# Patient Record
Sex: FEMALE | Race: WHITE | NOT HISPANIC OR LATINO | Employment: OTHER | URBAN - METROPOLITAN AREA
[De-identification: names, ages, dates, MRNs, and addresses within clinical notes are randomized per-mention and may not be internally consistent; named-entity substitution may affect disease eponyms.]

---

## 2017-01-13 ENCOUNTER — OFFICE VISIT (OUTPATIENT)
Dept: GASTROENTEROLOGY | Facility: CLINIC | Age: 72
End: 2017-01-13
Payer: MEDICARE

## 2017-01-13 VITALS
RESPIRATION RATE: 18 BRPM | DIASTOLIC BLOOD PRESSURE: 82 MMHG | HEIGHT: 63 IN | HEART RATE: 91 BPM | WEIGHT: 146.81 LBS | SYSTOLIC BLOOD PRESSURE: 132 MMHG | BODY MASS INDEX: 26.01 KG/M2

## 2017-01-13 DIAGNOSIS — K21.9 GASTROESOPHAGEAL REFLUX DISEASE WITHOUT ESOPHAGITIS: ICD-10-CM

## 2017-01-13 DIAGNOSIS — R43.2 TASTE SENSE ALTERED: Primary | ICD-10-CM

## 2017-01-13 DIAGNOSIS — K55.9 ISCHEMIC COLITIS: ICD-10-CM

## 2017-01-13 PROCEDURE — 99499 UNLISTED E&M SERVICE: CPT | Mod: S$GLB,,, | Performed by: NURSE PRACTITIONER

## 2017-01-13 PROCEDURE — 1159F MED LIST DOCD IN RCRD: CPT | Mod: S$GLB,,, | Performed by: NURSE PRACTITIONER

## 2017-01-13 PROCEDURE — 99214 OFFICE O/P EST MOD 30 MIN: CPT | Mod: S$GLB,,, | Performed by: NURSE PRACTITIONER

## 2017-01-13 PROCEDURE — 99999 PR PBB SHADOW E&M-EST. PATIENT-LVL III: CPT | Mod: PBBFAC,,, | Performed by: NURSE PRACTITIONER

## 2017-01-13 PROCEDURE — 1157F ADVNC CARE PLAN IN RCRD: CPT | Mod: S$GLB,,, | Performed by: NURSE PRACTITIONER

## 2017-01-13 PROCEDURE — 3075F SYST BP GE 130 - 139MM HG: CPT | Mod: S$GLB,,, | Performed by: NURSE PRACTITIONER

## 2017-01-13 PROCEDURE — 1160F RVW MEDS BY RX/DR IN RCRD: CPT | Mod: S$GLB,,, | Performed by: NURSE PRACTITIONER

## 2017-01-13 PROCEDURE — 3079F DIAST BP 80-89 MM HG: CPT | Mod: S$GLB,,, | Performed by: NURSE PRACTITIONER

## 2017-01-13 PROCEDURE — 1126F AMNT PAIN NOTED NONE PRSNT: CPT | Mod: S$GLB,,, | Performed by: NURSE PRACTITIONER

## 2017-01-13 NOTE — PATIENT INSTRUCTIONS

## 2017-01-13 NOTE — PROGRESS NOTES
Subjective:       Patient ID: Haley Castro is a 71 y.o. female Body mass index is 26.01 kg/(m^2).    Chief Complaint: Follow-up    This patient is new to me.  Established patient of Dr. Sousa.    HPI Comments: Patient is here for follow-up on ischemic colitis. Patient reports she is significantly improved, reports scheduled to see Dr. Cam (cardiologist) soon for follow-up.    Diarrhea    Chronicity: follow-up. Episode onset: started 11/27/16. The problem has been resolved. The patient states that diarrhea does not awaken her from sleep. Associated symptoms include bloating (slight, improved), increased flatus and weight loss (lost about 7 lbs with everything, but stable now). Pertinent negatives include no abdominal pain (resolved), chills, coughing, fever or vomiting. Associated symptoms comments: Diarrhea resolved; bowel movements about 3 times daily of soft formed stool in the mornings. Nothing aggravates the symptoms. Risk factors include recent antibiotic use and recent hospitalization (completed antibiotics from hospital for diarrhea; denies ill contacts, suspect food intake, or foreign travel). She has tried change of diet (completed cipro and flagyl; bentyl completed, stopped lomotil and imodium; taking aspirin 325 mg daily; protonix 40 mg once daily) for the symptoms. The treatment provided significant relief. There is no history of inflammatory bowel disease or irritable bowel syndrome.     Review of Systems   Constitutional: Positive for appetite change (improving), fatigue (improving) and weight loss (lost about 7 lbs with everything, but stable now). Negative for chills, fever and unexpected weight change.   HENT: Negative for sore throat and trouble swallowing.         Reports change in taste buds- unchanged; taking protonix 40 mg once daily; denies heartburn   Respiratory: Negative for cough, choking, chest tightness and shortness of breath.    Cardiovascular: Negative for chest pain and  palpitations.   Gastrointestinal: Positive for bloating (slight, improved) and flatus. Negative for abdominal pain (resolved), anal bleeding, blood in stool, constipation, diarrhea (resolved), nausea, rectal pain and vomiting.        Denies heartburn or indigestion   Genitourinary: Negative for difficulty urinating, dysuria, flank pain, frequency, pelvic pain and urgency.   Neurological: Negative for weakness.       Past Medical History   Diagnosis Date    Arthritis     Depression 2012    Diverticulosis     Fatty liver     Former smoker     GERD (gastroesophageal reflux disease)     HLD (hyperlipidemia) 2012    HTN (hypertension) 2012    Infectious gastroenteritis     Ischemic colitis      Past Surgical History   Procedure Laterality Date    Hysteretomy       section       x 3     Hemorrhoid surgery      Oophorectomy       one remains    Hysterectomy  age 35     TAHUSO benign reasons    Appendectomy      Tonsillectomy      Carotid angiogram      Hand surgery Right 2016     tarun noland/Dr. Abhinav Rolle    Joint replacement Right      1st interphalangeal joint of 3rd finger    Colonoscopy N/A 2016     Procedure: COLONOSCOPY;  Surgeon: Grzegorz Sousa Jr., MD;  Location: [facility];  Service: Endoscopy;  Laterality: N/A;     Family History   Problem Relation Age of Onset    Heart disease Mother 85     MI    Heart disease Father 55     MI    Colon cancer Neg Hx     Colon polyps Neg Hx     Crohn's disease Neg Hx     Ulcerative colitis Neg Hx      Wt Readings from Last 10 Encounters:   17 66.6 kg (146 lb 13.2 oz)   16 69.6 kg (153 lb 7 oz)   16 67.9 kg (149 lb 11.1 oz)   10/14/16 69.4 kg (153 lb)   16 68.4 kg (150 lb 12.7 oz)   16 67.9 kg (149 lb 11.1 oz)   07/07/15 68.9 kg (151 lb 14.4 oz)   06/22/15 68.6 kg (151 lb 4.8 oz)   01/15/15 69.5 kg (153 lb 3.2 oz)   14 69.3 kg (152 lb 12.8 oz)     Lab Results   Component Value  "Date    WBC 7.18 12/09/2016    HGB 13.4 12/09/2016    HCT 41.4 12/09/2016    MCV 92 12/09/2016     (H) 12/09/2016     Reviewed prior medical records including 11/28/16 stool studies (negative), 12/12/16 CTA abdomen/pelvis ("There is calcified plaque deposition within the common iliac arteries resulting in <50% luminal stenosis." "Widely patent origins of the celiac artery and SMA.") & endoscopy history (see surgical history).    11/27/16 ct abdomen/pelvis  was reviewed and radiology report states:  Impression: " 1. Colitis involving the transverse colon through the sigmoid colon.  2.  Minimal sliding type hiatal hernia  3.  Diffuse hepatic steatosis  4.  Colonic diverticulosis  5.  Infrarenal abdominal aortic ectasia  Additional findings as above "    11/28/16 Colonoscopy was reviewed and procedure report states:   " Impression:           - Severe Mid and Left-sided ischemic colitis.                        - Ulcerated mucosa in the mid sigmoid colon.                         Biopsied.                        - Congested, erythematous, inflamed and ulcerated                         mucosa in the proximal sigmoid colon, in the                         descending colon, at the splenic flexure and in the                         transverse colon.                        - Ulcerated mucosa at the hepatic flexure. Biopsied.                        - Ulcerated mucosa in the mid ascending colon.                         Biopsied.                        - Erythematous mucosa in the cecum.                        - Diverticulosis in the sigmoid colon.                        - Non-bleeding internal hemorrhoids.                        - The examination was otherwise normal.  Recommendation:       - Return patient to [facility] for ongoing care.                        - Clear liquid diet.                        - Check hypercoag panel in the morning.                        - Resume aspirin at full dose tomorrow.               " "         - Await pathology results.                        - May need to repeat CT scan (computed tomography)                         of the abdomen with IV contrast (CT angiogram). ".  Biopsy results:   "1. CECAL BIOPSIES:  - MODERATE ACUTE COLITIS.    2.-4. ASCENDING, HEPATIC FLEXURE, AND SIGMOID COLON BIOPSIES:  - ISCHEMIC COLITIS."  Objective:      Physical Exam   Constitutional: She is oriented to person, place, and time. She appears well-developed and well-nourished. No distress.   HENT:   Mouth/Throat: Oropharynx is clear and moist and mucous membranes are normal. No oral lesions. No oropharyngeal exudate.   Eyes: Conjunctivae are normal. No scleral icterus.   Cardiovascular: Normal rate.    Pulmonary/Chest: Effort normal. No respiratory distress.   Abdominal: Soft. Normal appearance and bowel sounds are normal. She exhibits no distension, no ascites and no mass. There is no tenderness. There is no rigidity, no rebound, no guarding, no tenderness at McBurney's point and negative Hyde's sign.   Neurological: She is alert and oriented to person, place, and time.   Skin: Skin is warm and dry. No rash noted. She is not diaphoretic. No erythema. No pallor.   Non-jaundiced   Psychiatric: She has a normal mood and affect. Her behavior is normal.   Nursing note and vitals reviewed.      Assessment:       1. Taste sense altered    2. Gastroesophageal reflux disease without esophagitis    3. Ischemic colitis        Plan:     - will discuss with Dr. Sousa to see when he wants to repeat colonoscopy and will notify patient of his recommendations, patient verbalized understanding and agreed with management plan    Taste sense altered  - schedule EGD, discussed procedure with patient, verbalized understanding  - if symptom persist, recommend seeing ENT for continued evaluation and management, patient verbalized understanding and agreed with management plan    Ischemic colitis  - follow-up with Dr. Cam for continued " evaluation and management and for him to determine if she needs to see vascular surgery, patient verbalized understanding  - continue aspirin 325 mg once daily as directed    Gastroesophageal reflux disease without esophagitis  -  continue   pantoprazole (PROTONIX) 40 MG tablet; Take 1 tablet (40 mg total) by mouth before breakfast.  Dispense: 90 tablet; Refill: 3, - take in the morning before breakfast, reviewed about possible long term use of medication (prefer to use lowest effective dose or discontinuing if possible) and reviewed the risks & benefits with taking a reflux medication long term, and to take OTC calcium and vitamin d supplements as directed (such as Citracal +D), pt verbalized understanding  -continue lifestyle modifications to help control reflux including: avoid large meals, avoid eating within 2-3 hours of bedtime (avoid late night eating & lying down soon after eating), elevate head of bed if nocturnal symptoms are present, smoking cessation (if current smoker), & weight loss (if overweight).   - avoid known foods which trigger reflux symptoms & to minimize/avoid high-fat foods, chocolate, caffeine, citrus, alcohol, & tomato products.  - avoid/limit use of NSAID's, since they can cause GI upset, bleeding, and/or ulcers. If needed, take with food.  - schedule EGD, discussed procedure with patient, verbalized understanding    Return in about 2 months (around 3/13/2017), or if symptoms worsen or fail to improve.    If no improvement in symptoms or symptoms worsen, call/follow-up at clinic or go to ER.

## 2017-01-13 NOTE — MR AVS SNAPSHOT
[facility] Gastroenterology  [address]  [place] [address]  Phone: [phone]                  Haley Castro   2017 11:00 AM   Office Visit    Description:  Female : 1945   Provider:  AMBROSIO Cardoso   Department:  [place] - Gastroenterology           Reason for Visit     Follow-up           Diagnoses this Visit        Comments    Taste sense altered    -  Primary     Gastroesophageal reflux disease without esophagitis         Ischemic colitis                To Do List           Future Appointments        Provider Department Dept Phone    2017 1:40 PM Avis Waters MD [facility] Internal Medicine [phone]    2017 8:40 AM Aquilino Cam MD [facility] Cardiology [phone]    2017 11:00 AM Avis Waters MD [facility] Internal Medicine [phone]      Goals (5 Years of Data)     None      Follow-Up and Disposition     Return if symptoms worsen or fail to improve.      [facility]     [facility] Nurse Care [facility] -  Assistance  Registered nurses in the [facility] provide clinical advisement, health education, appointment booking, and other advisory services.  Call for this free service at [phone].             Medications           Message regarding Medications     Verify the changes and/or additions to your medication regime listed below are the same as discussed with your clinician today.  If any of these changes or additions are incorrect, please notify your healthcare provider.             Verify that the below list of medications is an accurate representation of the medications you are currently taking.  If none reported, the list may be blank. If incorrect, please contact your healthcare provider. Carry this list with you in case of emergency.           Current Medications     aspirin (ECOTRIN) 325 MG EC tablet Take 1 tablet (325 mg total) by mouth once daily.    atorvastatin (LIPITOR) 20 MG tablet Take 1  "tablet (20 mg total) by mouth once daily.    conjugated estrogens (PREMARIN) vaginal cream Place 0.5 g vaginally twice a week.    dicyclomine (BENTYL) 10 MG capsule Take 2 capsules (20 mg total) by mouth 4 (four) times daily.    diphenoxylate-atropine 2.5-0.025 mg (LOMOTIL) 2.5-0.025 mg per tablet Take 1 tablet by mouth 4 (four) times daily as needed for Diarrhea.    duloxetine (CYMBALTA) 60 MG capsule TAKE 1 CAPSULE (60 MG TOTAL) BY MOUTH ONCE DAILY.    hydrochlorothiazide (MICROZIDE) 12.5 mg capsule Take 1 capsule (12.5 mg total) by mouth once daily.    pantoprazole (PROTONIX) 40 MG tablet Take 1 tablet (40 mg total) by mouth before breakfast.           Clinical Reference Information           Vital Signs - Last Recorded  Most recent update: 1/13/2017 11:09 AM by Willy Damian LPN    BP Pulse Resp Ht Wt LMP    132/82 91 18 5' 3" (1.6 m) 66.6 kg (146 lb 13.2 oz) 03/28/1980    BMI                26.01 kg/m2          Blood Pressure          Most Recent Value    BP  132/82      Allergies as of 1/13/2017     No Known Drug Allergies      Immunizations Administered on Date of Encounter - 1/13/2017     None      Instructions      GERD (Adult)    The esophagus is a tube that carries food from the mouth to the stomach. A valve at the lower end of the esophagus prevents stomach acid from flowing upward. When this valve doesn't work properly, stomach contents may repeatedly flow back up (reflux) into the esophagus. This is called gastroesophageal reflux disease (GERD). GERD can irritate the esophagus. It can cause problems with swallowing or breathing. In severe cases, GERD can cause recurrent pneumonia or other serious problems.  Symptoms of reflux include burning, pressure or sharp pain in the upper abdomen or mid to lower chest. The pain can spread to the neck, back, or shoulder. There may be belching, an acid taste in the back of the throat, chronic cough, or sore throat or hoarseness. GERD symptoms often occur " "during the day after a big meal. They can also occur at night when lying down.   Home care  Lifestyle changes can help reduce symptoms. If needed, medicines may be prescribed. Symptoms often improve with treatment, but if treatment is stopped, the symptoms often return after a few months. So most persons with GERD will need to continue treatment.  Lifestyle changes  · Limit or avoid fatty, fried, and spicy foods, as well as coffee, chocolate, mint, and foods with high acid content such as tomatoes and citrus fruit and juices (orange, grapefruit, lemon).  · Dont eat large meals, especially at night. Frequent, smaller meals are best. Do not lie down right after eating. And dont eat anything 3 hours before going to bed.  · Avoid drinking alcohol and smoking. As much as possible, stay away from second hand smoke.  · If you are overweight, losing weight will reduce symptoms.   · Avoid wearing tight clothing around your stomach area.  · If your symptoms occur during sleep, use a foam wedge to elevate your upper body (not just your head.) Or, place 4" blocks under the head of your bed.  Medicines  If needed, medicines can help relieve the symptoms of GERD and prevent damage to the esophagus. Discuss a medicine plan with your healthcare provider. This may include one or more of the following medicines:  · Antacids to help neutralize the normal acids in your stomach.  · Acid blockers (H2 blockers) to decrease acid production.  · Acid inhibitors (PPIs) to decrease acid production in a different way than the blockers. They may work better, but can take a little longer to take effect.  Take an antacid 30-60 minutes after eating and at bedtime, but not at the same time as an acid blocker.  Try not to take medicines such as ibuprofen and aspirin. If you are taking aspirin for your heart or other medical reasons, talk to your healthcare provider about stopping it.  Follow-up care  Follow up with your healthcare provider or as " advised by our staff.  When to seek medical advice  Call your healthcare provider if any of the following occur:  · Stomach pain gets worse or moves to the lower right abdomen (appendix area)  · Chest pain appears or gets worse, or spreads to the back, neck, shoulder, or arm  · Frequent vomiting (cant keep down liquids)  · Blood in the stool or vomit (red or black in color)  · Feeling weak or dizzy  · Fever of 100.4ºF (38ºC) or higher, or as directed by your healthcare provider  © 2780-2211 [employer]. [address] [place] [address]. All rights reserved. This information is not intended as a substitute for professional medical care. Always follow your healthcare professional's instructions.

## 2017-01-16 ENCOUNTER — TREATMENT PLANNING (OUTPATIENT)
Dept: GASTROENTEROLOGY | Facility: CLINIC | Age: 72
End: 2017-01-16

## 2017-01-16 NOTE — Clinical Note
Please inform patient that I updated Dr. Sousa on her status and he said no need to repeat colonoscopy if symptoms are improving/resolved. Continue with previous recommendations and next surveillance colonoscopy is due in 5-10 years. Thanks ALIZE

## 2017-01-16 NOTE — PROGRESS NOTES
Discussed case and updated Dr. Sousa on patient stasis; Dr. Sousa reports patient does not need a follow-up colonoscopy if symptoms are improving/resolved. Patient's next colonoscopy is based on normal surveillance screening recommendations. Repeat colonoscopy in 5-10 years for surveillance. (no history of colon polyps and no family history of colon cancer/polyps, but 2009 colonoscopy Dr. Reyes recommend 5 year surveillance- unsure of reasoning).

## 2017-01-23 ENCOUNTER — TELEPHONE (OUTPATIENT)
Dept: GASTROENTEROLOGY | Facility: CLINIC | Age: 72
End: 2017-01-23

## 2017-01-23 NOTE — TELEPHONE ENCOUNTER
Spoke to pt  She don't think she needs to have EGD  Not having any problems right now    She will discuss with Dr Waters at her next appt

## 2017-01-23 NOTE — TELEPHONE ENCOUNTER
----- Message from Aisha Saxena sent at 1/23/2017  9:18 AM CST -----  Contact: self  Patient called to cancel procedure scheduled for 2/7. Please contact [phone] (ewsi)

## 2017-01-26 ENCOUNTER — TELEPHONE (OUTPATIENT)
Dept: GASTROENTEROLOGY | Facility: CLINIC | Age: 72
End: 2017-01-26

## 2017-01-26 NOTE — TELEPHONE ENCOUNTER
----- Message from AMBROSIO Cardoso sent at 1/16/2017  3:40 PM CST -----  Please inform patient that I updated Dr. Sousa on her status and he said no need to repeat colonoscopy if symptoms are improving/resolved.  Continue with previous recommendations and next surveillance colonoscopy is due in 5-10 years.  Thanks  LAIZE

## 2017-02-01 ENCOUNTER — TELEPHONE (OUTPATIENT)
Dept: FAMILY MEDICINE | Facility: CLINIC | Age: 72
End: 2017-02-01

## 2017-02-01 ENCOUNTER — OFFICE VISIT (OUTPATIENT)
Dept: INTERNAL MEDICINE | Facility: CLINIC | Age: 72
End: 2017-02-01
Payer: MEDICARE

## 2017-02-01 VITALS
BODY MASS INDEX: 25.55 KG/M2 | OXYGEN SATURATION: 98 % | DIASTOLIC BLOOD PRESSURE: 82 MMHG | SYSTOLIC BLOOD PRESSURE: 138 MMHG | WEIGHT: 144.19 LBS | RESPIRATION RATE: 18 BRPM | HEIGHT: 63 IN | HEART RATE: 92 BPM

## 2017-02-01 DIAGNOSIS — Z87.19 HISTORY OF ISCHEMIC COLITIS: Primary | ICD-10-CM

## 2017-02-01 DIAGNOSIS — F32.A DEPRESSION, UNSPECIFIED DEPRESSION TYPE: ICD-10-CM

## 2017-02-01 PROCEDURE — 3079F DIAST BP 80-89 MM HG: CPT | Mod: S$GLB,,, | Performed by: INTERNAL MEDICINE

## 2017-02-01 PROCEDURE — 99213 OFFICE O/P EST LOW 20 MIN: CPT | Mod: S$GLB,,, | Performed by: INTERNAL MEDICINE

## 2017-02-01 PROCEDURE — 1157F ADVNC CARE PLAN IN RCRD: CPT | Mod: S$GLB,,, | Performed by: INTERNAL MEDICINE

## 2017-02-01 PROCEDURE — 1159F MED LIST DOCD IN RCRD: CPT | Mod: S$GLB,,, | Performed by: INTERNAL MEDICINE

## 2017-02-01 PROCEDURE — 1126F AMNT PAIN NOTED NONE PRSNT: CPT | Mod: S$GLB,,, | Performed by: INTERNAL MEDICINE

## 2017-02-01 PROCEDURE — 3075F SYST BP GE 130 - 139MM HG: CPT | Mod: S$GLB,,, | Performed by: INTERNAL MEDICINE

## 2017-02-01 PROCEDURE — 1160F RVW MEDS BY RX/DR IN RCRD: CPT | Mod: S$GLB,,, | Performed by: INTERNAL MEDICINE

## 2017-02-01 PROCEDURE — 99499 UNLISTED E&M SERVICE: CPT | Mod: S$GLB,,, | Performed by: INTERNAL MEDICINE

## 2017-02-01 PROCEDURE — 99999 PR PBB SHADOW E&M-EST. PATIENT-LVL III: CPT | Mod: PBBFAC,,, | Performed by: INTERNAL MEDICINE

## 2017-02-01 RX ORDER — DULOXETIN HYDROCHLORIDE 60 MG/1
60 CAPSULE, DELAYED RELEASE ORAL DAILY
Qty: 90 CAPSULE | Refills: 1 | Status: SHIPPED | OUTPATIENT
Start: 2017-02-01 | End: 2017-03-03 | Stop reason: SDUPTHER

## 2017-02-01 NOTE — TELEPHONE ENCOUNTER
Noted her recent ischemic colitis.  Would you think cymbalta had any cause/or her use of premarin cream.

## 2017-02-01 NOTE — MR AVS SNAPSHOT
[facility] Internal Medicine  [address]  [place] [address]  Phone: [phone]  Fax: [phone]                  Haley Castro   2017 1:40 PM   Office Visit    Description:  Female : 1945   Provider:  Avis Waters MD   Department:  [facility] Internal Medicine           Reason for Visit     Depression     Medication Refill           Diagnoses this Visit        Comments    Depression, unspecified depression type                To Do List           Future Appointments        Provider Department Dept Phone    2017 8:40 AM Aquilino Cam MD [place] - Cardiology [phone]    2017 11:00 AM Avis Waters MD [facility] Internal Medicine [phone]      Goals (5 Years of Data)     None       These Medications        Disp Refills Start End    duloxetine (CYMBALTA) 60 MG capsule 90 capsule 1 2017     Take 1 capsule (60 mg total) by mouth once daily. - Oral    Pharmacy: [facility]/pharmacy #[id number] - [place] - [address] AT [facility] Ph #: [phone]         [facility] On [facility]     [facility] On Call Nurse Care Line -  Assistance  Registered nurses in the [facility] provide clinical advisement, health education, appointment booking, and other advisory services.  Call for this free service at [phone].             Medications           Message regarding Medications     Verify the changes and/or additions to your medication regime listed below are the same as discussed with your clinician today.  If any of these changes or additions are incorrect, please notify your healthcare provider.        CHANGE how you are taking these medications     Start Taking Instead of    duloxetine (CYMBALTA) 60 MG capsule duloxetine (CYMBALTA) 60 MG capsule    Dosage:  Take 1 capsule (60 mg total) by mouth once daily. Dosage:  TAKE 1 CAPSULE (60 MG TOTAL) BY MOUTH ONCE DAILY.    Reason for Change:  Reorder            Verify that the  "below list of medications is an accurate representation of the medications you are currently taking.  If none reported, the list may be blank. If incorrect, please contact your healthcare provider. Carry this list with you in case of emergency.           Current Medications     aspirin (ECOTRIN) 325 MG EC tablet Take 1 tablet (325 mg total) by mouth once daily.    atorvastatin (LIPITOR) 20 MG tablet Take 1 tablet (20 mg total) by mouth once daily.    duloxetine (CYMBALTA) 60 MG capsule Take 1 capsule (60 mg total) by mouth once daily.    hydrochlorothiazide (MICROZIDE) 12.5 mg capsule Take 1 capsule (12.5 mg total) by mouth once daily.    pantoprazole (PROTONIX) 40 MG tablet Take 1 tablet (40 mg total) by mouth before breakfast.    conjugated estrogens (PREMARIN) vaginal cream Place 0.5 g vaginally twice a week.           Clinical Reference Information           Vital Signs - Last Recorded  Most recent update: 2/1/2017  1:39 PM by Hortencia Breen LPN    BP Pulse Resp Ht Wt LMP    138/82 (BP Location: Left arm, Patient Position: Sitting, BP Method: Manual) 92 18 5' 3" (1.6 m) 65.4 kg (144 lb 2.9 oz) 03/28/1980    SpO2 BMI             98% 25.54 kg/m2         Blood Pressure          Most Recent Value    BP  138/82      Allergies as of 2/1/2017     No Known Drug Allergies      Immunizations Administered on Date of Encounter - 2/1/2017     None      "

## 2017-02-01 NOTE — TELEPHONE ENCOUNTER
"----- Message from Dona Alexandre sent at 2/1/2017  1:59 PM CST -----  Contact: self at check out  Patient has an appointment set up to see Dr Waters in May for a "6 month check up". She wants to know if she needs to keep it or can it be canceled?    "

## 2017-02-01 NOTE — PROGRESS NOTES
HISTORY OF PRESENT ILLNESS:  Pt. is a 71 y.o. female presents for monitoring of her use of cymbalta.  In the interim, has had episode of ischemic colitis, has seen GI for followup and is also due for followup with Dr. Cam/argenis collins.  She is on 325 mg aspirin.  Hypercoagulable workup was also performed by GI. Note the patient has no history of venous thrombosis.  Of note, she has been on vaginal estrogen cream.      ROS:  GENERAL: No fever, chills, fatigability or weight loss.  SKIN: No rashes, itching or changes in color or texture of skin.  HEAD: No headaches or recent head trauma.  EARS: Denies ear pain, discharge or vertigo.  NOSE: No loss of smell, no epistaxis or postnasal drip.  MOUTH & THROAT: No hoarseness or change in voice. No excessive gum bleeding.  NODES: Denies swollen glands.  CHEST: Denies HERNANDEZ, cyanosis, wheezing, cough and sputum production.  CARDIOVASCULAR: Denies chest pain, PND, orthopnea or reduced exercise tolerance.  ABDOMEN: Appetite fine. No weight loss. Denies constipation, diarrhea, abdominal pain, hematemesis or blood in stool.  URINARY: No flank pain, dysuria or hematuria.  PERIPHERAL VASCULAR: No claudication or cyanosis. No edema.  MUSCULOSKELETAL: No joint stiffness or swelling. Denies back pain.  NEUROLOGIC: Denies numbness    PE:   Vitals:   Vitals:    02/01/17 1338   BP: 138/82   Pulse: 92   Resp: 18       GENERAL: no acute distress, A&Ox3, comfortable.  Female with BMI of 25  HEENT: tympanic membranes clear, nasal mucosa pink, no pharyngeal erythema or exudate  NECK: supple, no cervical lymphadenopathy, no thyromegaly; no supraclavicular nodes;   CHEST:  clear to auscultation bilaterally, no crackles or wheeze; no increased work of breathing;  CARDIOVASCULAR: regular rate and rhythm, no rubs, murmurs or gallops.  ABDOMEN: normal bowel sounds, soft non-tender, non-distended; no palpable organomegaly;   EXT: no clubbing, cyanosis or edema.     ASSESSMENT/PLAN:    Depression,  unspecified depression type  -     duloxetine (CYMBALTA) 60 MG capsule; Take 1 capsule (60 mg total) by mouth once daily.  Dispense: 90 capsule; Refill: 1    History of ischemic colitis: now on  mg po q day; will discuss with GYN use of estrogen cream;    Call if condition changes or worsens.

## 2017-02-04 PROBLEM — Z87.19 HISTORY OF ISCHEMIC COLITIS: Status: ACTIVE | Noted: 2017-02-04

## 2017-02-06 ENCOUNTER — OFFICE VISIT (OUTPATIENT)
Dept: CARDIOLOGY | Facility: CLINIC | Age: 72
End: 2017-02-06
Payer: MEDICARE

## 2017-02-06 VITALS
HEIGHT: 63 IN | WEIGHT: 148.38 LBS | SYSTOLIC BLOOD PRESSURE: 153 MMHG | HEART RATE: 91 BPM | BODY MASS INDEX: 26.29 KG/M2 | DIASTOLIC BLOOD PRESSURE: 80 MMHG

## 2017-02-06 DIAGNOSIS — I10 ESSENTIAL HYPERTENSION: ICD-10-CM

## 2017-02-06 DIAGNOSIS — Z87.19 HISTORY OF ISCHEMIC COLITIS: Primary | ICD-10-CM

## 2017-02-06 DIAGNOSIS — E78.2 MIXED HYPERLIPIDEMIA: ICD-10-CM

## 2017-02-06 DIAGNOSIS — I65.23 BILATERAL CAROTID ARTERY STENOSIS: ICD-10-CM

## 2017-02-06 DIAGNOSIS — Z82.49 FAMILY HISTORY OF EARLY CAD: ICD-10-CM

## 2017-02-06 PROCEDURE — 99214 OFFICE O/P EST MOD 30 MIN: CPT | Mod: S$GLB,,, | Performed by: INTERNAL MEDICINE

## 2017-02-06 PROCEDURE — 1160F RVW MEDS BY RX/DR IN RCRD: CPT | Mod: S$GLB,,, | Performed by: INTERNAL MEDICINE

## 2017-02-06 PROCEDURE — 3079F DIAST BP 80-89 MM HG: CPT | Mod: S$GLB,,, | Performed by: INTERNAL MEDICINE

## 2017-02-06 PROCEDURE — 1159F MED LIST DOCD IN RCRD: CPT | Mod: S$GLB,,, | Performed by: INTERNAL MEDICINE

## 2017-02-06 PROCEDURE — 1126F AMNT PAIN NOTED NONE PRSNT: CPT | Mod: S$GLB,,, | Performed by: INTERNAL MEDICINE

## 2017-02-06 PROCEDURE — 99999 PR PBB SHADOW E&M-EST. PATIENT-LVL III: CPT | Mod: PBBFAC,,, | Performed by: INTERNAL MEDICINE

## 2017-02-06 PROCEDURE — 3077F SYST BP >= 140 MM HG: CPT | Mod: S$GLB,,, | Performed by: INTERNAL MEDICINE

## 2017-02-06 PROCEDURE — 1157F ADVNC CARE PLAN IN RCRD: CPT | Mod: S$GLB,,, | Performed by: INTERNAL MEDICINE

## 2017-02-06 PROCEDURE — 99499 UNLISTED E&M SERVICE: CPT | Mod: S$GLB,,, | Performed by: INTERNAL MEDICINE

## 2017-02-06 RX ORDER — ATORVASTATIN CALCIUM 40 MG/1
40 TABLET, FILM COATED ORAL DAILY
Qty: 90 TABLET | Refills: 5 | Status: SHIPPED | OUTPATIENT
Start: 2017-02-06 | End: 2017-11-28 | Stop reason: SDUPTHER

## 2017-02-06 RX ORDER — ATORVASTATIN CALCIUM 20 MG/1
20 TABLET, FILM COATED ORAL DAILY
Qty: 90 TABLET | Refills: 3 | Status: SHIPPED | OUTPATIENT
Start: 2017-02-06 | End: 2017-02-06 | Stop reason: SDUPTHER

## 2017-02-06 RX ORDER — CARVEDILOL 3.12 MG/1
3.12 TABLET ORAL 2 TIMES DAILY
Qty: 180 TABLET | Refills: 5 | Status: SHIPPED | OUTPATIENT
Start: 2017-02-06 | End: 2018-02-16 | Stop reason: SDUPTHER

## 2017-02-06 NOTE — MR AVS SNAPSHOT
[place] [facility]  [address] [facility] [address] [place] [address]  Phone: [phone]                  Haley Castro   2017 8:40 AM   Office Visit    Description:  Female : 1945   Provider:  Aquilino Cam MD   Department:  [place] [facility]           Reason for Visit     Establish Care           Diagnoses this Visit        Comments    History of ischemic colitis    -  Primary     Family history of early CAD         Essential hypertension         Bilateral carotid artery stenosis         Mixed hyperlipidemia                To Do List           Future Appointments        Provider Department Dept Phone    2017 11:00 AM Avis Waters MD [place] [facility] [phone]      Goals (5 Years of Data)     None      Follow-Up and Disposition     Return in about 3 months (around 2017).       These Medications        Disp Refills Start End    atorvastatin (LIPITOR) 20 MG tablet 90 tablet 3 2017     Take 1 tablet (20 mg total) by mouth once daily. - Oral    Pharmacy: [facility]/pharmacy #[id number] - [facility] [address] [facility] [address] [address] [facility] [place]. AT [facility] Ph #: [phone]       carvedilol (COREG) 3.125 MG tablet 180 tablet 5 2017    Take 1 tablet (3.125 mg total) by mouth 2 (two) times daily. - Oral    Pharmacy: [facility]/pharmacy #[id number] [facility] [address]. AT [facility] Ph #: [phone]         [facility]     [facility] Nurse Care Line -  Assistance  Registered nurses in the [facility] provide clinical advisement, health education, appointment booking, and other advisory services.  Call for this free service at [phone].             Medications           Message regarding Medications     Verify the changes and/or additions to your medication regime listed below are the same as discussed with your clinician today.  If any of these changes or additions are incorrect, please notify your healthcare  "provider.        START taking these NEW medications        Refills    carvedilol (COREG) 3.125 MG tablet 5    Sig: Take 1 tablet (3.125 mg total) by mouth 2 (two) times daily.    Class: Normal    Route: Oral      STOP taking these medications     hydrochlorothiazide (MICROZIDE) 12.5 mg capsule Take 1 capsule (12.5 mg total) by mouth once daily.           Verify that the below list of medications is an accurate representation of the medications you are currently taking.  If none reported, the list may be blank. If incorrect, please contact your healthcare provider. Carry this list with you in case of emergency.           Current Medications     aspirin (ECOTRIN) 325 MG EC tablet Take 1 tablet (325 mg total) by mouth once daily.    atorvastatin (LIPITOR) 20 MG tablet Take 1 tablet (20 mg total) by mouth once daily.    conjugated estrogens (PREMARIN) vaginal cream Place 0.5 g vaginally twice a week.    duloxetine (CYMBALTA) 60 MG capsule Take 1 capsule (60 mg total) by mouth once daily.    pantoprazole (PROTONIX) 40 MG tablet Take 1 tablet (40 mg total) by mouth before breakfast.    carvedilol (COREG) 3.125 MG tablet Take 1 tablet (3.125 mg total) by mouth 2 (two) times daily.           Clinical Reference Information           Your Vitals Were     BP Pulse Height Weight Last Period BMI    153/80 (BP Location: Left arm, Patient Position: Sitting, BP Method: Automatic) 91 5' 3" (1.6 m) 67.3 kg (148 lb 5.9 oz) 03/28/1980 26.28 kg/m2      Blood Pressure          Most Recent Value    BP  (!)  153/80      Allergies as of 2/6/2017     No Known Drug Allergies      Immunizations Administered on Date of Encounter - 2/6/2017     None      Orders Placed During Today's Visit     Future Labs/Procedures Expected by Expires    CAR Ultrasound doppler arterial legs bilat  5/6/2017 2/6/2018    CK  5/6/2017 2/7/2018    Comprehensive metabolic panel  5/6/2017 2/7/2018    Lipid panel  5/6/2017 2/7/2018      Language Assistance Services     " ATTENTION: Language assistance services are available, free of charge. Please call [phone].      ATENCIÓN: Si habla savanah, tiene a bowers disposición servicios gratuitos de asistencia lingüística. Llame al [phone].     CHÚ Ý: N?u b?n nói Ti?ng Vi?t, có các d?ch v? h? tr? ngôn ng? mi?n phí dành cho b?n. G?i s? [phone].         [facility] complies with applicable Federal civil rights laws and does not discriminate on the basis of race, color, national origin, age, disability, or sex.

## 2017-02-06 NOTE — PROGRESS NOTES
Subjective:    Patient ID:  Haley Castro is a 71 y.o. female who presents for follow-up of [facility] (Abdominal aortic ectasia - Ref by Dr. Waters )      HPI   Pt of Dr Mota wanting to change with PVD here for ?? Intestinal angina. Sx markedly improved. Admits to aranza 3 claudication left leg > right leg.   No cardiac angina    Review of Systems   Constitution: Negative for malaise/fatigue.   Eyes: Negative for blurred vision.   Cardiovascular: Negative for chest pain, claudication, cyanosis, dyspnea on exertion, irregular heartbeat, leg swelling, near-syncope, orthopnea, palpitations, paroxysmal nocturnal dyspnea and syncope.   Respiratory: Negative for cough and shortness of breath.    Hematologic/Lymphatic: Does not bruise/bleed easily.   Musculoskeletal: Negative for back pain, falls, joint pain, muscle cramps, muscle weakness and myalgias.   Gastrointestinal: Negative for abdominal pain, change in bowel habit, nausea and vomiting.   Genitourinary: Negative for urgency.   Neurological: Negative for dizziness, focal weakness and light-headedness.        Objective:    Physical Exam   Constitutional: She is oriented to person, place, and time. She appears well-developed and well-nourished.   HENT:   Head: Normocephalic.   Eyes: Conjunctivae are normal.   Neck: Normal range of motion. Neck supple. No JVD present.   Cardiovascular: Normal rate, regular rhythm, normal heart sounds and intact distal pulses.    Pulses:       Carotid pulses are 2+ on the right side, and 2+ on the left side.       Radial pulses are 2+ on the right side, and 2+ on the left side.        Dorsalis pedis pulses are 2+ on the right side, and 2+ on the left side.        Posterior tibial pulses are 2+ on the right side, and 2+ on the left side.   Pulmonary/Chest: Effort normal and breath sounds normal.   Abdominal: Soft. Bowel sounds are normal.   Musculoskeletal: She exhibits no edema or tenderness.   Neurological: She is alert  and oriented to person, place, and time. Gait normal.   Skin: Skin is warm, dry and intact. No cyanosis. Nails show no clubbing.   Psychiatric: She has a normal mood and affect. Her speech is normal and behavior is normal. Thought content normal.   Nursing note and vitals reviewed.            ..    Chemistry        Component Value Date/Time     11/27/2016 0952    K 3.5 11/27/2016 0952     11/27/2016 0952    CO2 26 11/27/2016 0952    BUN 17 11/27/2016 0952    CREATININE 0.7 12/09/2016 1152     (H) 11/27/2016 0952        Component Value Date/Time    CALCIUM 9.5 11/27/2016 0952    ALKPHOS 83 11/27/2016 0952    AST 33 11/27/2016 0952    ALT 29 11/27/2016 0952    BILITOT 1.3 11/27/2016 0952            ..  Lab Results   Component Value Date    CHOL 235 (H) 06/07/2016    CHOL 225 (H) 06/22/2015    CHOL 187 06/06/2014     Lab Results   Component Value Date    HDL 65 06/07/2016    HDL 71 06/22/2015    HDL 57 06/06/2014     Lab Results   Component Value Date    LDLCALC 136.8 06/07/2016    LDLCALC 128.2 06/22/2015    LDLCALC 110.8 06/06/2014     Lab Results   Component Value Date    TRIG 166 (H) 06/07/2016    TRIG 129 06/22/2015    TRIG 96 06/06/2014     Lab Results   Component Value Date    CHOLHDL 27.7 06/07/2016    CHOLHDL 31.6 06/22/2015    CHOLHDL 30.5 06/06/2014     ..  Lab Results   Component Value Date    WBC 7.18 12/09/2016    HGB 13.4 12/09/2016    HCT 41.4 12/09/2016    MCV 92 12/09/2016     (H) 12/09/2016       Test(s) Reviewed  I have reviewed the following in detail:  [] Stress test   [] Angiography   [x] Echocardiogram   [x] Labs   [x] Other:  abd CTA     Assessment:         ICD-10-CM ICD-9-CM   1. History of ischemic colitis Z87.19 V12.79   2. Family history of early CAD Z82.49 V17.3   3. Essential hypertension I10 401.9   4. Bilateral carotid artery stenosis I65.23 433.10     433.30   5. Mixed hyperlipidemia E78.2 272.2     Problem List Items Addressed This Visit     Carotid artery  stenosis    Essential hypertension    Family history of early CAD    History of ischemic colitis - Primary    HLD (hyperlipidemia)           Plan:           Return to clinic 3 months   Low level/low impact aerobic exercise 5x's/wk. Heart healthy diet and risk factor modification.    See labs and med orders.  Better lipid control   NEYMAR US  Angio if develops sx

## 2017-02-06 NOTE — LETTER
February 6, 2017      Tammy Ag, FNP  [address] [facility] [address] [place] [address]           [facility]  [address] [facility] [place] [address]  Phone: [phone]          Patient: Haley Castro   MR Number: [id number]   YOB: 1945   Date of Visit: 2/6/2017       Dear Tammy Ag:    Thank you for referring Haley Castro to me for evaluation. Attached you will find relevant portions of my assessment and plan of care.    If you have questions, please do not hesitate to call me. I look forward to following Haley Castro along with you.    Sincerely,    Aquilino Cam MD    Enclosure  CC:  No Recipients    If you would like to receive this communication electronically, please contact [email or web address] or [phone] to request more information on [facility] access.    For providers and/or their staff who would like to refer a patient to [facility], please contact us through our one-stop-shop provider referral line, Devin Balderas, at [phone].    If you feel you have received this communication in error or would no longer like to receive these types of communications, please e-mail [email or web address]

## 2017-03-02 DIAGNOSIS — F32.A DEPRESSION: ICD-10-CM

## 2017-03-03 RX ORDER — DULOXETIN HYDROCHLORIDE 60 MG/1
CAPSULE, DELAYED RELEASE ORAL
Qty: 30 CAPSULE | Refills: 3 | Status: SHIPPED | OUTPATIENT
Start: 2017-03-03 | End: 2017-05-23 | Stop reason: SDUPTHER

## 2017-04-27 ENCOUNTER — CLINICAL SUPPORT (OUTPATIENT)
Dept: CARDIOLOGY | Facility: CLINIC | Age: 72
End: 2017-04-27
Payer: MEDICARE

## 2017-04-27 ENCOUNTER — HOSPITAL ENCOUNTER (OUTPATIENT)
Dept: RADIOLOGY | Facility: HOSPITAL | Age: 72
Discharge: HOME | End: 2017-04-27
Attending: THORACIC SURGERY (CARDIOTHORACIC VASCULAR SURGERY)
Payer: MEDICARE

## 2017-04-27 DIAGNOSIS — Z82.49 FAMILY HISTORY OF EARLY CAD: ICD-10-CM

## 2017-04-27 DIAGNOSIS — I65.23 BILATERAL CAROTID ARTERY STENOSIS: ICD-10-CM

## 2017-04-27 DIAGNOSIS — Z87.19 HISTORY OF ISCHEMIC COLITIS: ICD-10-CM

## 2017-04-27 DIAGNOSIS — E78.2 MIXED HYPERLIPIDEMIA: ICD-10-CM

## 2017-04-27 DIAGNOSIS — I10 ESSENTIAL HYPERTENSION: ICD-10-CM

## 2017-04-27 PROCEDURE — 93925 LOWER EXTREMITY STUDY: CPT | Mod: S$GLB,,, | Performed by: INTERNAL MEDICINE

## 2017-04-27 PROCEDURE — 93880 EXTRACRANIAL BILAT STUDY: CPT | Mod: 26,,, | Performed by: RADIOLOGY

## 2017-05-15 ENCOUNTER — OFFICE VISIT (OUTPATIENT)
Dept: CARDIOLOGY | Facility: CLINIC | Age: 72
End: 2017-05-15
Payer: MEDICARE

## 2017-05-15 VITALS
HEIGHT: 63 IN | WEIGHT: 148.56 LBS | BODY MASS INDEX: 26.32 KG/M2 | SYSTOLIC BLOOD PRESSURE: 139 MMHG | HEART RATE: 81 BPM | DIASTOLIC BLOOD PRESSURE: 88 MMHG

## 2017-05-15 DIAGNOSIS — R53.1 WEAKNESS: ICD-10-CM

## 2017-05-15 DIAGNOSIS — Z87.19 HISTORY OF ISCHEMIC COLITIS: ICD-10-CM

## 2017-05-15 DIAGNOSIS — E78.2 MIXED HYPERLIPIDEMIA: ICD-10-CM

## 2017-05-15 DIAGNOSIS — I73.9 PVD (PERIPHERAL VASCULAR DISEASE): ICD-10-CM

## 2017-05-15 DIAGNOSIS — I65.23 BILATERAL CAROTID ARTERY STENOSIS: Primary | ICD-10-CM

## 2017-05-15 DIAGNOSIS — I10 ESSENTIAL HYPERTENSION: ICD-10-CM

## 2017-05-15 PROCEDURE — 1160F RVW MEDS BY RX/DR IN RCRD: CPT | Mod: S$GLB,,, | Performed by: INTERNAL MEDICINE

## 2017-05-15 PROCEDURE — 99214 OFFICE O/P EST MOD 30 MIN: CPT | Mod: S$GLB,,, | Performed by: INTERNAL MEDICINE

## 2017-05-15 PROCEDURE — 1159F MED LIST DOCD IN RCRD: CPT | Mod: S$GLB,,, | Performed by: INTERNAL MEDICINE

## 2017-05-15 PROCEDURE — 99499 UNLISTED E&M SERVICE: CPT | Mod: S$GLB,,, | Performed by: INTERNAL MEDICINE

## 2017-05-15 PROCEDURE — 1126F AMNT PAIN NOTED NONE PRSNT: CPT | Mod: S$GLB,,, | Performed by: INTERNAL MEDICINE

## 2017-05-15 PROCEDURE — 3079F DIAST BP 80-89 MM HG: CPT | Mod: S$GLB,,, | Performed by: INTERNAL MEDICINE

## 2017-05-15 PROCEDURE — 3075F SYST BP GE 130 - 139MM HG: CPT | Mod: S$GLB,,, | Performed by: INTERNAL MEDICINE

## 2017-05-15 PROCEDURE — 99999 PR PBB SHADOW E&M-EST. PATIENT-LVL III: CPT | Mod: PBBFAC,,, | Performed by: INTERNAL MEDICINE

## 2017-05-15 RX ORDER — EZETIMIBE 10 MG/1
10 TABLET ORAL DAILY
Qty: 90 TABLET | Refills: 3 | Status: SHIPPED | OUTPATIENT
Start: 2017-05-15 | End: 2017-11-28

## 2017-05-15 NOTE — PROGRESS NOTES
Subjective:    Patient ID:  Haley Castro is a 72 y.o. female who presents for follow-up of Hyperlipidemia (3 month f/u - review arterial doppler and labs) and Hypertension      HPI   Here for follow up of PVD here for ?? Intestinal angina. Patients states is doing well no chest pain, SOB or change in exertional tolerence. Patient does not exercise but remains very active with out change in exertional tolerance or chest pain.  No claudication    Review of Systems   Constitution: Negative for malaise/fatigue.   Eyes: Negative for blurred vision.   Cardiovascular: Negative for chest pain, claudication, cyanosis, dyspnea on exertion, irregular heartbeat, leg swelling, near-syncope, orthopnea, palpitations, paroxysmal nocturnal dyspnea and syncope.   Respiratory: Negative for cough and shortness of breath.    Hematologic/Lymphatic: Does not bruise/bleed easily.   Musculoskeletal: Negative for back pain, falls, joint pain, muscle cramps, muscle weakness and myalgias.   Gastrointestinal: Negative for abdominal pain, change in bowel habit, nausea and vomiting.   Genitourinary: Negative for urgency.   Neurological: Negative for dizziness, focal weakness and light-headedness.        Objective:    Physical Exam   Constitutional: She is oriented to person, place, and time. She appears well-developed and well-nourished.   HENT:   Head: Normocephalic.   Eyes: Conjunctivae are normal.   Neck: Normal range of motion. Neck supple. No JVD present.   Cardiovascular: Normal rate, regular rhythm, normal heart sounds and intact distal pulses.    Pulses:       Carotid pulses are 2+ on the right side, and 2+ on the left side.       Radial pulses are 2+ on the right side, and 2+ on the left side.        Dorsalis pedis pulses are 2+ on the right side, and 2+ on the left side.        Posterior tibial pulses are 2+ on the right side, and 2+ on the left side.   Pulmonary/Chest: Effort normal and breath sounds normal.   Abdominal: Soft. Bowel  sounds are normal.   Musculoskeletal: She exhibits no edema or tenderness.   Neurological: She is alert and oriented to person, place, and time. Gait normal.   Skin: Skin is warm, dry and intact. No cyanosis. Nails show no clubbing.   Psychiatric: She has a normal mood and affect. Her speech is normal and behavior is normal. Thought content normal.   Nursing note and vitals reviewed.            ..    Chemistry        Component Value Date/Time     04/27/2017 0823    K 4.6 04/27/2017 0823     04/27/2017 0823    CO2 26 04/27/2017 0823    BUN 22 04/27/2017 0823    CREATININE 0.8 04/27/2017 0823     04/27/2017 0823        Component Value Date/Time    CALCIUM 10.2 04/27/2017 0823    ALKPHOS 76 04/27/2017 0823    AST 19 04/27/2017 0823    ALT 10 04/27/2017 0823    BILITOT 0.4 04/27/2017 0823            ..  Lab Results   Component Value Date    CHOL 209 (H) 04/27/2017    CHOL 235 (H) 06/07/2016    CHOL 225 (H) 06/22/2015     Lab Results   Component Value Date    HDL 55 04/27/2017    HDL 65 06/07/2016    HDL 71 06/22/2015     Lab Results   Component Value Date    LDLCALC 122.4 04/27/2017    LDLCALC 136.8 06/07/2016    LDLCALC 128.2 06/22/2015     Lab Results   Component Value Date    TRIG 158 (H) 04/27/2017    TRIG 166 (H) 06/07/2016    TRIG 129 06/22/2015     Lab Results   Component Value Date    CHOLHDL 26.3 04/27/2017    CHOLHDL 27.7 06/07/2016    CHOLHDL 31.6 06/22/2015     ..  Lab Results   Component Value Date    WBC 7.18 12/09/2016    HGB 13.4 12/09/2016    HCT 41.4 12/09/2016    MCV 92 12/09/2016     (H) 12/09/2016       Test(s) Reviewed  I have reviewed the following in detail:  [] Stress test   [] Angiography   [x] Echocardiogram   [x] Labs   [] Other:       Assessment:       1. Bilateral carotid artery stenosis     2. Essential hypertension     3. Mixed hyperlipidemia     4. Weakness     5. History of ischemic colitis     6. PVD (peripheral vascular disease)         Problem List Items  Addressed This Visit     Carotid artery stenosis - Primary    Essential hypertension    History of ischemic colitis    Hyperlipidemia    PVD (peripheral vascular disease)      Other Visit Diagnoses     Weakness               Plan:           Return to clinic 8 months   Low level/low impact aerobic exercise 5x's/wk. Heart healthy diet and risk factor modification.    See labs and med orders.  Ad zetia due to mod bilateral carotid dsx  Carotid CTA 2/18

## 2017-05-15 NOTE — MR AVS SNAPSHOT
[place] [facility]  [address] [facility] [address]  [place] [address]  Phone: [phone]                  Haley Castro   5/15/2017 2:00 PM   Office Visit    Description:  Female : 1945   Provider:  Aquilino Cam MD   Department:  [place] [facility]           Reason for Visit     Hyperlipidemia     Hypertension           Diagnoses this Visit        Comments    Bilateral carotid artery stenosis    -  Primary     Essential hypertension         Mixed hyperlipidemia         Weakness         History of ischemic colitis         PVD (peripheral vascular disease)                To Do List           Future Appointments        Provider Department Dept Phone    2017 11:00 AM Avis Waters MD [place] [facility] [phone]      Goals (5 Years of Data)     None      Follow-Up and Disposition     Return in about 7 months (around 12/15/2017).       These Medications        Disp Refills Start End    ezetimibe (ZETIA) 10 mg tablet 90 tablet 3 5/15/2017 5/15/2018    Take 1 tablet (10 mg total) by mouth once daily. - Oral    Pharmacy: [facility]/pharmacy #[id number] - [place] - [address]. [facility] #: [phone]         [facility] On Call     [facility] On Call Nurse Care Line -  Assistance  Unless otherwise directed by your provider, please contact [facility], our nurse care line that is available for  assistance.     Registered nurses in the [facility] provide: appointment scheduling, clinical advisement, health education, and other advisory services.  Call: [phone] (toll free)               Medications           Message regarding Medications     Verify the changes and/or additions to your medication regime listed below are the same as discussed with your clinician today.  If any of these changes or additions are incorrect, please notify your healthcare provider.        START taking these NEW medications        Refills    ezetimibe  "(ZETIA) 10 mg tablet 3    Sig: Take 1 tablet (10 mg total) by mouth once daily.    Class: Normal    Route: Oral      STOP taking these medications     conjugated estrogens (PREMARIN) vaginal cream Place 0.5 g vaginally twice a week.           Verify that the below list of medications is an accurate representation of the medications you are currently taking.  If none reported, the list may be blank. If incorrect, please contact your healthcare provider. Carry this list with you in case of emergency.           Current Medications     aspirin (ECOTRIN) 325 MG EC tablet Take 1 tablet (325 mg total) by mouth once daily.    atorvastatin (LIPITOR) 40 MG tablet Take 1 tablet (40 mg total) by mouth once daily.    carvedilol (COREG) 3.125 MG tablet Take 1 tablet (3.125 mg total) by mouth 2 (two) times daily.    duloxetine (CYMBALTA) 60 MG capsule TAKE 1 CAPSULE (60 MG TOTAL) BY MOUTH ONCE DAILY.    pantoprazole (PROTONIX) 40 MG tablet Take 1 tablet (40 mg total) by mouth before breakfast.    ezetimibe (ZETIA) 10 mg tablet Take 1 tablet (10 mg total) by mouth once daily.           Clinical Reference Information           Your Vitals Were     BP Pulse Height Weight Last Period BMI    139/88 (BP Location: Left arm, Patient Position: Sitting, BP Method: Automatic) 81 5' 3" (1.6 m) 67.4 kg (148 lb 9.4 oz) 03/28/1980 26.32 kg/m2      Blood Pressure          Most Recent Value    BP  139/88      Allergies as of 5/15/2017     No Known Drug Allergies      Immunizations Administered on Date of Encounter - 5/15/2017     None      Orders Placed During Today's Visit     Future Labs/Procedures Expected by Expires    Comprehensive metabolic panel  2/15/2018 5/16/2018    CTA Neck  2/15/2018 5/16/2018    Lipid panel  2/15/2018 5/16/2018      [facility] Sign-Up     Activating your [facility] account is as easy as 1-2-3!     1) Visit [email or web address], select Sign Up Now, enter this activation code and your date of birth, then select " Next.  Activation code not generated  Current Patient Portal Status: Account disabled      2) Create a username and password to use when you visit [facility] in the future and select a security question in case you lose your password and select Next.    3) Enter your e-mail address and click Sign Up!    Additional Information  If you have questions, please e-mail [email or web address] or call [phone] to talk to our [facility] staff. Remember, [facility] is NOT to be used for urgent needs. For medical emergencies, dial 911.         Language Assistance Services     ATTENTION: Language assistance services are available, free of charge. Please call [phone].      ATENCIÓN: Si alena pavon, tiene a bowers disposición servicios gratuitos de asistencia lingüística. Llame al [phone].     CHÚ Ý: N?u b?n nói Ti?ng Vi?t, có các d?ch v? h? tr? ngôn ng? mi?n phí dành cho b?n. G?i s? [phone].         [facility] complies with applicable Federal civil rights laws and does not discriminate on the basis of race, color, national origin, age, disability, or sex.

## 2017-05-23 ENCOUNTER — OFFICE VISIT (OUTPATIENT)
Dept: INTERNAL MEDICINE | Facility: CLINIC | Age: 72
End: 2017-05-23
Payer: MEDICARE

## 2017-05-23 VITALS
DIASTOLIC BLOOD PRESSURE: 82 MMHG | RESPIRATION RATE: 16 BRPM | WEIGHT: 148.38 LBS | OXYGEN SATURATION: 96 % | BODY MASS INDEX: 26.29 KG/M2 | HEIGHT: 63 IN | HEART RATE: 78 BPM | SYSTOLIC BLOOD PRESSURE: 138 MMHG

## 2017-05-23 DIAGNOSIS — K21.9 GASTROESOPHAGEAL REFLUX DISEASE WITHOUT ESOPHAGITIS: ICD-10-CM

## 2017-05-23 DIAGNOSIS — Z12.31 ENCOUNTER FOR SCREENING MAMMOGRAM FOR MALIGNANT NEOPLASM OF BREAST: ICD-10-CM

## 2017-05-23 DIAGNOSIS — I10 ESSENTIAL HYPERTENSION: ICD-10-CM

## 2017-05-23 DIAGNOSIS — I65.23 BILATERAL CAROTID ARTERY STENOSIS: ICD-10-CM

## 2017-05-23 DIAGNOSIS — Z00.00 HEALTH CARE MAINTENANCE: Primary | ICD-10-CM

## 2017-05-23 DIAGNOSIS — E78.2 MIXED HYPERLIPIDEMIA: ICD-10-CM

## 2017-05-23 DIAGNOSIS — F32.A DEPRESSION, UNSPECIFIED DEPRESSION TYPE: ICD-10-CM

## 2017-05-23 PROCEDURE — 99999 PR PBB SHADOW E&M-EST. PATIENT-LVL III: CPT | Mod: PBBFAC,,, | Performed by: INTERNAL MEDICINE

## 2017-05-23 PROCEDURE — 99214 OFFICE O/P EST MOD 30 MIN: CPT | Mod: S$GLB,,, | Performed by: INTERNAL MEDICINE

## 2017-05-23 PROCEDURE — 1157F ADVNC CARE PLAN IN RCRD: CPT | Mod: S$GLB,,, | Performed by: INTERNAL MEDICINE

## 2017-05-23 PROCEDURE — 99499 UNLISTED E&M SERVICE: CPT | Mod: S$GLB,,, | Performed by: INTERNAL MEDICINE

## 2017-05-23 PROCEDURE — 1159F MED LIST DOCD IN RCRD: CPT | Mod: S$GLB,,, | Performed by: INTERNAL MEDICINE

## 2017-05-23 PROCEDURE — 1126F AMNT PAIN NOTED NONE PRSNT: CPT | Mod: S$GLB,,, | Performed by: INTERNAL MEDICINE

## 2017-05-23 RX ORDER — ATORVASTATIN CALCIUM 20 MG/1
TABLET, FILM COATED ORAL
COMMUNITY
Start: 2017-04-28 | End: 2017-05-23 | Stop reason: DRUGHIGH

## 2017-05-23 RX ORDER — AZITHROMYCIN 250 MG/1
TABLET, FILM COATED ORAL
COMMUNITY
Start: 2017-02-21 | End: 2017-05-23 | Stop reason: ALTCHOICE

## 2017-05-23 RX ORDER — HYDROCORTISONE 25 MG/G
CREAM TOPICAL 2 TIMES DAILY
Qty: 20 G | Refills: 0 | Status: SHIPPED | OUTPATIENT
Start: 2017-05-23 | End: 2017-11-28

## 2017-05-23 RX ORDER — DULOXETIN HYDROCHLORIDE 60 MG/1
60 CAPSULE, DELAYED RELEASE ORAL DAILY
Qty: 90 CAPSULE | Refills: 1 | Status: SHIPPED | OUTPATIENT
Start: 2017-05-23 | End: 2017-11-28 | Stop reason: SDUPTHER

## 2017-05-23 NOTE — PROGRESS NOTES
"HISTORY OF PRESENT ILLNESS:  Pt. is a 72 y.o. female presents for monitoring of her hyperlipidemia, depression, GERD.  She is coming due for mammogram, is UTD with BMD, colonoscopy 11/28/16, UTD with tetanus.  She is UTD with pneumonia vaccines.  Dr. Cam has added zetia to lipitor, had some findings with US carotids, will be getting CTA:    "Impression          1.  Homogeneous, partially calcified atherosclerotic plaque deposition within both proximal internal carotid arteries resulting in 50-69% luminal stenosis within the left and right internal carotid artery    2.  Irregular waveforms within the left vertebral artery.  A previous angiogram performed in 2014 is not identified the left vertebral artery; however, no significant stenosis was observed within the left subclavian artery proximally at that time.  Additional evaluation of the neck vasculature could be achieved with CTA of the neck as deemed clinically appropriate."         Lab Results   Component Value Date    WBC 7.18 12/09/2016    HGB 13.4 12/09/2016    HCT 41.4 12/09/2016     (H) 12/09/2016    CHOL 209 (H) 04/27/2017    TRIG 158 (H) 04/27/2017    HDL 55 04/27/2017    ALT 10 04/27/2017    AST 19 04/27/2017     04/27/2017    K 4.6 04/27/2017     04/27/2017    CREATININE 0.8 04/27/2017    BUN 22 04/27/2017    CO2 26 04/27/2017    TSH 2.570 06/07/2016    INR 1.1 11/29/2016    HGBA1C 5.8 06/27/2016     Lab Results   Component Value Date    LDLCALC 122.4 04/27/2017       Is doing well on cymbalta and pantoprazole.      ROS:  GENERAL: No fever, chills, positive fatigability; no weight loss.  SKIN: No rashes, itching or changes in color or texture of skin.  HEAD: No headaches or recent head trauma.  EARS: Denies ear pain, discharge or vertigo.  NOSE: No loss of smell, no epistaxis or postnasal drip.  MOUTH & THROAT: No hoarseness or change in voice. No excessive gum bleeding.  NODES: Denies swollen glands.  CHEST: Denies HERNANDEZ, cyanosis, " wheezing, cough and sputum production.  CARDIOVASCULAR: Denies chest pain, PND, orthopnea or reduced exercise tolerance.  ABDOMEN: Appetite fine. No weight loss. Denies constipation, diarrhea, abdominal pain, hematemesis; rare fresh blood in stool/has hemorrhoids;.  URINARY: No flank pain, dysuria or hematuria.  PERIPHERAL VASCULAR: No claudication or cyanosis. No edema.  MUSCULOSKELETAL: No joint stiffness or swelling. Denies back pain.  NEUROLOGIC: Denies numbness    PE:   Vitals:   Vitals:    05/23/17 1059   BP: (!) 134/94   Pulse: 78   Resp: 16     My repeat: 138/82    GENERAL: no acute distress, A&Ox3, comfortable.  Female with BMI of 26   HEENT: tympanic membranes clear, nasal mucosa pink, no pharyngeal erythema or exudate  NECK: supple, no cervical lymphadenopathy, no thyromegaly; no supraclavicular nodes;   CHEST:  clear to auscultation bilaterally, no crackles or wheeze; no increased work of breathing;  CARDIOVASCULAR: regular rate and rhythm, no rubs, murmurs or gallops.  ABDOMEN: normal bowel sounds, soft non-tender, non-distended; no palpable organomegaly;   EXT: no clubbing, cyanosis or edema.     ASSESSMENT/PLAN:    1.  HTN: will have pt. Monitor pressure at home;  2.  Hyperlipidemia: Zetia; had added to her current Lipitor by cardiology  3.  Carotid stenosis: reviewed US  4.  GERD: In control on current medication will continue;  5.  Depression: In control on current medication will continue;  6.  Health Maintenance: Immunizations up-to-date for her pneumonia shots; prescription for Tdap Given;    Health care maintenance  -     Mammo Digital Screening Bilateral With CAD; Future; Expected date: 07/07/2017    Encounter for screening mammogram for malignant neoplasm of breast   -     Mammo Digital Screening Bilateral With CAD; Future; Expected date: 07/07/2017    Depression, unspecified depression type  -     duloxetine (CYMBALTA) 60 MG capsule; Take 1 capsule (60 mg total) by mouth once daily.   Dispense: 90 capsule; Refill: 1    Other orders  -     hydrocortisone 2.5 % cream; Apply topically 2 (two) times daily.  Dispense: 20 g; Refill: 0      Call if condition changes or worsens.

## 2017-06-26 DIAGNOSIS — I65.23 BILATERAL CAROTID ARTERY STENOSIS: Primary | ICD-10-CM

## 2017-06-28 ENCOUNTER — TELEPHONE (OUTPATIENT)
Dept: VASCULAR SURGERY | Facility: CLINIC | Age: 72
End: 2017-06-28

## 2017-06-28 NOTE — TELEPHONE ENCOUNTER
Pt was scheduled in April by Dr Cam's office for carotid us that Dr Kemp ordered and requested to be done in July. Dr Kemp reviewed us and request f/u next April

## 2017-06-28 NOTE — TELEPHONE ENCOUNTER
----- Message from Abeba Mandujano MA sent at 2016 12:49 PM CDT -----  Regardin yr w/carotid us  1 yr w/carotid us

## 2017-07-11 ENCOUNTER — HOSPITAL ENCOUNTER (OUTPATIENT)
Dept: RADIOLOGY | Facility: HOSPITAL | Age: 72
Discharge: HOME | End: 2017-07-11
Attending: INTERNAL MEDICINE
Payer: MEDICARE

## 2017-07-11 DIAGNOSIS — Z12.31 ENCOUNTER FOR SCREENING MAMMOGRAM FOR MALIGNANT NEOPLASM OF BREAST: ICD-10-CM

## 2017-07-11 DIAGNOSIS — Z00.00 HEALTH CARE MAINTENANCE: ICD-10-CM

## 2017-07-11 PROCEDURE — 77067 SCR MAMMO BI INCL CAD: CPT | Mod: 26,,, | Performed by: RADIOLOGY

## 2017-07-11 PROCEDURE — 77063 BREAST TOMOSYNTHESIS BI: CPT | Mod: 26,,, | Performed by: RADIOLOGY

## 2017-07-11 PROCEDURE — 77067 SCR MAMMO BI INCL CAD: CPT | Mod: TC

## 2017-11-26 NOTE — PROGRESS NOTES
HISTORY OF PRESENT ILLNESS:  Pt. is a 72 y.o. female presents for monitoring of her hyperlipidemia, depression, GERD.  She continues on cymbalta, she states she likes that dose.  Is due for followup of LDL after Dr. Cam added zetia to her lipitor 40 mg po q day for her carotid disease.  She states she stopped the zetia after her stomach got upset, thought it was due to med.  She states this week, GERD was worse than usual, is on pantoprazole.  She has been using a lot of NSAIDs.  She does have voltaren gel.  Has felt some occ bloating.    Health Maintenance Topics with due status: Not Due       Topic Last Completion Date    TETANUS VACCINE 2013    DEXA SCAN 2016    Colonoscopy 2016    Lipid Panel 2017    Mammogram 2017     There are no preventive care reminders to display for this patient.    Lab Results   Component Value Date    WBC 7.18 2016    HGB 13.4 2016    HCT 41.4 2016     (H) 2016    CHOL 209 (H) 2017    TRIG 158 (H) 2017    HDL 55 2017    LDLCALC 122.4 2017    ALT 10 2017    AST 19 2017     2017    K 4.6 2017     2017    CREATININE 0.8 2017    BUN 22 2017    CO2 26 2017    ALBUMIN 3.8 2017    TSH 2.570 2016    INR 1.1 2016    HGBA1C 5.8 2016       Past Medical History:   Diagnosis Date    Arthritis     Depression 2012    Diverticulosis     Fatty liver     Former smoker     GERD (gastroesophageal reflux disease)     HLD (hyperlipidemia) 2012    HTN (hypertension) 2012    Infectious gastroenteritis     Ischemic colitis        Past Surgical History:   Procedure Laterality Date    APPENDECTOMY      carotid angiogram       SECTION      x 3     COLONOSCOPY N/A 2016    Procedure: COLONOSCOPY;  Surgeon: Grzegorz Sousa Jr., MD;  Location[facility];  Service: Endoscopy;  Laterality: N/A;     COLONOSCOPY  04/14/2009    [place], repeat [place] 5 years    HAND SURGERY Right 05/2016    tarun noland/Dr. Abhinav Rolle    HEMORRHOID SURGERY      HYSTERECTOMY  age 35    TAHUSO benign reasons    hysteretomy      JOINT REPLACEMENT Right     1st interphalangeal joint of 3rd finger    OOPHORECTOMY      one remains    TONSILLECTOMY         Social History     Social History    Marital status:      Spouse name: N/A    Number of children: 3    Years of education: N/A     Occupational History    retired [email or web address]     Social History Main Topics    Smoking status: Former Smoker     Packs/day: 0.50     Years: 20.00     Quit date: 3/28/2013    Smokeless tobacco: Never Used      Comment: quit 2 years ago after intermittent use for 40 years    Alcohol use 0.0 oz/week      Comment: 0-2 ETOHic drinks per day    Drug use: No    Sexual activity: Yes     Partners: Male     Other Topics Concern    None     Social History Narrative    None       ROS:  GENERAL: No fever, chills, fatigability or weight loss.  SKIN: No rashes, itching or changes in color or texture of skin.  HEAD: No headaches or recent head trauma.  EARS: Denies ear pain, discharge or vertigo.  NOSE: No loss of smell, no epistaxis or postnasal drip.  MOUTH & THROAT: No hoarseness or change in voice. No excessive gum bleeding.  NODES: Denies swollen glands.  CHEST: Denies HERNANDEZ, cyanosis, wheezing, cough and sputum production.  CARDIOVASCULAR: Denies chest pain, PND, orthopnea or reduced exercise tolerance.  ABDOMEN: Appetite fine. No weight loss. Denies constipation, diarrhea, abdominal pain, hematemesis or blood in stool; positive GERD/bloating;  URINARY: No flank pain, dysuria or hematuria.  PERIPHERAL VASCULAR: No claudication or cyanosis. No edema.  MUSCULOSKELETAL: No joint stiffness or swelling. Denies back pain.  NEUROLOGIC: Denies numbness    PE:   Vitals:   Vitals:    11/28/17 1108   BP: 134/86   Pulse: 82   Resp: 16   Temp: 97.9  °F (36.6 °C)     GENERAL: no acute distress, A&Ox3, comfortable.  Female with BMI of 27   HEENT: tympanic membranes clear, nasal mucosa pink, no pharyngeal erythema or exudate  NECK: supple, no cervical lymphadenopathy, no thyromegaly; no supraclavicular nodes;   CHEST:  clear to auscultation bilaterally, no crackles or wheeze; no increased work of breathing;  CARDIOVASCULAR: regular rate and rhythm, no rubs, murmurs or gallops.  ABDOMEN: normal bowel sounds, soft non-tender, non-distended; no palpable organomegaly;   EXT: no clubbing, cyanosis or edema.     ASSESSMENT/PLAN:    Gastroesophageal reflux disease, esophagitis presence not specified  -     Case request GI: ESOPHAGOGASTRODUODENOSCOPY (EGD)  -     pantoprazole (PROTONIX) 40 MG tablet; Take 1 tablet (40 mg total) by mouth before breakfast.  Dispense: 90 tablet; Refill: 1    Hyperlipidemia, unspecified hyperlipidemia type  -     Lipid panel; Future; Expected date: 11/28/2017    Depression, unspecified depression type  -     DULoxetine (CYMBALTA) 60 MG capsule; Take 1 capsule (60 mg total) by mouth once daily.  Dispense: 90 capsule; Refill: 1    Mixed hyperlipidemia  -     atorvastatin (LIPITOR) 40 MG tablet; Take 1 tablet (40 mg total) by mouth once daily.  Dispense: 90 tablet; Refill: 5    Essential hypertension  -     atorvastatin (LIPITOR) 40 MG tablet; Take 1 tablet (40 mg total) by mouth once daily.  Dispense: 90 tablet; Refill: 5    Bilateral carotid artery stenosis  -     atorvastatin (LIPITOR) 40 MG tablet; Take 1 tablet (40 mg total) by mouth once daily.  Dispense: 90 tablet; Refill: 5    Health Maintenance:  -     Influenza - High Dose (65+) (PF) (IM)        Medication List with Changes/Refills   Current Medications    ASPIRIN (ECOTRIN) 325 MG EC TABLET    Take 1 tablet (325 mg total) by mouth once daily.    CARVEDILOL (COREG) 3.125 MG TABLET    Take 1 tablet (3.125 mg total) by mouth 2 (two) times daily.   Changed and/or Refilled Medications     Modified Medication Previous Medication    ATORVASTATIN (LIPITOR) 40 MG TABLET atorvastatin (LIPITOR) 40 MG tablet       Take 1 tablet (40 mg total) by mouth once daily.    Take 1 tablet (40 mg total) by mouth once daily.    DULOXETINE (CYMBALTA) 60 MG CAPSULE duloxetine (CYMBALTA) 60 MG capsule       Take 1 capsule (60 mg total) by mouth once daily.    Take 1 capsule (60 mg total) by mouth once daily.    PANTOPRAZOLE (PROTONIX) 40 MG TABLET pantoprazole (PROTONIX) 40 MG tablet       Take 1 tablet (40 mg total) by mouth before breakfast.    Take 1 tablet (40 mg total) by mouth before breakfast.   Discontinued Medications    ATORVASTATIN (LIPITOR) 20 MG TABLET    Take 1 tablet by mouth once daily.    EZETIMIBE (ZETIA) 10 MG TABLET    Take 1 tablet (10 mg total) by mouth once daily.    HYDROCORTISONE 2.5 % CREAM    Apply topically 2 (two) times daily.     Call if condition changes or worsens.

## 2017-11-28 ENCOUNTER — OFFICE VISIT (OUTPATIENT)
Dept: INTERNAL MEDICINE | Facility: CLINIC | Age: 72
End: 2017-11-28
Payer: MEDICARE

## 2017-11-28 VITALS
OXYGEN SATURATION: 99 % | TEMPERATURE: 98 F | HEIGHT: 63 IN | HEART RATE: 82 BPM | WEIGHT: 155.88 LBS | DIASTOLIC BLOOD PRESSURE: 86 MMHG | BODY MASS INDEX: 27.62 KG/M2 | RESPIRATION RATE: 16 BRPM | SYSTOLIC BLOOD PRESSURE: 134 MMHG

## 2017-11-28 DIAGNOSIS — E78.2 MIXED HYPERLIPIDEMIA: ICD-10-CM

## 2017-11-28 DIAGNOSIS — Z00.00 HEALTH CARE MAINTENANCE: ICD-10-CM

## 2017-11-28 DIAGNOSIS — F32.A DEPRESSION, UNSPECIFIED DEPRESSION TYPE: ICD-10-CM

## 2017-11-28 DIAGNOSIS — I65.23 BILATERAL CAROTID ARTERY STENOSIS: ICD-10-CM

## 2017-11-28 DIAGNOSIS — E78.5 HYPERLIPIDEMIA, UNSPECIFIED HYPERLIPIDEMIA TYPE: ICD-10-CM

## 2017-11-28 DIAGNOSIS — I10 ESSENTIAL HYPERTENSION: ICD-10-CM

## 2017-11-28 DIAGNOSIS — K21.9 GASTROESOPHAGEAL REFLUX DISEASE, ESOPHAGITIS PRESENCE NOT SPECIFIED: Primary | ICD-10-CM

## 2017-11-28 PROCEDURE — G0008 ADMIN INFLUENZA VIRUS VAC: HCPCS | Mod: S$GLB,,, | Performed by: INTERNAL MEDICINE

## 2017-11-28 PROCEDURE — 90662 IIV NO PRSV INCREASED AG IM: CPT | Mod: S$GLB,,, | Performed by: INTERNAL MEDICINE

## 2017-11-28 PROCEDURE — 99999 PR PBB SHADOW E&M-EST. PATIENT-LVL III: CPT | Mod: PBBFAC,,, | Performed by: INTERNAL MEDICINE

## 2017-11-28 PROCEDURE — 99214 OFFICE O/P EST MOD 30 MIN: CPT | Mod: S$GLB,,, | Performed by: INTERNAL MEDICINE

## 2017-11-28 PROCEDURE — 99499 UNLISTED E&M SERVICE: CPT | Mod: S$GLB,,, | Performed by: INTERNAL MEDICINE

## 2017-11-28 RX ORDER — DULOXETIN HYDROCHLORIDE 60 MG/1
60 CAPSULE, DELAYED RELEASE ORAL DAILY
Qty: 90 CAPSULE | Refills: 1 | Status: SHIPPED | OUTPATIENT
Start: 2017-11-28 | End: 2018-03-21 | Stop reason: SDUPTHER

## 2017-11-28 RX ORDER — ATORVASTATIN CALCIUM 20 MG/1
1 TABLET, FILM COATED ORAL DAILY
COMMUNITY
Start: 2017-10-26 | End: 2017-11-28 | Stop reason: DRUGHIGH

## 2017-11-28 RX ORDER — ATORVASTATIN CALCIUM 40 MG/1
40 TABLET, FILM COATED ORAL DAILY
Qty: 90 TABLET | Refills: 5
Start: 2017-11-28 | End: 2018-08-10 | Stop reason: SDUPTHER

## 2017-11-28 RX ORDER — PANTOPRAZOLE SODIUM 40 MG/1
40 TABLET, DELAYED RELEASE ORAL
Qty: 90 TABLET | Refills: 1 | Status: ON HOLD | OUTPATIENT
Start: 2017-11-28 | End: 2017-12-14

## 2017-11-29 ENCOUNTER — TELEPHONE (OUTPATIENT)
Dept: INTERNAL MEDICINE | Facility: CLINIC | Age: 72
End: 2017-11-29

## 2017-11-29 NOTE — TELEPHONE ENCOUNTER
----- Message from Priscilla Shepard sent at 11/29/2017 10:00 AM CST -----  Contact: self   Patient want to inform your office she taking atorvastatin 20mg not 40mg any questions please call back at [phone] (home)

## 2017-11-29 NOTE — TELEPHONE ENCOUNTER
If she is not taking the zetia, needs to start with the 40 mg lipitor.  Get labs in month, orders are in, just change date.

## 2017-11-30 DIAGNOSIS — K21.9 GASTROESOPHAGEAL REFLUX DISEASE WITHOUT ESOPHAGITIS: ICD-10-CM

## 2017-11-30 DIAGNOSIS — R43.2 TASTE SENSE ALTERED: ICD-10-CM

## 2017-11-30 RX ORDER — PANTOPRAZOLE SODIUM 40 MG/1
40 TABLET, DELAYED RELEASE ORAL
Qty: 90 TABLET | Refills: 3
Start: 2017-11-30 | End: 2018-11-30

## 2017-12-13 RX ORDER — EZETIMIBE 10 MG/1
10 TABLET ORAL DAILY
COMMUNITY
End: 2018-03-21 | Stop reason: ALTCHOICE

## 2017-12-14 ENCOUNTER — ANESTHESIA (OUTPATIENT)
Dept: ENDOSCOPY | Facility: HOSPITAL | Age: 72
End: 2017-12-14
Payer: MEDICARE

## 2017-12-14 ENCOUNTER — HOSPITAL ENCOUNTER (OUTPATIENT)
Facility: HOSPITAL | Age: 72
Discharge: HOME | End: 2017-12-14
Attending: INTERNAL MEDICINE | Admitting: INTERNAL MEDICINE
Payer: MEDICARE

## 2017-12-14 ENCOUNTER — SURGERY (OUTPATIENT)
Age: 72
End: 2017-12-14

## 2017-12-14 ENCOUNTER — ANESTHESIA EVENT (OUTPATIENT)
Dept: ENDOSCOPY | Facility: HOSPITAL | Age: 72
End: 2017-12-14
Payer: MEDICARE

## 2017-12-14 VITALS
DIASTOLIC BLOOD PRESSURE: 77 MMHG | BODY MASS INDEX: 26.58 KG/M2 | SYSTOLIC BLOOD PRESSURE: 155 MMHG | HEART RATE: 65 BPM | RESPIRATION RATE: 16 BRPM | OXYGEN SATURATION: 99 % | HEIGHT: 63 IN | WEIGHT: 150 LBS | TEMPERATURE: 98 F

## 2017-12-14 DIAGNOSIS — K21.9 GASTROESOPHAGEAL REFLUX DISEASE, ESOPHAGITIS PRESENCE NOT SPECIFIED: ICD-10-CM

## 2017-12-14 DIAGNOSIS — K21.9 GERD (GASTROESOPHAGEAL REFLUX DISEASE): ICD-10-CM

## 2017-12-14 DIAGNOSIS — K21.9 GASTROESOPHAGEAL REFLUX DISEASE WITHOUT ESOPHAGITIS: Primary | ICD-10-CM

## 2017-12-14 LAB — H PYLORI INDEX VALUE: NEGATIVE

## 2017-12-14 PROCEDURE — D9220A PRA ANESTHESIA: Mod: ANES,,, | Performed by: ANESTHESIOLOGY

## 2017-12-14 PROCEDURE — 63600175 PHARM REV CODE 636 W HCPCS: Mod: PO | Performed by: NURSE ANESTHETIST, CERTIFIED REGISTERED

## 2017-12-14 PROCEDURE — 43239 EGD BIOPSY SINGLE/MULTIPLE: CPT | Mod: ,,, | Performed by: INTERNAL MEDICINE

## 2017-12-14 PROCEDURE — 87449 NOS EACH ORGANISM AG IA: CPT | Mod: PO | Performed by: INTERNAL MEDICINE

## 2017-12-14 PROCEDURE — 43239 EGD BIOPSY SINGLE/MULTIPLE: CPT | Mod: PO | Performed by: INTERNAL MEDICINE

## 2017-12-14 PROCEDURE — 37000009 HC ANESTHESIA EA ADD 15 MINS: Mod: PO | Performed by: INTERNAL MEDICINE

## 2017-12-14 PROCEDURE — 88305 TISSUE EXAM BY PATHOLOGIST: CPT | Performed by: PATHOLOGY

## 2017-12-14 PROCEDURE — 88342 IMHCHEM/IMCYTCHM 1ST ANTB: CPT | Mod: 26,,, | Performed by: PATHOLOGY

## 2017-12-14 PROCEDURE — 88305 TISSUE EXAM BY PATHOLOGIST: CPT | Mod: 26,,, | Performed by: PATHOLOGY

## 2017-12-14 PROCEDURE — 27201012 HC FORCEPS, HOT/COLD, DISP: Mod: PO | Performed by: INTERNAL MEDICINE

## 2017-12-14 PROCEDURE — 37000008 HC ANESTHESIA 1ST 15 MINUTES: Mod: PO | Performed by: INTERNAL MEDICINE

## 2017-12-14 PROCEDURE — D9220A PRA ANESTHESIA: Mod: CRNA,,, | Performed by: NURSE ANESTHETIST, CERTIFIED REGISTERED

## 2017-12-14 RX ORDER — PROPOFOL 10 MG/ML
VIAL (ML) INTRAVENOUS
Status: DISCONTINUED | OUTPATIENT
Start: 2017-12-14 | End: 2017-12-14

## 2017-12-14 RX ORDER — SODIUM CHLORIDE, SODIUM LACTATE, POTASSIUM CHLORIDE, CALCIUM CHLORIDE 600; 310; 30; 20 MG/100ML; MG/100ML; MG/100ML; MG/100ML
INJECTION, SOLUTION INTRAVENOUS CONTINUOUS
Status: DISCONTINUED | OUTPATIENT
Start: 2017-12-14 | End: 2017-12-14 | Stop reason: HOSPADM

## 2017-12-14 RX ORDER — FENTANYL CITRATE 50 UG/ML
INJECTION, SOLUTION INTRAMUSCULAR; INTRAVENOUS
Status: DISCONTINUED | OUTPATIENT
Start: 2017-12-14 | End: 2017-12-14

## 2017-12-14 RX ORDER — PANTOPRAZOLE SODIUM 40 MG/1
40 TABLET, DELAYED RELEASE ORAL
Qty: 90 TABLET | Refills: 3 | Status: SHIPPED | OUTPATIENT
Start: 2017-12-14 | End: 2018-07-12 | Stop reason: SDUPTHER

## 2017-12-14 RX ORDER — LIDOCAINE HCL/PF 100 MG/5ML
SYRINGE (ML) INTRAVENOUS
Status: DISCONTINUED | OUTPATIENT
Start: 2017-12-14 | End: 2017-12-14

## 2017-12-14 RX ADMIN — PROPOFOL 100 MG: 10 INJECTION, EMULSION INTRAVENOUS at 08:12

## 2017-12-14 RX ADMIN — FENTANYL CITRATE 50 MCG: 50 INJECTION, SOLUTION INTRAMUSCULAR; INTRAVENOUS at 08:12

## 2017-12-14 RX ADMIN — PROPOFOL 20 MG: 10 INJECTION, EMULSION INTRAVENOUS at 08:12

## 2017-12-14 RX ADMIN — SODIUM CHLORIDE, SODIUM LACTATE, POTASSIUM CHLORIDE, CALCIUM CHLORIDE: 600; 310; 30; 20 INJECTION, SOLUTION INTRAVENOUS at 08:12

## 2017-12-14 RX ADMIN — PROPOFOL 40 MG: 10 INJECTION, EMULSION INTRAVENOUS at 08:12

## 2017-12-14 RX ADMIN — LIDOCAINE HYDROCHLORIDE 50 MG: 20 INJECTION, SOLUTION INTRAVENOUS at 08:12

## 2017-12-14 NOTE — TRANSFER OF CARE
"Anesthesia Transfer of Care Note    Patient: Haley Castro    Procedure(s) Performed: Procedure(s) (LRB):  ESOPHAGOGASTRODUODENOSCOPY (EGD) (N/A)    Patient location: PACU    Anesthesia Type: general    Transport from OR: Transported from OR on room air with adequate spontaneous ventilation    Post pain: adequate analgesia    Post assessment: no apparent anesthetic complications and tolerated procedure well    Post vital signs: stable    Level of consciousness: awake and sedated    Nausea/Vomiting: no nausea/vomiting    Complications: none    Transfer of care protocol was followed      Last vitals:   Visit Vitals  BP (!) 179/86 (BP Location: Right arm, Patient Position: Lying)   Pulse 76   Temp 36.6 °C (97.9 °F) (Skin)   Resp 18   Ht 5' 3" (1.6 m)   Wt 68 kg (150 lb)   LMP 03/28/1980   SpO2 95%   Breastfeeding? No   BMI 26.57 kg/m²     "

## 2017-12-14 NOTE — ANESTHESIA PREPROCEDURE EVALUATION
12/14/2017  Haley Castro is a 72 y.o., female.    Anesthesia Evaluation    I have reviewed the Patient Summary Reports.    I have reviewed the Nursing Notes.   I have reviewed the Medications.     Review of Systems  Anesthesia Hx:  No problems with previous Anesthesia    Social:  Non-Smoker    Cardiovascular:   Hypertension, well controlled    Pulmonary:  Pulmonary Normal    Renal/:  Renal/ Normal     Hepatic/GI:   GERD, well controlled Liver Disease,    Musculoskeletal:   Arthritis     Neurological:  Neurology Normal    Endocrine:  Endocrine Normal    Psych:   Psychiatric History          Physical Exam  General:  Well nourished    Airway/Jaw/Neck:  Airway Findings: Mouth Opening: Normal Tongue: Normal  General Airway Assessment: Adult  Oropharynx Findings:  Mallampati: II  Jaw/Neck Findings:  Neck ROM: Normal ROM     Eyes/Ears/Nose:  Eyes/Ears/Nose Findings:    Dental:  Dental Findings:   Chest/Lungs:  Chest/Lungs Findings: Normal Respiratory Rate     Heart/Vascular:  Heart Findings: Rate: Normal  Rhythm: Regular Rhythm        Mental Status:  Mental Status Findings:  Cooperative, Alert and Oriented         Anesthesia Plan  Type of Anesthesia, risks & benefits discussed:  Anesthesia Type:  general  Patient's Preference:   Intra-op Monitoring Plan:   Intra-op Monitoring Plan Comments:   Post Op Pain Control Plan: multimodal analgesia  Post Op Pain Control Plan Comments:   Induction:   IV  Beta Blocker:  Patient is on a Beta-Blocker and has received one dose within the past 24 hours (No further documentation required).       Informed Consent: Patient understands risks and agrees with Anesthesia plan.  Questions answered. Anesthesia consent signed with patient.  ASA Score: 2     Day of Surgery Review of History & Physical:  There are no significant changes.   H&P completed by Anesthesiologist.       Ready  For Surgery From Anesthesia Perspective.

## 2017-12-14 NOTE — PLAN OF CARE
PT TO PACU S/P EGD WITH COLD BX'S.  PT AWAKENS SHORTLY AFTER ARRIVAL TO PACU AND VERY TALKATIVE/INQUISITIVE.  TEACHING AND SUPPORT PROVIDED.  SEE EPIC CHARTING DETAILS FURTHER.  WILL RETRIEVE PT'S  AND HAVE HIM AT BEDSIDE FOR DR HUDSON PER PRACTICE

## 2017-12-14 NOTE — BRIEF OP NOTE
Discharge Note  Short Stay      SUMMARY     Admit Date: 12/14/2017    Attending Physician: Grzegorz Sousa Jr., MD     Discharge Physician: Grzegorz Sousa Jr., MD    Discharge Date: 12/14/2017 9:07 AM    Final Diagnosis: Gastroesophageal reflux disease, esophagitis presence not specified [K21.9]    Lax LES / NERD.  Slight antritis.    Disposition: HOME OR SELF CARE    Patient Instructions:   Current Discharge Medication List      START taking these medications    Details   ranitidine (ZANTAC) 300 MG tablet Take 1 tablet (300 mg total) by mouth every evening. , about 30-60 minutes before bedtime.  Qty: 90 tablet, Refills: 2         CONTINUE these medications which have CHANGED    Details   pantoprazole (PROTONIX) 40 MG tablet Take 1 tablet (40 mg total) by mouth before breakfast.  Qty: 90 tablet, Refills: 3    Associated Diagnoses: Gastroesophageal reflux disease, esophagitis presence not specified         CONTINUE these medications which have NOT CHANGED    Details   aspirin (ECOTRIN) 325 MG EC tablet Take 1 tablet (325 mg total) by mouth once daily.  Refills: 0      atorvastatin (LIPITOR) 40 MG tablet Take 1 tablet (40 mg total) by mouth once daily.  Qty: 90 tablet, Refills: 5    Associated Diagnoses: Mixed hyperlipidemia; Essential hypertension; Bilateral carotid artery stenosis      carvedilol (COREG) 3.125 MG tablet Take 1 tablet (3.125 mg total) by mouth 2 (two) times daily.  Qty: 180 tablet, Refills: 5    Associated Diagnoses: History of ischemic colitis; Family history of early CAD; Essential hypertension; Bilateral carotid artery stenosis; Mixed hyperlipidemia      DULoxetine (CYMBALTA) 60 MG capsule Take 1 capsule (60 mg total) by mouth once daily.  Qty: 90 capsule, Refills: 1    Associated Diagnoses: Depression, unspecified depression type      ezetimibe (ZETIA) 10 mg tablet Take 10 mg by mouth once daily.             Discharge Procedure Orders (must include Diet, Follow-up, Activity)    Follow Up:   Follow up with PCP as per your routine.  Please follow an anti reflux diet.  Activity as tolerated.    No driving day of procedure.

## 2017-12-14 NOTE — H&P
History & Physical - Short Stay  Gastroenterology      SUBJECTIVE:     Procedure: Gastroscopy    Chief Complaint/Indication for Procedure: GERD    History of Present Illness:  Office Visit     11/28/2017  [place] - Internal Medicine      Avis Waters MD   Internal Medicine   Gastroesophageal reflux disease, esophagitis presence not specified +6 more   Dx   Hypertension   Reason for Visit    Progress Notes        HISTORY OF PRESENT ILLNESS:  Pt. is a 72 y.o. female presents for monitoring of her hyperlipidemia, depression, GERD.  She continues on cymbalta, she states she likes that dose.  Is due for followup of LDL after Dr. Cam added zetia to her lipitor 40 mg po q day for her carotid disease.  She states she stopped the zetia after her stomach got upset, thought it was due to med.  She states this week, GERD was worse than usual, is on pantoprazole.  She has been using a lot of NSAIDs.  She does have voltaren gel.  Has felt some occ bloating.    ASSESSMENT/PLAN:     Gastroesophageal reflux disease, esophagitis presence not specified  -     Case request GI: ESOPHAGOGASTRODUODENOSCOPY (EGD)  -     pantoprazole (PROTONIX) 40 MG tablet; Take 1 tablet (40 mg total) by mouth before breakfast.  Dispense: 90 tablet; Refill: 1     Hyperlipidemia, unspecified hyperlipidemia type  -     Lipid panel; Future; Expected date: 11/28/2017     Depression, unspecified depression type  -     DULoxetine (CYMBALTA) 60 MG capsule; Take 1 capsule (60 mg total) by mouth once daily.  Dispense: 90 capsule; Refill: 1     Mixed hyperlipidemia  -     atorvastatin (LIPITOR) 40 MG tablet; Take 1 tablet (40 mg total) by mouth once daily.  Dispense: 90 tablet; Refill: 5     Essential hypertension  -     atorvastatin (LIPITOR) 40 MG tablet; Take 1 tablet (40 mg total) by mouth once daily.  Dispense: 90 tablet; Refill: 5     Bilateral carotid artery stenosis  -     atorvastatin (LIPITOR) 40 MG tablet; Take 1 tablet (40 mg total) by mouth  once daily.  Dispense: 90 tablet; Refill: 5     Health Maintenance:  -     Influenza - High Dose (65+) (PF) (IM)                  PTA Medications   Medication Sig    aspirin (ECOTRIN) 325 MG EC tablet Take 1 tablet (325 mg total) by mouth once daily.    atorvastatin (LIPITOR) 40 MG tablet Take 1 tablet (40 mg total) by mouth once daily.    carvedilol (COREG) 3.125 MG tablet Take 1 tablet (3.125 mg total) by mouth 2 (two) times daily.    DULoxetine (CYMBALTA) 60 MG capsule Take 1 capsule (60 mg total) by mouth once daily.    ezetimibe (ZETIA) 10 mg tablet Take 10 mg by mouth once daily.    pantoprazole (PROTONIX) 40 MG tablet Take 1 tablet (40 mg total) by mouth before breakfast.       Review of patient's allergies indicates:   Allergen Reactions    No known drug allergies         Past Medical History:   Diagnosis Date    Arthritis     Depression 2012    Diverticulosis     Fatty liver     Former smoker     GERD (gastroesophageal reflux disease)     HLD (hyperlipidemia) 2012    HTN (hypertension) 2012    Infectious gastroenteritis     Ischemic colitis      Past Surgical History:   Procedure Laterality Date    APPENDECTOMY      carotid angiogram       SECTION      x 3     COLONOSCOPY N/A 2016    Procedure: COLONOSCOPY;  Surgeon: Grzegorz Sousa Jr., MD;  Location: [facility];  Service: Endoscopy;  Laterality: N/A;    COLONOSCOPY  2009    [place]. [place], [place] in 5 years    HAND SURGERY Right 2016    tarun noland/Dr. Abhinav Rolle    HEMORRHOID SURGERY      HYSTERECTOMY  age 35    TAHUSO benign reasons    hysteretomy      JOINT REPLACEMENT Right     1st interphalangeal joint of 3rd finger    OOPHORECTOMY      one remains    TONSILLECTOMY       Family History   Problem Relation Age of Onset    Heart disease Mother 85     MI    Heart disease Father 55     MI    Colon cancer Neg Hx     Colon polyps Neg Hx     Crohn's disease Neg Hx      "Ulcerative colitis Neg Hx      Social History   Substance Use Topics    Smoking status: Former Smoker     Packs/day: 0.50     Years: 20.00     Quit date: 3/28/2013    Smokeless tobacco: Never Used      Comment: quit 2 years ago after intermittent use for 40 years    Alcohol use 0.0 oz/week      Comment: 0-2 ETOHic drinks per day         OBJECTIVE:     Vital Signs (Most Recent)  Temp: 97.9 °F (36.6 °C) (12/14/17 0747)  Pulse: 76 (12/14/17 0747)  Resp: 18 (12/14/17 0747)  BP: (!) 179/86 (12/14/17 0747)  SpO2: 95 % (12/14/17 0747)    Physical Exam:          : Ht 5' 3" (1.6 m)    Wt 70.7 kg (155 lb 13.8 oz)    BMI 27.61 kg/m²                                         GENERAL:  Comfortable, in no acute distress.                                 HEENT EXAM:  Nonicteric.  No adenopathy.  Oropharynx is clear.               NECK:  Supple.                                                               LUNGS:  Clear.                                                               CARDIAC:  Regular rate and rhythm.  S1, S2.  No murmur.                      ABDOMEN:  Soft, positive bowel sounds, nontender.  No hepatosplenomegaly or masses.  No rebound or guarding.                                             EXTREMITIES:  No edema.     MENTAL STATUS:  Alert and oriented.    ASSESSMENT/PLAN:     Assessment: GERD    Plan: Gastroscopy    Anesthesia Plan:   MAC / General Anaesthesia    ASA Grade: ASA 2 - Patient with mild systemic disease with no functional limitations    MALLAMPATI SCORE: I (soft palate, uvula, fauces, and tonsillar pillars visible)    "

## 2017-12-14 NOTE — DISCHARGE INSTRUCTIONS
Recovery After Procedural Sedation (Adult)  You have been given medicine by vein to make you sleep during your surgery. This may have included both a pain medicine and sleeping medicine. Most of the effects have worn off. But you may still have some drowsiness for the next 6 to 8 hours.  Home care  Follow these guidelines when you get home:  · For the next 8 hours, you should be watched by a responsible adult. This person should make sure your condition is not getting worse.  · Don't drink any alcohol for the next 24 hours.  · Don't drive, operate dangerous machinery, or make important business or personal decisions during the next 24 hours.  Note: Your healthcare provider may tell you not to take any medicine by mouth for pain or sleep in the next 4 hours. These medicines may react with the medicines you were given in the hospital. This could cause a much stronger response than usual.  Follow-up care  Follow up with your healthcare provider if you are not alert and back to your usual level of activity within 12 hours.  When to seek medical advice  Call your healthcare provider right away if any of these occur:  · Drowsiness gets worse  · Weakness or dizziness gets worse  · Repeated vomiting  · You can't be awakened   Date Last Reviewed: 10/18/2016  © 9902-0171 [employer]. [address] [place] [address], [place] [address]. All rights reserved. This information is not intended as a substitute for professional medical care. Always follow your healthcare professional's instructions.        Tips to Control Acid Reflux    To control acid reflux, youll need to make some basic diet and lifestyle changes. The simple steps outlined below may be all youll need to ease discomfort.  Watch what you eat  · Avoid fatty foods and spicy foods.  · Eat fewer acidic foods, such as citrus and tomato-based foods. These can increase symptoms.  · Limit drinking alcohol, caffeine, and fizzy beverages. All increase acid  reflux.  · Try limiting chocolate, peppermint, and spearmint. These can worsen acid reflux in some people.  Watch when you eat  · Avoid lying down for 3 hours after eating.  · Do not snack before going to bed.  Raise your head  Raising your head and upper body by 4 to 6 inches helps limit reflux when youre lying down. Put blocks under the head of your bed frame to raise it.  Other changes  · Lose weight, if you need to  · Dont exercise near bedtime  · Avoid tight-fitting clothes  · Limit aspirin and ibuprofen  · Stop smoking   Date Last Reviewed: 7/1/2016  © 2250-6358 [employer]. [address] [place] [address], [place] [address]. All rights reserved. This information is not intended as a substitute for professional medical care. Always follow your healthcare professional's instructions.

## 2017-12-14 NOTE — ANESTHESIA POSTPROCEDURE EVALUATION
"Anesthesia Post Evaluation    Patient: Haley Castro    Procedure(s) Performed: Procedure(s) (LRB):  ESOPHAGOGASTRODUODENOSCOPY (EGD) (N/A)    Final Anesthesia Type: general  Patient location during evaluation: PACU  Patient participation: Yes- Able to Participate  Level of consciousness: awake and alert and oriented  Post-procedure vital signs: reviewed and stable  Pain management: adequate  Airway patency: patent  PONV status at discharge: No PONV  Anesthetic complications: no      Cardiovascular status: blood pressure returned to baseline and stable  Respiratory status: unassisted and spontaneous ventilation  Hydration status: euvolemic  Follow-up not needed.        Visit Vitals  /71 (BP Location: Left arm, Patient Position: Lying)   Pulse 67   Temp 36.5 °C (97.7 °F) (Skin)   Resp 12   Ht 5' 3" (1.6 m)   Wt 68 kg (150 lb)   LMP 03/28/1980   SpO2 98%   Breastfeeding? No   BMI 26.57 kg/m²       Pain/Olivia Score: Pain Assessment Performed: Yes (12/14/2017  8:55 AM)  Presence of Pain: denies (12/14/2017  8:55 AM)  Olivia Score: 9 (12/14/2017  8:55 AM)      "

## 2018-02-16 DIAGNOSIS — Z82.49 FAMILY HISTORY OF EARLY CAD: ICD-10-CM

## 2018-02-16 DIAGNOSIS — Z87.19 HISTORY OF ISCHEMIC COLITIS: ICD-10-CM

## 2018-02-16 DIAGNOSIS — I10 ESSENTIAL HYPERTENSION: ICD-10-CM

## 2018-02-16 DIAGNOSIS — I65.23 BILATERAL CAROTID ARTERY STENOSIS: ICD-10-CM

## 2018-02-16 DIAGNOSIS — E78.2 MIXED HYPERLIPIDEMIA: ICD-10-CM

## 2018-02-19 RX ORDER — CARVEDILOL 3.12 MG/1
3.12 TABLET ORAL 2 TIMES DAILY
Qty: 180 TABLET | Refills: 3 | Status: SHIPPED | OUTPATIENT
Start: 2018-02-19 | End: 2019-02-24 | Stop reason: SDUPTHER

## 2018-02-28 ENCOUNTER — PES CALL (OUTPATIENT)
Dept: ADMINISTRATIVE | Facility: CLINIC | Age: 73
End: 2018-02-28

## 2018-03-19 NOTE — PROGRESS NOTES
HISTORY OF PRESENT ILLNESS:  Pt. is a 72 y.o. female presents for monitoring of her HTN, hyperlipidemia, depression/is on cymbalta, GERD/on pantoprazole.  She continues on cymbalta.  Since our last appt. She has had EGD, found to have chronic gastritis.  I have reviewed the path.  She is due for labs.  She states she has not been taking zetia, [facility] wouldn't fill.  She had gone to the dentist, was 185/96.    Health Maintenance Topics with due status: Not Due       Topic Last Completion Date    TETANUS VACCINE 2013    DEXA SCAN 2016    Colonoscopy 2016    Lipid Panel 2017    Mammogram 2017    High Dose Statin 2018     There are no preventive care reminders to display for this patient.    Lab Results   Component Value Date    WBC 5.71 2018    HGB 14.1 2018    HCT 43.6 2018     2018    CHOL 187 2018    CHOL 187 2018    TRIG 77 2018    TRIG 77 2018    HDL 55 2018    HDL 55 2018    LDLCALC 116.6 2018    LDLCALC 116.6 2018    ALT 7 (L) 2018    AST 15 2018     2018    K 5.1 2018     2018    CREATININE 0.8 2018    BUN 15 2018    CO2 30 (H) 2018    ALBUMIN 3.6 2018    TSH 2.570 2016    INR 1.1 2016    HGBA1C 5.8 2016       Past Medical History:   Diagnosis Date    Arthritis     Depression 2012    Diverticulosis     Fatty liver     Former smoker     GERD (gastroesophageal reflux disease)     HLD (hyperlipidemia) 2012    HTN (hypertension) 2012    Infectious gastroenteritis     Ischemic colitis        Past Surgical History:   Procedure Laterality Date    APPENDECTOMY      carotid angiogram       SECTION      x 3     COLONOSCOPY N/A 2016    Procedure: COLONOSCOPY;  Surgeon: Grzegorz Sousa Jr., MD;  Location: [facility];  Service: Endoscopy;  Laterality: N/A;    COLONOSCOPY   04/14/2009    Dr. Reyes [place], repeat in 5 years    HAND SURGERY Right 05/2016    knuckvicky implant/Dr. Abhinav Rolle    HEMORRHOID SURGERY      HYSTERECTOMY  age 35    TAHUSO benign reasons    hysteretomy      JOINT REPLACEMENT Right     1st interphalangeal joint of 3rd finger    OOPHORECTOMY      one remains    TONSILLECTOMY         Social History     Social History    Marital status:      Spouse name: N/A    Number of children: 3    Years of education: N/A     Occupational History    retired [employer]     Social History Main Topics    Smoking status: Former Smoker     Packs/day: 0.50     Years: 20.00     Quit date: 3/28/2013    Smokeless tobacco: Never Used      Comment: quit 2 years ago after intermittent use for 40 years    Alcohol use 0.0 oz/week      Comment: 0-2 ETOHic drinks per day    Drug use: No    Sexual activity: Yes     Partners: Male     Other Topics Concern    None     Social History Narrative    None       ROS:  GENERAL: No fever, chills, fatigability or weight loss.  SKIN: No rashes, itching or changes in color or texture of skin.  HEAD: No headaches or recent head trauma.  EARS: Denies ear pain, discharge or vertigo.  NOSE: No loss of smell, no epistaxis or postnasal drip.  MOUTH & THROAT: No hoarseness or change in voice. No excessive gum bleeding.  NODES: Denies swollen glands.  CHEST: Denies HERNANDEZ, cyanosis, wheezing, cough and sputum production.  CARDIOVASCULAR: Denies chest pain, PND, orthopnea or reduced exercise tolerance.  ABDOMEN: Appetite fine. No weight loss. Denies constipation, diarrhea, abdominal pain, hematemesis or blood in stool.  URINARY: No flank pain, dysuria or hematuria.  PERIPHERAL VASCULAR: No claudication or cyanosis. No edema.  MUSCULOSKELETAL: No joint stiffness or swelling. Positive back pain/sciatica on right with radiation down right leg;  NEUROLOGIC: Denies numbness    PE:   Vitals:   Vitals:    03/21/18 1840   BP: (!) 148/88   Pulse: 84   Resp:  20     GENERAL: no acute distress, A&Ox3, comfortable.  Female with BMI of 27   HEENT: tympanic membranes clear, nasal mucosa pink, no pharyngeal erythema or exudate  NECK: supple, no cervical lymphadenopathy, no thyromegaly; no supraclavicular nodes;   CHEST:  clear to auscultation bilaterally, no crackles or wheeze; no increased work of breathing;  CARDIOVASCULAR: regular rate and rhythm, no rubs, murmurs or gallops.  ABDOMEN: normal bowel sounds, soft non-tender, non-distended; no palpable organomegaly;   EXT: no clubbing, cyanosis or edema.     ASSESSMENT/PLAN:    Essential hypertension  -     CBC auto differential; Future; Expected date: 03/21/2018  -     Comprehensive metabolic panel; Future; Expected date: 03/21/2018  -     Lipid panel; Future; Expected date: 03/21/2018   -     losartan (COZAAR) 100 MG tablet; Take 1 tablet (100 mg total) by mouth once daily.  Dispense: 30 tablet; Refill: 1    Hyperlipidemia, unspecified hyperlipidemia type  -     Comprehensive metabolic panel; Future; Expected date: 03/21/2018  -     Lipid panel; Future; Expected date: 03/21/2018    Depression, unspecified depression type  -     DULoxetine (CYMBALTA) 60 MG capsule; Take 1 capsule (60 mg total) by mouth once daily.  Dispense: 90 capsule; Refill: 1    Lumbar radiculopathy  -     X-Ray Lumbar Spine Complete 5 View; Future; Expected date: 03/21/2018  -     gabapentin (NEURONTIN) 100 MG capsule; At bedtime for 3 days, if tolerated increase to 2 pills/200 mg for 3 days, if tolerated increase to 3 pills qhs;  Dispense: 90 capsule; Refill: 3  -     Ambulatory consult to Physical Therapy  -     diclofenac sodium (VOLTAREN) 1 % Gel; Apply 2 g topically 4 (four) times daily.  Dispense: 100 g; Refill: 0        Medication List with Changes/Refills   New Medications    DICLOFENAC SODIUM (VOLTAREN) 1 % GEL    Apply 2 g topically 4 (four) times daily.    GABAPENTIN (NEURONTIN) 100 MG CAPSULE    At bedtime for 3 days, if tolerated increase  to 2 pills/200 mg for 3 days, if tolerated increase to 3 pills qhs;    LOSARTAN (COZAAR) 100 MG TABLET    Take 1 tablet (100 mg total) by mouth once daily.   Current Medications    ASPIRIN (ECOTRIN) 325 MG EC TABLET    Take 1 tablet (325 mg total) by mouth once daily.    ATORVASTATIN (LIPITOR) 40 MG TABLET    Take 1 tablet (40 mg total) by mouth once daily.    CARVEDILOL (COREG) 3.125 MG TABLET    TAKE 1 TABLET (3.125 MG TOTAL) BY MOUTH 2 (TWO) TIMES DAILY.    PANTOPRAZOLE (PROTONIX) 40 MG TABLET    Take 1 tablet (40 mg total) by mouth before breakfast.    RANITIDINE (ZANTAC) 300 MG TABLET    Take 1 tablet (300 mg total) by mouth every evening. , about 30-60 minutes before bedtime.   Changed and/or Refilled Medications    Modified Medication Previous Medication    DULOXETINE (CYMBALTA) 60 MG CAPSULE DULoxetine (CYMBALTA) 60 MG capsule       Take 1 capsule (60 mg total) by mouth once daily.    Take 1 capsule (60 mg total) by mouth once daily.   Discontinued Medications    EZETIMIBE (ZETIA) 10 MG TABLET    Take 10 mg by mouth once daily.     Call if condition changes or worsens.

## 2018-03-21 ENCOUNTER — OFFICE VISIT (OUTPATIENT)
Dept: INTERNAL MEDICINE | Facility: CLINIC | Age: 73
End: 2018-03-21
Payer: MEDICARE

## 2018-03-21 VITALS
WEIGHT: 155.88 LBS | RESPIRATION RATE: 20 BRPM | HEART RATE: 84 BPM | BODY MASS INDEX: 27.61 KG/M2 | DIASTOLIC BLOOD PRESSURE: 88 MMHG | SYSTOLIC BLOOD PRESSURE: 148 MMHG

## 2018-03-21 DIAGNOSIS — F32.A DEPRESSION, UNSPECIFIED DEPRESSION TYPE: ICD-10-CM

## 2018-03-21 DIAGNOSIS — E78.5 HYPERLIPIDEMIA, UNSPECIFIED HYPERLIPIDEMIA TYPE: ICD-10-CM

## 2018-03-21 DIAGNOSIS — I10 ESSENTIAL HYPERTENSION: Primary | ICD-10-CM

## 2018-03-21 DIAGNOSIS — M54.16 LUMBAR RADICULOPATHY: ICD-10-CM

## 2018-03-21 PROCEDURE — 99999 PR PBB SHADOW E&M-EST. PATIENT-LVL III: CPT | Mod: PBBFAC,,, | Performed by: INTERNAL MEDICINE

## 2018-03-21 PROCEDURE — 3077F SYST BP >= 140 MM HG: CPT | Mod: CPTII,S$GLB,, | Performed by: INTERNAL MEDICINE

## 2018-03-21 PROCEDURE — 3079F DIAST BP 80-89 MM HG: CPT | Mod: CPTII,S$GLB,, | Performed by: INTERNAL MEDICINE

## 2018-03-21 PROCEDURE — 99214 OFFICE O/P EST MOD 30 MIN: CPT | Mod: S$GLB,,, | Performed by: INTERNAL MEDICINE

## 2018-03-21 PROCEDURE — 99499 UNLISTED E&M SERVICE: CPT | Mod: S$GLB,,, | Performed by: INTERNAL MEDICINE

## 2018-03-21 RX ORDER — DICLOFENAC SODIUM 10 MG/G
2 GEL TOPICAL 4 TIMES DAILY
Qty: 100 G | Refills: 0 | Status: SHIPPED | OUTPATIENT
Start: 2018-03-21 | End: 2018-06-06

## 2018-03-21 RX ORDER — GABAPENTIN 100 MG/1
CAPSULE ORAL
Qty: 90 CAPSULE | Refills: 3 | Status: SHIPPED | OUTPATIENT
Start: 2018-03-21 | End: 2018-06-20

## 2018-03-21 RX ORDER — LOSARTAN POTASSIUM 100 MG/1
100 TABLET ORAL DAILY
Qty: 30 TABLET | Refills: 1 | Status: SHIPPED | OUTPATIENT
Start: 2018-03-21 | End: 2018-04-02 | Stop reason: SDUPTHER

## 2018-03-21 RX ORDER — DULOXETIN HYDROCHLORIDE 60 MG/1
60 CAPSULE, DELAYED RELEASE ORAL DAILY
Qty: 90 CAPSULE | Refills: 1 | Status: SHIPPED | OUTPATIENT
Start: 2018-03-21 | End: 2018-06-04 | Stop reason: SDUPTHER

## 2018-03-22 ENCOUNTER — HOSPITAL ENCOUNTER (OUTPATIENT)
Dept: RADIOLOGY | Facility: HOSPITAL | Age: 73
Discharge: HOME | End: 2018-03-22
Attending: INTERNAL MEDICINE
Payer: MEDICARE

## 2018-03-22 ENCOUNTER — TELEPHONE (OUTPATIENT)
Dept: FAMILY MEDICINE | Facility: CLINIC | Age: 73
End: 2018-03-22

## 2018-03-22 DIAGNOSIS — M54.16 LUMBAR RADICULOPATHY: ICD-10-CM

## 2018-03-22 PROCEDURE — 72110 X-RAY EXAM L-2 SPINE 4/>VWS: CPT | Mod: TC,FY,PO

## 2018-03-22 PROCEDURE — 72110 X-RAY EXAM L-2 SPINE 4/>VWS: CPT | Mod: 26,,, | Performed by: RADIOLOGY

## 2018-03-22 NOTE — TELEPHONE ENCOUNTER
----- Message from Maurisio Christianson sent at 3/22/2018  4:56 PM CDT -----  Contact: Haley Weiss is returning phone call. Please contact patient back at [phone] (home)   thanks

## 2018-03-25 PROBLEM — M54.16 LUMBAR RADICULOPATHY: Status: ACTIVE | Noted: 2018-03-25

## 2018-03-29 ENCOUNTER — TELEPHONE (OUTPATIENT)
Dept: FAMILY MEDICINE | Facility: CLINIC | Age: 73
End: 2018-03-29

## 2018-03-29 NOTE — TELEPHONE ENCOUNTER
----- Message from Allyson Melo sent at 3/29/2018  2:39 PM CDT -----  Contact: [phone]  Patient is requesting a call back from the nurse stated losartan make her feel lite headed and the gabapentin make her feel groggy.    Please call the patient upon request at phone number [phone].

## 2018-03-29 NOTE — TELEPHONE ENCOUNTER
"Phoned pt in regards to message. Pt states she does not want to take the RX gabapentin any more or the RX losartan due to the way it makes her feel. Pt states the RX losartan makes her feel light headed and refuses to take any more. Pt also states she refuses to take any more of the RX gabapentin due to it making her "feel groggy and drowsy". Pt R/S her nurse visit for B/P check for Monday AM, 4/2/18. Pt voiced understanding for appt. [facility]  "

## 2018-03-31 NOTE — TELEPHONE ENCOUNTER
Please get pt. Appt. With new provider Dr. Glass sooner than upcoming appt, so she can be monitored more closely.

## 2018-04-02 ENCOUNTER — CLINICAL SUPPORT (OUTPATIENT)
Dept: FAMILY MEDICINE | Facility: CLINIC | Age: 73
End: 2018-04-02
Payer: MEDICARE

## 2018-04-02 ENCOUNTER — TELEPHONE (OUTPATIENT)
Dept: FAMILY MEDICINE | Facility: CLINIC | Age: 73
End: 2018-04-02

## 2018-04-02 PROCEDURE — 99999 PR PBB SHADOW E&M-EST. PATIENT-LVL I: CPT | Mod: PBBFAC,,,

## 2018-04-02 RX ORDER — LOSARTAN POTASSIUM 50 MG/1
50 TABLET ORAL DAILY
Qty: 30 TABLET | Refills: 1 | Status: SHIPPED | OUTPATIENT
Start: 2018-04-02 | End: 2018-04-22 | Stop reason: DRUGHIGH

## 2018-04-02 RX ORDER — ATORVASTATIN CALCIUM 20 MG/1
TABLET, FILM COATED ORAL
COMMUNITY
Start: 2018-01-26 | End: 2018-06-05 | Stop reason: SDUPTHER

## 2018-04-02 NOTE — PROGRESS NOTES
Haley Castro 73 y.o. female is here today for Blood Pressure check.   History of HTN yes.    Review of patient's allergies indicates:   Allergen Reactions    No known drug allergies      Creatinine   Date Value Ref Range Status   03/22/2018 0.8 0.5 - 1.4 mg/dL Final     Sodium   Date Value Ref Range Status   03/22/2018 141 136 - 145 mmol/L Final     Potassium   Date Value Ref Range Status   03/22/2018 5.1 3.5 - 5.1 mmol/L Final   ]  Patient verifies taking blood pressure medications on a regular basis at the same time of the day.     Current Outpatient Prescriptions:     aspirin (ECOTRIN) 325 MG EC tablet, Take 1 tablet (325 mg total) by mouth once daily., Disp: , Rfl: 0    atorvastatin (LIPITOR) 40 MG tablet, Take 1 tablet (40 mg total) by mouth once daily., Disp: 90 tablet, Rfl: 5    carvedilol (COREG) 3.125 MG tablet, TAKE 1 TABLET (3.125 MG TOTAL) BY MOUTH 2 (TWO) TIMES DAILY., Disp: 180 tablet, Rfl: 3    diclofenac sodium (VOLTAREN) 1 % Gel, Apply 2 g topically 4 (four) times daily., Disp: 100 g, Rfl: 0    DULoxetine (CYMBALTA) 60 MG capsule, Take 1 capsule (60 mg total) by mouth once daily., Disp: 90 capsule, Rfl: 1    gabapentin (NEURONTIN) 100 MG capsule, At bedtime for 3 days, if tolerated increase to 2 pills/200 mg for 3 days, if tolerated increase to 3 pills qhs;, Disp: 90 capsule, Rfl: 3    losartan (COZAAR) 100 MG tablet, Take 1 tablet (100 mg total) by mouth once daily., Disp: 30 tablet, Rfl: 1    pantoprazole (PROTONIX) 40 MG tablet, Take 1 tablet (40 mg total) by mouth before breakfast., Disp: 90 tablet, Rfl: 3    ranitidine (ZANTAC) 300 MG tablet, Take 1 tablet (300 mg total) by mouth every evening. , about 30-60 minutes before bedtime., Disp: 90 tablet, Rfl: 2  Does patient have record of home blood pressure readings no. Readings have been averaging     Patient taking Cozaar different than prescribed.  States that she is not taking Corzaar at all.  Stop taking Cozaar  3/30/18.    Last  dose of blood pressure medication was taken at 3/20/18.  Patient is asymptomatic.   Complains of N/A.      /100    Blood pressure reading after 15 minutes was 176/100, Pulse  Dr. Waters notified.  Telephone encounter created.  Also, I went to notify in person to be advised while patient is still present.   Advised a change in dosage of Coozar to 50 mg.  Pt advise to continue to take medication as ordered even though it may make her dizzy until her body adjusts to it.  Pt states that she will comply.

## 2018-04-02 NOTE — Clinical Note
Patient present for BP check.  /100.  Pt state that she stop taking Cozaar on last Friday.  Advised to sit til  is notified.

## 2018-04-03 NOTE — TELEPHONE ENCOUNTER
Followed up with patient.  She now states that she did not call for a sooner appointment and wants to keep her appointment in July.  Original appointment already taken.  Patient rescheduled for 7/12/18.

## 2018-04-22 RX ORDER — LOSARTAN POTASSIUM 100 MG/1
100 TABLET ORAL DAILY
Qty: 90 TABLET | Refills: 0 | Status: SHIPPED | OUTPATIENT
Start: 2018-04-22 | End: 2018-06-20 | Stop reason: SDUPTHER

## 2018-04-26 DIAGNOSIS — Z82.49 FAMILY HISTORY OF EARLY CAD: ICD-10-CM

## 2018-04-26 DIAGNOSIS — I65.23 BILATERAL CAROTID ARTERY STENOSIS: ICD-10-CM

## 2018-04-26 DIAGNOSIS — I10 ESSENTIAL HYPERTENSION: ICD-10-CM

## 2018-04-26 DIAGNOSIS — E78.2 MIXED HYPERLIPIDEMIA: ICD-10-CM

## 2018-04-26 DIAGNOSIS — Z87.19 HISTORY OF ISCHEMIC COLITIS: ICD-10-CM

## 2018-04-26 RX ORDER — ATORVASTATIN CALCIUM 20 MG/1
20 TABLET, FILM COATED ORAL DAILY
Qty: 90 TABLET | Refills: 4 | Status: SHIPPED | OUTPATIENT
Start: 2018-04-26 | End: 2018-06-06 | Stop reason: ALTCHOICE

## 2018-05-03 ENCOUNTER — TELEPHONE (OUTPATIENT)
Dept: CARDIOLOGY | Facility: CLINIC | Age: 73
End: 2018-05-03

## 2018-05-03 NOTE — TELEPHONE ENCOUNTER
----- Message from Sally Harrington sent at 5/3/2018 11:04 AM CDT -----  Type: Needs Medical Advice    Who Called:  Patient  Best Call Back Number: [phone]  Additional Information: Patient has a cat scan with labs that she would like to schedule. We cannot schedule cat scans. She would also like to schedule her ultrasound with this all in one day. Please call to schedule.

## 2018-05-16 ENCOUNTER — HOSPITAL ENCOUNTER (OUTPATIENT)
Dept: RADIOLOGY | Facility: HOSPITAL | Age: 73
Discharge: HOME | End: 2018-05-16
Attending: THORACIC SURGERY (CARDIOTHORACIC VASCULAR SURGERY)
Payer: MEDICARE

## 2018-05-16 ENCOUNTER — HOSPITAL ENCOUNTER (OUTPATIENT)
Dept: RADIOLOGY | Facility: HOSPITAL | Age: 73
Discharge: HOME | End: 2018-05-16
Attending: INTERNAL MEDICINE
Payer: MEDICARE

## 2018-05-16 DIAGNOSIS — E78.2 MIXED HYPERLIPIDEMIA: ICD-10-CM

## 2018-05-16 DIAGNOSIS — Z87.19 HISTORY OF ISCHEMIC COLITIS: ICD-10-CM

## 2018-05-16 DIAGNOSIS — I73.9 PVD (PERIPHERAL VASCULAR DISEASE): ICD-10-CM

## 2018-05-16 DIAGNOSIS — R53.1 WEAKNESS: ICD-10-CM

## 2018-05-16 DIAGNOSIS — I10 ESSENTIAL HYPERTENSION: ICD-10-CM

## 2018-05-16 DIAGNOSIS — I65.23 BILATERAL CAROTID ARTERY STENOSIS: ICD-10-CM

## 2018-05-16 PROCEDURE — 93880 EXTRACRANIAL BILAT STUDY: CPT | Mod: 26,,, | Performed by: RADIOLOGY

## 2018-05-16 PROCEDURE — 25500020 PHARM REV CODE 255: Mod: PO | Performed by: INTERNAL MEDICINE

## 2018-05-16 PROCEDURE — 70498 CT ANGIOGRAPHY NECK: CPT | Mod: TC,PO

## 2018-05-16 PROCEDURE — 70498 CT ANGIOGRAPHY NECK: CPT | Mod: 26,,, | Performed by: RADIOLOGY

## 2018-05-16 PROCEDURE — 93880 EXTRACRANIAL BILAT STUDY: CPT | Mod: TC,PO

## 2018-05-16 RX ADMIN — IOHEXOL 75 ML: 350 INJECTION, SOLUTION INTRAVENOUS at 10:05

## 2018-06-04 DIAGNOSIS — F32.A DEPRESSION, UNSPECIFIED DEPRESSION TYPE: ICD-10-CM

## 2018-06-04 NOTE — TELEPHONE ENCOUNTER
LOV on 3-21-18 with Dr. waters  Labs are up to date  UOV on 7-12-18 with Dr. Sparks     Please advise refill request on behalf of Dr. Waters

## 2018-06-05 ENCOUNTER — TELEPHONE (OUTPATIENT)
Dept: FAMILY MEDICINE | Facility: CLINIC | Age: 73
End: 2018-06-05

## 2018-06-05 RX ORDER — DULOXETIN HYDROCHLORIDE 60 MG/1
60 CAPSULE, DELAYED RELEASE ORAL DAILY
Qty: 90 CAPSULE | Refills: 0 | Status: SHIPPED | OUTPATIENT
Start: 2018-06-05 | End: 2019-02-25 | Stop reason: SDUPTHER

## 2018-06-05 NOTE — PROGRESS NOTES
Refill Authorization Note     is requesting a refill authorization.    Brief assessment and rationale for refill: APPROVE: technically early  Amount/Quantity of medication ordered: 90d         Refills Authorized: Yes  If authorized number of refills: 0        Medication-related problems identified:   Therapeutic duplication  Medication education needs  Non-adherence - intentional  Medication Therapy Plan: This should last until 9/18 per prescribing history; padilla 3 more for depression / 2 atorvastatin scripts; spoke to patient and she states she is on 40 mg although last time she picked up was 20 mg back in January per Janice PharmD at [facility].  She is not adherent to this therapy.  Please address at NOV  Name and strength of medication: DULoxetine (CYMBALTA) 60 MG capsule  How patient will take medication: t1t po daily   Medication reconciliation completed: Yes  Comments: Dc'd duplicate meds    BP Readings from Last 3 Encounters:   03/21/18 (!) 148/88   12/14/17 (!) 155/77   11/28/17 134/86      Lab Results   Component Value Date    CREATININE 0.7 05/16/2018    BUN 16 05/16/2018     05/16/2018    K 4.4 05/16/2018     05/16/2018    CO2 28 05/16/2018

## 2018-06-05 NOTE — TELEPHONE ENCOUNTER
2 atorvastatin scripts; spoke to patient and she states she is on 40 mg although last time she picked up was 20 mg back in January per Janice PharmD at [facility].  She is not adherent to this therapy.  Please address at NOV

## 2018-06-05 NOTE — TELEPHONE ENCOUNTER
Spoke to patient. Scheduled patient appt for tomorrow. Patient states she does not see Dr. Sparks until July. She states over the last month her blood pressure has been running high She cannot get the bottom number lower than 88. Today her blood pressure has been 165/96. She says she does not feel like her blood pressure is high. She not not have a headache. Patient is going to bring in her home blood pressure cuff for comparison.

## 2018-06-05 NOTE — TELEPHONE ENCOUNTER
----- Message from Ev Pimentel sent at 6/5/2018 11:19 AM CDT -----  Contact: patient  Type: Needs Medical Advice    Who Called:  patient  Symptoms (blood pressure fluctuation):    How long has patient had these symptoms:   Pharmacy name and phone #:    Best Call Back Number: [phone]  Additional Information: requesting a call back to discuss blood pressure

## 2018-06-06 ENCOUNTER — OFFICE VISIT (OUTPATIENT)
Dept: FAMILY MEDICINE | Facility: CLINIC | Age: 73
End: 2018-06-06
Payer: MEDICARE

## 2018-06-06 VITALS
BODY MASS INDEX: 26.8 KG/M2 | DIASTOLIC BLOOD PRESSURE: 84 MMHG | HEIGHT: 63 IN | WEIGHT: 151.25 LBS | RESPIRATION RATE: 16 BRPM | TEMPERATURE: 98 F | OXYGEN SATURATION: 98 % | HEART RATE: 75 BPM | SYSTOLIC BLOOD PRESSURE: 140 MMHG

## 2018-06-06 DIAGNOSIS — I10 ESSENTIAL HYPERTENSION: Primary | ICD-10-CM

## 2018-06-06 DIAGNOSIS — M54.16 LUMBAR RADICULOPATHY: ICD-10-CM

## 2018-06-06 DIAGNOSIS — I65.23 BILATERAL CAROTID ARTERY STENOSIS: ICD-10-CM

## 2018-06-06 PROCEDURE — 3077F SYST BP >= 140 MM HG: CPT | Mod: CPTII,S$GLB,, | Performed by: NURSE PRACTITIONER

## 2018-06-06 PROCEDURE — 99999 PR PBB SHADOW E&M-EST. PATIENT-LVL V: CPT | Mod: PBBFAC,,, | Performed by: NURSE PRACTITIONER

## 2018-06-06 PROCEDURE — 99214 OFFICE O/P EST MOD 30 MIN: CPT | Mod: S$GLB,,, | Performed by: NURSE PRACTITIONER

## 2018-06-06 PROCEDURE — 3079F DIAST BP 80-89 MM HG: CPT | Mod: CPTII,S$GLB,, | Performed by: NURSE PRACTITIONER

## 2018-06-06 RX ORDER — DORZOLAMIDE HCL 20 MG/ML
1 SOLUTION/ DROPS OPHTHALMIC 2 TIMES DAILY
COMMUNITY
Start: 2018-04-25 | End: 2018-10-11 | Stop reason: ALTCHOICE

## 2018-06-06 RX ORDER — AMLODIPINE BESYLATE 2.5 MG/1
2.5 TABLET ORAL DAILY
Qty: 30 TABLET | Refills: 1 | Status: SHIPPED | OUTPATIENT
Start: 2018-06-06 | End: 2018-06-20 | Stop reason: SDUPTHER

## 2018-06-06 NOTE — PROGRESS NOTES
Subjective:       Patient ID: Haley Castro is a 73 y.o. female.    Chief Complaint: Hypertension  She was last seen in internal medicine by Dr. Waters on 03/21/2018. This is her first time seeing me in the clinic.   HPI   She is here today related to elevated home blood pressure readings. She states takes all medications daily as ordered. Taking Cozaar about 9-10 each morning. She does have coffee about an hour or two earlier then gets dressed and takes her blood pressure.   Vitals:    06/06/18 0959   BP: (!) 140/84   Pulse:    Resp:    Temp:      BP Readings from Last 3 Encounters:   06/06/18 (!) 140/84   03/21/18 (!) 148/88   12/14/17 (!) 155/77     Review of Systems   Respiratory: Negative for shortness of breath.    Cardiovascular: Negative for chest pain and leg swelling.   Musculoskeletal: Positive for back pain.        Chronic pain to lumbar region which radiates down her right leg     160' s (highest systolic); and as high as 100 (diastolic)  She is not taking any over the counter med's or decongestants  Objective:      Physical Exam   Constitutional: She is oriented to person, place, and time. She appears well-developed and well-nourished. She is active and cooperative.   HENT:   Head: Normocephalic and atraumatic.   Right Ear: Hearing, tympanic membrane, external ear and ear canal normal.   Left Ear: Hearing, tympanic membrane, external ear and ear canal normal.   Nose: Nose normal.   Mouth/Throat: Uvula is midline, oropharynx is clear and moist and mucous membranes are normal.   Eyes: Lids are normal.   Neck: Trachea normal, normal range of motion, full passive range of motion without pain and phonation normal. Neck supple.   Cardiovascular: Normal rate, regular rhythm and normal heart sounds.    Pulmonary/Chest: Effort normal and breath sounds normal.   Abdominal: Soft. There is no tenderness.   Musculoskeletal: Normal range of motion.   Lymphadenopathy:        Head (right side): No submental, no  submandibular, no tonsillar, no preauricular, no posterior auricular and no occipital adenopathy present.        Head (left side): No submental, no submandibular, no tonsillar, no preauricular, no posterior auricular and no occipital adenopathy present.     She has no cervical adenopathy.   Neurological: She is alert and oriented to person, place, and time.   Skin: Skin is warm, dry and intact.   Psychiatric: She has a normal mood and affect. Her speech is normal and behavior is normal. Judgment and thought content normal. Cognition and memory are normal.   Nursing note and vitals reviewed.      Assessment & Plan:       Essential hypertension  -     amLODIPine (NORVASC) 2.5 MG tablet; Take 1 tablet (2.5 mg total) by mouth once daily.  Dispense: 30 tablet; Refill: 1    Bilateral carotid artery stenosis    Lumbar radiculopathy  -     MRI Lumbar Spine Without Contrast; Future; Expected date: 06/06/2018      Medication List with Changes/Refills   New Medications    AMLODIPINE (NORVASC) 2.5 MG TABLET    Take 1 tablet (2.5 mg total) by mouth once daily.   Current Medications    ASPIRIN (ECOTRIN) 325 MG EC TABLET    Take 1 tablet (325 mg total) by mouth once daily.    ATORVASTATIN (LIPITOR) 40 MG TABLET    Take 1 tablet (40 mg total) by mouth once daily.    CARVEDILOL (COREG) 3.125 MG TABLET    TAKE 1 TABLET (3.125 MG TOTAL) BY MOUTH 2 (TWO) TIMES DAILY.    DORZOLAMIDE (TRUSOPT) 2 % OPHTHALMIC SOLUTION        DULOXETINE (CYMBALTA) 60 MG CAPSULE    Take 1 capsule (60 mg total) by mouth once daily.    GABAPENTIN (NEURONTIN) 100 MG CAPSULE    At bedtime for 3 days, if tolerated increase to 2 pills/200 mg for 3 days, if tolerated increase to 3 pills qhs;    LOSARTAN (COZAAR) 100 MG TABLET    TAKE 1 TABLET (100 MG TOTAL) BY MOUTH ONCE DAILY.    PANTOPRAZOLE (PROTONIX) 40 MG TABLET    Take 1 tablet (40 mg total) by mouth before breakfast.    RANITIDINE (ZANTAC) 300 MG TABLET    Take 1 tablet (300 mg total) by mouth every  evening. , about 30-60 minutes before bedtime.   Discontinued Medications    ATORVASTATIN (LIPITOR) 20 MG TABLET    TAKE 1 TABLET (20 MG TOTAL) BY MOUTH ONCE DAILY.    DICLOFENAC SODIUM (VOLTAREN) 1 % GEL    Apply 2 g topically 4 (four) times daily.     She is completing daily home blood pressure readings and recording them. Continue taking blood pressure readings at home and recording them. Blood pressure log given  She brought in her OMRON machine today and it is about 5 points higher on systolic and diastolic. The cuff is about 2-3 months old.  Nurse for blood pressure recheck in 1 week   Then see Vera in 2 weeks to re-evaluate B/P and new medication.       Follow-up if symptoms worsen or fail to improve.

## 2018-06-06 NOTE — PATIENT INSTRUCTIONS

## 2018-06-11 ENCOUNTER — HOSPITAL ENCOUNTER (OUTPATIENT)
Dept: RADIOLOGY | Facility: HOSPITAL | Age: 73
Discharge: HOME | End: 2018-06-11
Attending: NURSE PRACTITIONER
Payer: MEDICARE

## 2018-06-11 DIAGNOSIS — M54.16 LUMBAR RADICULOPATHY: ICD-10-CM

## 2018-06-11 PROCEDURE — 72148 MRI LUMBAR SPINE W/O DYE: CPT | Mod: 26,,, | Performed by: RADIOLOGY

## 2018-06-11 PROCEDURE — 72148 MRI LUMBAR SPINE W/O DYE: CPT | Mod: TC,PO

## 2018-06-13 ENCOUNTER — CLINICAL SUPPORT (OUTPATIENT)
Dept: FAMILY MEDICINE | Facility: CLINIC | Age: 73
End: 2018-06-13
Payer: MEDICARE

## 2018-06-13 ENCOUNTER — TELEPHONE (OUTPATIENT)
Dept: FAMILY MEDICINE | Facility: CLINIC | Age: 73
End: 2018-06-13

## 2018-06-13 VITALS — DIASTOLIC BLOOD PRESSURE: 88 MMHG | SYSTOLIC BLOOD PRESSURE: 132 MMHG

## 2018-06-13 DIAGNOSIS — Z01.30 BP CHECK: Primary | ICD-10-CM

## 2018-06-13 PROCEDURE — 99999 PR PBB SHADOW E&M-EST. PATIENT-LVL I: CPT | Mod: PBBFAC,,,

## 2018-06-13 PROCEDURE — 99211 OFF/OP EST MAY X REQ PHY/QHP: CPT | Mod: S$GLB,,, | Performed by: FAMILY MEDICINE

## 2018-06-13 NOTE — PROGRESS NOTES
Haley Castro 73 y.o. female is here today for Blood Pressure check.   History of HTN yes.    Review of patient's allergies indicates:   Allergen Reactions    No known drug allergies      Creatinine   Date Value Ref Range Status   05/16/2018 0.7 0.5 - 1.4 mg/dL Final     Sodium   Date Value Ref Range Status   05/16/2018 143 136 - 145 mmol/L Final     Potassium   Date Value Ref Range Status   05/16/2018 4.4 3.5 - 5.1 mmol/L Final   ]  Patient verifies taking blood pressure medications on a regular basis at the same time of the day.     Current Outpatient Prescriptions:     amLODIPine (NORVASC) 2.5 MG tablet, Take 1 tablet (2.5 mg total) by mouth once daily., Disp: 30 tablet, Rfl: 1    aspirin (ECOTRIN) 325 MG EC tablet, Take 1 tablet (325 mg total) by mouth once daily., Disp: , Rfl: 0    atorvastatin (LIPITOR) 40 MG tablet, Take 1 tablet (40 mg total) by mouth once daily., Disp: 90 tablet, Rfl: 5    carvedilol (COREG) 3.125 MG tablet, TAKE 1 TABLET (3.125 MG TOTAL) BY MOUTH 2 (TWO) TIMES DAILY., Disp: 180 tablet, Rfl: 3    dorzolamide (TRUSOPT) 2 % ophthalmic solution, , Disp: , Rfl:     DULoxetine (CYMBALTA) 60 MG capsule, Take 1 capsule (60 mg total) by mouth once daily., Disp: 90 capsule, Rfl: 0    gabapentin (NEURONTIN) 100 MG capsule, At bedtime for 3 days, if tolerated increase to 2 pills/200 mg for 3 days, if tolerated increase to 3 pills qhs;, Disp: 90 capsule, Rfl: 3    losartan (COZAAR) 100 MG tablet, TAKE 1 TABLET (100 MG TOTAL) BY MOUTH ONCE DAILY., Disp: 90 tablet, Rfl: 0    pantoprazole (PROTONIX) 40 MG tablet, Take 1 tablet (40 mg total) by mouth before breakfast., Disp: 90 tablet, Rfl: 3    ranitidine (ZANTAC) 300 MG tablet, Take 1 tablet (300 mg total) by mouth every evening. , about 30-60 minutes before bedtime., Disp: 90 tablet, Rfl: 2  Does patient have record of home blood pressure readings yes. Readings have been averaging 140//90.   Last dose of blood pressure medication  was taken at am.  Patient is asymptomatic.   Complains of n/a.      ,   .    Blood pressure reading after 15 minutes was 132/88, Pulse n/a  Dr. Sparks notified.

## 2018-06-13 NOTE — PATIENT INSTRUCTIONS
Pt had bp check 132/88 to left arm, pt encouraged to take meds at same time each day healthy diet and exercise, pt is scheduled to see Gwyn in two weeks. Please advise

## 2018-06-13 NOTE — TELEPHONE ENCOUNTER
Please see message below:    Pt had bp check 132/88 to left arm, pt encouraged to take meds at same time each day healthy diet and exercise, pt is scheduled to see Gwyn in two weeks. Please advise

## 2018-06-20 ENCOUNTER — OFFICE VISIT (OUTPATIENT)
Dept: FAMILY MEDICINE | Facility: CLINIC | Age: 73
End: 2018-06-20
Payer: MEDICARE

## 2018-06-20 ENCOUNTER — TELEPHONE (OUTPATIENT)
Dept: FAMILY MEDICINE | Facility: CLINIC | Age: 73
End: 2018-06-20

## 2018-06-20 VITALS
OXYGEN SATURATION: 95 % | SYSTOLIC BLOOD PRESSURE: 122 MMHG | DIASTOLIC BLOOD PRESSURE: 82 MMHG | WEIGHT: 152.75 LBS | BODY MASS INDEX: 27.07 KG/M2 | HEIGHT: 63 IN | TEMPERATURE: 99 F | HEART RATE: 82 BPM

## 2018-06-20 DIAGNOSIS — M48.061 SPINAL STENOSIS OF LUMBAR REGION WITHOUT NEUROGENIC CLAUDICATION: ICD-10-CM

## 2018-06-20 DIAGNOSIS — M54.16 LUMBAR RADICULOPATHY: ICD-10-CM

## 2018-06-20 DIAGNOSIS — I10 ESSENTIAL HYPERTENSION: Primary | ICD-10-CM

## 2018-06-20 PROCEDURE — 99999 PR PBB SHADOW E&M-EST. PATIENT-LVL IV: CPT | Mod: PBBFAC,,, | Performed by: NURSE PRACTITIONER

## 2018-06-20 PROCEDURE — 3079F DIAST BP 80-89 MM HG: CPT | Mod: CPTII,S$GLB,, | Performed by: NURSE PRACTITIONER

## 2018-06-20 PROCEDURE — 99213 OFFICE O/P EST LOW 20 MIN: CPT | Mod: S$GLB,,, | Performed by: NURSE PRACTITIONER

## 2018-06-20 PROCEDURE — 3074F SYST BP LT 130 MM HG: CPT | Mod: CPTII,S$GLB,, | Performed by: NURSE PRACTITIONER

## 2018-06-20 RX ORDER — AMLODIPINE BESYLATE 2.5 MG/1
2.5 TABLET ORAL DAILY
Qty: 90 TABLET | Refills: 1 | Status: SHIPPED | OUTPATIENT
Start: 2018-06-20 | End: 2018-09-02 | Stop reason: SDUPTHER

## 2018-06-20 RX ORDER — GABAPENTIN 100 MG/1
100 CAPSULE ORAL 3 TIMES DAILY
COMMUNITY
End: 2018-07-12 | Stop reason: SDUPTHER

## 2018-06-20 RX ORDER — LOSARTAN POTASSIUM 100 MG/1
100 TABLET ORAL DAILY
Qty: 90 TABLET | Refills: 1 | Status: SHIPPED | OUTPATIENT
Start: 2018-06-20 | End: 2019-02-24 | Stop reason: SDUPTHER

## 2018-06-20 NOTE — TELEPHONE ENCOUNTER
Spoke with patient and scheduled her an appointment to see Ynes Uriostegui 6-21-18, she indicated understanding.

## 2018-06-20 NOTE — PROGRESS NOTES
Subjective:       Patient ID: Haley Castro is a 73 y.o. female.    Chief Complaint: Hypertension  She was last seen in primary care by me on 06/06/2018. She last saw Jt on 03/21/2018.   HPI   She is here today to follow up with hypertension.  Vitals:    06/20/18 1053   BP: 122/82   Pulse: 82   Temp: 98.5 °F (36.9 °C)     BP Readings from Last 3 Encounters:   06/20/18 122/82   06/13/18 132/88   06/06/18 (!) 140/84     Review of Systems   Musculoskeletal: Positive for back pain.   All other systems reviewed and are negative.    She was using Neurontin prior but stopped when concerned about increased drowsiness but was taking it at the same time with other medications.   Objective:      Physical Exam   Constitutional: She is oriented to person, place, and time. Vital signs are normal. She appears well-developed and well-nourished. She is active and cooperative.   HENT:   Head: Normocephalic and atraumatic.   Right Ear: Hearing, tympanic membrane, external ear and ear canal normal.   Left Ear: Hearing, tympanic membrane, external ear and ear canal normal.   Nose: Nose normal.   Mouth/Throat: Uvula is midline, oropharynx is clear and moist and mucous membranes are normal.   Neck: Trachea normal, normal range of motion and phonation normal. Neck supple.   Cardiovascular: Normal rate and regular rhythm.    Pulmonary/Chest: Effort normal and breath sounds normal.   Abdominal: Soft. Bowel sounds are normal. There is no splenomegaly or hepatomegaly. There is no tenderness.   Musculoskeletal: Normal range of motion.   Lymphadenopathy:        Head (right side): No submental, no submandibular, no tonsillar, no preauricular, no posterior auricular and no occipital adenopathy present.        Head (left side): No submental, no submandibular, no tonsillar, no preauricular, no posterior auricular and no occipital adenopathy present.     She has no cervical adenopathy.   Neurological: She is alert and oriented to person, place,  and time.   Skin: Skin is warm and dry.   Psychiatric: She has a normal mood and affect. Her speech is normal and behavior is normal. Judgment and thought content normal. Cognition and memory are normal.   Nursing note and vitals reviewed.      Assessment & Plan:       Essential hypertension  -     losartan (COZAAR) 100 MG tablet; Take 1 tablet (100 mg total) by mouth once daily.  Dispense: 90 tablet; Refill: 1  -     amLODIPine (NORVASC) 2.5 MG tablet; Take 1 tablet (2.5 mg total) by mouth once daily.  Dispense: 90 tablet; Refill: 1    Spinal stenosis of lumbar region without neurogenic claudication  -     Ambulatory Referral to [facility]    Lumbar radiculopathy  -     Ambulatory Referral to [facility]    She will restart the Neurontin by itself at night time to see if will offer any relief from the pain/radiculopathy  Medication List with Changes/Refills   Current Medications    ASPIRIN (ECOTRIN) 325 MG EC TABLET    Take 1 tablet (325 mg total) by mouth once daily.    ATORVASTATIN (LIPITOR) 40 MG TABLET    Take 1 tablet (40 mg total) by mouth once daily.    CARVEDILOL (COREG) 3.125 MG TABLET    TAKE 1 TABLET (3.125 MG TOTAL) BY MOUTH 2 (TWO) TIMES DAILY.    DORZOLAMIDE (TRUSOPT) 2 % OPHTHALMIC SOLUTION        DULOXETINE (CYMBALTA) 60 MG CAPSULE    Take 1 capsule (60 mg total) by mouth once daily.    GABAPENTIN (NEURONTIN) 100 MG CAPSULE    Take 100 mg by mouth 3 (three) times daily. Will restart 06/20/2018    PANTOPRAZOLE (PROTONIX) 40 MG TABLET    Take 1 tablet (40 mg total) by mouth before breakfast.   Changed and/or Refilled Medications    Modified Medication Previous Medication    AMLODIPINE (NORVASC) 2.5 MG TABLET amLODIPine (NORVASC) 2.5 MG tablet       Take 1 tablet (2.5 mg total) by mouth once daily.    Take 1 tablet (2.5 mg total) by mouth once daily.    LOSARTAN (COZAAR) 100 MG TABLET losartan (COZAAR) 100 MG tablet       Take 1 tablet (100 mg total) by mouth once daily.    TAKE 1  TABLET (100 MG TOTAL) BY MOUTH ONCE DAILY.   Discontinued Medications    GABAPENTIN (NEURONTIN) 100 MG CAPSULE    At bedtime for 3 days, if tolerated increase to 2 pills/200 mg for 3 days, if tolerated increase to 3 pills qhs;    RANITIDINE (ZANTAC) 300 MG TABLET    Take 1 tablet (300 mg total) by mouth every evening. , about 30-60 minutes before bedtime.         Follow-up if symptoms worsen or fail to improve.

## 2018-06-21 ENCOUNTER — OFFICE VISIT (OUTPATIENT)
Dept: SPINE | Facility: CLINIC | Age: 73
End: 2018-06-21
Payer: MEDICARE

## 2018-06-21 VITALS
BODY MASS INDEX: 26.78 KG/M2 | HEART RATE: 83 BPM | WEIGHT: 151.13 LBS | DIASTOLIC BLOOD PRESSURE: 76 MMHG | SYSTOLIC BLOOD PRESSURE: 132 MMHG | HEIGHT: 63 IN

## 2018-06-21 DIAGNOSIS — M54.41 CHRONIC RIGHT-SIDED LOW BACK PAIN WITH RIGHT-SIDED SCIATICA: Primary | ICD-10-CM

## 2018-06-21 DIAGNOSIS — M47.816 LUMBAR SPONDYLOSIS: ICD-10-CM

## 2018-06-21 DIAGNOSIS — M48.061 SPINAL STENOSIS OF LUMBAR REGION WITHOUT NEUROGENIC CLAUDICATION: ICD-10-CM

## 2018-06-21 DIAGNOSIS — G89.29 CHRONIC RIGHT-SIDED LOW BACK PAIN WITH RIGHT-SIDED SCIATICA: Primary | ICD-10-CM

## 2018-06-21 PROCEDURE — 99204 OFFICE O/P NEW MOD 45 MIN: CPT | Mod: S$GLB,,, | Performed by: PHYSICIAN ASSISTANT

## 2018-06-21 PROCEDURE — 99999 PR PBB SHADOW E&M-EST. PATIENT-LVL IV: CPT | Mod: PBBFAC,,, | Performed by: PHYSICIAN ASSISTANT

## 2018-06-21 PROCEDURE — 3078F DIAST BP <80 MM HG: CPT | Mod: CPTII,S$GLB,, | Performed by: PHYSICIAN ASSISTANT

## 2018-06-21 PROCEDURE — 3075F SYST BP GE 130 - 139MM HG: CPT | Mod: CPTII,S$GLB,, | Performed by: PHYSICIAN ASSISTANT

## 2018-06-21 NOTE — LETTER
June 25, 2018      Savannah Booth DNP, APRN  [address] [facility] [address] [place] [address]           [place] [facility]  [address] [facility] [address]  [facility] [address]  Phone: [phone]  Fax: [phone]          Patient: Haley Castro   MR Number: [id number]   YOB: 1945   Date of Visit: 6/21/2018       Dear Savananh Booth:    Thank you for referring Haley Castro to me for evaluation. Attached you will find relevant portions of my assessment and plan of care.    If you have questions, please do not hesitate to call me. I look forward to following Haley Castro along with you.    Sincerely,    Ynes Uriostegui PA-C    Enclosure  CC:  No Recipients    If you would like to receive this communication electronically, please contact [email or web address] or [phone] to request more information on [employer] access.    For providers and/or their staff who would like to refer a patient to [facility], please contact us through our one-stop-shop provider referral line, [facility] , at [phone].    If you feel you have received this communication in error or would no longer like to receive these types of communications, please e-mail [email or web address]

## 2018-06-25 NOTE — PROGRESS NOTES
Neurosurgery History & Physical    Patient ID: Haley Castro is a 73 y.o. female.    Chief Complaint   Patient presents with    Low-back Pain     Has had pain for 6 months to a year. Pain is constant. Pain radiates down right leg.Nothing seems to make pain better. Sitting makes pain worse.       Review of Systems   Constitutional: Negative for activity change, appetite change, chills, fever and unexpected weight change.   HENT: Negative for tinnitus, trouble swallowing and voice change.    Respiratory: Negative for apnea, cough, chest tightness and shortness of breath.    Cardiovascular: Negative for chest pain and palpitations.   Gastrointestinal: Negative for constipation, diarrhea, nausea and vomiting.   Genitourinary: Negative for difficulty urinating, dysuria, frequency and urgency.   Musculoskeletal: Positive for back pain. Negative for gait problem, neck pain and neck stiffness.   Skin: Negative for wound.   Neurological: Negative for dizziness, tremors, seizures, facial asymmetry, speech difficulty, weakness, light-headedness, numbness and headaches.   Psychiatric/Behavioral: Negative for confusion and decreased concentration.       Past Medical History:   Diagnosis Date    Arthritis     Depression 11/29/2012    Diverticulosis     Fatty liver     Former smoker     GERD (gastroesophageal reflux disease)     HLD (hyperlipidemia) 11/29/2012    HTN (hypertension) 11/29/2012    Infectious gastroenteritis     Ischemic colitis      Social History     Social History    Marital status:      Spouse name: N/A    Number of children: 3    Years of education: N/A     Occupational History    retired [employer]     Social History Main Topics    Smoking status: Former Smoker     Packs/day: 0.50     Years: 20.00     Quit date: 3/28/2013    Smokeless tobacco: Never Used      Comment: quit 2 years ago after intermittent use for 40 years    Alcohol use 0.0 oz/week      Comment: 0-2 ETOHic drinks per day  "   Drug use: No    Sexual activity: Yes     Partners: Male     Other Topics Concern    Not on file     Social History Narrative    No narrative on file     Family History   Problem Relation Age of Onset    Heart disease Mother 85        MI    Heart disease Father 55        MI    Colon cancer Neg Hx     Colon polyps Neg Hx     Crohn's disease Neg Hx     Ulcerative colitis Neg Hx      Review of patient's allergies indicates:   Allergen Reactions    No known drug allergies        Current Outpatient Prescriptions:     amLODIPine (NORVASC) 2.5 MG tablet, Take 1 tablet (2.5 mg total) by mouth once daily., Disp: 90 tablet, Rfl: 1    atorvastatin (LIPITOR) 40 MG tablet, Take 1 tablet (40 mg total) by mouth once daily., Disp: 90 tablet, Rfl: 5    carvedilol (COREG) 3.125 MG tablet, TAKE 1 TABLET (3.125 MG TOTAL) BY MOUTH 2 (TWO) TIMES DAILY., Disp: 180 tablet, Rfl: 3    dorzolamide (TRUSOPT) 2 % ophthalmic solution, , Disp: , Rfl:     DULoxetine (CYMBALTA) 60 MG capsule, Take 1 capsule (60 mg total) by mouth once daily., Disp: 90 capsule, Rfl: 0    gabapentin (NEURONTIN) 100 MG capsule, Take 100 mg by mouth 3 (three) times daily. Will restart 06/20/2018, Disp: , Rfl:     losartan (COZAAR) 100 MG tablet, Take 1 tablet (100 mg total) by mouth once daily., Disp: 90 tablet, Rfl: 1    pantoprazole (PROTONIX) 40 MG tablet, Take 1 tablet (40 mg total) by mouth before breakfast., Disp: 90 tablet, Rfl: 3    aspirin (ECOTRIN) 325 MG EC tablet, Take 1 tablet (325 mg total) by mouth once daily., Disp: , Rfl: 0    Vitals:    06/21/18 0937   BP: 132/76   Pulse: 83   Weight: 68.6 kg (151 lb 2 oz)   Height: 5' 3" (1.6 m)       Physical Exam   Constitutional: She is oriented to person, place, and time. She appears well-developed and well-nourished.   HENT:   Head: Normocephalic and atraumatic.   Eyes: Pupils are equal, round, and reactive to light.   Neck: Normal range of motion. Neck supple.   Cardiovascular: Normal " rate.    Pulmonary/Chest: Effort normal.   Musculoskeletal: Normal range of motion. She exhibits no edema.   Neurological: She is alert and oriented to person, place, and time. She has a normal Finger-Nose-Finger Test, a normal Heel to Shin Test, a normal Romberg Test and a normal Tandem Gait Test. Gait normal.   Reflex Scores:       Tricep reflexes are 2+ on the right side and 2+ on the left side.       Bicep reflexes are 2+ on the right side and 2+ on the left side.       Brachioradialis reflexes are 2+ on the right side and 2+ on the left side.       Patellar reflexes are 2+ on the right side and 2+ on the left side.       Achilles reflexes are 2+ on the right side and 2+ on the left side.  Skin: Skin is warm, dry and intact.   Psychiatric: She has a normal mood and affect. Her speech is normal and behavior is normal. Judgment and thought content normal.   Nursing note and vitals reviewed.      Neurologic Exam     Mental Status   Oriented to person, place, and time.   Oriented to person.   Oriented to place.   Oriented to time.   Follows 3 step commands.   Attention: normal. Concentration: normal.   Speech: speech is normal   Level of consciousness: alert  Knowledge: consistent with education.   Able to name object. Able to read. Able to repeat. Able to write. Normal comprehension.     Cranial Nerves     CN II   Visual acuity: normal  Right visual field deficit: none  Left visual field deficit: none     CN III, IV, VI   Pupils are equal, round, and reactive to light.  Right pupil: Size: 3 mm. Shape: regular. Reactivity: brisk. Consensual response: intact.   Left pupil: Size: 3 mm. Shape: regular. Reactivity: brisk. Consensual response: intact.   CN III: no CN III palsy  CN VI: no CN VI palsy  Nystagmus: none   Diplopia: none  Ophthalmoparesis: none  Conjugate gaze: present    CN V   Right facial sensation deficit: none  Left facial sensation deficit: none    CN VII   Right facial weakness: none  Left facial  weakness: none    CN VIII   Hearing: intact    CN IX, X   CN IX normal.   CN X normal.     CN XI   Right sternocleidomastoid strength: normal  Left sternocleidomastoid strength: normal  Right trapezius strength: normal  Left trapezius strength: normal    CN XII   Fasciculations: absent  Tongue deviation: none    Motor Exam   Muscle bulk: normal  Overall muscle tone: normal  Right arm pronator drift: absent  Left arm pronator drift: absent    Strength   Right neck flexion: 5/5  Left neck flexion: 5/5  Right neck extension: 5/5  Left neck extension: 5/5  Right deltoid: 5/5  Left deltoid: 5/5  Right biceps: 5/5  Left biceps: 5/5  Right triceps: 5/5  Left triceps: 5/5  Right wrist flexion: 5/5  Left wrist flexion: 5/5  Right wrist extension: 5/5  Left wrist extension: 5/5  Right interossei: 5/5  Left interossei: 5/5  Right abdominals: 5/5  Left abdominals: 5/5  Right iliopsoas: 5/5  Left iliopsoas: 5/5  Right quadriceps: 5/5  Left quadriceps: 5/5  Right hamstrin/5  Left hamstrin/5  Right glutei: 5/5  Left glutei: 5/5  Right anterior tibial: 5/5  Left anterior tibial: 5/5  Right posterior tibial: 5/5  Left posterior tibial: 5/5  Right peroneal: 5/5  Left peroneal: 5/5  Right gastroc: 5/5  Left gastroc: 5/5    Sensory Exam   Right arm light touch: normal  Left arm light touch: normal  Right leg light touch: normal  Left leg light touch: normal  Right arm vibration: normal  Left arm vibration: normal  Right arm pinprick: normal  Left arm pinprick: normal    Gait, Coordination, and Reflexes     Gait  Gait: normal    Coordination   Romberg: negative  Finger to nose coordination: normal  Heel to shin coordination: normal  Tandem walking coordination: normal    Tremor   Resting tremor: absent  Intention tremor: absent  Action tremor: absent    Reflexes   Right brachioradialis: 2+  Left brachioradialis: 2+  Right biceps: 2+  Left biceps: 2+  Right triceps: 2+  Left triceps: 2+  Right patellar: 2+  Left patellar:  2+  Right achilles: 2+  Left achilles: 2+  Right Calderon: absent  Left Calderon: absent  Right ankle clonus: absent  Left ankle clonus: absent      Provider dictation:  73 year old female with history of depression, hypertension, peripheral vascular disease, and GERD is referred by Savannah Booth for evaluation of back and right leg pain.  Right lower back pain started 6-12 months ago.  Pain radiates from the right lower back to the right lateral leg to the ankle.  She denies numbness and tingling.  Pain increases with sitting and there are no specific alleviating factors.  She takes naproxen sparingly for pain.  She also takes cymbalta.  She was prescribed neurontin and PT, but has not started either of these.   Oswestry score: 24%.  PHQ:  0.    She is neurologically intact on exam without focal deficit noted.    I have reviewed xrays (3/2018) and MRI (6/2018) lumbar spine demonstrating mild gentle rotary scoliosis.  There is L2/3 facet hypertrophy and disk space narrowing with mild central canal stenosis.  At L3/4 there is ligamentous hypertrophy and broad based disk with moderate central canal stenosis.  At L4/5 and L5/S1 there are mild degenerative changes without significant central canal or foraminal narrowing.    Ms. Castro has lumbar spondylsosi at L2/3 and L3/4 with lumbar stenosis the greatest at L3/4.  She has mild rotary scoliosis.  She may be experieincingn right L4 radiculopathy due to spondylosis/ stenosis at L3/4. At this time I recommend trying PT and DOROTHEA before considering any surgical intervention as she has not had any treatments thus far.  Referral placed to PT and pain management.  If no improvement we can consider surgical intervention, but we may need to consider nerve testing prior to considering surgery.  Follow up as needed.    Visit Diagnosis:  Chronic right-sided low back pain with right-sided sciatica  -     Ambulatory referral to Pain Clinic  -     Ambulatory Referral to  Physical/Occupational Therapy    Lumbar spondylosis  -     Ambulatory referral to Pain Clinic  -     Ambulatory Referral to Physical/Occupational Therapy    Spinal stenosis of lumbar region without neurogenic claudication  -     Ambulatory referral to Pain Clinic  -     Ambulatory Referral to Physical/Occupational Therapy        Total time spent counseling greater than fifty percent of total visit time.  Counseling included discussion regarding imaging findings, diagnosis possibilities, treatment options, risks and benefits.   The patient had many questions regarding the options and long-term effects.

## 2018-06-28 ENCOUNTER — OFFICE VISIT (OUTPATIENT)
Dept: CARDIOLOGY | Facility: CLINIC | Age: 73
End: 2018-06-28
Payer: MEDICARE

## 2018-06-28 VITALS
WEIGHT: 151.25 LBS | DIASTOLIC BLOOD PRESSURE: 84 MMHG | HEIGHT: 63 IN | SYSTOLIC BLOOD PRESSURE: 133 MMHG | BODY MASS INDEX: 26.8 KG/M2 | HEART RATE: 82 BPM

## 2018-06-28 DIAGNOSIS — I73.9 PVD (PERIPHERAL VASCULAR DISEASE): ICD-10-CM

## 2018-06-28 DIAGNOSIS — E78.2 MIXED HYPERLIPIDEMIA: ICD-10-CM

## 2018-06-28 DIAGNOSIS — Z82.49 FAMILY HISTORY OF EARLY CAD: ICD-10-CM

## 2018-06-28 DIAGNOSIS — I10 ESSENTIAL HYPERTENSION: ICD-10-CM

## 2018-06-28 DIAGNOSIS — I65.23 BILATERAL CAROTID ARTERY STENOSIS: Primary | ICD-10-CM

## 2018-06-28 PROCEDURE — 99499 UNLISTED E&M SERVICE: CPT | Mod: S$PBB,,, | Performed by: INTERNAL MEDICINE

## 2018-06-28 PROCEDURE — 99999 PR PBB SHADOW E&M-EST. PATIENT-LVL III: CPT | Mod: PBBFAC,,, | Performed by: INTERNAL MEDICINE

## 2018-06-28 PROCEDURE — 3079F DIAST BP 80-89 MM HG: CPT | Mod: CPTII,S$GLB,, | Performed by: INTERNAL MEDICINE

## 2018-06-28 PROCEDURE — 99214 OFFICE O/P EST MOD 30 MIN: CPT | Mod: S$GLB,,, | Performed by: INTERNAL MEDICINE

## 2018-06-28 PROCEDURE — 3075F SYST BP GE 130 - 139MM HG: CPT | Mod: CPTII,S$GLB,, | Performed by: INTERNAL MEDICINE

## 2018-06-28 RX ORDER — EZETIMIBE 10 MG/1
10 TABLET ORAL DAILY
Qty: 90 TABLET | Refills: 3 | Status: SHIPPED | OUTPATIENT
Start: 2018-06-28 | End: 2019-04-26 | Stop reason: SDUPTHER

## 2018-06-28 NOTE — PROGRESS NOTES
Subjective:    Patient ID:  Haley Castro is a 73 y.o. female who presents for follow-up of No chief complaint on file.      HPI  Here for follow up of PVD/dlp/HTN/fam hx CAD. Active lady w/o angina. Exercises 2/wk w/o sx's.     Review of Systems   Constitution: Negative for malaise/fatigue.   Eyes: Negative for blurred vision.   Cardiovascular: Negative for chest pain, claudication, cyanosis, dyspnea on exertion, irregular heartbeat, leg swelling, near-syncope, orthopnea, palpitations, paroxysmal nocturnal dyspnea and syncope.   Respiratory: Negative for cough and shortness of breath.    Hematologic/Lymphatic: Does not bruise/bleed easily.   Musculoskeletal: Negative for back pain, falls, joint pain, muscle cramps, muscle weakness and myalgias.   Gastrointestinal: Negative for abdominal pain, change in bowel habit, nausea and vomiting.   Genitourinary: Negative for urgency.   Neurological: Negative for dizziness, focal weakness and light-headedness.        Objective:    Physical Exam   Constitutional: She is oriented to person, place, and time. She appears well-developed and well-nourished.   HENT:   Head: Normocephalic.   Eyes: Conjunctivae are normal.   Neck: Normal range of motion. Neck supple. No JVD present.   Cardiovascular: Normal rate, regular rhythm, normal heart sounds and intact distal pulses.    Pulses:       Carotid pulses are 2+ on the right side, and 2+ on the left side.       Radial pulses are 2+ on the right side, and 2+ on the left side.        Dorsalis pedis pulses are 2+ on the right side, and 2+ on the left side.        Posterior tibial pulses are 2+ on the right side, and 2+ on the left side.   Pulmonary/Chest: Effort normal and breath sounds normal.   Abdominal: Soft. Bowel sounds are normal.   Musculoskeletal: She exhibits no edema or tenderness.   Neurological: She is alert and oriented to person, place, and time. Gait normal.   Skin: Skin is warm, dry and intact. No cyanosis. Nails show  no clubbing.   Psychiatric: She has a normal mood and affect. Her speech is normal and behavior is normal. Thought content normal.   Nursing note and vitals reviewed.            ..    Chemistry        Component Value Date/Time     05/16/2018 0906    K 4.4 05/16/2018 0906     05/16/2018 0906    CO2 28 05/16/2018 0906    BUN 16 05/16/2018 0906    CREATININE 0.7 05/16/2018 0906     (H) 05/16/2018 0906        Component Value Date/Time    CALCIUM 10.2 05/16/2018 0906    ALKPHOS 76 05/16/2018 0906    AST 18 05/16/2018 0906    ALT 12 05/16/2018 0906    BILITOT 0.7 05/16/2018 0906    ESTGFRAFRICA >60 05/16/2018 0906    EGFRNONAA >60 05/16/2018 0906            ..  Lab Results   Component Value Date    CHOL 192 05/16/2018    CHOL 187 03/22/2018    CHOL 187 03/22/2018     Lab Results   Component Value Date    HDL 55 05/16/2018    HDL 55 03/22/2018    HDL 55 03/22/2018     Lab Results   Component Value Date    LDLCALC 106.2 05/16/2018    LDLCALC 116.6 03/22/2018    LDLCALC 116.6 03/22/2018     Lab Results   Component Value Date    TRIG 154 (H) 05/16/2018    TRIG 77 03/22/2018    TRIG 77 03/22/2018     Lab Results   Component Value Date    CHOLHDL 28.6 05/16/2018    CHOLHDL 29.4 03/22/2018    CHOLHDL 29.4 03/22/2018     ..  Lab Results   Component Value Date    WBC 5.71 03/22/2018    HGB 14.1 03/22/2018    HCT 43.6 03/22/2018    MCV 96 03/22/2018     03/22/2018       Test(s) Reviewed  I have reviewed the following in detail:  [] Stress test   [] Angiography   [] Echocardiogram   [x] Labs   [] Other:       Assessment:         ICD-10-CM ICD-9-CM   1. Bilateral carotid artery stenosis I65.23 433.10     433.30   2. PVD (peripheral vascular disease) I73.9 443.9   3. Essential hypertension I10 401.9   4. Mixed hyperlipidemia E78.2 272.2   5. Family history of early CAD Z82.49 V17.3     Problem List Items Addressed This Visit     Carotid artery stenosis - Primary    Overview     3/2017 bilateral 50-69%          Essential hypertension    Family history of early CAD    Hyperlipidemia    PVD (peripheral vascular disease)           Plan:           Return to clinic 8 months   Low level/low impact aerobic exercise 5x's/wk. Heart healthy diet and risk factor modification.    See labs and med orders.  Refer to [facility] eval for CEA due to 80 % ELEUTERIO  Add zetia  Stress test in 8 months

## 2018-07-12 ENCOUNTER — OFFICE VISIT (OUTPATIENT)
Dept: FAMILY MEDICINE | Facility: CLINIC | Age: 73
End: 2018-07-12
Payer: MEDICARE

## 2018-07-12 VITALS
HEIGHT: 63 IN | DIASTOLIC BLOOD PRESSURE: 70 MMHG | WEIGHT: 151.44 LBS | HEART RATE: 82 BPM | SYSTOLIC BLOOD PRESSURE: 128 MMHG | BODY MASS INDEX: 26.83 KG/M2

## 2018-07-12 DIAGNOSIS — E78.2 MIXED HYPERLIPIDEMIA: Primary | ICD-10-CM

## 2018-07-12 DIAGNOSIS — I10 ESSENTIAL HYPERTENSION: ICD-10-CM

## 2018-07-12 DIAGNOSIS — Z12.39 SCREENING FOR BREAST CANCER: ICD-10-CM

## 2018-07-12 DIAGNOSIS — K21.9 GASTROESOPHAGEAL REFLUX DISEASE, ESOPHAGITIS PRESENCE NOT SPECIFIED: ICD-10-CM

## 2018-07-12 PROCEDURE — 3074F SYST BP LT 130 MM HG: CPT | Mod: CPTII,S$GLB,, | Performed by: FAMILY MEDICINE

## 2018-07-12 PROCEDURE — 3078F DIAST BP <80 MM HG: CPT | Mod: CPTII,S$GLB,, | Performed by: FAMILY MEDICINE

## 2018-07-12 PROCEDURE — 99214 OFFICE O/P EST MOD 30 MIN: CPT | Mod: S$GLB,,, | Performed by: FAMILY MEDICINE

## 2018-07-12 PROCEDURE — 99999 PR PBB SHADOW E&M-EST. PATIENT-LVL III: CPT | Mod: PBBFAC,,, | Performed by: FAMILY MEDICINE

## 2018-07-12 RX ORDER — GABAPENTIN 100 MG/1
100 CAPSULE ORAL 3 TIMES DAILY
Qty: 270 CAPSULE | Refills: 1 | Status: SHIPPED | OUTPATIENT
Start: 2018-07-12 | End: 2018-12-13 | Stop reason: SDUPTHER

## 2018-07-12 RX ORDER — PANTOPRAZOLE SODIUM 40 MG/1
40 TABLET, DELAYED RELEASE ORAL
Qty: 90 TABLET | Refills: 3 | Status: SHIPPED | OUTPATIENT
Start: 2018-07-12 | End: 2019-02-25 | Stop reason: SDUPTHER

## 2018-07-12 NOTE — PROGRESS NOTES
Subjective:       Patient ID: Haley Castro is a 73 y.o. female.    Chief Complaint: Hyperlipidemia (establish care)    Here as new patient; previously saw Dr. Waters.  Doing well overall.      Hyperlipidemia   This is a chronic problem. The current episode started more than 1 year ago. The problem is controlled. Pertinent negatives include no chest pain or shortness of breath.   Hypertension   This is a chronic problem. The current episode started more than 1 year ago. The problem is controlled. Pertinent negatives include no chest pain, palpitations or shortness of breath.     Review of Systems   Constitutional: Negative for chills, fatigue and fever.   Respiratory: Negative for cough, chest tightness and shortness of breath.    Cardiovascular: Negative for chest pain, palpitations and leg swelling.   Gastrointestinal: Negative for abdominal distention and abdominal pain.   Endocrine: Negative for cold intolerance and heat intolerance.   Skin: Negative for rash.   Psychiatric/Behavioral: Negative for dysphoric mood. The patient is not nervous/anxious.        Objective:      Physical Exam   Constitutional: She appears well-developed and well-nourished.   HENT:   Head: Normocephalic and atraumatic.   Cardiovascular: Normal rate, regular rhythm and normal heart sounds.    Pulmonary/Chest: Effort normal and breath sounds normal.   Psychiatric: She has a normal mood and affect.   Nursing note and vitals reviewed.      Assessment:       1. Mixed hyperlipidemia    2. Gastroesophageal reflux disease, esophagitis presence not specified    3. Essential hypertension    4. Screening for breast cancer        Plan:       Mixed hyperlipidemia    Gastroesophageal reflux disease, esophagitis presence not specified  -     pantoprazole (PROTONIX) 40 MG tablet; Take 1 tablet (40 mg total) by mouth before breakfast.  Dispense: 90 tablet; Refill: 3    Essential hypertension    Screening for breast cancer  -     [facility]  Screening Bilat with CAD; Future; Expected date: 07/12/2018    Other orders  -     gabapentin (NEURONTIN) 100 MG capsule; Take 1 capsule (100 mg total) by mouth 3 (three) times daily. Will restart 06/20/2018  Dispense: 270 capsule; Refill: 1      F/u with Dr. Thomas as planned.  Will monitor chronic medical issues and continue current plan of care.      Follow-up in about 3 months (around 10/12/2018), or if symptoms worsen or fail to improve.

## 2018-07-17 ENCOUNTER — PES CALL (OUTPATIENT)
Dept: ADMINISTRATIVE | Facility: CLINIC | Age: 73
End: 2018-07-17

## 2018-07-19 ENCOUNTER — OFFICE VISIT (OUTPATIENT)
Dept: VASCULAR SURGERY | Facility: CLINIC | Age: 73
End: 2018-07-19
Payer: MEDICARE

## 2018-07-19 VITALS
SYSTOLIC BLOOD PRESSURE: 138 MMHG | DIASTOLIC BLOOD PRESSURE: 78 MMHG | HEART RATE: 82 BPM | WEIGHT: 151.38 LBS | BODY MASS INDEX: 26.82 KG/M2 | HEIGHT: 63 IN

## 2018-07-19 DIAGNOSIS — I65.23 BILATERAL CAROTID ARTERY STENOSIS: Primary | ICD-10-CM

## 2018-07-19 PROCEDURE — 99214 OFFICE O/P EST MOD 30 MIN: CPT | Mod: S$GLB,,, | Performed by: THORACIC SURGERY (CARDIOTHORACIC VASCULAR SURGERY)

## 2018-07-19 PROCEDURE — 3075F SYST BP GE 130 - 139MM HG: CPT | Mod: CPTII,S$GLB,, | Performed by: THORACIC SURGERY (CARDIOTHORACIC VASCULAR SURGERY)

## 2018-07-19 PROCEDURE — 99999 PR PBB SHADOW E&M-EST. PATIENT-LVL III: CPT | Mod: PBBFAC,,, | Performed by: THORACIC SURGERY (CARDIOTHORACIC VASCULAR SURGERY)

## 2018-07-19 PROCEDURE — 3078F DIAST BP <80 MM HG: CPT | Mod: CPTII,S$GLB,, | Performed by: THORACIC SURGERY (CARDIOTHORACIC VASCULAR SURGERY)

## 2018-07-19 NOTE — PROGRESS NOTES
OFFICE VISIT NOTE    I was asked to see this patient by Dr. Cam.  The patient is a 73-year-old female   with known carotid artery stenosis, which has gotten worse over the last   several years.  She denies any symptoms of amaurosis fugax, TIAs or strokes.    PAST MEDICAL HISTORY:  Degenerative joint disease, carotid artery stenosis,   depression, diverticulosis, fatty liver, gastroesophageal reflux disease,   hyperlipidemia, hypertension, gastroenteritis, ischemic colitis.    PAST SURGICAL HISTORY:  Hysterectomy,  section, hemorrhoid surgery,   oophorectomy, appendectomy, tonsillectomy, hand surgery with knuckle implants,   colonoscopy.    ALLERGIES:  No known drug allergies.    MEDICATIONS:  Norvasc, Lipitor, Coreg, Trusopt ophthalmic solution, Cymbalta,   Zetia, Neurontin, Cozaar, Protonix, aspirin.    FAMILY HISTORY:  Coronary artery disease with her father dying at age 55 with a   myocardial infarction.    SOCIAL HISTORY:  She smoked half pack of cigarettes per day for 20 years and   quit smoking in .  She drinks alcohol occasionally.    REVIEW OF SYSTEMS:  She denies any amaurosis fugax, TIAs or strokes.  All other   systems are reviewed and are negative.    PHYSICAL EXAMINATION:  VITAL SIGNS:  Blood pressure is 138/78, respiratory rate is 18, heart rate 82,   height 5 feet 3 inches, weight 161 pounds.  GENERAL:  She is awake and alert, in no apparent distress.  HEENT:  Head is normocephalic.  Pupils are equal, round, reactive.  Sclerae are   anicteric.  NECK:  Supple.  Trachea midline.  No masses.  LUNGS:  Clear.  HEART:  Has a regular rate and rhythm.  ABDOMEN:  Soft and nontender.  EXTREMITIES:  No ulcers.  No edema.  No hyperpigmentation.  Bilateral feet are   pink and warm and well perfused with good capillary refill.  NEUROLOGIC:  Awake, alert, and oriented.  No lateralizing neurologic deficits.    Ultrasound of the carotid arteries shows 80% right internal carotid artery   stenosis.  This has  progressed since her last ultrasound, which was around 60%.    The left internal carotid artery has an approximately 60% stenosis.    IMPRESSION:  1.  Asymptomatic high-grade stenosis of the right internal carotid artery.  2.  Asymptomatic moderate-degree stenosis of the left internal carotid artery.  3.  Hypertension.  4.  Hyperlipidemia.  5.  History of ischemic colitis.  6.  Past history of tobacco abuse.  7.  Fatty liver.  8.  Diverticulosis.  9.  Degenerative joint disease.  10.  Status post hysterectomy.  11.  Status post oophorectomy.  12.  Status post appendectomy.  13.  Status post tonsillectomy.  14.  Status post joint replacement of the hands.    RECOMMENDATIONS:  The patient has had an 80% stenosis of the right internal   carotid artery and a 60% stenosis of the left internal carotid artery.  Both of   these are asymptomatic.  I think she would benefit from a right carotid   endarterectomy.  I think I would leave the left carotid alone for now.  We   discussed the findings of the ACAS study.  The recommendations were to perform   carotid endarterectomy in patients with greater than 60% asymptomatic carotid   artery stenoses.  I explained to her that in reviewing the data, there was very   little benefit for patients between 60% and 80% and all the benefit was for men   and there was none for women because of a higher complication rate in woman.  My   practice has been to perform carotid endarterectomy in asymptomatic patients   who have stenosis of 80-99%.  Risks including death, stroke, bleeding,   infection, cranial nerve injury causing problems with speaking and swallowing,   numbness of the face, neck and earlobe, etc., were all discussed.  She will have   a scar.  She voiced understanding and wishes to proceed with right carotid   endarterectomy.  Initially, she stated that she wanted the left done also   because she was worried.  After explaining the results of the ACAS study, we   will hold off  from doing the left and we will follow this with carotid duplex   scans.  Should it progress significantly, then we will consider left carotid   endarterectomy.      MELISSA  dd: 07/19/2018 14:27:20 (CDT)  td: 07/20/2018 05:27:51 (CDT)  Doc ID   #[id number]  Job ID #[id number]    CC:

## 2018-07-19 NOTE — LETTER
July 19, 2018      Aquilino Cam MD  [address] [facility] [address] [place] [address]           [facility]  [address] [facility] [address] [place] [address]  Phone: [phone]          Patient: Haley Castro   MR Number: [id number]   YOB: 1945   Date of Visit: 7/19/2018       Dear Dr. Aquilino Cam:    Thank you for referring Haley Castro to me for evaluation. Attached you will find relevant portions of my assessment and plan of care.    If you have questions, please do not hesitate to call me. I look forward to following Haley Castro along with you.    Sincerely,    Safia Thomas MD    Enclosure  CC:  No Recipients    If you would like to receive this communication electronically, please contact [email or web address] or [phone] to request more information on [employer] access.    For providers and/or their staff who would like to refer a patient to [facility], please contact us through our one-stop-shop provider referral line, [facility], at [phone].    If you feel you have received this communication in error or would no longer like to receive these types of communications, please e-mail [email or web address]

## 2018-07-23 ENCOUNTER — TELEPHONE (OUTPATIENT)
Dept: VASCULAR SURGERY | Facility: CLINIC | Age: 73
End: 2018-07-23

## 2018-07-23 DIAGNOSIS — I65.23 BILATERAL CAROTID ARTERY OCCLUSION: Primary | ICD-10-CM

## 2018-07-23 DIAGNOSIS — Z79.01 CURRENT USE OF ANTICOAGULANT THERAPY: ICD-10-CM

## 2018-07-23 RX ORDER — LIDOCAINE HYDROCHLORIDE 10 MG/ML
1 INJECTION, SOLUTION EPIDURAL; INFILTRATION; INTRACAUDAL; PERINEURAL ONCE
Status: CANCELLED | OUTPATIENT
Start: 2018-07-23 | End: 2018-07-23

## 2018-07-23 NOTE — TELEPHONE ENCOUNTER
Spoke to patient, Rt CEA scheduled on 5/30/18 @ [facility], preop scheduled on 5/27/18 1000 @ [facility].  Voices understanding, will call back as needed.

## 2018-07-23 NOTE — TELEPHONE ENCOUNTER
----- Message from Haley Conley sent at 7/23/2018  9:51 AM CDT -----  Type: Needs Medical Advice    Who Called:     Symptoms (please be specific):  surgery date ?  How long has patient had these symptoms:     Pharmacy name and phone #:     Best Call Back Number: [phone] (home)     Additional Information:

## 2018-07-30 PROBLEM — I65.23 BILATERAL CAROTID ARTERY OCCLUSION: Status: ACTIVE | Noted: 2018-07-30

## 2018-08-02 ENCOUNTER — TELEPHONE (OUTPATIENT)
Dept: VASCULAR SURGERY | Facility: CLINIC | Age: 73
End: 2018-08-02

## 2018-08-02 NOTE — TELEPHONE ENCOUNTER
----- Message from Yamilka Ahmetnhan sent at 8/2/2018 11:49 AM CDT -----  Contact:  Charbel Beyer  is requesting a call back he just has a couple of questions.  Plus he needs an appt in two weeks, and the patients throat is swollen, just wants to know if this is normal.  Call back Haley [phone] (home).  Thank you!

## 2018-08-02 NOTE — TELEPHONE ENCOUNTER
States throat is slightly swollen and voice is hoarse.  Reassured that this can happen post intubation, encouraged fluids to keep throat moist, if does not resolve or symptoms change, please call back.  Post op appt scheduled on 8/23/18.

## 2018-08-09 ENCOUNTER — TELEPHONE (OUTPATIENT)
Dept: PAIN MEDICINE | Facility: CLINIC | Age: 73
End: 2018-08-09

## 2018-08-09 ENCOUNTER — OFFICE VISIT (OUTPATIENT)
Dept: PAIN MEDICINE | Facility: CLINIC | Age: 73
End: 2018-08-09
Payer: MEDICARE

## 2018-08-09 VITALS
DIASTOLIC BLOOD PRESSURE: 65 MMHG | HEIGHT: 63 IN | HEART RATE: 81 BPM | RESPIRATION RATE: 18 BRPM | WEIGHT: 149.81 LBS | TEMPERATURE: 97 F | OXYGEN SATURATION: 99 % | SYSTOLIC BLOOD PRESSURE: 138 MMHG | BODY MASS INDEX: 26.54 KG/M2

## 2018-08-09 DIAGNOSIS — M54.16 LUMBAR RADICULOPATHY: Primary | ICD-10-CM

## 2018-08-09 DIAGNOSIS — M47.816 LUMBAR SPONDYLOSIS: ICD-10-CM

## 2018-08-09 DIAGNOSIS — M54.16 RIGHT LUMBAR RADICULOPATHY: Primary | ICD-10-CM

## 2018-08-09 DIAGNOSIS — M51.36 DDD (DEGENERATIVE DISC DISEASE), LUMBAR: ICD-10-CM

## 2018-08-09 PROCEDURE — 3075F SYST BP GE 130 - 139MM HG: CPT | Mod: CPTII,S$GLB,, | Performed by: ANESTHESIOLOGY

## 2018-08-09 PROCEDURE — 99999 PR PBB SHADOW E&M-EST. PATIENT-LVL IV: CPT | Mod: PBBFAC,,, | Performed by: ANESTHESIOLOGY

## 2018-08-09 PROCEDURE — 3078F DIAST BP <80 MM HG: CPT | Mod: CPTII,S$GLB,, | Performed by: ANESTHESIOLOGY

## 2018-08-09 PROCEDURE — 99204 OFFICE O/P NEW MOD 45 MIN: CPT | Mod: S$GLB,,, | Performed by: ANESTHESIOLOGY

## 2018-08-09 RX ORDER — ALPRAZOLAM 0.5 MG/1
1 TABLET, ORALLY DISINTEGRATING ORAL ONCE AS NEEDED
Status: CANCELLED | OUTPATIENT
Start: 2018-08-27 | End: 2018-08-27

## 2018-08-09 NOTE — TELEPHONE ENCOUNTER
Patient is scheduled for a lumbar steroid injection on 8/27 and will need to stop aspirin 7 days prior. Please advise if this is ok. Thanks.

## 2018-08-09 NOTE — H&P (VIEW-ONLY)
This note was completed with dictation software and grammatical errors may exist.    CC:  Right back and leg pain    HPI:  The patient is a 73-year-old woman with a history of carotid artery stenosis, osteoarthritis who presents in referral from Ynes Uriostegui for back and right leg pain. She reports having pain starting at the right low back, iliac crest radiates along the lateral thigh, lateral calf into the ankle and sometimes into the right lateral foot.  She has some mild pain on the left side but primarily this is on the right side.  She states that the pain is worse with sitting, lying down, worse with getting out of a bed or a chair, sometimes improved with walking.  While she has had this pain for the last several years, it has become much worse in the last several months.  She denies any weakness in the leg, denies any bowel or bladder incontinence.  She was given a prescription for gabapentin 100 mg 3 times daily but states that this was making her lightheaded.  She was prescribed physical therapy.  About 1 week ago she underwent right carotid endarterectomy.      ROS:  She reports hypertension.  Balance of review of systems is negative.    Past Medical History:   Diagnosis Date    Anticoagulant long-term use     Arthritis     Carotid artery occlusion     Depression 2012    Diverticulosis     Fatty liver     Former smoker     GERD (gastroesophageal reflux disease)     HLD (hyperlipidemia) 2012    HTN (hypertension) 2012    Infectious gastroenteritis     Ischemic colitis        Past Surgical History:   Procedure Laterality Date    APPENDECTOMY      carotid angiogram      CAROTID ENDARTERECTOMY Right 2018    Procedure: Endarterectomy-Carotid - RIGHT;  Surgeon: Safia Thomas MD;  [facility];  Service: Cardiovascular;  Laterality: Right;  with patch  angioplasty     SECTION      x 3     COLONOSCOPY N/A 2016    Procedure: COLONOSCOPY;  Surgeon: Grzegorz  "NISSA Sousa Jr., MD;  Location[facility];  Service: Endoscopy;  Laterality: N/A;    COLONOSCOPY  04[place]14[place]    HAND SURGERY Right 05/2016    [facility] noland/Dr. Abhinav Rolle    HEMORRHOID SURGERY      HYSTERECTOMY  age 35    TAHUSO benign reasons    hysteretomy      JOINT REPLACEMENT Right     1st interphalangeal joint of 3rd finger    OOPHORECTOMY      one remains    TONSILLECTOMY         Social History     Social History    Marital status:      Spouse name: N/A    Number of children: 3    Years of education: N/A     Occupational History    retired [employer]     Social History Main Topics    Smoking status: Former Smoker     Packs/day: 0.50     Years: 20.00     Quit date: 3/28/2013    Smokeless tobacco: Never Used      Comment: quit 2 years ago after intermittent use for 40 years    Alcohol use 0.0 oz/week      Comment: 0-2 ETOHic drinks per day    Drug use: No    Sexual activity: Yes     Partners: Male     Other Topics Concern    None     Social History Narrative    None         Medications/Allergies: See med card    Vitals:    08/09/18 0840   BP: 138/65   Pulse: 81   Resp: 18   Temp: 96.6 °F (35.9 °C)   TempSrc: Oral   SpO2: 99%   Weight: 67.9 kg (149 lb 12.8 oz)   Height: 5' 3" (1.6 m)   PainSc:   4   PainLoc: Back         Physical exam:  Gen: A and O x3, pleasant, well-groomed  Skin: No rashes or obvious lesions  HEENT: PERRLA, no obvious deformities on ears or in canals. Trachea midline.  CVS: Regular rate and rhythm, normal palpable pulses.  Resp:No increased work of breathing, symmetrical chest rise.  Abdomen: Soft, NT/ND.  Musculoskeletal: Able to heel walk, toe walk. No antalgic gait.     Neuro:  Lower extremities: 5/5 strength bilaterally  Reflexes: Patellar 2+, Achilles 2+ bilaterally.  Sensory:  Intact and symmetrical to light touch and pinprick in L2-S1 dermatomes bilaterally.    Lumbar spine:  Lumbar spine:  Range of motion is moderately " decreased with flexion with increased pain in the right low back, mildly decreased with extension with no increased pain. Mika's test causes no increased pain on either side.    Supine straight leg raise is positive on the right side at the 60° for left low back, left posterior thigh pain.   Internal and external rotation of the hip causes no increased pain on either side.  Myofascial exam: No tenderness to palpation across lumbar paraspinous muscles.    Imagin18 MRI L-spine  Vertebral column: There is multilevel degenerative change.  There is no fracture.  Comparison plain films demonstrate a very gentle rotary levocurvature of the lumbar spine.  There is 2 mm retrolisthesis of L2 on L3.  This is exaggerated by endplate osteophyte formation.  There is moderate disc space narrowing at the L2-3 level where there is also associated degenerative endplate signal change.  All of the discs are mildly desiccated.  There is no other significant disc space narrowing.  There is mild disc space narrowing at the L3-4 level.  Baseline marrow signal intensity is within normal limits.  T12-L1: Unremarkable.  There is no spinal canal or significant foraminal stenosis.  L1-2: There is mild facet joint arthropathy.  There is no spinal canal or significant foraminal stenosis.  L2-3: There is disc space narrowing.  There is trace retrolisthesis of L2 on L3.  There is a diffuse disc bulge with osteophytic ridging.  There is mild-to-moderate facet joint arthropathy with ligamentum flavum thickening, right greater than left.  There is no spinal stenosis.  There is mild bilateral foraminal stenosis.  L3-4: There is mild disc space narrowing.  There is a mild-to-moderate diffuse disc bulge.  There is moderate-to-marked facet joint arthropathy with ligamentum flavum thickening.  There is mildly prominent dorsal epidural fat.  There is moderate central spinal stenosis.  AP measurement of the dural sac is 7 mm there is mild  bilateral foraminal stenosis.  L4-5: There is a diffuse disc bulge with osteophytic ridging slightly eccentric to the right.  There is moderate facet joint arthropathy with ligamentum flavum thickening.  There is borderline spinal stenosis.  There is mild bilateral foraminal stenosis.  L5-S1: There is moderate facet joint arthropathy.  There is minimal bulging of the annulus.  There is no spinal canal or significant foraminal stenosis.  Soft tissues, other: The prevertebral soft tissues are normal.  The aorta is somewhat ectatic.    Assessment:  The patient is a 73-year-old woman with a history of carotid artery stenosis, osteoarthritis who presents in referral from Ynes Uriostegui for back and right leg pain.    1. Right lumbar radiculopathy     2. DDD (degenerative disc disease), lumbar     3. Lumbar spondylosis           Plan:  1.  We reviewed her symptoms, reviewed her lumbar spine MRI which shows canal narrowing at L3/4, this likely is the cause of her L4 and L5 radicular pain. We discussed setting her up with a lumbar epidural steroid injection, I would approach at L5/S1 interlaminar, we will need to get approval to discontinue aspirin, since she just had an endarterectomy, I am not sure what the management will be in terms of being able to hold this for 1 week prior to the injection.  We will get approval, after the injection I will have her follow up in several weeks to see what type of improvement she has had.      Thank you for referring this interesting patient, and I look forward to continuing to collaborate in her care.

## 2018-08-09 NOTE — PROGRESS NOTES
This note was completed with dictation software and grammatical errors may exist.    CC:  Right back and leg pain    HPI:  The patient is a 73-year-old woman with a history of carotid artery stenosis, osteoarthritis who presents in referral from Ynes Uriostegui for back and right leg pain. She reports having pain starting at the right low back, iliac crest radiates along the lateral thigh, lateral calf into the ankle and sometimes into the right lateral foot.  She has some mild pain on the left side but primarily this is on the right side.  She states that the pain is worse with sitting, lying down, worse with getting out of a bed or a chair, sometimes improved with walking.  While she has had this pain for the last several years, it has become much worse in the last several months.  She denies any weakness in the leg, denies any bowel or bladder incontinence.  She was given a prescription for gabapentin 100 mg 3 times daily but states that this was making her lightheaded.  She was prescribed physical therapy.  About 1 week ago she underwent right carotid endarterectomy.      ROS:  She reports hypertension.  Balance of review of systems is negative.    Past Medical History:   Diagnosis Date    Anticoagulant long-term use     Arthritis     Carotid artery occlusion     Depression 2012    Diverticulosis     Fatty liver     Former smoker     GERD (gastroesophageal reflux disease)     HLD (hyperlipidemia) 2012    HTN (hypertension) 2012    Infectious gastroenteritis     Ischemic colitis        Past Surgical History:   Procedure Laterality Date    APPENDECTOMY      carotid angiogram      CAROTID ENDARTERECTOMY Right 2018    Procedure: Endarterectomy-Carotid - RIGHT;  Surgeon: Safia Thomas MD;  Location: [facility];  Service: Cardiovascular;  Laterality: Right;  with patch  angioplasty     SECTION      x 3     COLONOSCOPY N/A 2016    Procedure: COLONOSCOPY;  Surgeon: Grzegorz  "NISSA Sousa Jr., MD;  Location: [facility];  Service: Endoscopy;  Laterality: N/A;    COLONOSCOPY  04[place]14/2009    [place] 5 years    HAND SURGERY Right 05/2016    knlaura noland/Dr. Abhinav Rolle    HEMORRHOID SURGERY      HYSTERECTOMY  age 35    TAHUSO benign reasons    hysteretomy      JOINT REPLACEMENT Right     1st interphalangeal joint of 3rd finger    OOPHORECTOMY      one remains    TONSILLECTOMY         Social History     Social History    Marital status:      Spouse name: N/A    Number of children: 3    Years of education: N/A     Occupational History    retired [employer]     Social History Main Topics    Smoking status: Former Smoker     Packs/day: 0.50     Years: 20.00     Quit date: 3/28/2013    Smokeless tobacco: Never Used      Comment: quit 2 years ago after intermittent use for 40 years    Alcohol use 0.0 oz/week      Comment: 0-2 ETOHic drinks per day    Drug use: No    Sexual activity: Yes     Partners: Male     Other Topics Concern    None     Social History Narrative    None         Medications/Allergies: See med card    Vitals:    08/09/18 0840   BP: 138/65   Pulse: 81   Resp: 18   Temp: 96.6 °F (35.9 °C)   TempSrc: Oral   SpO2: 99%   Weight: 67.9 kg (149 lb 12.8 oz)   Height: 5' 3" (1.6 m)   PainSc:   4   PainLoc: Back         Physical exam:  Gen: A and O x3, pleasant, well-groomed  Skin: No rashes or obvious lesions  HEENT: PERRLA, no obvious deformities on ears or in canals. Trachea midline.  CVS: Regular rate and rhythm, normal palpable pulses.  Resp:No increased work of breathing, symmetrical chest rise.  Abdomen: Soft, NT/ND.  Musculoskeletal: Able to heel walk, toe walk. No antalgic gait.     Neuro:  Lower extremities: 5/5 strength bilaterally  Reflexes: Patellar 2+, Achilles 2+ bilaterally.  Sensory:  Intact and symmetrical to light touch and pinprick in L2-S1 dermatomes bilaterally.    Lumbar spine:  Lumbar spine:  Range of motion is moderately " decreased with flexion with increased pain in the right low back, mildly decreased with extension with no increased pain. Mika's test causes no increased pain on either side.    Supine straight leg raise is positive on the right side at the 60° for left low back, left posterior thigh pain.   Internal and external rotation of the hip causes no increased pain on either side.  Myofascial exam: No tenderness to palpation across lumbar paraspinous muscles.    Imagin18 MRI L-spine  Vertebral column: There is multilevel degenerative change.  There is no fracture.  Comparison plain films demonstrate a very gentle rotary levocurvature of the lumbar spine.  There is 2 mm retrolisthesis of L2 on L3.  This is exaggerated by endplate osteophyte formation.  There is moderate disc space narrowing at the L2-3 level where there is also associated degenerative endplate signal change.  All of the discs are mildly desiccated.  There is no other significant disc space narrowing.  There is mild disc space narrowing at the L3-4 level.  Baseline marrow signal intensity is within normal limits.  T12-L1: Unremarkable.  There is no spinal canal or significant foraminal stenosis.  L1-2: There is mild facet joint arthropathy.  There is no spinal canal or significant foraminal stenosis.  L2-3: There is disc space narrowing.  There is trace retrolisthesis of L2 on L3.  There is a diffuse disc bulge with osteophytic ridging.  There is mild-to-moderate facet joint arthropathy with ligamentum flavum thickening, right greater than left.  There is no spinal stenosis.  There is mild bilateral foraminal stenosis.  L3-4: There is mild disc space narrowing.  There is a mild-to-moderate diffuse disc bulge.  There is moderate-to-marked facet joint arthropathy with ligamentum flavum thickening.  There is mildly prominent dorsal epidural fat.  There is moderate central spinal stenosis.  AP measurement of the dural sac is 7 mm there is mild  bilateral foraminal stenosis.  L4-5: There is a diffuse disc bulge with osteophytic ridging slightly eccentric to the right.  There is moderate facet joint arthropathy with ligamentum flavum thickening.  There is borderline spinal stenosis.  There is mild bilateral foraminal stenosis.  L5-S1: There is moderate facet joint arthropathy.  There is minimal bulging of the annulus.  There is no spinal canal or significant foraminal stenosis.  Soft tissues, other: The prevertebral soft tissues are normal.  The aorta is somewhat ectatic.    Assessment:  The patient is a 73-year-old woman with a history of carotid artery stenosis, osteoarthritis who presents in referral from Ynes Uriostegui for back and right leg pain.    1. Right lumbar radiculopathy     2. DDD (degenerative disc disease), lumbar     3. Lumbar spondylosis           Plan:  1.  We reviewed her symptoms, reviewed her lumbar spine MRI which shows canal narrowing at L3/4, this likely is the cause of her L4 and L5 radicular pain. We discussed setting her up with a lumbar epidural steroid injection, I would approach at L5/S1 interlaminar, we will need to get approval to discontinue aspirin, since she just had an endarterectomy, I am not sure what the management will be in terms of being able to hold this for 1 week prior to the injection.  We will get approval, after the injection I will have her follow up in several weeks to see what type of improvement she has had.      Thank you for referring this interesting patient, and I look forward to continuing to collaborate in her care.

## 2018-08-09 NOTE — TELEPHONE ENCOUNTER
S/P right carotid endarterectomy 7/30/2018.      VORB per Dr Jess SENA to hold ASA 7 days prior to lumbar injection.

## 2018-08-09 NOTE — LETTER
August 12, 2018      Ynes Uriostegui PA-C  [address] [facility] [address]  [address]  [facility] [address]           [facility]  [address] [facility] [address] [place] [address]  Phone: [phone]  Fax: [phone]          Patient: Haley Castro   MR Number: [id number]   YOB: 1945   Date of Visit: 8/9/2018       Dear Ynes Uriostegui:    Thank you for referring Haley Castro to me for evaluation. Attached you will find relevant portions of my assessment and plan of care.    If you have questions, please do not hesitate to call me. I look forward to following Haley Castro along with you.    Sincerely,    Jakub Greer MD    Enclosure  CC:  No Recipients    If you would like to receive this communication electronically, please contact [email or web address] or [phone] to request more information on [employer] Link access.    For providers and/or their staff who would like to refer a patient to [facility], please contact us through our one-stop-shop provider referral line, [facility] , at [phone].    If you feel you have received this communication in error or would no longer like to receive these types of communications, please e-mail [email or web address]

## 2018-08-10 DIAGNOSIS — E78.2 MIXED HYPERLIPIDEMIA: ICD-10-CM

## 2018-08-10 DIAGNOSIS — I10 ESSENTIAL HYPERTENSION: ICD-10-CM

## 2018-08-10 DIAGNOSIS — I65.23 BILATERAL CAROTID ARTERY STENOSIS: ICD-10-CM

## 2018-08-10 RX ORDER — ATORVASTATIN CALCIUM 40 MG/1
40 TABLET, FILM COATED ORAL DAILY
Qty: 90 TABLET | Refills: 1 | Status: SHIPPED | OUTPATIENT
Start: 2018-08-10 | End: 2019-02-11 | Stop reason: SDUPTHER

## 2018-08-10 NOTE — TELEPHONE ENCOUNTER
----- Message from Melly Marques sent at 8/10/2018 11:45 AM CDT -----  Type:  Pharmacy Calling to Clarify an RX    Name of Caller: ptPharmacy Name:    [id number] [place] [facility] [address]  [address] [address]  Phone: [phone] Fax: [phone]    Prescription Name: atoreastatin 40 mg What do they need to clarify?: Alessia Call Back Number: [phone]  Additional Information:  na

## 2018-08-10 NOTE — TELEPHONE ENCOUNTER
Phoned [facility] in regards to message. Spoke to Alexandro and he states that the pt has not picked up any Rx atorvastatin since January. Alexandro states the pt has RX pravastatin 20mg  That has been sent there since 5/2018.     Phoned pt and spoke to her. Pt states she has been taking the 40 mg atorvastatin and that she does not know who ordered the RX 20 mg atorvastatin. Pt's LOV 7/12/18 with labs 5/16/18. Pt has a f/u 10/24/18. Please review and advise. Thank you. CLC

## 2018-08-13 ENCOUNTER — HOSPITAL ENCOUNTER (OUTPATIENT)
Dept: RADIOLOGY | Facility: HOSPITAL | Age: 73
Discharge: HOME | End: 2018-08-13
Attending: FAMILY MEDICINE
Payer: MEDICARE

## 2018-08-13 DIAGNOSIS — Z12.39 SCREENING FOR BREAST CANCER: ICD-10-CM

## 2018-08-13 PROCEDURE — 77063 BREAST TOMOSYNTHESIS BI: CPT | Mod: 26,,, | Performed by: RADIOLOGY

## 2018-08-13 PROCEDURE — 77063 BREAST TOMOSYNTHESIS BI: CPT | Mod: TC,PO

## 2018-08-13 PROCEDURE — 77067 SCR MAMMO BI INCL CAD: CPT | Mod: 26,,, | Performed by: RADIOLOGY

## 2018-08-24 DIAGNOSIS — M51.36 DDD (DEGENERATIVE DISC DISEASE), LUMBAR: Primary | ICD-10-CM

## 2018-08-27 ENCOUNTER — HOSPITAL ENCOUNTER (OUTPATIENT)
Facility: HOSPITAL | Age: 73
Discharge: HOME | End: 2018-08-27
Attending: ANESTHESIOLOGY | Admitting: ANESTHESIOLOGY
Payer: MEDICARE

## 2018-08-27 ENCOUNTER — HOSPITAL ENCOUNTER (OUTPATIENT)
Dept: RADIOLOGY | Facility: HOSPITAL | Age: 73
Discharge: HOME | End: 2018-08-27
Attending: ANESTHESIOLOGY | Admitting: ANESTHESIOLOGY
Payer: MEDICARE

## 2018-08-27 VITALS
OXYGEN SATURATION: 96 % | DIASTOLIC BLOOD PRESSURE: 71 MMHG | BODY MASS INDEX: 26.22 KG/M2 | RESPIRATION RATE: 20 BRPM | HEIGHT: 63 IN | HEART RATE: 77 BPM | SYSTOLIC BLOOD PRESSURE: 120 MMHG | WEIGHT: 148 LBS | TEMPERATURE: 98 F

## 2018-08-27 DIAGNOSIS — M51.36 DDD (DEGENERATIVE DISC DISEASE), LUMBAR: ICD-10-CM

## 2018-08-27 DIAGNOSIS — M54.16 LUMBAR RADICULOPATHY: Primary | ICD-10-CM

## 2018-08-27 PROCEDURE — 25000003 PHARM REV CODE 250: Mod: PO | Performed by: ANESTHESIOLOGY

## 2018-08-27 PROCEDURE — 76000 FLUOROSCOPY <1 HR PHYS/QHP: CPT | Mod: TC,PO

## 2018-08-27 PROCEDURE — 64483 NJX AA&/STRD TFRM EPI L/S 1: CPT | Mod: RT,,, | Performed by: ANESTHESIOLOGY

## 2018-08-27 PROCEDURE — 63600175 PHARM REV CODE 636 W HCPCS: Mod: PO | Performed by: ANESTHESIOLOGY

## 2018-08-27 PROCEDURE — 25500020 PHARM REV CODE 255: Mod: PO | Performed by: ANESTHESIOLOGY

## 2018-08-27 PROCEDURE — 64483 NJX AA&/STRD TFRM EPI L/S 1: CPT | Mod: PO | Performed by: ANESTHESIOLOGY

## 2018-08-27 RX ORDER — LIDOCAINE HYDROCHLORIDE 10 MG/ML
INJECTION, SOLUTION EPIDURAL; INFILTRATION; INTRACAUDAL; PERINEURAL
Status: DISCONTINUED | OUTPATIENT
Start: 2018-08-27 | End: 2018-08-27 | Stop reason: HOSPADM

## 2018-08-27 RX ORDER — BUPIVACAINE HYDROCHLORIDE 2.5 MG/ML
INJECTION, SOLUTION EPIDURAL; INFILTRATION; INTRACAUDAL
Status: DISCONTINUED | OUTPATIENT
Start: 2018-08-27 | End: 2018-08-27 | Stop reason: HOSPADM

## 2018-08-27 RX ORDER — METHYLPREDNISOLONE ACETATE 40 MG/ML
INJECTION, SUSPENSION INTRA-ARTICULAR; INTRALESIONAL; INTRAMUSCULAR; SOFT TISSUE
Status: DISCONTINUED | OUTPATIENT
Start: 2018-08-27 | End: 2018-08-27 | Stop reason: HOSPADM

## 2018-08-27 RX ORDER — ALPRAZOLAM 0.5 MG/1
1 TABLET, ORALLY DISINTEGRATING ORAL ONCE AS NEEDED
Status: COMPLETED | OUTPATIENT
Start: 2018-08-27 | End: 2018-08-27

## 2018-08-27 RX ADMIN — ALPRAZOLAM 1 MG: 0.5 TABLET, ORALLY DISINTEGRATING ORAL at 01:08

## 2018-08-27 NOTE — OP NOTE
PROCEDURE DATE: 8/27/2018    PROCEDURE: Right L4/5 transforaminal epidural steroid injection under fluoroscopy    DIAGNOSIS: Lumbar  Radiculopathy    Post op diagnosis: Same    PHYSICIAN: Jakub Greer MD    MEDICATIONS INJECTED:  Methylprednisolone 40mg (0.5ml) and 1.5ml 0.25% bupivicaine at each nerve root.     LOCAL ANESTHETIC INJECTED:  Lidocaine 1%. 4 ml per site.    SEDATION MEDICATIONS: none    ESTIMATED BLOOD LOSS:  none    COMPLICATIONS:  none    TECHNIQUE:   A time-out was taken to identify patient and procedure side prior to starting the procedure. The patient was placed in a prone position, prepped and draped in the usual sterile fashion using ChloraPrep and sterile towels.  The area to be injected was determined under fluoroscopic guidance in AP and oblique view.  Local anesthetic was given by raising a wheal and going down to the hub of a 25-gauge 1.5 inch needle.  In oblique view, a 3.5 inch 22-gauge bent-tip spinal needle was introduced towards 6 oclock position of the pedicle of each above named nerve root level.  The needle was walked medially then hinged into the neural foramen and position was confirmed in AP and lateral views.  Omnipaque contrast dye was injected to confirm appropriate placement and that there was no vascular uptake.  After negative aspiration for blood or CSF, the medication was then injected. This was performed at the right L4/5 level(s). The patient tolerated the procedure well.    The patient was monitored after the procedure.  Patient was given post procedure and discharge instructions to follow at home. The patient was discharged in a stable condition.

## 2018-08-27 NOTE — DISCHARGE INSTRUCTIONS
You have been given medicine by vein to sedate you during your procedure today. This may have included both a pain medicine and sleeping medicine. Most of the effects have worn off; however, you may continue to have some drowsiness for the next 6-8 hours.  HOME CARE:  Have a responsible adult for the next 8 hours for worsening of your condition  Do not take any oral medicine for pain/sleep during the next 4 hours since this might react with the other medicines you were given  Do not drink any alcohol for the next 24 hours  Do not drive or operate dangerous machinery during next 24 hours.  Follow up with your doctor if not alert and back to your usual level of activity in 24 hours    SEEK PROMPT MEDICAL ATTENTION FOR:  Increased drowsiness, weakness, or dizziness  Repeated vomiting  If care giver is unable to wake you  Home care instructions  Apply ice pack to the injection site for 20 minutes periods for the first 24 hrs for soreness/discomfort at injection site DO NOT USE HEAT FOR 24 HOURS  Keep site clean and dry for 24 hours, remove bandaid when desired  Do not drive until tomorrow  Take care when walking after a lumbar injection  Avoid strenuous activities for 2 days  Make take 2 weeks to feel the full effects   Resume home medication as prescribed today  Resume Aspirin, Plavix, or Coumadin the day after the procedure unless otherwise instructed.    SEE IMMEDIATE MEDICAL HELP FOR:  Severe increase in your usual pain or appearance of new pain  Prolonged or increasing weakness or numbness in the legs or arms  Drainage, redness, active bleeding, or increased swelling at the injection site  Temperature over 100.0 degrees F.  Headache that increases when your head is upright and decreases when you lie flat    CALL 911 OR GO DIRECTLY TO EMERGENCY DEPARTMENT FOR:  Shortness of breath, chest pain, or problems breathing

## 2018-08-29 ENCOUNTER — OFFICE VISIT (OUTPATIENT)
Dept: VASCULAR SURGERY | Facility: CLINIC | Age: 73
End: 2018-08-29
Payer: MEDICARE

## 2018-08-29 VITALS
SYSTOLIC BLOOD PRESSURE: 172 MMHG | HEART RATE: 89 BPM | WEIGHT: 149.69 LBS | BODY MASS INDEX: 26.52 KG/M2 | DIASTOLIC BLOOD PRESSURE: 99 MMHG

## 2018-08-29 DIAGNOSIS — I65.23 BILATERAL CAROTID ARTERY STENOSIS: Primary | ICD-10-CM

## 2018-08-29 PROCEDURE — 99024 POSTOP FOLLOW-UP VISIT: CPT | Mod: S$GLB,,, | Performed by: THORACIC SURGERY (CARDIOTHORACIC VASCULAR SURGERY)

## 2018-08-29 PROCEDURE — 99999 PR PBB SHADOW E&M-EST. PATIENT-LVL III: CPT | Mod: PBBFAC,,, | Performed by: THORACIC SURGERY (CARDIOTHORACIC VASCULAR SURGERY)

## 2018-08-29 NOTE — PROGRESS NOTES
OFFICE VISIT NOTE    HISTORY OF PRESENT ILLNESS:  Ms. Castro underwent right carotid endarterectomy.    She states that it feels funny when she swallows.  It almost seems as though   the food is not going down, but it does go down.  She has no symptoms of   aspiration.  She does not cough with eating.  She states that it just feels as   though there might be some swelling still present.    PHYSICAL EXAMINATION:  On physical examination, she is neurologically intact.    Her tongue protrudes in the midline.  Strength in all extremities are equal and   symmetric and very good.  Her incision is healing well.  The patient had an   approximately 60% stenosis of the left internal carotid artery.    We will get another ultrasound in six months.  If this feeling in the throat   does not go away in a couple of months, we will have her evaluated by ENT.  She   is speaking well and is not hoarse.  She has no evidence of any injury to the   hypoglossal or glossopharyngeal vagus or marginal mandibular nerves.      MELISSA  dd: 08/29/2018 11:22:58 (CDT)  td: 08/30/2018 03:53:53 (CDT)  Doc ID   #[id number]  Job ID #[id number]    CC:

## 2018-08-31 ENCOUNTER — TELEPHONE (OUTPATIENT)
Dept: FAMILY MEDICINE | Facility: CLINIC | Age: 73
End: 2018-08-31

## 2018-08-31 NOTE — TELEPHONE ENCOUNTER
Patient advised to seek help at urgent care. Offered that [facility] urgent care would allow physician to see records when patient was evaluated.   Patient is asymptomatic, but reports that her systolic home readings register 160 and over the past two days. States she is never symptomatic when blood pressure is high.   Patient seen in Dr Codey flannery office two days ago, also with elevation.   Spoke with PCP, who advised evaluation at urgent care and to call in to PCP on Tuesday with blood pressure log from weekend. Advised to continue home monitoring. Voiced understanding.

## 2018-09-04 ENCOUNTER — TELEPHONE (OUTPATIENT)
Dept: FAMILY MEDICINE | Facility: CLINIC | Age: 73
End: 2018-09-04

## 2018-09-04 NOTE — TELEPHONE ENCOUNTER
Patient scheduled for follow up to ED visit with Savannah Booth.   Patient has been advised to keep blood pressure log until visit. Patient teaching provided regarding acceptable blood pressure ranges and when to contact physician. Voiced understanding

## 2018-09-04 NOTE — TELEPHONE ENCOUNTER
----- Message from Haley Vaughn sent at 9/4/2018 11:38 AM CDT -----  Contact: Patient  Type: Needs Medical Advice    Who Called:  Haley, patient  Symptoms (please be specific):  Elevated blood pressure and vertigo  How long has patient had these symptoms:  Since friday  Pharmacy name and phone #:    [id number] - [place] - [address] [facility] [address] [facility]  [address] [place] [address]  Phone: [phone] Fax: [phone]  Best Call Back Number: [phone]  Additional Information: Calling to inform you that she went to [facility] emergency room and was treated for elevated blood pressure. She was given Rx Amlodipine 5mg which is an increase from what she was taking 2.5 mg. And was also given Rx Meclozine for the vertigo.  Please advise. Thanks.

## 2018-09-11 ENCOUNTER — OFFICE VISIT (OUTPATIENT)
Dept: FAMILY MEDICINE | Facility: CLINIC | Age: 73
End: 2018-09-11
Payer: MEDICARE

## 2018-09-11 VITALS
RESPIRATION RATE: 18 BRPM | WEIGHT: 149.69 LBS | TEMPERATURE: 98 F | HEIGHT: 63 IN | SYSTOLIC BLOOD PRESSURE: 136 MMHG | BODY MASS INDEX: 26.52 KG/M2 | DIASTOLIC BLOOD PRESSURE: 84 MMHG | OXYGEN SATURATION: 96 % | HEART RATE: 84 BPM

## 2018-09-11 DIAGNOSIS — I10 ESSENTIAL HYPERTENSION: Primary | ICD-10-CM

## 2018-09-11 PROCEDURE — 99999 PR PBB SHADOW E&M-EST. PATIENT-LVL IV: CPT | Mod: PBBFAC,,, | Performed by: NURSE PRACTITIONER

## 2018-09-11 PROCEDURE — 90662 IIV NO PRSV INCREASED AG IM: CPT | Mod: PBBFAC,PO

## 2018-09-11 PROCEDURE — 99213 OFFICE O/P EST LOW 20 MIN: CPT | Mod: S$PBB,,, | Performed by: NURSE PRACTITIONER

## 2018-09-11 PROCEDURE — 99214 OFFICE O/P EST MOD 30 MIN: CPT | Mod: PBBFAC,PO | Performed by: NURSE PRACTITIONER

## 2018-09-11 PROCEDURE — 1101F PT FALLS ASSESS-DOCD LE1/YR: CPT | Mod: CPTII,,, | Performed by: NURSE PRACTITIONER

## 2018-09-11 PROCEDURE — 3079F DIAST BP 80-89 MM HG: CPT | Mod: CPTII,,, | Performed by: NURSE PRACTITIONER

## 2018-09-11 PROCEDURE — 3075F SYST BP GE 130 - 139MM HG: CPT | Mod: CPTII,,, | Performed by: NURSE PRACTITIONER

## 2018-09-11 RX ORDER — AMLODIPINE BESYLATE 5 MG/1
5 TABLET ORAL DAILY
Qty: 90 TABLET | Refills: 1 | Status: SHIPPED | OUTPATIENT
Start: 2018-09-11 | End: 2018-10-02 | Stop reason: SDUPTHER

## 2018-09-11 NOTE — PROGRESS NOTES
Subjective:       Patient ID: Haley Castro is a 73 y.o. female.    Chief Complaint: Follow-up (ED follow up related to elevated blood pressure)  Last seen in primary care by Bridger on 07/12/2018. I last saw her on 06/20/2018.   HPI   She is here to follow up with ED visit. She presented to [facility]  ED on 09/02/2018 for vertigo and HTN.   Vitals:    09/11/18 1601   BP: 136/84   Pulse: 84   Resp: 18   Temp: 97.9 °F (36.6 °C)     BP Readings from Last 3 Encounters:   09/11/18 136/84   09/02/18 (!) 153/80   08/29/18 (!) 172/99     Review of Systems   Respiratory: Negative for shortness of breath.    Cardiovascular: Negative for leg swelling.   Neurological: Negative for dizziness and headaches.       I have reviewed her labs from ED visit. Her amlodipine was increased to 5 mg daily.   Lab Results   Component Value Date    WBC 6.52 09/02/2018    HGB 13.0 09/02/2018    HCT 39.3 09/02/2018    MCV 92 09/02/2018     09/02/2018     CMP  Sodium   Date Value Ref Range Status   09/02/2018 139 136 - 145 mmol/L Final     Potassium   Date Value Ref Range Status   09/02/2018 4.1 3.5 - 5.1 mmol/L Final     Chloride   Date Value Ref Range Status   09/02/2018 103 95 - 110 mmol/L Final     CO2   Date Value Ref Range Status   09/02/2018 27 22 - 31 mmol/L Final     Glucose   Date Value Ref Range Status   09/02/2018 114 (H) 70 - 110 mg/dL Final     Comment:     The ADA recommends the following guidelines for fasting glucose:  Normal:       less than 100 mg/dL  Prediabetes:  100 mg/dL to 125 mg/dL  Diabetes:     126 mg/dL or higher       BUN, Bld   Date Value Ref Range Status   09/02/2018 18 7 - 18 mg/dL Final     Creatinine   Date Value Ref Range Status   09/02/2018 0.63 0.50 - 1.40 mg/dL Final     Calcium   Date Value Ref Range Status   09/02/2018 10.2 8.4 - 10.2 mg/dL Final     Total Protein   Date Value Ref Range Status   09/02/2018 6.9 6.0 - 8.4 g/dL Final     Albumin   Date Value Ref Range Status   09/02/2018 3.9 3.5 - 5.2  g/dL Final     Total Bilirubin   Date Value Ref Range Status   09/02/2018 0.4 0.2 - 1.3 mg/dL Final     Alkaline Phosphatase   Date Value Ref Range Status   09/02/2018 73 38 - 145 U/L Final     AST   Date Value Ref Range Status   09/02/2018 20 14 - 36 U/L Final     ALT   Date Value Ref Range Status   09/02/2018 23 10 - 44 U/L Final     Anion Gap   Date Value Ref Range Status   09/02/2018 9 8 - 16 mmol/L Final     eGFR if    Date Value Ref Range Status   09/02/2018 >60 >60 mL/min/1.73 m^2 Final     eGFR if non    Date Value Ref Range Status   09/02/2018 >60 >60 mL/min/1.73 m^2 Final     Comment:     Calculation used to obtain the estimated glomerular filtration  rate (eGFR) is the CKD-EPI equation.        Lab Results   Component Value Date    HGBA1C 5.8 06/27/2016     Objective:       Hypertension    Continue current dosage of Amlodipine  Physical Exam   Constitutional: She is oriented to person, place, and time. Vital signs are normal. She appears well-developed and well-nourished. She is active and cooperative.   HENT:   Head: Normocephalic and atraumatic.   Right Ear: Hearing, tympanic membrane, external ear and ear canal normal.   Left Ear: Hearing, tympanic membrane, external ear and ear canal normal.   Nose: Nose normal.   Mouth/Throat: Uvula is midline, oropharynx is clear and moist and mucous membranes are normal.   Eyes: Lids are normal.   Neck: Trachea normal, normal range of motion, full passive range of motion without pain and phonation normal. Neck supple.       Cardiovascular: Normal rate, regular rhythm and normal heart sounds.   Pulmonary/Chest: Effort normal and breath sounds normal.   Abdominal: Soft. Bowel sounds are normal. There is no splenomegaly or hepatomegaly. There is no tenderness.   Musculoskeletal: Normal range of motion.   Lymphadenopathy:        Head (right side): No submental, no submandibular, no tonsillar, no preauricular, no posterior auricular and no  occipital adenopathy present.        Head (left side): No submental, no submandibular, no tonsillar, no preauricular, no posterior auricular and no occipital adenopathy present.     She has no cervical adenopathy.   Neurological: She is alert and oriented to person, place, and time.   Skin: Skin is warm, dry and intact.   Psychiatric: She has a normal mood and affect. Her speech is normal and behavior is normal. Judgment and thought content normal. Cognition and memory are normal.   Nursing note and vitals reviewed.      Assessment & Plan:          Medication List           Accurate as of 9/11/18  4:30 PM. If you have any questions, ask your nurse or doctor.               CHANGE how you take these medications    ezetimibe 10 mg tablet  Commonly known as:  ZETIA  Take 1 tablet (10 mg total) by mouth once daily.  What changed:  when to take this        CONTINUE taking these medications    amLODIPine 5 MG tablet  Commonly known as:  NORVASC  Take 1 tablet (5 mg total) by mouth once daily.     aspirin 325 MG EC tablet  Commonly known as:  ECOTRIN  Take 1 tablet (325 mg total) by mouth once daily.     atorvastatin 40 MG tablet  Commonly known as:  LIPITOR  Take 1 tablet (40 mg total) by mouth once daily.     carvedilol 3.125 MG tablet  Commonly known as:  COREG  TAKE 1 TABLET (3.125 MG TOTAL) BY MOUTH 2 (TWO) TIMES DAILY.     dorzolamide 2 % ophthalmic solution  Commonly known as:  TRUSOPT     DULoxetine 60 MG capsule  Commonly known as:  CYMBALTA  Take 1 capsule (60 mg total) by mouth once daily.     gabapentin 100 MG capsule  Commonly known as:  NEURONTIN  Take 1 capsule (100 mg total) by mouth 3 (three) times daily. Will restart 06/20/2018     losartan 100 MG tablet  Commonly known as:  COZAAR  Take 1 tablet (100 mg total) by mouth once daily.     pantoprazole 40 MG tablet  Commonly known as:  PROTONIX  Take 1 tablet (40 mg total) by mouth before breakfast.        STOP taking these medications    chlorhexidine 0.12 %  solution  Commonly known as:  PERIDEX  Stopped by:  Savannah Booth DNP, APRN     HYDROcodone-acetaminophen 5-325 mg per tablet  Commonly known as:  NORCO  Stopped by:  Savannah Booth DNP, APRN     meclizine 25 mg tablet  Commonly known as:  ANTIVERT  Stopped by:  Savannah Booth DNP, APRN     ondansetron 8 MG Tbdl  Commonly known as:  ZOFRAN-ODT  Stopped by:  Savannah Booth DNP, APRN           Where to Get Your Medications      You can get these medications from any pharmacy    Bring a paper prescription for each of these medications  · amLODIPine 5 MG tablet         Continue amlodipine at 5mg daily  Follow-up if symptoms worsen or fail to improve.

## 2018-09-17 ENCOUNTER — OFFICE VISIT (OUTPATIENT)
Dept: PAIN MEDICINE | Facility: CLINIC | Age: 73
End: 2018-09-17
Payer: MEDICARE

## 2018-09-17 VITALS
OXYGEN SATURATION: 97 % | SYSTOLIC BLOOD PRESSURE: 151 MMHG | WEIGHT: 149.69 LBS | DIASTOLIC BLOOD PRESSURE: 70 MMHG | TEMPERATURE: 97 F | RESPIRATION RATE: 18 BRPM | HEART RATE: 88 BPM | BODY MASS INDEX: 26.52 KG/M2

## 2018-09-17 DIAGNOSIS — M47.816 LUMBAR SPONDYLOSIS: Primary | ICD-10-CM

## 2018-09-17 DIAGNOSIS — M51.36 DDD (DEGENERATIVE DISC DISEASE), LUMBAR: ICD-10-CM

## 2018-09-17 PROCEDURE — 3078F DIAST BP <80 MM HG: CPT | Mod: CPTII,,, | Performed by: ANESTHESIOLOGY

## 2018-09-17 PROCEDURE — 99213 OFFICE O/P EST LOW 20 MIN: CPT | Mod: S$PBB,,, | Performed by: ANESTHESIOLOGY

## 2018-09-17 PROCEDURE — 1101F PT FALLS ASSESS-DOCD LE1/YR: CPT | Mod: CPTII,,, | Performed by: ANESTHESIOLOGY

## 2018-09-17 PROCEDURE — 99999 PR PBB SHADOW E&M-EST. PATIENT-LVL III: CPT | Mod: PBBFAC,,, | Performed by: ANESTHESIOLOGY

## 2018-09-17 PROCEDURE — 3077F SYST BP >= 140 MM HG: CPT | Mod: CPTII,,, | Performed by: ANESTHESIOLOGY

## 2018-09-17 PROCEDURE — 99213 OFFICE O/P EST LOW 20 MIN: CPT | Mod: PBBFAC,PN | Performed by: ANESTHESIOLOGY

## 2018-09-17 RX ORDER — SODIUM CHLORIDE, SODIUM LACTATE, POTASSIUM CHLORIDE, CALCIUM CHLORIDE 600; 310; 30; 20 MG/100ML; MG/100ML; MG/100ML; MG/100ML
INJECTION, SOLUTION INTRAVENOUS CONTINUOUS
Status: CANCELLED | OUTPATIENT
Start: 2018-10-15

## 2018-09-17 NOTE — H&P (VIEW-ONLY)
This note was completed with dictation software and grammatical errors may exist.    CC:  Right back pain    HPI:  The patient is a 73-year-old woman with a history of carotid artery stenosis, osteoarthritis who presents in referral from [facility] Uriostegui for back and right leg pain.  She presents in follow-up today, she is status post right L4/5 transforaminal injection on 2018 with 100% relief of her right leg pain but continues to have right low back pain and points to her right iliac crest.  The pain is worse with moving from a sitting to standing position, standing too long and also if she has been lying down and tries to move.  She denies any radiation into the leg any more but a fairly constant low back pain continues.  She denies any weakness, no bowel or bladder incontinence.    Pain intervention history: She was given a prescription for gabapentin 100 mg 3 times daily but states that this was making her lightheaded.  She was prescribed physical therapy.    ROS:  She reports hypertension.  Balance of review of systems is negative.    Past Medical History:   Diagnosis Date    Anticoagulant long-term use     Arthritis     Carotid artery occlusion     Depression 2012    Diverticulosis     Fatty liver     Former smoker     GERD (gastroesophageal reflux disease)     HLD (hyperlipidemia) 2012    HTN (hypertension) 2012    Infectious gastroenteritis     Ischemic colitis        Past Surgical History:   Procedure Laterality Date    ANGIOGRAM-CAROTID Bilateral 2014    [facility]    APPENDECTOMY      carotid angiogram      CAROTID ENDARTERECTOMY Right 2018    Procedure: Endarterectomy-Carotid - RIGHT;  Surgeon: Safia Thomas MD;  Location: [facility] OR;  Service: Cardiovascular;  Laterality: Right;  with patch  angioplasty     SECTION      x 3     COLONOSCOPY N/A 2016    Procedure: COLONOSCOPY;  Surgeon: Grzegorz Sousa Jr.,  MD;  Location[facility];  Service: Endoscopy;  Laterality: N/A;    COLONOSCOPY  2009    Dr. Reyes in legacy, repeat in 5 years    COLONOSCOPY N/A 2016    Performed by [facility]orz [facility] Jr., [facility] at [facility] ENDO    Endarterectomy-Carotid - RIGHT Right 2018    Performed by Safia Thomas [facility] at [facility] OR    ESOPHAGOGASTRODUODENOSCOPY (EGD) N/A 2017    Performed by [facility] at [facility] ENDO    HAND SURGERY Right 2016    knuckle implant/Dr. Abhinav Rolle    HEMORRHOID SURGERY      HYSTERECTOMY  age 35    TAHUSO benign reasons    hysteretomy      Injection,steroid,epidural,transforaminal approach L4/5 Right 2018    Performed by [facility]er [facility] at [facility] OR    JOINT REPLACEMENT Right     1st interphalangeal joint of 3rd finger    OOPHORECTOMY      one remains    TONSILLECTOMY         Social History     Socioeconomic History    Marital status:      Spouse name: None    Number of children: 3    Years of education: None    Highest education level: None   Social Needs    Financial resource strain: None    Food insecurity - worry: None    Food insecurity - inability: None    Transportation needs - medical: None    Transportation needs - non-medical: None   Occupational History    Occupation: retired     Employer: [employer]   Tobacco Use    Smoking status: Former Smoker     Packs/day: 0.50     Years: 20.00     Pack years: 10.00     Last attempt to quit: 3/28/2013     Years since quittin.4    Smokeless tobacco: Never Used    Tobacco comment: quit 2 years ago after intermittent use for 40 years   Substance and Sexual Activity    Alcohol use: Yes     Alcohol/week: 0.0 oz     Comment: 0-2 ETOHic drinks per day    Drug use: No    Sexual activity: Yes     Partners: Male   Other Topics Concern    None   Social History Narrative    None         Medications/Allergies: See med card    Vitals:    18 0916   BP: (!) 151/70   Pulse: 88   Resp: 18   Temp: 97.2 °F  (36.2 °C)   TempSrc: Oral   SpO2: 97%   Weight: 67.9 kg (149 lb 11.1 oz)   PainSc:   4   PainLoc: Back         Physical exam:  Gen: A and O x3, pleasant, well-groomed  Skin: No rashes or obvious lesions  HEENT: PERRLA, no obvious deformities on ears or in canals. Trachea midline.  CVS: Regular rate and rhythm, normal palpable pulses.  Resp:No increased work of breathing, symmetrical chest rise.  Abdomen: Soft, NT/ND.  Musculoskeletal No antalgic gait.     Neuro:  Lower extremities: 5/5 strength bilaterally  Reflexes: Patellar 2+, Achilles 2+ bilaterally.  Sensory:  Intact and symmetrical to light touch and pinprick in L2-S1 dermatomes bilaterally.    Lumbar spine:  Lumbar spine:    Range of motion is mildly reduced with flexion with mild increased pain in the low back, moderately reduced with extension especially with right oblique extension causing right low back pain.   Mika's test causes no increased pain on either side.    Supine straight leg raise is   Negative for any leg pain bilaterally.  Internal and external rotation of the hip causes no increased pain on either side.  Myofascial exam: No tenderness to palpation across lumbar paraspinous muscles.    Imagin18 MRI L-spine  Vertebral column: There is multilevel degenerative change.  There is no fracture.  Comparison plain films demonstrate a very gentle rotary levocurvature of the lumbar spine.  There is 2 mm retrolisthesis of L2 on L3.  This is exaggerated by endplate osteophyte formation.  There is moderate disc space narrowing at the L2-3 level where there is also associated degenerative endplate signal change.  All of the discs are mildly desiccated.  There is no other significant disc space narrowing.  There is mild disc space narrowing at the L3-4 level.  Baseline marrow signal intensity is within normal limits.  T12-L1: Unremarkable.  There is no spinal canal or significant foraminal stenosis.  L1-2: There is mild facet joint arthropathy.   There is no spinal canal or significant foraminal stenosis.  L2-3: There is disc space narrowing.  There is trace retrolisthesis of L2 on L3.  There is a diffuse disc bulge with osteophytic ridging.  There is mild-to-moderate facet joint arthropathy with ligamentum flavum thickening, right greater than left.  There is no spinal stenosis.  There is mild bilateral foraminal stenosis.  L3-4: There is mild disc space narrowing.  There is a mild-to-moderate diffuse disc bulge.  There is moderate-to-marked facet joint arthropathy with ligamentum flavum thickening.  There is mildly prominent dorsal epidural fat.  There is moderate central spinal stenosis.  AP measurement of the dural sac is 7 mm there is mild bilateral foraminal stenosis.  L4-5: There is a diffuse disc bulge with osteophytic ridging slightly eccentric to the right.  There is moderate facet joint arthropathy with ligamentum flavum thickening.  There is borderline spinal stenosis.  There is mild bilateral foraminal stenosis.  L5-S1: There is moderate facet joint arthropathy.  There is minimal bulging of the annulus.  There is no spinal canal or significant foraminal stenosis.  Soft tissues, other: The prevertebral soft tissues are normal.  The aorta is somewhat ectatic.    Assessment:  The patient is a 73-year-old woman with a history of carotid artery stenosis, osteoarthritis who presents in referral from [facility] Uriostegui for back and right leg pain.    1. Lumbar spondylosis     2. DDD (degenerative disc disease), lumbar           Plan:    1.  We discussed that her left leg pain has resolved, I suspect that the remaining low back pain is more facet mediated.  She has fairly significant facet arthropathy from L2 through L5/S1 but most the pain is located lower in the lumbar spine.  I will set her up for a right L2, 3, 4, 5 medial branch block and if she has relief with this, we would repeat this and consider radiofrequency ablation.  We will contact her the  day after the medial branch block.

## 2018-09-17 NOTE — PROGRESS NOTES
This note was completed with dictation software and grammatical errors may exist.    CC:  Right back pain    HPI:  The patient is a 73-year-old woman with a history of carotid artery stenosis, osteoarthritis who presents in referral from [facility] Uriostegui for back and right leg pain.  She presents in follow-up today, she is status post right L4/5 transforaminal injection on 2018 with 100% relief of her right leg pain but continues to have right low back pain and points to her right iliac crest.  The pain is worse with moving from a sitting to standing position, standing too long and also if she has been lying down and tries to move.  She denies any radiation into the leg any more but a fairly constant low back pain continues.  She denies any weakness, no bowel or bladder incontinence.    Pain intervention history: She was given a prescription for gabapentin 100 mg 3 times daily but states that this was making her lightheaded.  She was prescribed physical therapy.    ROS:  She reports hypertension.  Balance of review of systems is negative.    Past Medical History:   Diagnosis Date    Anticoagulant long-term use     Arthritis     Carotid artery occlusion     Depression 2012    Diverticulosis     Fatty liver     Former smoker     GERD (gastroesophageal reflux disease)     HLD (hyperlipidemia) 2012    HTN (hypertension) 2012    Infectious gastroenteritis     Ischemic colitis        Past Surgical History:   Procedure Laterality Date    ANGIOGRAM-CAROTID Bilateral 2014    Performed by [facility] at [facility] OR    APPENDECTOMY      carotid angiogram      CAROTID ENDARTERECTOMY Right 2018    Procedure: Endarterectomy-Carotid - RIGHT;  Surgeon: Safia Thomas MD;  Location: [facility] OR;  Service: Cardiovascular;  Laterality: Right;  with patch  angioplasty     SECTION      x 3     COLONOSCOPY N/A 2016    Procedure: COLONOSCOPY;  Surgeon: Grzegorz Sousa Jr.,  MD;  Location: [facility];  Service: Endoscopy;  Laterality: N/A;    COLONOSCOPY  2009    Dr. Reyes in legacy, repeat in 5 years    COLONOSCOPY N/A 2016    Performed by Grzegorz Sousa Jr., MD at [facility] ENDO    Endarterectomy-Carotid - RIGHT Right 2018    Performed by Safia Thomas MD at [facility] OR    ESOPHAGOGASTRODUODENOSCOPY (EGD) N/A 2017    Performed by [facility]z [facility] Jr[facility] at [facility] ENDO    HAND SURGERY Right 2016    knuckle implant/Dr. Abhinav Rolle    HEMORRHOID SURGERY      HYSTERECTOMY  age 35    TAHUSO benign reasons    hysteretomy      Injection,steroid,epidural,transforaminal approach L4/5 Right 2018    Performed by Jakub Greer MD at [facility] OR    JOINT REPLACEMENT Right     1st interphalangeal joint of 3rd finger    OOPHORECTOMY      one remains    TONSILLECTOMY         Social History     Socioeconomic History    Marital status:      Spouse name: None    Number of children: 3    Years of education: None    Highest education level: None   Social Needs    Financial resource strain: None    Food insecurity - worry: None    Food insecurity - inability: None    Transportation needs - medical: None    Transportation needs - non-medical: None   Occupational History    Occupation: retired     Employer: [employer]   Tobacco Use    Smoking status: Former Smoker     Packs/day: 0.50     Years: 20.00     Pack years: 10.00     Last attempt to quit: 3/28/2013     Years since quittin.4    Smokeless tobacco: Never Used    Tobacco comment: quit 2 years ago after intermittent use for 40 years   Substance and Sexual Activity    Alcohol use: Yes     Alcohol/week: 0.0 oz     Comment: 0-2 ETOHic drinks per day    Drug use: No    Sexual activity: Yes     Partners: Male   Other Topics Concern    None   Social History Narrative    None         Medications/Allergies: See med card    Vitals:    18 0916   BP: (!) 151/70   Pulse: 88   Resp: 18   Temp: 97.2 °F  (36.2 °C)   TempSrc: Oral   SpO2: 97%   Weight: 67.9 kg (149 lb 11.1 oz)   PainSc:   4   PainLoc: Back         Physical exam:  Gen: A and O x3, pleasant, well-groomed  Skin: No rashes or obvious lesions  HEENT: PERRLA, no obvious deformities on ears or in canals. Trachea midline.  CVS: Regular rate and rhythm, normal palpable pulses.  Resp:No increased work of breathing, symmetrical chest rise.  Abdomen: Soft, NT/ND.  Musculoskeletal No antalgic gait.     Neuro:  Lower extremities: 5/5 strength bilaterally  Reflexes: Patellar 2+, Achilles 2+ bilaterally.  Sensory:  Intact and symmetrical to light touch and pinprick in L2-S1 dermatomes bilaterally.    Lumbar spine:  Lumbar spine:    Range of motion is mildly reduced with flexion with mild increased pain in the low back, moderately reduced with extension especially with right oblique extension causing right low back pain.   Mika's test causes no increased pain on either side.    Supine straight leg raise is   Negative for any leg pain bilaterally.  Internal and external rotation of the hip causes no increased pain on either side.  Myofascial exam: No tenderness to palpation across lumbar paraspinous muscles.    Imagin18 MRI L-spine  Vertebral column: There is multilevel degenerative change.  There is no fracture.  Comparison plain films demonstrate a very gentle rotary levocurvature of the lumbar spine.  There is 2 mm retrolisthesis of L2 on L3.  This is exaggerated by endplate osteophyte formation.  There is moderate disc space narrowing at the L2-3 level where there is also associated degenerative endplate signal change.  All of the discs are mildly desiccated.  There is no other significant disc space narrowing.  There is mild disc space narrowing at the L3-4 level.  Baseline marrow signal intensity is within normal limits.  T12-L1: Unremarkable.  There is no spinal canal or significant foraminal stenosis.  L1-2: There is mild facet joint arthropathy.   There is no spinal canal or significant foraminal stenosis.  L2-3: There is disc space narrowing.  There is trace retrolisthesis of L2 on L3.  There is a diffuse disc bulge with osteophytic ridging.  There is mild-to-moderate facet joint arthropathy with ligamentum flavum thickening, right greater than left.  There is no spinal stenosis.  There is mild bilateral foraminal stenosis.  L3-4: There is mild disc space narrowing.  There is a mild-to-moderate diffuse disc bulge.  There is moderate-to-marked facet joint arthropathy with ligamentum flavum thickening.  There is mildly prominent dorsal epidural fat.  There is moderate central spinal stenosis.  AP measurement of the dural sac is 7 mm there is mild bilateral foraminal stenosis.  L4-5: There is a diffuse disc bulge with osteophytic ridging slightly eccentric to the right.  There is moderate facet joint arthropathy with ligamentum flavum thickening.  There is borderline spinal stenosis.  There is mild bilateral foraminal stenosis.  L5-S1: There is moderate facet joint arthropathy.  There is minimal bulging of the annulus.  There is no spinal canal or significant foraminal stenosis.  Soft tissues, other: The prevertebral soft tissues are normal.  The aorta is somewhat ectatic.    Assessment:  The patient is a 73-year-old woman with a history of carotid artery stenosis, osteoarthritis who presents in referral from Y[facility] Uriostegui for back and right leg pain.    1. Lumbar spondylosis     2. DDD (degenerative disc disease), lumbar           Plan:    1.  We discussed that her left leg pain has resolved, I suspect that the remaining low back pain is more facet mediated.  She has fairly significant facet arthropathy from L2 through L5/S1 but most the pain is located lower in the lumbar spine.  I will set her up for a right L2, 3, 4, 5 medial branch block and if she has relief with this, we would repeat this and consider radiofrequency ablation.  We will contact her the  day after the medial branch block.

## 2018-10-02 DIAGNOSIS — I10 ESSENTIAL HYPERTENSION: ICD-10-CM

## 2018-10-03 RX ORDER — AMLODIPINE BESYLATE 5 MG/1
TABLET ORAL
Qty: 30 TABLET | Refills: 4 | Status: SHIPPED | OUTPATIENT
Start: 2018-10-03 | End: 2019-01-02 | Stop reason: SDUPTHER

## 2018-10-12 DIAGNOSIS — M51.36 DDD (DEGENERATIVE DISC DISEASE), LUMBAR: Primary | ICD-10-CM

## 2018-10-15 ENCOUNTER — HOSPITAL ENCOUNTER (OUTPATIENT)
Dept: RADIOLOGY | Facility: HOSPITAL | Age: 73
Discharge: HOME | End: 2018-10-15
Attending: ANESTHESIOLOGY | Admitting: ANESTHESIOLOGY
Payer: MEDICARE

## 2018-10-15 ENCOUNTER — HOSPITAL ENCOUNTER (OUTPATIENT)
Facility: HOSPITAL | Age: 73
Discharge: HOME | End: 2018-10-15
Attending: ANESTHESIOLOGY | Admitting: ANESTHESIOLOGY
Payer: MEDICARE

## 2018-10-15 VITALS
HEIGHT: 63 IN | BODY MASS INDEX: 26.05 KG/M2 | SYSTOLIC BLOOD PRESSURE: 128 MMHG | WEIGHT: 147 LBS | RESPIRATION RATE: 18 BRPM | TEMPERATURE: 98 F | HEART RATE: 76 BPM | OXYGEN SATURATION: 96 % | DIASTOLIC BLOOD PRESSURE: 62 MMHG

## 2018-10-15 DIAGNOSIS — M51.36 DDD (DEGENERATIVE DISC DISEASE), LUMBAR: ICD-10-CM

## 2018-10-15 DIAGNOSIS — M47.816 LUMBAR SPONDYLOSIS: Primary | ICD-10-CM

## 2018-10-15 PROCEDURE — 64493 INJ PARAVERT F JNT L/S 1 LEV: CPT | Mod: RT,,, | Performed by: ANESTHESIOLOGY

## 2018-10-15 PROCEDURE — 25500020 PHARM REV CODE 255: Mod: PO | Performed by: ANESTHESIOLOGY

## 2018-10-15 PROCEDURE — 64493 INJ PARAVERT F JNT L/S 1 LEV: CPT | Mod: PO | Performed by: ANESTHESIOLOGY

## 2018-10-15 PROCEDURE — 64495 INJ PARAVERT F JNT L/S 3 LEV: CPT | Mod: PO | Performed by: ANESTHESIOLOGY

## 2018-10-15 PROCEDURE — 63600175 PHARM REV CODE 636 W HCPCS: Mod: PO | Performed by: ANESTHESIOLOGY

## 2018-10-15 PROCEDURE — 64494 INJ PARAVERT F JNT L/S 2 LEV: CPT | Mod: RT,,, | Performed by: ANESTHESIOLOGY

## 2018-10-15 PROCEDURE — 64494 INJ PARAVERT F JNT L/S 2 LEV: CPT | Mod: PO | Performed by: ANESTHESIOLOGY

## 2018-10-15 PROCEDURE — 99152 MOD SED SAME PHYS/QHP 5/>YRS: CPT | Mod: ,,, | Performed by: ANESTHESIOLOGY

## 2018-10-15 PROCEDURE — 64495 INJ PARAVERT F JNT L/S 3 LEV: CPT | Mod: RT,,, | Performed by: ANESTHESIOLOGY

## 2018-10-15 PROCEDURE — 76000 FLUOROSCOPY <1 HR PHYS/QHP: CPT | Mod: TC,PO

## 2018-10-15 PROCEDURE — 25000003 PHARM REV CODE 250: Mod: PO | Performed by: ANESTHESIOLOGY

## 2018-10-15 RX ORDER — MIDAZOLAM HYDROCHLORIDE 1 MG/ML
INJECTION INTRAMUSCULAR; INTRAVENOUS
Status: DISCONTINUED | OUTPATIENT
Start: 2018-10-15 | End: 2018-10-15 | Stop reason: HOSPADM

## 2018-10-15 RX ORDER — BUPIVACAINE HYDROCHLORIDE 2.5 MG/ML
INJECTION, SOLUTION EPIDURAL; INFILTRATION; INTRACAUDAL
Status: DISCONTINUED | OUTPATIENT
Start: 2018-10-15 | End: 2018-10-15 | Stop reason: HOSPADM

## 2018-10-15 RX ORDER — SODIUM CHLORIDE, SODIUM LACTATE, POTASSIUM CHLORIDE, CALCIUM CHLORIDE 600; 310; 30; 20 MG/100ML; MG/100ML; MG/100ML; MG/100ML
INJECTION, SOLUTION INTRAVENOUS CONTINUOUS
Status: DISCONTINUED | OUTPATIENT
Start: 2018-10-15 | End: 2018-10-15 | Stop reason: HOSPADM

## 2018-10-15 RX ORDER — LIDOCAINE HYDROCHLORIDE 10 MG/ML
INJECTION, SOLUTION EPIDURAL; INFILTRATION; INTRACAUDAL; PERINEURAL
Status: DISCONTINUED | OUTPATIENT
Start: 2018-10-15 | End: 2018-10-15 | Stop reason: HOSPADM

## 2018-10-15 RX ADMIN — SODIUM CHLORIDE, SODIUM LACTATE, POTASSIUM CHLORIDE, AND CALCIUM CHLORIDE: .6; .31; .03; .02 INJECTION, SOLUTION INTRAVENOUS at 12:10

## 2018-10-15 NOTE — DISCHARGE INSTRUCTIONS
Home care instructions  Apply ice pack to the injection site for 20 minutes periods for the first 24 hrs for soreness/discomfort at injection site   DO NOT USE HEAT FOR 24 HOURS  Keep site clean and dry for 24 hours  Do not drive until tomorrow  Take care when walking after a lumbar injection  Resume normal activities as tolerated  Resume home medication as prescribed today  Resume Aspirin, Plavix, or Coumadin the day after the procedure unless otherwise instructed.    SEE IMMEDIATE MEDICAL HELP FOR:  Severe increase in your usual pain or appearance of new pain  Prolonged or increasing weakness or numbness in the legs or arms  Drainage, redness, active bleeding, or increased swelling at the injection site  Temperature over 100.0 degrees F.  Headache that increases when your head is upright and decreases when you lie flat    CALL 911 OR GO DIRECTLY TO EMERGENCY DEPARTMENT FOR:  Shortness of breath, chest pain, or problems breathing      Recovery After Procedural Sedation (Adult)  You have been given medicine by vein to make you sleep during your surgery. This may have included both a pain medicine and sleeping medicine. Most of the effects have worn off. But you may still have some drowsiness for the next 6 to 8 hours.  Home care  Follow these guidelines when you get home:  · For the next 8 hours, you should be watched by a responsible adult. This person should make sure your condition is not getting worse.  · Don't drink any alcohol for the next 24 hours.  · Don't drive, operate dangerous machinery, or make important business or personal decisions during the next 24 hours.  Note: Your healthcare provider may tell you not to take any medicine by mouth for pain or sleep in the next 4 hours. These medicines may react with the medicines you were given in the hospital. This could cause a much stronger response than usual.  Follow-up care  Follow up with your healthcare provider if you are not alert and back to your usual  level of activity within 12 hours.  When to seek medical advice  Call your healthcare provider right away if any of these occur:  · Drowsiness gets worse  · Weakness or dizziness gets worse  · Repeated vomiting  · You can't be awakened   Date Last Reviewed: 10/18/2016  © 2358-3402 [employer]. [address] [place] [address], [place] [address]. All rights reserved. This information is not intended as a substitute for professional medical care. Always follow your healthcare professional's instructions.

## 2018-10-15 NOTE — PLAN OF CARE
VSS. Questions answered to patient satisfaction. Site nabeel needed. H&P update needed. Patient ready for procedure.

## 2018-10-15 NOTE — OP NOTE
PROCEDURE DATE: 10/15/2018    PROCEDURE:  Right L2,3,4,5 medial branch nerve block     DIAGNOSIS:  Lumbar spondylosis    Post Op diagnosis: Same    PHYSICIAN: Jakub Greer MD    MEDICATIONS INJECTED: 0.25% bupivicaine, 1ml at each level    LOCAL ANESTHETIC USED: Lidocaine 1%, 2ml at each level    SEDATION MEDICATIONS:4mg versed    ESTIMATED BLOOD LOSS:  none    COMPLICATIONS:  none    TECHNIQUE: A time out was taken to identify the patient, procedure and side of the procedure. The patient was placed in a prone position, then prepped and draped in the usual sterile fashion using ChloraPrep and sterile towels.  The levels were determined under fluoroscopic guidance and then marked.  Local anesthetic was given by raising a wheal at the skin over each site and then infiltrated approximately 2cm deeper.  A 25-gauge 3.5 inch needle was introduced to the anatomic location of the right L2,3,4,5 medial branch nerves on the right side.  Appropriate location and medication spread confirmed by injecting 0.5ml of Omnipaque. The above medication was then injected. The patient tolerated the procedure well.     The patient was monitored after the procedure. The patient will be contacted in the next few days to determine extent of relief.  Patient was given post procedure and discharge instructions to follow at home.  The patient was discharged in a stable condition.

## 2018-10-15 NOTE — DISCHARGE SUMMARY
[facility]  Discharge Note  Short Stay    Admit Date: 10/15/2018    Discharge Date: 10/15/2018    Attending Physician: Jakub Greer MD     Discharge Provider: Jakub Greer    Diagnoses:  Active Hospital Problems    Diagnosis  POA    *Lumbar spondylosis [M47.816]  Yes      Resolved Hospital Problems   No resolved problems to display.       Discharged Condition: good    Final Diagnoses: Lumbar spondylosis [M47.816]    Disposition: Home or Self Care    Hospital Course: no complications, uneventful    Outcome of Hospitalization, Treatment, Procedure, or Surgery:  Patient was admitted for outpatient procedure. The patient underwent procedure without complications and are discharged home    Follow up/Patient Instructions:  Follow up as scheduled in Pain Management clinic in 3-4 weeks/Patient has received instructions and follow up date and time    Medications:  Continue previous medications    Discharge Procedure Orders   Call MD for:  temperature >100.4     Call MD for:  severe uncontrolled pain     Call MD for:  redness, tenderness, or signs of infection (pain, swelling, redness, odor or green/yellow discharge around incision site)     Call MD for:  severe persistent headache     No dressing needed         Discharge Procedure Orders (must include Diet, Follow-up, Activity):   Discharge Procedure Orders (must include Diet, Follow-up, Activity)   Call MD for:  temperature >100.4     Call MD for:  severe uncontrolled pain     Call MD for:  redness, tenderness, or signs of infection (pain, swelling, redness, odor or green/yellow discharge around incision site)     Call MD for:  severe persistent headache     No dressing needed

## 2018-10-15 NOTE — INTERVAL H&P NOTE
The patient has been examined and the H&P has been reviewed:    I concur with the findings and no changes have occurred since H&P was written.    Anesthesia/Surgery risks, benefits and alternative options discussed and understood by patient/family.          Active Hospital Problems    Diagnosis  POA    Lumbar spondylosis [M47.816]  Yes      Resolved Hospital Problems   No resolved problems to display.

## 2018-10-17 ENCOUNTER — TELEPHONE (OUTPATIENT)
Dept: PAIN MEDICINE | Facility: CLINIC | Age: 73
End: 2018-10-17

## 2018-10-17 DIAGNOSIS — M47.816 LUMBAR SPONDYLOSIS: Primary | ICD-10-CM

## 2018-10-17 RX ORDER — SODIUM CHLORIDE, SODIUM LACTATE, POTASSIUM CHLORIDE, CALCIUM CHLORIDE 600; 310; 30; 20 MG/100ML; MG/100ML; MG/100ML; MG/100ML
INJECTION, SOLUTION INTRAVENOUS CONTINUOUS
Status: CANCELLED | OUTPATIENT
Start: 2018-10-25

## 2018-10-17 NOTE — TELEPHONE ENCOUNTER
Spoke with patient regarding block. She stated had 100% for about 6 hours. She stated she was able to pick things up off the floor, wash clothes, and get up out of a chair easier. Lumbar RFA scheduled for 10/25 with a 4 week follow up. Pre-op instructions were reviewed with patient.

## 2018-10-24 ENCOUNTER — OFFICE VISIT (OUTPATIENT)
Dept: FAMILY MEDICINE | Facility: CLINIC | Age: 73
End: 2018-10-24
Payer: MEDICARE

## 2018-10-24 ENCOUNTER — TELEPHONE (OUTPATIENT)
Dept: PAIN MEDICINE | Facility: CLINIC | Age: 73
End: 2018-10-24

## 2018-10-24 VITALS
SYSTOLIC BLOOD PRESSURE: 136 MMHG | DIASTOLIC BLOOD PRESSURE: 74 MMHG | WEIGHT: 151.88 LBS | HEART RATE: 88 BPM | RESPIRATION RATE: 20 BRPM | HEIGHT: 63 IN | BODY MASS INDEX: 26.91 KG/M2

## 2018-10-24 DIAGNOSIS — I65.23 BILATERAL CAROTID ARTERY STENOSIS: ICD-10-CM

## 2018-10-24 DIAGNOSIS — E78.2 MIXED HYPERLIPIDEMIA: ICD-10-CM

## 2018-10-24 DIAGNOSIS — K21.9 GASTROESOPHAGEAL REFLUX DISEASE WITHOUT ESOPHAGITIS: ICD-10-CM

## 2018-10-24 DIAGNOSIS — I10 ESSENTIAL HYPERTENSION: ICD-10-CM

## 2018-10-24 DIAGNOSIS — Z00.00 WELLNESS EXAMINATION: Primary | ICD-10-CM

## 2018-10-24 PROCEDURE — 3075F SYST BP GE 130 - 139MM HG: CPT | Mod: CPTII,,, | Performed by: FAMILY MEDICINE

## 2018-10-24 PROCEDURE — 99999 PR PBB SHADOW E&M-EST. PATIENT-LVL III: CPT | Mod: PBBFAC,,, | Performed by: FAMILY MEDICINE

## 2018-10-24 PROCEDURE — 99397 PER PM REEVAL EST PAT 65+ YR: CPT | Mod: S$PBB,,, | Performed by: FAMILY MEDICINE

## 2018-10-24 PROCEDURE — 99499 UNLISTED E&M SERVICE: CPT | Mod: HCNC,S$GLB,, | Performed by: FAMILY MEDICINE

## 2018-10-24 PROCEDURE — 3078F DIAST BP <80 MM HG: CPT | Mod: CPTII,,, | Performed by: FAMILY MEDICINE

## 2018-10-24 PROCEDURE — 99213 OFFICE O/P EST LOW 20 MIN: CPT | Mod: PBBFAC,PO | Performed by: FAMILY MEDICINE

## 2018-10-24 NOTE — TELEPHONE ENCOUNTER
----- Message from Alex Ball sent at 10/24/2018  1:47 PM CDT -----  Contact: Haley Castro            Name of Who is Calling: Haley Castro      What is the request in detail: Patient called in regards to her procedure time for tomorrow      Can the clinic reply by [facility]: No      What Number to Call Back [facility]: [phone]

## 2018-10-24 NOTE — PROGRESS NOTES
Subjective:       Patient ID: Haley Castro is a 73 y.o. female.    Chief Complaint: Hyperlipidemia (Patient here for 3 month follow up)    Here for f/u htn and lipids. Doing well since carotid surgery. Due for wellness.      Hypertension   This is a chronic problem. The current episode started more than 1 year ago. The problem is controlled. Pertinent negatives include no chest pain, palpitations or shortness of breath.   Hyperlipidemia   This is a chronic problem. The current episode started more than 1 year ago. The problem is controlled. Pertinent negatives include no chest pain or shortness of breath.     Review of Systems   Constitutional: Negative for chills, fatigue and fever.   Respiratory: Negative for cough, chest tightness and shortness of breath.    Cardiovascular: Negative for chest pain, palpitations and leg swelling.   Gastrointestinal: Negative for abdominal distention and abdominal pain.   Endocrine: Negative for cold intolerance and heat intolerance.   Skin: Negative for rash.   Psychiatric/Behavioral: Negative for dysphoric mood. The patient is not nervous/anxious.        Objective:      Physical Exam   Constitutional: She appears well-developed and well-nourished.   HENT:   Head: Normocephalic and atraumatic.   Cardiovascular: Normal rate, regular rhythm and normal heart sounds.   Pulmonary/Chest: Effort normal and breath sounds normal.   Psychiatric: She has a normal mood and affect.   Nursing note and vitals reviewed.      Assessment:       1. Wellness examination    2. Mixed hyperlipidemia    3. Essential hypertension    4. Bilateral carotid artery stenosis    5. Gastroesophageal reflux disease without esophagitis        Plan:       Wellness examination    Mixed hyperlipidemia  -     CBC auto differential; Future; Expected date: 10/24/2018  -     Comprehensive metabolic panel; Future; Expected date: 10/24/2018  -     Lipid panel; Future; Expected date: 10/24/2018  -     TSH; Future;  Expected date: 10/24/2018    Essential hypertension  -     CBC auto differential; Future; Expected date: 10/24/2018  -     Comprehensive metabolic panel; Future; Expected date: 10/24/2018  -     Lipid panel; Future; Expected date: 10/24/2018  -     TSH; Future; Expected date: 10/24/2018    Bilateral carotid artery stenosis  -     CBC auto differential; Future; Expected date: 10/24/2018  -     Comprehensive metabolic panel; Future; Expected date: 10/24/2018  -     Lipid panel; Future; Expected date: 10/24/2018  -     TSH; Future; Expected date: 10/24/2018    Gastroesophageal reflux disease without esophagitis      Update labs and health maintenance.  Will monitor chronic medical issues and continue current plan of care.      Follow-up in about 4 months (around 2/24/2019), or if symptoms worsen or fail to improve.

## 2018-10-25 ENCOUNTER — HOSPITAL ENCOUNTER (OUTPATIENT)
Facility: HOSPITAL | Age: 73
Discharge: HOME | End: 2018-10-25
Attending: ANESTHESIOLOGY | Admitting: ANESTHESIOLOGY
Payer: MEDICARE

## 2018-10-25 ENCOUNTER — HOSPITAL ENCOUNTER (OUTPATIENT)
Dept: RADIOLOGY | Facility: HOSPITAL | Age: 73
Discharge: HOME | End: 2018-10-25
Attending: ANESTHESIOLOGY | Admitting: ANESTHESIOLOGY
Payer: MEDICARE

## 2018-10-25 VITALS
WEIGHT: 148 LBS | TEMPERATURE: 98 F | HEIGHT: 63 IN | BODY MASS INDEX: 26.22 KG/M2 | OXYGEN SATURATION: 100 % | DIASTOLIC BLOOD PRESSURE: 63 MMHG | HEART RATE: 74 BPM | RESPIRATION RATE: 16 BRPM | SYSTOLIC BLOOD PRESSURE: 137 MMHG

## 2018-10-25 DIAGNOSIS — M51.36 DDD (DEGENERATIVE DISC DISEASE), LUMBAR: ICD-10-CM

## 2018-10-25 DIAGNOSIS — M47.816 LUMBAR SPONDYLOSIS: Primary | ICD-10-CM

## 2018-10-25 PROCEDURE — 64636 DESTROY L/S FACET JNT ADDL: CPT | Mod: PO | Performed by: ANESTHESIOLOGY

## 2018-10-25 PROCEDURE — 64635 DESTROY LUMB/SAC FACET JNT: CPT | Mod: PO | Performed by: ANESTHESIOLOGY

## 2018-10-25 PROCEDURE — 99152 MOD SED SAME PHYS/QHP 5/>YRS: CPT | Mod: ,,, | Performed by: ANESTHESIOLOGY

## 2018-10-25 PROCEDURE — 64635 DESTROY LUMB/SAC FACET JNT: CPT | Mod: RT,,, | Performed by: ANESTHESIOLOGY

## 2018-10-25 PROCEDURE — A4216 STERILE WATER/SALINE, 10 ML: HCPCS | Mod: PO | Performed by: ANESTHESIOLOGY

## 2018-10-25 PROCEDURE — 64636 DESTROY L/S FACET JNT ADDL: CPT | Mod: RT,,, | Performed by: ANESTHESIOLOGY

## 2018-10-25 PROCEDURE — 76000 FLUOROSCOPY <1 HR PHYS/QHP: CPT | Mod: TC,PO

## 2018-10-25 PROCEDURE — 63600175 PHARM REV CODE 636 W HCPCS: Mod: PO | Performed by: ANESTHESIOLOGY

## 2018-10-25 PROCEDURE — 25000003 PHARM REV CODE 250: Mod: PO | Performed by: ANESTHESIOLOGY

## 2018-10-25 RX ORDER — LIDOCAINE HYDROCHLORIDE 20 MG/ML
INJECTION, SOLUTION EPIDURAL; INFILTRATION; INTRACAUDAL; PERINEURAL
Status: DISCONTINUED | OUTPATIENT
Start: 2018-10-25 | End: 2018-10-25 | Stop reason: HOSPADM

## 2018-10-25 RX ORDER — FENTANYL CITRATE 50 UG/ML
INJECTION, SOLUTION INTRAMUSCULAR; INTRAVENOUS
Status: DISCONTINUED | OUTPATIENT
Start: 2018-10-25 | End: 2018-10-25 | Stop reason: HOSPADM

## 2018-10-25 RX ORDER — SODIUM CHLORIDE, SODIUM LACTATE, POTASSIUM CHLORIDE, CALCIUM CHLORIDE 600; 310; 30; 20 MG/100ML; MG/100ML; MG/100ML; MG/100ML
INJECTION, SOLUTION INTRAVENOUS CONTINUOUS
Status: DISCONTINUED | OUTPATIENT
Start: 2018-10-25 | End: 2018-10-25 | Stop reason: HOSPADM

## 2018-10-25 RX ORDER — SODIUM CHLORIDE 9 MG/ML
INJECTION, SOLUTION INTRAMUSCULAR; INTRAVENOUS; SUBCUTANEOUS
Status: DISCONTINUED | OUTPATIENT
Start: 2018-10-25 | End: 2018-10-25 | Stop reason: HOSPADM

## 2018-10-25 RX ORDER — METHYLPREDNISOLONE ACETATE 40 MG/ML
INJECTION, SUSPENSION INTRA-ARTICULAR; INTRALESIONAL; INTRAMUSCULAR; SOFT TISSUE
Status: DISCONTINUED | OUTPATIENT
Start: 2018-10-25 | End: 2018-10-25 | Stop reason: HOSPADM

## 2018-10-25 RX ORDER — LIDOCAINE HYDROCHLORIDE 10 MG/ML
INJECTION, SOLUTION EPIDURAL; INFILTRATION; INTRACAUDAL; PERINEURAL
Status: DISCONTINUED | OUTPATIENT
Start: 2018-10-25 | End: 2018-10-25 | Stop reason: HOSPADM

## 2018-10-25 RX ORDER — MIDAZOLAM HYDROCHLORIDE 2 MG/2ML
INJECTION, SOLUTION INTRAMUSCULAR; INTRAVENOUS
Status: DISCONTINUED | OUTPATIENT
Start: 2018-10-25 | End: 2018-10-25 | Stop reason: HOSPADM

## 2018-10-25 RX ADMIN — SODIUM CHLORIDE, SODIUM LACTATE, POTASSIUM CHLORIDE, AND CALCIUM CHLORIDE: .6; .31; .03; .02 INJECTION, SOLUTION INTRAVENOUS at 10:10

## 2018-10-25 NOTE — OP NOTE
PROCEDURE DATE: 10/25/2018    PROCEDURE:  Radiofrequency ablation of the right L2,3,4,5 medial branch nerves on the right-side utilizing fluoroscopy    DIAGNOSIS:  Lumbar spondylosis    Post op Diagnosis: Same    PHYSICIAN: Jakub Greer MD    MEDICATIONS INJECTED:  From a mixture of 4ml of 2% lidocaine and 40mg of methylprednisone, 1ml of this solution was injected at each level.    LOCAL ANESTHETIC USED: Lidocaine 1%, 4 ml given at each site.    SEDATION MEDICATIONS: 4mg versed, 25mcg fentanyl    ESTIMATED BLOOD LOSS:  none    COMPLICATIONS:  none    TECHNIQUE:  A time out was taken to identify patient and procedure side prior to starting the procedure. Laying in a prone position, the patient was prepped and draped in the usual sterile fashion using ChloraPrep and sterile towels.  The levels were determined under fluoroscopic guidance and then marked.  Local anesthetic was given by raising a wheal at the skin over each site and then infiltrated approximately 2cm deeper.  A 20-gauge  100 mm [employer] RF needle was introduced to the anatomic location of the right L2,3,4,5 medial branch nerves.  Motor stimulation up to 2 Volts at each level confirmed no motor nerve involvement.  Impedance was less than 800 ohms at each level. The above noted medication was then injected slowly.  Ablation was performed per level utilizing [employer]win radiofrequency generator 80°C for 90 seconds. The patient tolerated the procedure well.     The patient was monitored after the procedure.  Patient was given post procedure and discharge instructions to follow at home.  The patient was discharged in a stable condition

## 2018-10-25 NOTE — DISCHARGE INSTRUCTIONS
Recovery After Procedural Sedation (Adult)  You have been given medicine by vein to make you sleep during your surgery. This may have included both a pain medicine and sleeping medicine. Most of the effects have worn off. But you may still have some drowsiness for the next 6 to 8 hours.  Home care  Follow these guidelines when you get home:  · For the next 8 hours, you should be watched by a responsible adult. This person should make sure your condition is not getting worse.  · Don't drink any alcohol for the next 24 hours.  · Don't drive, operate dangerous machinery, or make important business or personal decisions during the next 24 hours.  Note: Your healthcare provider may tell you not to take any medicine by mouth for pain or sleep in the next 4 hours. These medicines may react with the medicines you were given in the hospital. This could cause a much stronger response than usual.  Follow-up care  Follow up with your healthcare provider if you are not alert and back to your usual level of activity within 12 hours.  When to seek medical advice  Call your healthcare provider right away if any of these occur:  · Drowsiness gets worse  · Weakness or dizziness gets worse  · Repeated vomiting  · You can't be awakened   Date Last Reviewed: 10/18/2016  © [id number] [employer]. [address] [place] [address], [place] [address]. All rights reserved. This information is not intended as a substitute for professional medical care. Always follow your healthcare professional's instructions.      Home care instructions  Apply ice pack to the injection site for 20 minutes periods for the first 24 hrs for soreness/discomfort at injection site DO NOT USE HEAT FOR 24 HOURS  Keep site clean and dry for 24 hours, remove bandaid when desired  Do not drive until tomorrow  Take care when walking after a lumbar injection  Avoid strenuous activities for 2 days  Make take 2 weeks to feel the full effects   Resume home medication  as prescribed today  Resume Aspirin, Plavix, or Coumadin the day after the procedure unless otherwise instructed.    SEE IMMEDIATE MEDICAL HELP FOR:  Severe increase in your usual pain or appearance of new pain  Prolonged or increasing weakness or numbness in the legs or arms  Drainage, redness, active bleeding, or increased swelling at the injection site  Temperature over 100.0 degrees F.  Headache that increases when your head is upright and decreases when you lie flat    CALL 911 OR GO DIRECTLY TO EMERGENCY DEPARTMENT FOR:  Shortness of breath, chest pain, or problems breathing

## 2018-10-25 NOTE — DISCHARGE SUMMARY
[facility]  Discharge Note  Short Stay    Admit Date: 10/25/2018    Discharge Date: 10/25/2018    Attending Physician: Jakub Greer MD     Discharge Provider: Jakub Greer    Diagnoses:  Active Hospital Problems    Diagnosis  POA    *Lumbar spondylosis [M47.816]  Yes      Resolved Hospital Problems   No resolved problems to display.       Discharged Condition: good    Final Diagnoses: Lumbar spondylosis [M47.816]    Disposition: Home or Self Care    Hospital Course: no complications, uneventful    Outcome of Hospitalization, Treatment, Procedure, or Surgery:  Patient was admitted for outpatient procedure. The patient underwent procedure without complications and are discharged home    Follow up/Patient Instructions:  Follow up as scheduled in Pain Management clinic in 3-4 weeks/Patient has received instructions and follow up date and time    Medications:  Continue previous medications    Discharge Procedure Orders   Call MD for:  temperature >100.4     Call MD for:  severe uncontrolled pain     Call MD for:  redness, tenderness, or signs of infection (pain, swelling, redness, odor or green/yellow discharge around incision site)     Call MD for:  severe persistent headache     No dressing needed         Discharge Procedure Orders (must include Diet, Follow-up, Activity):   Discharge Procedure Orders (must include Diet, Follow-up, Activity)   Call MD for:  temperature >100.4     Call MD for:  severe uncontrolled pain     Call MD for:  redness, tenderness, or signs of infection (pain, swelling, redness, odor or green/yellow discharge around incision site)     Call MD for:  severe persistent headache     No dressing needed

## 2018-10-25 NOTE — H&P
CC: Back pain    HPI: The patient is a 72yo woman with a history of lumbar spondylosis here for right L2,3,4,5 RFA. There are no major changes in history and physical from 18.    Past Medical History:   Diagnosis Date    Anticoagulant long-term use     Arthritis     Carotid artery occlusion     Depression 2012    Diverticulosis     Former smoker     GERD (gastroesophageal reflux disease)     HLD (hyperlipidemia) 2012    HTN (hypertension) 2012    Infectious gastroenteritis     Ischemic colitis        Past Surgical History:   Procedure Laterality Date    ANGIOGRAM-CAROTID Bilateral 2014    Performed by [facility] OR    APPENDECTOMY      Block-nerve-medial branch-lumbar L2,3,4,5 Right 10/15/2018    Performed by Jakub Greer [facility] at [facility] OR    carotid angiogram      CAROTID ENDARTERECTOMY Right 2018    Procedure: Endarterectomy-Carotid - RIGHT;  Surgeon: Safia Thomas MD;  Location: [facility] OR;  Service: Cardiovascular;  Laterality: Right;  with patch  angioplasty     SECTION      x 3     COLONOSCOPY N/A 2016    Procedure: COLONOSCOPY;  Surgeon: Grzegorz Sousa Jr., MD;  Location: [facility];  Service: Endoscopy;  Laterality: N/A;    COLONOSCOPY  2009    Dr. Reyes in legacy, repeat in 5 years    COLONOSCOPY N/A 2016    Performed by [facility]z [facility] Jr., [facility] at [facility] ENDO    Endarterectomy-Carotid - RIGHT Right 2018    Performed by Safia [facility] at [facility] OR    ESOPHAGOGASTRODUODENOSCOPY (EGD) N/A 2017    Performed by [facility]zeg[facility]z S[facility]a Jr., MD at [facility] ENDO    HAND SURGERY Right 2016    tarun noland/Dr. Abhinav Rolle    HEMORRHOID SURGERY      HYSTERECTOMY  age 35    TAHUSO benign reasons    hysteretomy      INJECTION OF ANESTHETIC AGENT AROUND MEDIAL BRANCH NERVES INNERVATING LUMBAR FACET JOINT Right 10/15/2018    Procedure: Block-nerve-medial branch-lumbar L2,3,4,5;  Surgeon: Jakub Greer MD;   Location: [facility] OR;  Service: Pain Management;  Laterality: Right;    Injection,steroid,epidural,transforaminal approach L4/5 Right 2018    Per[facility] at [facility] OR    JOINT REPLACEMENT Right     1st interphalangeal joint of 3rd finger    OOPHORECTOMY      one remains    TONSILLECTOMY         Family History   Problem Relation Age of Onset    Heart disease Mother 85        MI    Heart disease Father 55        MI    Colon cancer Neg Hx     Colon polyps Neg Hx     Crohn's disease Neg Hx     Ulcerative colitis Neg Hx        Social History     Socioeconomic History    Marital status:      Spouse name: None    Number of children: 3    Years of education: None    Highest education level: None   Social Needs    Financial resource strain: None    Food insecurity - worry: None    Food insecurity - inability: None    Transportation needs - medical: None    Transportation needs - non-medical: None   Occupational History    Occupation: retired     Employer: [employer]   Tobacco Use    Smoking status: Former Smoker     Packs/day: 0.50     Years: 20.00     Pack years: 10.00     Last attempt to quit: 3/28/2013     Years since quittin.5    Smokeless tobacco: Never Used    Tobacco comment: quit 2 years ago after intermittent use for 40 years   Substance and Sexual Activity    Alcohol use: Yes     Alcohol/week: 0.0 oz     Comment: 0-2 ETOHic drinks per day    Drug use: No    Sexual activity: Yes     Partners: Male   Other Topics Concern    None   Social History Narrative    None       Current Facility-Administered Medications   Medication Dose Route Frequency Provider Last Rate Last Dose    lactated ringers infusion   Intravenous Continuous Jakub Greer MD           Review of patient's allergies indicates:   Allergen Reactions    No known drug allergies        Vitals:    10/24/18 1354 10/25/18 1028   BP:  (!) 159/76   Pulse:  76   Resp:  16   Temp:  97.9 °F (36.6  "°C)   TempSrc:  Skin   SpO2:  95%   Weight: 67.1 kg (148 lb)    Height: 5' 3" (1.6 m)        REVIEW OF SYSTEMS:     GENERAL: No weight loss, malaise or fevers.  HEENT:  No recent changes in vision or hearing  NECK: Negative for lumps, no difficulty with swallowing.  RESPIRATORY: Negative for cough, wheezing or shortness of breath, patient denies any recent URI.  CARDIOVASCULAR: Negative for chest pain, leg swelling or palpitations.  GI: Negative for abdominal discomfort, blood in stools or black stools or change in bowel habits.  MUSCULOSKELETAL: See HPI.  SKIN: Negative for lesions, rash, and itching.  PSYCH: No suicidal or homicidal ideations, no current mood disturbances.  HEMATOLOGY/LYMPHOLOGY: Negative for prolonged bleeding, bruising easily or swollen nodes. Patient is not currently taking any anti-coagulants  ENDO: No history of diabetes or thyroid dysfunction  NEURO: No history of syncope, paralysis, seizures or tremors.All other reviewed and negative other than HPI.    Physical exam:  Gen: A and O x3, pleasant, well-groomed  Skin: No rashes or obvious lesions  HEENT: PERRLA, no obvious deformities on ears or in canals. No thyroid masses, trachea midline, no palpable lymph nodes in neck, axilla.  CVS: Regular rate and rhythm, normal S1 and S2, no murmurs.  Resp: Clear to auscultation bilaterally.  Abdomen: Soft, NT/ND, normal bowel sounds present.  Musculoskeletal/Neuro: Moving all extremities    Assessment:  Lumbar spondylosis  -     Case Request Operating Room: RADIOFREQUENCY ABLATION, NERVE, SPINAL, LUMBAR, MEDIAL BRANCH, THERMAL- L2,L3,L4,L5  -     Place in Outpatient; Standing  -     Diet NPO; Standing  -     lactated ringers infusion  -     Notify physician ; Standing  -     Notify physician ; Standing  -     Notify physician (specify); Standing  -     Place 18-22 [facility] ; Standing  -     Verify informed consent; Standing  -     Vital signs; Standing          "

## 2018-11-27 ENCOUNTER — OFFICE VISIT (OUTPATIENT)
Dept: PAIN MEDICINE | Facility: CLINIC | Age: 73
End: 2018-11-27
Payer: MEDICARE

## 2018-11-27 ENCOUNTER — TELEPHONE (OUTPATIENT)
Dept: PAIN MEDICINE | Facility: CLINIC | Age: 73
End: 2018-11-27

## 2018-11-27 VITALS
HEART RATE: 84 BPM | TEMPERATURE: 97 F | RESPIRATION RATE: 18 BRPM | DIASTOLIC BLOOD PRESSURE: 70 MMHG | OXYGEN SATURATION: 96 % | BODY MASS INDEX: 26.75 KG/M2 | SYSTOLIC BLOOD PRESSURE: 150 MMHG | WEIGHT: 151 LBS

## 2018-11-27 DIAGNOSIS — M54.16 LUMBAR RADICULOPATHY: Primary | ICD-10-CM

## 2018-11-27 DIAGNOSIS — M47.816 LUMBAR SPONDYLOSIS: ICD-10-CM

## 2018-11-27 PROCEDURE — 3077F SYST BP >= 140 MM HG: CPT | Mod: CPTII,HCNC,S$GLB, | Performed by: ANESTHESIOLOGY

## 2018-11-27 PROCEDURE — 99213 OFFICE O/P EST LOW 20 MIN: CPT | Mod: HCNC,S$GLB,, | Performed by: ANESTHESIOLOGY

## 2018-11-27 PROCEDURE — 1101F PT FALLS ASSESS-DOCD LE1/YR: CPT | Mod: CPTII,HCNC,S$GLB, | Performed by: ANESTHESIOLOGY

## 2018-11-27 PROCEDURE — 99999 PR PBB SHADOW E&M-EST. PATIENT-LVL IV: CPT | Mod: PBBFAC,HCNC,, | Performed by: ANESTHESIOLOGY

## 2018-11-27 PROCEDURE — 3078F DIAST BP <80 MM HG: CPT | Mod: CPTII,HCNC,S$GLB, | Performed by: ANESTHESIOLOGY

## 2018-11-27 RX ORDER — ALPRAZOLAM 0.5 MG/1
1 TABLET, ORALLY DISINTEGRATING ORAL ONCE AS NEEDED
Status: CANCELLED | OUTPATIENT
Start: 2018-12-07 | End: 2018-12-07

## 2018-11-27 NOTE — TELEPHONE ENCOUNTER
Patient is scheduled for a lumbar transforaminal steroid injection on 12-7 and will need to stop aspirin 7 days prior. Please advise if this is okay. Thanks.

## 2018-11-27 NOTE — PROGRESS NOTES
This note was completed with dictation software and grammatical errors may exist.    CC:  Right back pain    HPI:  The patient is a 73-year-old woman with a history of carotid artery stenosis, osteoarthritis who presents in referral from Ynes Uriostegui for back and right leg pain.  She is status post right L2, 3, 4, 5 medial branch block on 10/15/2018 with 100% relief for 6 hr, status post radiofrequency ablation of right L2, 3, 4, 5 medial branch RFA on 10/25/2018.  The patient reports that she feels better after the radiofrequency ablation because she no longer has right leg pain. However when I explained that the 1st epidural steroid injection resolve her leg pain, she seems to be confused, cannot recall what type of pain she had previously and is unable to recall the relief she had on the day of the medial branch block.  Our nurses had called her the next day after the procedure and she reported 100% relief for 6 hr but she is now unable to tell me if actually helped the pain in the right low back that she is still experiencing.  She states that the pain is located in the right low back above the iliac crest, worse with twisting, worse with sitting too long, worse with standing too long.  She feels that this pain is severe, no longer radiates in the leg.  She states that this pain did not improve after the radiofrequency ablation.        Pain intervention history: She was given a prescription for gabapentin 100 mg 3 times daily but states that this was making her lightheaded.  She was prescribed physical therapy. She is status post right L4/5 transforaminal injection on 08/27/2018 with 100% relief of her right leg pain    ROS:  She reports hypertension.  Balance of review of systems is negative.    Past Medical History:   Diagnosis Date    Anticoagulant long-term use     Arthritis     Carotid artery occlusion     Depression 11/29/2012    Diverticulosis     Former smoker     GERD (gastroesophageal reflux  disease)     HLD (hyperlipidemia) 2012    HTN (hypertension) 2012    Infectious gastroenteritis     Ischemic colitis        Past Surgical History:   Procedure Laterality Date    ANGIOGRAM-CAROTID Bilateral 2014    Performed by [facility]    APPENDECTOMY      Block-nerve-medial branch-lumbar L2,3,4,5 Right 10/15/2018    Performed by Jakub Greer [facility] at [facility] OR    carotid angiogram      CAROTID ENDARTERECTOMY Right 2018    Procedure: Endarterectomy-Carotid - RIGHT;  Surgeon: Safia Thomas MD;  Location: [facility] OR;  Service: Cardiovascular;  Laterality: Right;  with patch  angioplasty     SECTION      x 3     COLONOSCOPY N/A 2016    Procedure: COLONOSCOPY;  Surgeon: Grzgeorz Sousa Jr., MD;  Location: [facility];  Service: Endoscopy;  Laterality: N/A;    COLONOSCOPY  2009    Dr. Reyes in legacy, repeat in 5 years    COLONOSCOPY N/A 2016    Performed by [facility] Jr[facility] at [facility] ENDO    Endarterectomy-Carotid - RIGHT Right 2018    Performed by Safia Thomas [facility] at [facility] OR    ESOPHAGOGASTRODUODENOSCOPY (EGD) N/A 2017    Performed by Gr[facility]z [facility]a Jr., MD at [facility] ENDO    HAND SURGERY Right 2016    tarun noland/Dr. Abhinav Rolle    HEMORRHOID SURGERY      HYSTERECTOMY  age 35    TAHUSO benign reasons    hysteretomy      INJECTION OF ANESTHETIC AGENT AROUND MEDIAL BRANCH NERVES INNERVATING LUMBAR FACET JOINT Right 10/15/2018    Procedure: Block-nerve-medial branch-lumbar L2,3,4,5;  Surgeon: Jakub Greer MD;  Location: [facility] OR;  Service: Pain Management;  Laterality: Right;    Injection,steroid,epidural,transforaminal approach L4/5 Right 2018    Performed by Jak[facility] at [facility] OR    JOINT REPLACEMENT Right     1st interphalangeal joint of 3rd finger    OOPHORECTOMY      one remains    RADIOFREQUENCY ABLATION OF LUMBAR MEDIAL BRANCH NERVE AT SINGLE LEVEL Right 10/25/2018     Procedure: RADIOFREQUENCY ABLATION, NERVE, SPINAL, LUMBAR, MEDIAL BRANCH, THERMAL- L2,L3,L4,L5;  Surgeon: Jakub Greer MD;  Location: [facility] OR;  Service: Pain Management;  Laterality: Right;    RADIOFREQUENCY ABLATION, NERVE, SPINAL, LUMBAR, MEDIAL BRANCH, THERMAL- L2,L3,L4,L5 Right 10/25/2018    Performed by Jak[facility] Greer [facility] at [facility] OR    TONSILLECTOMY         Social History     Socioeconomic History    Marital status:      Spouse name: None    Number of children: 3    Years of education: None    Highest education level: None   Social Needs    Financial resource strain: None    Food insecurity - worry: None    Food insecurity - inability: None    Transportation needs - medical: None    Transportation needs - non-medical: None   Occupational History    Occupation: retired     Employer: [employer]   Tobacco Use    Smoking status: Former Smoker     Packs/day: 0.50     Years: 20.00     Pack years: 10.00     Last attempt to quit: 3/28/2013     Years since quittin.6    Smokeless tobacco: Never Used    Tobacco comment: quit 2 years ago after intermittent use for 40 years   Substance and Sexual Activity    Alcohol use: Yes     Alcohol/week: 0.0 oz     Comment: 0-2 ETOHic drinks per day    Drug use: No    Sexual activity: Yes     Partners: Male   Other Topics Concern    None   Social History Narrative    None         Medications/Allergies: See med card    Vitals:    18 0831   BP: (!) 150/70   Pulse: 84   Resp: 18   Temp: 97.4 °F (36.3 °C)   TempSrc: Oral   SpO2: 96%   Weight: 68.5 kg (151 lb 0.2 oz)   PainSc:   4   PainLoc: Back         Physical exam:  Gen: A and O x3, pleasant, well-groomed  Skin: No rashes or obvious lesions  HEENT: PERRLA, no obvious deformities on ears or in canals. Trachea midline.  CVS: Regular rate and rhythm, normal palpable pulses.  Resp:No increased work of breathing, symmetrical chest rise.  Abdomen: Soft, NT/ND.  Musculoskeletal No antalgic gait.      Neuro:  Lower extremities: 5/5 strength bilaterally  Reflexes: Patellar 2+, Achilles 2+ bilaterally.  Sensory:  Intact and symmetrical to light touch and pinprick in L2-S1 dermatomes bilaterally.    Lumbar spine:  Lumbar spine:    Range of motion is mildly reduced with flexion with mild increased pain in the low back, moderately reduced with extension especially with right oblique extension causing right low back pain.   Mika's test causes no increased pain on either side.    Supine straight leg raise is   Negative for any leg pain bilaterally.  Internal and external rotation of the hip causes no increased pain on either side.  Myofascial exam: No tenderness to palpation across lumbar paraspinous muscles.    Imagin18 MRI L-spine  Vertebral column: There is multilevel degenerative change.  There is no fracture.  Comparison plain films demonstrate a very gentle rotary levocurvature of the lumbar spine.  There is 2 mm retrolisthesis of L2 on L3.  This is exaggerated by endplate osteophyte formation.  There is moderate disc space narrowing at the L2-3 level where there is also associated degenerative endplate signal change.  All of the discs are mildly desiccated.  There is no other significant disc space narrowing.  There is mild disc space narrowing at the L3-4 level.  Baseline marrow signal intensity is within normal limits.  T12-L1: Unremarkable.  There is no spinal canal or significant foraminal stenosis.  L1-2: There is mild facet joint arthropathy.  There is no spinal canal or significant foraminal stenosis.  L2-3: There is disc space narrowing.  There is trace retrolisthesis of L2 on L3.  There is a diffuse disc bulge with osteophytic ridging.  There is mild-to-moderate facet joint arthropathy with ligamentum flavum thickening, right greater than left.  There is no spinal stenosis.  There is mild bilateral foraminal stenosis.  L3-4: There is mild disc space narrowing.  There is a mild-to-moderate  diffuse disc bulge.  There is moderate-to-marked facet joint arthropathy with ligamentum flavum thickening.  There is mildly prominent dorsal epidural fat.  There is moderate central spinal stenosis.  AP measurement of the dural sac is 7 mm there is mild bilateral foraminal stenosis.  L4-5: There is a diffuse disc bulge with osteophytic ridging slightly eccentric to the right.  There is moderate facet joint arthropathy with ligamentum flavum thickening.  There is borderline spinal stenosis.  There is mild bilateral foraminal stenosis.  L5-S1: There is moderate facet joint arthropathy.  There is minimal bulging of the annulus.  There is no spinal canal or significant foraminal stenosis.  Soft tissues, other: The prevertebral soft tissues are normal.  The aorta is somewhat ectatic.    Assessment:  The patient is a 73-year-old woman with a history of carotid artery stenosis, osteoarthritis who presents in referral from Ynes Uriostegui for back and right leg pain.    1. Lumbar radiculopathy  Vital signs    Verify informed consent    Notify physician     Notify physician     Notify physician (specify)    Diet NPO    Case Request Operating Room: Injection,steroid,epidural,transforaminal approach l2/3    Place in Outpatient    alprazolam ODT dissolvable tablet 1 mg   2. Lumbar spondylosis           Plan:  1.  Since she did not have relief with the medial branch RFA, we discussed that her pain was becoming from another source other than the L3/4 through L5/S1 facet joints.  It could be coming from her canal narrowing at L3/4, her foraminal narrowing at L2/3.  We also discussed the possibility of SI joint pain but I think this is less likely.  We discussed proceeding with a right-sided L2/3 transforaminal injection but I am going to also add L3/4 for the canal narrowing that she has at that level. I am going to have her follow up in several weeks after the injection or sooner as needed.  If she does not get relief, I will  consider doing an L1 and L2 medial branch block to address the L2/3 facet joint.

## 2018-11-27 NOTE — H&P (VIEW-ONLY)
This note was completed with dictation software and grammatical errors may exist.    CC:  Right back pain    HPI:  The patient is a 73-year-old woman with a history of carotid artery stenosis, osteoarthritis who presents in referral from [facility] Uriostegui for back and right leg pain.  She is status post right L2, 3, 4, 5 medial branch block on 10/15/2018 with 100% relief for 6 hr, status post radiofrequency ablation of right L2, 3, 4, 5 medial branch RFA on 10/25/2018.  The patient reports that she feels better after the radiofrequency ablation because she no longer has right leg pain. However when I explained that the 1st epidural steroid injection resolve her leg pain, she seems to be confused, cannot recall what type of pain she had previously and is unable to recall the relief she had on the day of the medial branch block.  Our nurses had called her the next day after the procedure and she reported 100% relief for 6 hr but she is now unable to tell me if actually helped the pain in the right low back that she is still experiencing.  She states that the pain is located in the right low back above the iliac crest, worse with twisting, worse with sitting too long, worse with standing too long.  She feels that this pain is severe, no longer radiates in the leg.  She states that this pain did not improve after the radiofrequency ablation.        Pain intervention history: She was given a prescription for gabapentin 100 mg 3 times daily but states that this was making her lightheaded.  She was prescribed physical therapy. She is status post right L4/5 transforaminal injection on 08/27/2018 with 100% relief of her right leg pain    ROS:  She reports hypertension.  Balance of review of systems is negative.    Past Medical History:   Diagnosis Date    Anticoagulant long-term use     Arthritis     Carotid artery occlusion     Depression 11/29/2012    Diverticulosis     Former smoker     GERD (gastroesophageal reflux  disease)     HLD (hyperlipidemia) 2012    HTN (hypertension) 2012    Infectious gastroenteritis     Ischemic colitis        Past Surgical History:   Procedure Laterality Date    ANGIOGRAM-CAROTID Bilateral 2014    Performed by [facility] OR    APPENDECTOMY      Block-nerve-medial branch-lumbar L2,3,4,5 Right 10/15/2018    Performed by Jakub Greer [facility] at [facility] OR    carotid angiogram      CAROTID ENDARTERECTOMY Right 2018    Procedure: Endarterectomy-Carotid - RIGHT;  Surgeon: Safia Thomas MD;  Location: [facility] OR;  Service: Cardiovascular;  Laterality: Right;  with patch  angioplasty     SECTION      x 3     COLONOSCOPY N/A 2016    Procedure: COLONOSCOPY;  Surgeon: Grzegorz Sousa Jr., MD;  Location: [facility];  Service: Endoscopy;  Laterality: N/A;    COLONOSCOPY  2009    Dr. Reyes in [place], repeat in 5 years    COLONOSCOPY N/A 2016    Performed by Grzegorz Sousa Jr., MD at [facility]O    Endarterectomy-Carotid - RIGHT Right 2018    Performed by Safia Thomas MD at [facility] OR    ESOPHAGOGASTRODUODENOSCOPY (EGD) N/A 2017    Performed by Grzegorz Sousa Jr., MD at [facility] ENDO    HAND SURGERY Right 2016    tarun noland/Dr. Abhinav Rolle    HEMORRHOID SURGERY      HYSTERECTOMY  age 35    TAHUSO benign reasons    hysteretomy      INJECTION OF ANESTHETIC AGENT AROUND MEDIAL BRANCH NERVES INNERVATING LUMBAR FACET JOINT Right 10/15/2018    Procedure: Block-nerve-medial branch-lumbar L2,3,4,5;  Surgeon: Jakub Greer MD;  Location: [facility] OR;  Service: Pain Management;  Laterality: Right;    Injection,steroid,epidural,transforaminal approach L4/5 Right 2018    Performed by [facility] at [facility] OR    JOINT REPLACEMENT Right     1st interphalangeal joint of 3rd finger    OOPHORECTOMY      one remains    RADIOFREQUENCY ABLATION OF LUMBAR MEDIAL BRANCH NERVE AT SINGLE LEVEL Right 10/25/2018     Procedure: RADIOFREQUENCY ABLATION, NERVE, SPINAL, LUMBAR, MEDIAL BRANCH, THERMAL- L2,L3,L4,L5;  Surgeon: Jakub Greer MD;  Location: [facility] OR;  Service: Pain Management;  Laterality: Right;    RADIOFREQUENCY ABLATION, NERVE, SPINAL, LUMBAR, MEDIAL BRANCH, THERMAL- L2,L3,L4,L5 Right 10/25/2018    Performed by [facility] at [facility] OR    TONSILLECTOMY         Social History     Socioeconomic History    Marital status:      Spouse name: None    Number of children: 3    Years of education: None    Highest education level: None   Social Needs    Financial resource strain: None    Food insecurity - worry: None    Food insecurity - inability: None    Transportation needs - medical: None    Transportation needs - non-medical: None   Occupational History    Occupation: retired     Employer: [employer]   Tobacco Use    Smoking status: Former Smoker     Packs/day: 0.50     Years: 20.00     Pack years: 10.00     Last attempt to quit: 3/28/2013     Years since quittin.6    Smokeless tobacco: Never Used    Tobacco comment: quit 2 years ago after intermittent use for 40 years   Substance and Sexual Activity    Alcohol use: Yes     Alcohol/week: 0.0 oz     Comment: 0-2 ETOHic drinks per day    Drug use: No    Sexual activity: Yes     Partners: Male   Other Topics Concern    None   Social History Narrative    None         Medications/Allergies: See med card    Vitals:    18 0831   BP: (!) 150/70   Pulse: 84   Resp: 18   Temp: 97.4 °F (36.3 °C)   TempSrc: Oral   SpO2: 96%   Weight: 68.5 kg (151 lb 0.2 oz)   PainSc:   4   PainLoc: Back         Physical exam:  Gen: A and O x3, pleasant, well-groomed  Skin: No rashes or obvious lesions  HEENT: PERRLA, no obvious deformities on ears or in canals. Trachea midline.  CVS: Regular rate and rhythm, normal palpable pulses.  Resp:No increased work of breathing, symmetrical chest rise.  Abdomen: Soft, NT/ND.  Musculoskeletal No antalgic gait.      Neuro:  Lower extremities: 5/5 strength bilaterally  Reflexes: Patellar 2+, Achilles 2+ bilaterally.  Sensory:  Intact and symmetrical to light touch and pinprick in L2-S1 dermatomes bilaterally.    Lumbar spine:  Lumbar spine:    Range of motion is mildly reduced with flexion with mild increased pain in the low back, moderately reduced with extension especially with right oblique extension causing right low back pain.   Mika's test causes no increased pain on either side.    Supine straight leg raise is   Negative for any leg pain bilaterally.  Internal and external rotation of the hip causes no increased pain on either side.  Myofascial exam: No tenderness to palpation across lumbar paraspinous muscles.    Imagin18 MRI L-spine  Vertebral column: There is multilevel degenerative change.  There is no fracture.  Comparison plain films demonstrate a very gentle rotary levocurvature of the lumbar spine.  There is 2 mm retrolisthesis of L2 on L3.  This is exaggerated by endplate osteophyte formation.  There is moderate disc space narrowing at the L2-3 level where there is also associated degenerative endplate signal change.  All of the discs are mildly desiccated.  There is no other significant disc space narrowing.  There is mild disc space narrowing at the L3-4 level.  Baseline marrow signal intensity is within normal limits.  T12-L1: Unremarkable.  There is no spinal canal or significant foraminal stenosis.  L1-2: There is mild facet joint arthropathy.  There is no spinal canal or significant foraminal stenosis.  L2-3: There is disc space narrowing.  There is trace retrolisthesis of L2 on L3.  There is a diffuse disc bulge with osteophytic ridging.  There is mild-to-moderate facet joint arthropathy with ligamentum flavum thickening, right greater than left.  There is no spinal stenosis.  There is mild bilateral foraminal stenosis.  L3-4: There is mild disc space narrowing.  There is a mild-to-moderate  diffuse disc bulge.  There is moderate-to-marked facet joint arthropathy with ligamentum flavum thickening.  There is mildly prominent dorsal epidural fat.  There is moderate central spinal stenosis.  AP measurement of the dural sac is 7 mm there is mild bilateral foraminal stenosis.  L4-5: There is a diffuse disc bulge with osteophytic ridging slightly eccentric to the right.  There is moderate facet joint arthropathy with ligamentum flavum thickening.  There is borderline spinal stenosis.  There is mild bilateral foraminal stenosis.  L5-S1: There is moderate facet joint arthropathy.  There is minimal bulging of the annulus.  There is no spinal canal or significant foraminal stenosis.  Soft tissues, other: The prevertebral soft tissues are normal.  The aorta is somewhat ectatic.    Assessment:  The patient is a 73-year-old woman with a history of carotid artery stenosis, osteoarthritis who presents in referral from [facility] Uriostegui for back and right leg pain.    1. Lumbar radiculopathy  Vital signs    Verify informed consent    Notify physician     Notify physician     Notify physician (specify)    Diet NPO    Case Request Operating Room: Injection,steroid,epidural,transforaminal approach l2/3    Place in Outpatient    alprazolam ODT dissolvable tablet 1 mg   2. Lumbar spondylosis           Plan:  1.  Since she did not have relief with the medial branch RFA, we discussed that her pain was becoming from another source other than the L3/4 through L5/S1 facet joints.  It could be coming from her canal narrowing at L3/4, her foraminal narrowing at L2/3.  We also discussed the possibility of SI joint pain but I think this is less likely.  We discussed proceeding with a right-sided L2/3 transforaminal injection but I am going to also add L3/4 for the canal narrowing that she has at that level. I am going to have her follow up in several weeks after the injection or sooner as needed.  If she does not get relief, I will  consider doing an L1 and L2 medial branch block to address the L2/3 facet joint.

## 2018-11-28 NOTE — TELEPHONE ENCOUNTER
----- Message from Jakub Greer MD sent at 11/27/2018  5:33 PM CST -----  Can we add the L3/4 level on the right side to her current procedure that is scheduled as a right L2/3 transforaminal injection.

## 2018-12-06 ENCOUNTER — TELEPHONE (OUTPATIENT)
Dept: SURGERY | Facility: HOSPITAL | Age: 73
End: 2018-12-06

## 2018-12-06 ENCOUNTER — TELEPHONE (OUTPATIENT)
Dept: PAIN MEDICINE | Facility: CLINIC | Age: 73
End: 2018-12-06

## 2018-12-06 DIAGNOSIS — M51.36 DDD (DEGENERATIVE DISC DISEASE), LUMBAR: Primary | ICD-10-CM

## 2018-12-06 NOTE — TELEPHONE ENCOUNTER
----- Message from Micah Joshi sent at 12/6/2018  2:42 PM CST -----  Type:  Patient Returning Call    Who Called:  Patient  Who Left Message for Patient:  Na  Does the patient know what this is regarding?:  Rescheduling  Best Call Back Number:  [phone] (home)

## 2018-12-06 NOTE — TELEPHONE ENCOUNTER
Left a voice message to contact the office to reschedule her injection. She will need to hold aspirin for 7 days before the injection.

## 2018-12-11 DIAGNOSIS — I65.23 BILATERAL CAROTID ARTERY OCCLUSION: Primary | ICD-10-CM

## 2018-12-13 RX ORDER — GABAPENTIN 100 MG/1
100 CAPSULE ORAL 3 TIMES DAILY
Qty: 270 CAPSULE | Refills: 1 | Status: SHIPPED | OUTPATIENT
Start: 2018-12-13 | End: 2019-07-03 | Stop reason: SDUPTHER

## 2018-12-13 NOTE — TELEPHONE ENCOUNTER
When doing pre op call, patient asked if the office could call in a refill on her gabapentin. Thank you

## 2018-12-14 ENCOUNTER — HOSPITAL ENCOUNTER (OUTPATIENT)
Dept: RADIOLOGY | Facility: HOSPITAL | Age: 73
Discharge: HOME | End: 2018-12-14
Attending: ANESTHESIOLOGY | Admitting: ANESTHESIOLOGY
Payer: MEDICARE

## 2018-12-14 ENCOUNTER — HOSPITAL ENCOUNTER (OUTPATIENT)
Facility: HOSPITAL | Age: 73
Discharge: HOME | End: 2018-12-14
Attending: ANESTHESIOLOGY | Admitting: ANESTHESIOLOGY
Payer: MEDICARE

## 2018-12-14 VITALS
HEIGHT: 63 IN | DIASTOLIC BLOOD PRESSURE: 71 MMHG | RESPIRATION RATE: 16 BRPM | TEMPERATURE: 97 F | BODY MASS INDEX: 26.22 KG/M2 | SYSTOLIC BLOOD PRESSURE: 171 MMHG | WEIGHT: 148 LBS | OXYGEN SATURATION: 97 % | HEART RATE: 76 BPM

## 2018-12-14 DIAGNOSIS — M51.36 DDD (DEGENERATIVE DISC DISEASE), LUMBAR: ICD-10-CM

## 2018-12-14 DIAGNOSIS — M54.16 LUMBAR RADICULOPATHY: Primary | ICD-10-CM

## 2018-12-14 PROCEDURE — 64483 NJX AA&/STRD TFRM EPI L/S 1: CPT | Mod: HCNC,RT,, | Performed by: ANESTHESIOLOGY

## 2018-12-14 PROCEDURE — 64484 NJX AA&/STRD TFRM EPI L/S EA: CPT | Mod: HCNC,RT,, | Performed by: ANESTHESIOLOGY

## 2018-12-14 PROCEDURE — 25000003 PHARM REV CODE 250: Mod: HCNC,PO | Performed by: ANESTHESIOLOGY

## 2018-12-14 PROCEDURE — 63600175 PHARM REV CODE 636 W HCPCS: Mod: HCNC,PO | Performed by: ANESTHESIOLOGY

## 2018-12-14 PROCEDURE — 99152 MOD SED SAME PHYS/QHP 5/>YRS: CPT | Mod: HCNC,,, | Performed by: ANESTHESIOLOGY

## 2018-12-14 PROCEDURE — S0020 INJECTION, BUPIVICAINE HYDRO: HCPCS | Mod: HCNC,PO | Performed by: ANESTHESIOLOGY

## 2018-12-14 PROCEDURE — 64483 NJX AA&/STRD TFRM EPI L/S 1: CPT | Mod: HCNC,PO | Performed by: ANESTHESIOLOGY

## 2018-12-14 PROCEDURE — 25500020 PHARM REV CODE 255: Mod: HCNC,PO | Performed by: ANESTHESIOLOGY

## 2018-12-14 PROCEDURE — 76000 FLUOROSCOPY <1 HR PHYS/QHP: CPT | Mod: TC,HCNC,PO

## 2018-12-14 PROCEDURE — 64484 NJX AA&/STRD TFRM EPI L/S EA: CPT | Mod: HCNC,PO | Performed by: ANESTHESIOLOGY

## 2018-12-14 RX ORDER — ALPRAZOLAM 0.5 MG/1
1 TABLET, ORALLY DISINTEGRATING ORAL ONCE AS NEEDED
Status: DISCONTINUED | OUTPATIENT
Start: 2018-12-14 | End: 2018-12-14

## 2018-12-14 RX ORDER — SODIUM CHLORIDE 9 MG/ML
INJECTION, SOLUTION INTRAVENOUS CONTINUOUS
Status: DISCONTINUED | OUTPATIENT
Start: 2018-12-14 | End: 2018-12-14 | Stop reason: HOSPADM

## 2018-12-14 RX ORDER — BUPIVACAINE HYDROCHLORIDE 5 MG/ML
INJECTION, SOLUTION EPIDURAL; INTRACAUDAL
Status: DISCONTINUED | OUTPATIENT
Start: 2018-12-14 | End: 2018-12-14 | Stop reason: HOSPADM

## 2018-12-14 RX ORDER — MIDAZOLAM HYDROCHLORIDE 2 MG/2ML
INJECTION, SOLUTION INTRAMUSCULAR; INTRAVENOUS
Status: DISCONTINUED | OUTPATIENT
Start: 2018-12-14 | End: 2018-12-14 | Stop reason: HOSPADM

## 2018-12-14 RX ORDER — LIDOCAINE HYDROCHLORIDE 10 MG/ML
INJECTION, SOLUTION EPIDURAL; INFILTRATION; INTRACAUDAL; PERINEURAL
Status: DISCONTINUED | OUTPATIENT
Start: 2018-12-14 | End: 2018-12-14 | Stop reason: HOSPADM

## 2018-12-14 RX ORDER — METHYLPREDNISOLONE ACETATE 80 MG/ML
INJECTION, SUSPENSION INTRA-ARTICULAR; INTRALESIONAL; INTRAMUSCULAR; SOFT TISSUE
Status: DISCONTINUED | OUTPATIENT
Start: 2018-12-14 | End: 2018-12-14 | Stop reason: HOSPADM

## 2018-12-14 RX ADMIN — SODIUM CHLORIDE 500 ML: 0.9 INJECTION, SOLUTION INTRAVENOUS at 02:12

## 2018-12-14 NOTE — DISCHARGE SUMMARY
[facility]  Discharge Note  Short Stay    Admit Date: 12/14/2018    Discharge Date: 12/14/2018    Attending Physician: Jakub Greer MD     Discharge Provider: Jakub Greer    Diagnoses:  Active Hospital Problems    Diagnosis  POA    *Lumbar radiculopathy [M54.16]  Yes      Resolved Hospital Problems   No resolved problems to display.       Discharged Condition: good    Final Diagnoses: Lumbar radiculopathy [M54.16]    Disposition: Home or Self Care    Hospital Course: no complications, uneventful    Outcome of Hospitalization, Treatment, Procedure, or Surgery:  Patient was admitted for outpatient procedure. The patient underwent procedure without complications and are discharged home    Follow up/Patient Instructions:  Follow up as scheduled in Pain Management clinic in 3-4 weeks/Patient has received instructions and follow up date and time    Medications:  Continue previous medications    Discharge Procedure Orders   Call MD for:  temperature >100.4     Call MD for:  severe uncontrolled pain     Call MD for:  redness, tenderness, or signs of infection (pain, swelling, redness, odor or green/yellow discharge around incision site)     Call MD for:  severe persistent headache     No dressing needed         Discharge Procedure Orders (must include Diet, Follow-up, Activity):   Discharge Procedure Orders (must include Diet, Follow-up, Activity)   Call MD for:  temperature >100.4     Call MD for:  severe uncontrolled pain     Call MD for:  redness, tenderness, or signs of infection (pain, swelling, redness, odor or green/yellow discharge around incision site)     Call MD for:  severe persistent headache     No dressing needed

## 2018-12-14 NOTE — DISCHARGE INSTRUCTIONS
Recovery After Procedural Sedation (Adult)  You have been given medicine by vein to make you sleep during your surgery. This may have included both a pain medicine and sleeping medicine. Most of the effects have worn off. But you may still have some drowsiness for the next 6 to 8 hours.  Home care  Follow these guidelines when you get home:  · For the next 8 hours, you should be watched by a responsible adult. This person should make sure your condition is not getting worse.  · Don't drink any alcohol for the next 24 hours.  · Don't drive, operate dangerous machinery, or make important business or personal decisions during the next 24 hours.  Note: Your healthcare provider may tell you not to take any medicine by mouth for pain or sleep in the next 4 hours. These medicines may react with the medicines you were given in the hospital. This could cause a much stronger response than usual.  Follow-up care  Follow up with your healthcare provider if you are not alert and back to your usual level of activity within 12 hours.  When to seek medical advice  Call your healthcare provider right away if any of these occur:  · Drowsiness gets worse  · Weakness or dizziness gets worse  · Repeated vomiting  · You can't be awakened   Date Last Reviewed: 10/18/2016  © 6138-7718 [employer]. [address] [place] [address], [place] [address]. All rights reserved. This information is not intended as a substitute for professional medical care. Always follow your healthcare professional's instructions.    Home care instructions  Apply ice pack to the injection site for 20 minutes periods for the first 24 hrs for soreness/discomfort at injection site DO NOT USE HEAT FOR 24 HOURS  Keep site clean and dry for 24 hours, remove bandaid when desired  Do not drive until tomorrow  Take care when walking after a lumbar injection  Avoid strenuous activities for 2 days  Make take 2 weeks to feel the full effects   Resume home medication as  prescribed today  Resume Aspirin, Plavix, or Coumadin the day after the procedure unless otherwise instructed.    SEE IMMEDIATE MEDICAL HELP FOR:  Severe increase in your usual pain or appearance of new pain  Prolonged or increasing weakness or numbness in the legs or arms  Drainage, redness, active bleeding, or increased swelling at the injection site  Temperature over 100.0 degrees F.  Headache that increases when your head is upright and decreases when you lie flat    CALL 911 OR GO DIRECTLY TO EMERGENCY DEPARTMENT FOR:  Shortness of breath, chest pain, or problems breathing

## 2018-12-14 NOTE — OP NOTE
PROCEDURE DATE: 12/14/2018    PROCEDURE: Right L2/3 and L3/4 transforaminal epidural steroid injection under fluoroscopy    DIAGNOSIS: Lumbar  Radiculopathy    Post op diagnosis: Same    PHYSICIAN: Jakub Greer MD    MEDICATIONS INJECTED:  Methylprednisolone 40mg (0.5ml) and 1.5ml 0.25% bupivicaine at each nerve root.     LOCAL ANESTHETIC INJECTED:  Lidocaine 1%. 4 ml per site.    SEDATION MEDICATIONS: 2mg versed    ESTIMATED BLOOD LOSS:  none    COMPLICATIONS:  none    TECHNIQUE:   A time-out was taken to identify patient and procedure side prior to starting the procedure. The patient was placed in a prone position, prepped and draped in the usual sterile fashion using ChloraPrep and sterile towels.  The area to be injected was determined under fluoroscopic guidance in AP and oblique view.  Local anesthetic was given by raising a wheal and going down to the hub of a 25-gauge 1.5 inch needle.  In oblique view, a 3.5 inch 22-gauge bent-tip spinal needle was introduced towards 6 oclock position of the pedicle of each above named nerve root level.  The needle was walked medially then hinged into the neural foramen and position was confirmed in AP and lateral views.  Omnipaque contrast dye was injected to confirm appropriate placement and that there was no vascular uptake.  After negative aspiration for blood or CSF, the medication was then injected. This was performed at the right L2/3 and L3/4 level(s). The patient tolerated the procedure well.    The patient was monitored after the procedure.  Patient was given post procedure and discharge instructions to follow at home. The patient was discharged in a stable condition.

## 2019-01-02 DIAGNOSIS — I10 ESSENTIAL HYPERTENSION: ICD-10-CM

## 2019-01-02 RX ORDER — AMLODIPINE BESYLATE 5 MG/1
5 TABLET ORAL DAILY
Qty: 30 TABLET | Refills: 4 | Status: SHIPPED | OUTPATIENT
Start: 2019-01-02 | End: 2019-07-04 | Stop reason: SDUPTHER

## 2019-01-04 ENCOUNTER — OFFICE VISIT (OUTPATIENT)
Dept: PAIN MEDICINE | Facility: CLINIC | Age: 74
End: 2019-01-04
Payer: MEDICARE

## 2019-01-04 VITALS
HEART RATE: 78 BPM | WEIGHT: 152.75 LBS | TEMPERATURE: 97 F | SYSTOLIC BLOOD PRESSURE: 148 MMHG | BODY MASS INDEX: 27.06 KG/M2 | OXYGEN SATURATION: 98 % | DIASTOLIC BLOOD PRESSURE: 73 MMHG | RESPIRATION RATE: 18 BRPM

## 2019-01-04 DIAGNOSIS — M54.16 RIGHT LUMBAR RADICULOPATHY: ICD-10-CM

## 2019-01-04 DIAGNOSIS — M51.36 DDD (DEGENERATIVE DISC DISEASE), LUMBAR: Primary | ICD-10-CM

## 2019-01-04 DIAGNOSIS — M47.816 LUMBAR SPONDYLOSIS: ICD-10-CM

## 2019-01-04 PROCEDURE — 99213 PR OFFICE/OUTPT VISIT, EST, LEVL III, 20-29 MIN: ICD-10-PCS | Mod: S$GLB,,, | Performed by: ANESTHESIOLOGY

## 2019-01-04 PROCEDURE — 99213 OFFICE O/P EST LOW 20 MIN: CPT | Mod: S$GLB,,, | Performed by: ANESTHESIOLOGY

## 2019-01-04 PROCEDURE — 99999 PR PBB SHADOW E&M-EST. PATIENT-LVL III: CPT | Mod: PBBFAC,HCNC,, | Performed by: ANESTHESIOLOGY

## 2019-01-04 PROCEDURE — 99499 RISK ADDL DX/OHS AUDIT: ICD-10-PCS | Mod: HCNC,S$GLB,, | Performed by: ANESTHESIOLOGY

## 2019-01-04 PROCEDURE — 3078F PR MOST RECENT DIASTOLIC BLOOD PRESSURE < 80 MM HG: ICD-10-PCS | Mod: CPTII,S$GLB,, | Performed by: ANESTHESIOLOGY

## 2019-01-04 PROCEDURE — 3077F PR MOST RECENT SYSTOLIC BLOOD PRESSURE >= 140 MM HG: ICD-10-PCS | Mod: CPTII,S$GLB,, | Performed by: ANESTHESIOLOGY

## 2019-01-04 PROCEDURE — 3077F SYST BP >= 140 MM HG: CPT | Mod: CPTII,S$GLB,, | Performed by: ANESTHESIOLOGY

## 2019-01-04 PROCEDURE — 99999 PR PBB SHADOW E&M-EST. PATIENT-LVL III: ICD-10-PCS | Mod: PBBFAC,HCNC,, | Performed by: ANESTHESIOLOGY

## 2019-01-04 PROCEDURE — 99499 UNLISTED E&M SERVICE: CPT | Mod: HCNC,S$GLB,, | Performed by: ANESTHESIOLOGY

## 2019-01-04 PROCEDURE — 1101F PR PT FALLS ASSESS DOC 0-1 FALLS W/OUT INJ PAST YR: ICD-10-PCS | Mod: CPTII,S$GLB,, | Performed by: ANESTHESIOLOGY

## 2019-01-04 PROCEDURE — 3078F DIAST BP <80 MM HG: CPT | Mod: CPTII,S$GLB,, | Performed by: ANESTHESIOLOGY

## 2019-01-04 PROCEDURE — 1101F PT FALLS ASSESS-DOCD LE1/YR: CPT | Mod: CPTII,S$GLB,, | Performed by: ANESTHESIOLOGY

## 2019-01-04 NOTE — PROGRESS NOTES
This note was completed with dictation software and grammatical errors may exist.    CC:  Right back pain    HPI:  The patient is a 73-year-old woman with a history of carotid artery stenosis, osteoarthritis who presents in referral from Ynes Uriostegui for back and right leg pain. She returns in follow-up today, she is status post right L2/3 and L3/4 transforaminal injection on 12/14/18 with What she reports is 75% relief.  She actually has been doing much better, much less pain, however she reports that [place] airport [place] 1[place], actually landed on her right back, right buttock and has had increased pain in that area but feels that this is somewhat different from her usual back and hip pain. Otherwise, she feels that she is able to stand longer, walk longer without discomfort, denies any new weakness or numbness in the legs.      Pain intervention history: She was given a prescription for gabapentin 100 mg 3 times daily but states that this was making her lightheaded.  She was prescribed physical therapy. She is status post right L4/5 transforaminal injection on 08/27/2018 with 100% relief of her right leg pain. She is status post right L2, 3, 4, 5 medial branch block on 10/15/2018 with 100% relief for 6 hr, status post radiofrequency ablation of right L2, 3, 4, 5 medial branch RFA on 10/25/2018. She returns in follow-up today, she is status post right L2/3 and L3/4 transforaminal injection on 12/14/18 with What she reports is 75% relief.    ROS:  She reports hypertension.  Balance of review of systems is negative.    Past Medical History:   Diagnosis Date    Anticoagulant long-term use     Arthritis     Carotid artery occlusion     Depression 11/29/2012    Diverticulosis     Former smoker     GERD (gastroesophageal reflux disease)     HLD (hyperlipidemia) 11/29/2012    HTN (hypertension) 11/29/2012    Infectious gastroenteritis     Ischemic colitis        Past Surgical History:    Procedure Laterality Date    ANGIOGRAM-CAROTID Bilateral 2014    [facility]    APPENDECTOMY      Block-nerve-medial branch-lumbar L2,3,4,5 Right 10/15/2018    Performed by [facility] at [facility] OR    carotid angiogram       SECTION      x 3     COLONOSCOPY  2009    [place], repeat [place] 5 years    COLONOSCOPY N/A 2016    Performed by [facility] Jr[facility] at [facility] ENDO    Endarterectomy-Carotid - RIGHT Right 2018    Performed by Safia Thomas [facility] at [facility] OR    ESOPHAGOGASTRODUODENOSCOPY (EGD) N/A 2017    Performed [facility] Jr[facility] at [facility]    HAND SURGERY Right 2016    kntranle implant/Dr. Abhinav Rolle    HEMORRHOID SURGERY      HYSTERECTOMY  age 35    TAHUSO benign reasons    hysteretomy      Injection,steroid,epidural,transforaminal approach l2/3, L3/4 Right 2018    [facility] OR    Injection,steroid,epidural,transforaminal approach L4/5 Right 2018    [facility] at [facility] OR    JOINT REPLACEMENT Right     1st interphalangeal joint of 3rd finger    OOPHORECTOMY      one remains    RADIOFREQUENCY ABLATION, NERVE, SPINAL, LUMBAR, MEDIAL BRANCH, THERMAL- L2,L3,L4,L5 Right 10/25/2018    [facility] at [facility] OR    TONSILLECTOMY         Social History     Socioeconomic History    Marital status:      Spouse name: None    Number of children: 3    Years of education: None    Highest education level: None   Social Needs    Financial resource strain: None    Food insecurity - worry: None    Food insecurity - inability: None    Transportation needs - medical: None    Transportation needs - non-medical: None   Occupational History    Occupation: retired     Employer: [employer]   Tobacco Use    Smoking status: Former Smoker     Packs/day: 0.50     Years: 20.00     Pack years: 10.00     Last attempt  to quit: 3/28/2013     Years since quittin.7    Smokeless tobacco: Never Used    Tobacco comment: quit 2 years ago after intermittent use for 40 years   Substance and Sexual Activity    Alcohol use: Yes     Alcohol/week: 0.0 oz     Comment: 0-2 ETOHic drinks per day    Drug use: No    Sexual activity: Yes     Partners: Male   Other Topics Concern    None   Social History Narrative    None         Medications/Allergies: See med card    Vitals:    19 1053   BP: (!) 148/73   Pulse: 78   Resp: 18   Temp: 97.4 °F (36.3 °C)   TempSrc: Oral   SpO2: 98%   Weight: 69.3 kg (152 lb 12.5 oz)   PainSc:   4   PainLoc: Back         Physical exam:  Gen: A and O x3, pleasant, well-groomed  Skin: No rashes or obvious lesions  HEENT: PERRLA, no obvious deformities on ears or in canals. Trachea midline.  CVS: Regular rate and rhythm, normal palpable pulses.  Resp:No increased work of breathing, symmetrical chest rise.  Abdomen: Soft, NT/ND.  Musculoskeletal No antalgic gait.     Neuro:  Lower extremities: 5/5 strength bilaterally  Reflexes: Patellar 2+, Achilles 2+ bilaterally.  Sensory:  Intact and symmetrical to light touch and pinprick in L2-S1 dermatomes bilaterally.    Lumbar spine:  Lumbar spine:   Range of motion is mildly reduced with forward flexion and extension with mild increased pain on extension   Mika's test causes no increased pain on either side.    Supine straight leg raise is   Negative for any leg pain bilaterally.  Internal and external rotation of the hip causes no increased pain on either side.  Myofascial exam: No tenderness to palpation across lumbar paraspinous muscles.    Imagin18 MRI L-spine  Vertebral column: There is multilevel degenerative change.  There is no fracture.  Comparison plain films demonstrate a very gentle rotary levocurvature of the lumbar spine.  There is 2 mm retrolisthesis of L2 on L3.  This is exaggerated by endplate osteophyte formation.  There is moderate  disc space narrowing at the L2-3 level where there is also associated degenerative endplate signal change.  All of the discs are mildly desiccated.  There is no other significant disc space narrowing.  There is mild disc space narrowing at the L3-4 level.  Baseline marrow signal intensity is within normal limits.  T12-L1: Unremarkable.  There is no spinal canal or significant foraminal stenosis.  L1-2: There is mild facet joint arthropathy.  There is no spinal canal or significant foraminal stenosis.  L2-3: There is disc space narrowing.  There is trace retrolisthesis of L2 on L3.  There is a diffuse disc bulge with osteophytic ridging.  There is mild-to-moderate facet joint arthropathy with ligamentum flavum thickening, right greater than left.  There is no spinal stenosis.  There is mild bilateral foraminal stenosis.  L3-4: There is mild disc space narrowing.  There is a mild-to-moderate diffuse disc bulge.  There is moderate-to-marked facet joint arthropathy with ligamentum flavum thickening.  There is mildly prominent dorsal epidural fat.  There is moderate central spinal stenosis.  AP measurement of the dural sac is 7 mm there is mild bilateral foraminal stenosis.  L4-5: There is a diffuse disc bulge with osteophytic ridging slightly eccentric to the right.  There is moderate facet joint arthropathy with ligamentum flavum thickening.  There is borderline spinal stenosis.  There is mild bilateral foraminal stenosis.  L5-S1: There is moderate facet joint arthropathy.  There is minimal bulging of the annulus.  There is no spinal canal or significant foraminal stenosis.  Soft tissues, other: The prevertebral soft tissues are normal.  The aorta is somewhat ectatic.    Assessment:  The patient is a 73-year-old woman with a history of carotid artery stenosis, osteoarthritis who presents in referral from Ynes Uriostegui for back and right leg pain.    1. DDD (degenerative disc disease), lumbar     2. Lumbar spondylosis      3. Right lumbar radiculopathy           Plan:  1.  Since she is doing well after the injection, I think this is fairly diagnostic that her problems were coming from the right side at L2/3 and somewhat at L3/4.  On the other hand, her lower leg pain resolved after injections to the lower lumbar spine.  I have encouraged her to work on exercise, walking, she has a treadmill.  She had a recent fall which exacerbated her pain but hopefully this resolves, I will have her follow up as needed for now.

## 2019-01-05 DIAGNOSIS — I10 ESSENTIAL HYPERTENSION: ICD-10-CM

## 2019-01-07 RX ORDER — AMLODIPINE BESYLATE 2.5 MG/1
2.5 TABLET ORAL DAILY
Qty: 90 TABLET | Refills: 1 | Status: SHIPPED | OUTPATIENT
Start: 2019-01-07 | End: 2019-02-05 | Stop reason: SDUPTHER

## 2019-01-28 ENCOUNTER — TELEPHONE (OUTPATIENT)
Dept: CARDIOLOGY | Facility: CLINIC | Age: 74
End: 2019-01-28

## 2019-01-28 ENCOUNTER — TELEPHONE (OUTPATIENT)
Dept: PAIN MEDICINE | Facility: CLINIC | Age: 74
End: 2019-01-28

## 2019-01-28 DIAGNOSIS — M51.36 DDD (DEGENERATIVE DISC DISEASE), LUMBAR: Primary | ICD-10-CM

## 2019-01-28 NOTE — TELEPHONE ENCOUNTER
----- Message from RT Ramez sent at 1/28/2019  2:08 PM CST -----  Contact: pt    pt , requesting a call back soon concerning hip pain issues, thanks.

## 2019-01-28 NOTE — TELEPHONE ENCOUNTER
Spoke to pt. Schedule echo and labs for 01- pt. Will see Dr. Cam to see if echo stress is still needed.

## 2019-01-28 NOTE — TELEPHONE ENCOUNTER
----- Message from RT Ramez sent at 1/28/2019  2:05 PM CST -----  Contact: pt    pt , requesting lab test and all other cardiac orders scheduled prior to her appt of 02/05/2019, please call to confirm, thanks.

## 2019-01-28 NOTE — TELEPHONE ENCOUNTER
Spoke with patient. She stated she is having right hip pain that is shooting down her leg. She stated when she gets up from a sitting position she needs to wait before walking. She also stated the two gabapentin she takes at night is not helping the pain. She stated the only thing that helps is aleve and she is aware she should not take these while on blood thinners. She stated the TF DOROTHEA helped the hip pain 50% and this lasted for two weeks. Please advise.

## 2019-01-31 ENCOUNTER — CLINICAL SUPPORT (OUTPATIENT)
Dept: CARDIOLOGY | Facility: CLINIC | Age: 74
End: 2019-01-31
Attending: INTERNAL MEDICINE
Payer: MEDICARE

## 2019-01-31 ENCOUNTER — TELEPHONE (OUTPATIENT)
Dept: PAIN MEDICINE | Facility: CLINIC | Age: 74
End: 2019-01-31

## 2019-01-31 ENCOUNTER — LAB VISIT (OUTPATIENT)
Dept: LAB | Facility: HOSPITAL | Age: 74
End: 2019-01-31
Attending: INTERNAL MEDICINE
Payer: MEDICARE

## 2019-01-31 VITALS
WEIGHT: 152 LBS | HEIGHT: 63 IN | SYSTOLIC BLOOD PRESSURE: 118 MMHG | HEART RATE: 60 BPM | DIASTOLIC BLOOD PRESSURE: 60 MMHG | BODY MASS INDEX: 26.93 KG/M2

## 2019-01-31 DIAGNOSIS — I10 ESSENTIAL HYPERTENSION: ICD-10-CM

## 2019-01-31 DIAGNOSIS — Z82.49 FAMILY HISTORY OF EARLY CAD: ICD-10-CM

## 2019-01-31 DIAGNOSIS — I73.9 PVD (PERIPHERAL VASCULAR DISEASE): ICD-10-CM

## 2019-01-31 DIAGNOSIS — I65.23 BILATERAL CAROTID ARTERY STENOSIS: ICD-10-CM

## 2019-01-31 DIAGNOSIS — E78.2 MIXED HYPERLIPIDEMIA: ICD-10-CM

## 2019-01-31 LAB
ALBUMIN SERPL BCP-MCNC: 3.7 G/DL
ALP SERPL-CCNC: 90 U/L
ALT SERPL W/O P-5'-P-CCNC: 14 U/L
ANION GAP SERPL CALC-SCNC: 8 MMOL/L
ASCENDING AORTA: 3.09 CM
AST SERPL-CCNC: 23 U/L
AV INDEX (PROSTH): 0.75
AV MEAN GRADIENT: 4.39 MMHG
AV PEAK GRADIENT: 8.88 MMHG
AV VALVE AREA: 1.91 CM2
AV VELOCITY RATIO: 0.7
BASOPHILS # BLD AUTO: 0.07 K/UL
BASOPHILS NFR BLD: 0.9 %
BILIRUB SERPL-MCNC: 0.7 MG/DL
BSA FOR ECHO PROCEDURE: 1.75 M2
BUN SERPL-MCNC: 18 MG/DL
CALCIUM SERPL-MCNC: 10.2 MG/DL
CHLORIDE SERPL-SCNC: 103 MMOL/L
CHOLEST SERPL-MCNC: 147 MG/DL
CHOLEST/HDLC SERPL: 2.4 {RATIO}
CK SERPL-CCNC: 41 U/L
CO2 SERPL-SCNC: 30 MMOL/L
CREAT SERPL-MCNC: 0.7 MG/DL
CV ECHO LV RWT: 0.53 CM
DIFFERENTIAL METHOD: ABNORMAL
DOP CALC AO PEAK VEL: 1.49 M/S
DOP CALC AO VTI: 30.96 CM
DOP CALC LVOT AREA: 2.54 CM2
DOP CALC LVOT DIAMETER: 1.8 CM
DOP CALC LVOT PEAK VEL: 1.05 M/S
DOP CALC LVOT STROKE VOLUME: 59.18 CM3
DOP CALCLVOT PEAK VEL VTI: 23.27 CM
E WAVE DECELERATION TIME: 169.13 MSEC
E/A RATIO: 0.69
E/E' RATIO: 9.33
ECHO LV POSTERIOR WALL: 0.95 CM (ref 0.6–1.1)
EOSINOPHIL # BLD AUTO: 0.2 K/UL
EOSINOPHIL NFR BLD: 2.2 %
ERYTHROCYTE [DISTWIDTH] IN BLOOD BY AUTOMATED COUNT: 14.7 %
EST. GFR  (AFRICAN AMERICAN): >60 ML/MIN/1.73 M^2
EST. GFR  (NON AFRICAN AMERICAN): >60 ML/MIN/1.73 M^2
FRACTIONAL SHORTENING: 29 % (ref 28–44)
GLUCOSE SERPL-MCNC: 107 MG/DL
HCT VFR BLD AUTO: 41.9 %
HDLC SERPL-MCNC: 62 MG/DL
HDLC SERPL: 42.2 %
HGB BLD-MCNC: 13.4 G/DL
IMM GRANULOCYTES # BLD AUTO: 0.02 K/UL
IMM GRANULOCYTES NFR BLD AUTO: 0.3 %
INTERVENTRICULAR SEPTUM: 1.03 CM (ref 0.6–1.1)
IVRT: 0.13 MSEC
LA MAJOR: 4.31 CM
LA MINOR: 4.67 CM
LA WIDTH: 3.59 CM
LDLC SERPL CALC-MCNC: 70.8 MG/DL
LEFT ATRIUM SIZE: 3.03 CM
LEFT ATRIUM VOLUME INDEX: 24.1 ML/M2
LEFT ATRIUM VOLUME: 41.45 CM3
LEFT INTERNAL DIMENSION IN SYSTOLE: 2.54 CM (ref 2.1–4)
LEFT VENTRICLE DIASTOLIC VOLUME INDEX: 31.2 ML/M2
LEFT VENTRICLE DIASTOLIC VOLUME: 53.69 ML
LEFT VENTRICLE MASS INDEX: 61.3 G/M2
LEFT VENTRICLE SYSTOLIC VOLUME INDEX: 13.5 ML/M2
LEFT VENTRICLE SYSTOLIC VOLUME: 23.24 ML
LEFT VENTRICULAR INTERNAL DIMENSION IN DIASTOLE: 3.58 CM (ref 3.5–6)
LEFT VENTRICULAR MASS: 105.43 G
LV LATERAL E/E' RATIO: 8.75
LV SEPTAL E/E' RATIO: 10
LYMPHOCYTES # BLD AUTO: 1.7 K/UL
LYMPHOCYTES NFR BLD: 21.6 %
MCH RBC QN AUTO: 30.3 PG
MCHC RBC AUTO-ENTMCNC: 32 G/DL
MCV RBC AUTO: 95 FL
MONOCYTES # BLD AUTO: 0.6 K/UL
MONOCYTES NFR BLD: 7.5 %
MV PEAK A VEL: 1.01 M/S
MV PEAK E VEL: 0.7 M/S
NEUTROPHILS # BLD AUTO: 5.2 K/UL
NEUTROPHILS NFR BLD: 67.5 %
NONHDLC SERPL-MCNC: 85 MG/DL
NRBC BLD-RTO: 0 /100 WBC
PISA TR MAX VEL: 1.47 M/S
PLATELET # BLD AUTO: 371 K/UL
PMV BLD AUTO: 11 FL
POTASSIUM SERPL-SCNC: 4.4 MMOL/L
PROT SERPL-MCNC: 7.4 G/DL
PULM VEIN S/D RATIO: 2.12
PV PEAK D VEL: 0.33 M/S
PV PEAK S VEL: 0.7 M/S
RA MAJOR: 3.56 CM
RA PRESSURE: 3 MMHG
RA WIDTH: 2.54 CM
RBC # BLD AUTO: 4.42 M/UL
RIGHT VENTRICULAR END-DIASTOLIC DIMENSION: 3.49 CM
SINUS: 3.16 CM
SODIUM SERPL-SCNC: 141 MMOL/L
STJ: 2.52 CM
TDI LATERAL: 0.08
TDI SEPTAL: 0.07
TDI: 0.08
TR MAX PG: 8.64 MMHG
TRICUSPID ANNULAR PLANE SYSTOLIC EXCURSION: 2.21 CM
TRIGL SERPL-MCNC: 71 MG/DL
TSH SERPL DL<=0.005 MIU/L-ACNC: 2.02 UIU/ML
TV REST PULMONARY ARTERY PRESSURE: 12 MMHG
WBC # BLD AUTO: 7.69 K/UL

## 2019-01-31 PROCEDURE — 36415 COLL VENOUS BLD VENIPUNCTURE: CPT | Mod: HCNC,PO

## 2019-01-31 PROCEDURE — 84443 ASSAY THYROID STIM HORMONE: CPT | Mod: HCNC

## 2019-01-31 PROCEDURE — 85025 COMPLETE CBC W/AUTO DIFF WBC: CPT | Mod: HCNC

## 2019-01-31 PROCEDURE — 99999 PR PBB SHADOW E&M-EST. PATIENT-LVL II: ICD-10-PCS | Mod: PBBFAC,HCNC,,

## 2019-01-31 PROCEDURE — 93306 TTE W/DOPPLER COMPLETE: CPT | Mod: HCNC,S$GLB,, | Performed by: INTERNAL MEDICINE

## 2019-01-31 PROCEDURE — 80053 COMPREHEN METABOLIC PANEL: CPT | Mod: HCNC

## 2019-01-31 PROCEDURE — 93306 TRANSTHORACIC ECHO (TTE) COMPLETE: ICD-10-PCS | Mod: HCNC,S$GLB,, | Performed by: INTERNAL MEDICINE

## 2019-01-31 PROCEDURE — 82550 ASSAY OF CK (CPK): CPT | Mod: HCNC

## 2019-01-31 PROCEDURE — 99999 PR PBB SHADOW E&M-EST. PATIENT-LVL II: CPT | Mod: PBBFAC,HCNC,,

## 2019-01-31 PROCEDURE — 80061 LIPID PANEL: CPT | Mod: HCNC

## 2019-01-31 NOTE — TELEPHONE ENCOUNTER
----- Message from Suman Gallagher sent at 1/31/2019  2:14 PM CST -----  Type: Needs Medical Advice    Who Called:  Patient    Best Call Back Number: [phone]  Additional Information: Caller states that she would like a callback regarding her medications and pain in her hip

## 2019-02-01 RX ORDER — TRAMADOL HYDROCHLORIDE 50 MG/1
50 TABLET ORAL EVERY 12 HOURS PRN
Qty: 40 TABLET | Refills: 1 | Status: SHIPPED | OUTPATIENT
Start: 2019-02-01 | End: 2019-07-03

## 2019-02-01 NOTE — TELEPHONE ENCOUNTER
Patient is having right hip pain and it is radiating down to her knee. She is having difficulty sitting or laying down without pain. She is only taking the gabapentin 100mg 2 capsules at night. She is taking aleve right now which she says helps some, but she was advised not to take that by her PCP and cardiology. Please advise.

## 2019-02-04 NOTE — TELEPHONE ENCOUNTER
Patient notified that medication was sent to the pharmacy and a follow up appointment was made for her.

## 2019-02-05 ENCOUNTER — OFFICE VISIT (OUTPATIENT)
Dept: CARDIOLOGY | Facility: CLINIC | Age: 74
End: 2019-02-05
Payer: MEDICARE

## 2019-02-05 VITALS
BODY MASS INDEX: 27.23 KG/M2 | SYSTOLIC BLOOD PRESSURE: 133 MMHG | HEIGHT: 63 IN | HEART RATE: 87 BPM | WEIGHT: 153.69 LBS | DIASTOLIC BLOOD PRESSURE: 76 MMHG

## 2019-02-05 DIAGNOSIS — I10 ESSENTIAL HYPERTENSION: ICD-10-CM

## 2019-02-05 DIAGNOSIS — I73.9 PVD (PERIPHERAL VASCULAR DISEASE): ICD-10-CM

## 2019-02-05 DIAGNOSIS — E78.2 MIXED HYPERLIPIDEMIA: ICD-10-CM

## 2019-02-05 DIAGNOSIS — I65.23 BILATERAL CAROTID ARTERY STENOSIS: Primary | ICD-10-CM

## 2019-02-05 PROCEDURE — 99999 PR PBB SHADOW E&M-EST. PATIENT-LVL III: ICD-10-PCS | Mod: PBBFAC,HCNC,, | Performed by: INTERNAL MEDICINE

## 2019-02-05 PROCEDURE — 99214 PR OFFICE/OUTPT VISIT, EST, LEVL IV, 30-39 MIN: ICD-10-PCS | Mod: HCNC,S$GLB,, | Performed by: INTERNAL MEDICINE

## 2019-02-05 PROCEDURE — 1101F PR PT FALLS ASSESS DOC 0-1 FALLS W/OUT INJ PAST YR: ICD-10-PCS | Mod: HCNC,CPTII,S$GLB, | Performed by: INTERNAL MEDICINE

## 2019-02-05 PROCEDURE — 1101F PT FALLS ASSESS-DOCD LE1/YR: CPT | Mod: HCNC,CPTII,S$GLB, | Performed by: INTERNAL MEDICINE

## 2019-02-05 PROCEDURE — 99214 OFFICE O/P EST MOD 30 MIN: CPT | Mod: HCNC,S$GLB,, | Performed by: INTERNAL MEDICINE

## 2019-02-05 PROCEDURE — 3075F PR MOST RECENT SYSTOLIC BLOOD PRESS GE 130-139MM HG: ICD-10-PCS | Mod: HCNC,CPTII,S$GLB, | Performed by: INTERNAL MEDICINE

## 2019-02-05 PROCEDURE — 99999 PR PBB SHADOW E&M-EST. PATIENT-LVL III: CPT | Mod: PBBFAC,HCNC,, | Performed by: INTERNAL MEDICINE

## 2019-02-05 PROCEDURE — 3075F SYST BP GE 130 - 139MM HG: CPT | Mod: HCNC,CPTII,S$GLB, | Performed by: INTERNAL MEDICINE

## 2019-02-05 PROCEDURE — 3078F PR MOST RECENT DIASTOLIC BLOOD PRESSURE < 80 MM HG: ICD-10-PCS | Mod: HCNC,CPTII,S$GLB, | Performed by: INTERNAL MEDICINE

## 2019-02-05 PROCEDURE — 3078F DIAST BP <80 MM HG: CPT | Mod: HCNC,CPTII,S$GLB, | Performed by: INTERNAL MEDICINE

## 2019-02-05 NOTE — PROGRESS NOTES
Subjective:    Patient ID:  Haley Castro is a 73 y.o. female who presents for follow-up of Peripheral Vascular Disease (8 month f/u - review echo and labs ); carotid artery stenosis; Hyperlipidemia; and Hypertension      HPI  Here for follow up of carotid dsx (7/18 Rt CEA, 60% LICA)/PVD/dlp/HTN/fam hx CAD. Patients states is doing well no chest pain, SOB or change in exertional tolerence. Patient is exercising regularly with out symptoms.        Review of Systems   Constitution: Negative for malaise/fatigue.   Eyes: Negative for blurred vision.   Cardiovascular: Negative for chest pain, claudication, cyanosis, dyspnea on exertion, irregular heartbeat, leg swelling, near-syncope, orthopnea, palpitations, paroxysmal nocturnal dyspnea and syncope.   Respiratory: Negative for cough and shortness of breath.    Hematologic/Lymphatic: Does not bruise/bleed easily.   Musculoskeletal: Negative for back pain, falls, joint pain, muscle cramps, muscle weakness and myalgias.   Gastrointestinal: Negative for abdominal pain, change in bowel habit, nausea and vomiting.   Genitourinary: Negative for urgency.   Neurological: Negative for dizziness, focal weakness and light-headedness.        Objective:    Physical Exam   Constitutional: She is oriented to person, place, and time. She appears well-developed and well-nourished.   HENT:   Head: Normocephalic.   Eyes: Conjunctivae are normal.   Neck: Normal range of motion. Neck supple. No JVD present.   Rt CEA scar   Cardiovascular: Normal rate, regular rhythm, normal heart sounds and intact distal pulses.   Pulses:       Carotid pulses are 2+ on the right side, and 2+ on the left side.       Radial pulses are 2+ on the right side, and 2+ on the left side.        Dorsalis pedis pulses are 2+ on the right side, and 2+ on the left side.        Posterior tibial pulses are 2+ on the right side, and 2+ on the left side.   Pulmonary/Chest: Effort normal and breath sounds normal.    Abdominal: Soft. Bowel sounds are normal.   Musculoskeletal: She exhibits no edema or tenderness.   Neurological: She is alert and oriented to person, place, and time. Gait normal.   Skin: Skin is warm, dry and intact. No cyanosis. Nails show no clubbing.   Psychiatric: She has a normal mood and affect. Her speech is normal and behavior is normal. Thought content normal.   Nursing note and vitals reviewed.              ..    Chemistry        Component Value Date/Time     01/31/2019 1246    K 4.4 01/31/2019 1246     01/31/2019 1246    CO2 30 (H) 01/31/2019 1246    BUN 18 01/31/2019 1246    CREATININE 0.7 01/31/2019 1246     01/31/2019 1246        Component Value Date/Time    CALCIUM 10.2 01/31/2019 1246    ALKPHOS 90 01/31/2019 1246    AST 23 01/31/2019 1246    ALT 14 01/31/2019 1246    BILITOT 0.7 01/31/2019 1246    ESTGFRAFRICA >60.0 01/31/2019 1246    EGFRNONAA >60.0 01/31/2019 1246            ..  Lab Results   Component Value Date    CHOL 147 01/31/2019    CHOL 143 10/25/2018    CHOL 192 05/16/2018     Lab Results   Component Value Date    HDL 62 01/31/2019    HDL 52 10/25/2018    HDL 55 05/16/2018     Lab Results   Component Value Date    LDLCALC 70.8 01/31/2019    LDLCALC 71.2 10/25/2018    LDLCALC 106.2 05/16/2018     Lab Results   Component Value Date    TRIG 71 01/31/2019    TRIG 99 10/25/2018    TRIG 154 (H) 05/16/2018     Lab Results   Component Value Date    CHOLHDL 42.2 01/31/2019    CHOLHDL 36.4 10/25/2018    CHOLHDL 28.6 05/16/2018     ..  Lab Results   Component Value Date    WBC 7.69 01/31/2019    HGB 13.4 01/31/2019    HCT 41.9 01/31/2019    MCV 95 01/31/2019     (H) 01/31/2019       Test(s) Reviewed  I have reviewed the following in detail:  [] Stress test   [] Angiography   [x] Echocardiogram   [x] Labs   [] Other:       Assessment:         ICD-10-CM ICD-9-CM   1. Bilateral carotid artery stenosis I65.23 433.10     433.30   2. Essential hypertension I10 401.9   3. PVD  (peripheral vascular disease) I73.9 443.9   4. Mixed hyperlipidemia E78.2 272.2     Problem List Items Addressed This Visit     PVD (peripheral vascular disease)    Hyperlipidemia    Essential hypertension    Carotid artery stenosis - Primary    Overview     7/18 right CEA    5/2018 CTA  High-grade stenosis of the right proximal ICA in the range of 80%.  Moderate stenosis of the left proximal ICA in the range of 60%.  Small lung nodules for which follow-up in 1 year with chest CT is recommended.                 Plan:           Return to clinic 1 year   Low level/low impact aerobic exercise 5x's/wk. Heart healthy diet and risk factor modification.    See labs and med orders.  Dr Smith following carotid

## 2019-02-11 DIAGNOSIS — I65.23 BILATERAL CAROTID ARTERY STENOSIS: ICD-10-CM

## 2019-02-11 DIAGNOSIS — I10 ESSENTIAL HYPERTENSION: ICD-10-CM

## 2019-02-11 DIAGNOSIS — E78.2 MIXED HYPERLIPIDEMIA: ICD-10-CM

## 2019-02-11 RX ORDER — ATORVASTATIN CALCIUM 40 MG/1
40 TABLET, FILM COATED ORAL DAILY
Qty: 90 TABLET | Refills: 1 | Status: SHIPPED | OUTPATIENT
Start: 2019-02-11 | End: 2019-08-24 | Stop reason: SDUPTHER

## 2019-02-12 ENCOUNTER — HOSPITAL ENCOUNTER (OUTPATIENT)
Dept: RADIOLOGY | Facility: HOSPITAL | Age: 74
Discharge: HOME | End: 2019-02-12
Attending: THORACIC SURGERY (CARDIOTHORACIC VASCULAR SURGERY)
Payer: MEDICARE

## 2019-02-12 DIAGNOSIS — I65.23 BILATERAL CAROTID ARTERY OCCLUSION: ICD-10-CM

## 2019-02-12 PROCEDURE — 93880 US CAROTID BILATERAL: ICD-10-PCS | Mod: 26,HCNC,, | Performed by: RADIOLOGY

## 2019-02-12 PROCEDURE — 93880 EXTRACRANIAL BILAT STUDY: CPT | Mod: 26,HCNC,, | Performed by: RADIOLOGY

## 2019-02-12 PROCEDURE — 93880 EXTRACRANIAL BILAT STUDY: CPT | Mod: TC,HCNC,PO

## 2019-02-14 ENCOUNTER — OFFICE VISIT (OUTPATIENT)
Dept: PAIN MEDICINE | Facility: CLINIC | Age: 74
End: 2019-02-14
Payer: MEDICARE

## 2019-02-14 VITALS
RESPIRATION RATE: 18 BRPM | TEMPERATURE: 98 F | DIASTOLIC BLOOD PRESSURE: 67 MMHG | HEART RATE: 80 BPM | BODY MASS INDEX: 26.87 KG/M2 | WEIGHT: 151.69 LBS | SYSTOLIC BLOOD PRESSURE: 116 MMHG

## 2019-02-14 DIAGNOSIS — M54.16 LUMBAR RADICULOPATHY: Primary | ICD-10-CM

## 2019-02-14 DIAGNOSIS — M51.36 DDD (DEGENERATIVE DISC DISEASE), LUMBAR: ICD-10-CM

## 2019-02-14 PROCEDURE — 3078F PR MOST RECENT DIASTOLIC BLOOD PRESSURE < 80 MM HG: ICD-10-PCS | Mod: HCNC,CPTII,S$GLB, | Performed by: PHYSICIAN ASSISTANT

## 2019-02-14 PROCEDURE — 3074F PR MOST RECENT SYSTOLIC BLOOD PRESSURE < 130 MM HG: ICD-10-PCS | Mod: HCNC,CPTII,S$GLB, | Performed by: PHYSICIAN ASSISTANT

## 2019-02-14 PROCEDURE — 99499 RISK ADDL DX/OHS AUDIT: ICD-10-PCS | Mod: HCNC,S$GLB,, | Performed by: PHYSICIAN ASSISTANT

## 2019-02-14 PROCEDURE — 1101F PT FALLS ASSESS-DOCD LE1/YR: CPT | Mod: HCNC,CPTII,S$GLB, | Performed by: PHYSICIAN ASSISTANT

## 2019-02-14 PROCEDURE — 99213 PR OFFICE/OUTPT VISIT, EST, LEVL III, 20-29 MIN: ICD-10-PCS | Mod: HCNC,S$GLB,, | Performed by: PHYSICIAN ASSISTANT

## 2019-02-14 PROCEDURE — 99999 PR PBB SHADOW E&M-EST. PATIENT-LVL III: ICD-10-PCS | Mod: PBBFAC,HCNC,, | Performed by: PHYSICIAN ASSISTANT

## 2019-02-14 PROCEDURE — 1101F PR PT FALLS ASSESS DOC 0-1 FALLS W/OUT INJ PAST YR: ICD-10-PCS | Mod: HCNC,CPTII,S$GLB, | Performed by: PHYSICIAN ASSISTANT

## 2019-02-14 PROCEDURE — 99999 PR PBB SHADOW E&M-EST. PATIENT-LVL III: CPT | Mod: PBBFAC,HCNC,, | Performed by: PHYSICIAN ASSISTANT

## 2019-02-14 PROCEDURE — 99213 OFFICE O/P EST LOW 20 MIN: CPT | Mod: HCNC,S$GLB,, | Performed by: PHYSICIAN ASSISTANT

## 2019-02-14 PROCEDURE — 99499 UNLISTED E&M SERVICE: CPT | Mod: HCNC,S$GLB,, | Performed by: PHYSICIAN ASSISTANT

## 2019-02-14 PROCEDURE — 3074F SYST BP LT 130 MM HG: CPT | Mod: HCNC,CPTII,S$GLB, | Performed by: PHYSICIAN ASSISTANT

## 2019-02-14 PROCEDURE — 3078F DIAST BP <80 MM HG: CPT | Mod: HCNC,CPTII,S$GLB, | Performed by: PHYSICIAN ASSISTANT

## 2019-02-14 NOTE — PROGRESS NOTES
This note was completed with dictation software and grammatical errors may exist.    CC:  Right back pain    HPI:  The patient is a 73-year-old woman with a history of carotid artery stenosis, osteoarthritis who presents in referral from Ynes Uriostegui for back and right leg pain. She returns in follow-up today with back and right leg pain.  The patient is new to me.  She feels that her pain has slightly changed in location since her fall.  She reports having pain in the right low back radiating laterally along the right leg iliac crest and right upper buttock.  She also continues to have pain in the right anterior thigh.  She describes her pain as burning, worse with getting up out of a chair or lying on her right side.  She does have some mild improvement with tramadol.  She denies weakness, numbness, bladder or bowel incontinence.    Pain intervention history: She was given a prescription for gabapentin 100 mg 3 times daily but states that this was making her lightheaded.  She was prescribed physical therapy. She is status post right L4/5 transforaminal injection on 08/27/2018 with 100% relief of her right leg pain. She is status post right L2, 3, 4, 5 medial branch block on 10/15/2018 with 100% relief for 6 hr, status post radiofrequency ablation of right L2, 3, 4, 5 medial branch RFA on 10/25/2018. She returns in follow-up today, she is status post right L2/3 and L3/4 transforaminal injection on 12/14/18 with What she reports is 75% relief.    ROS:  She reports hypertension.  Balance of review of systems is negative.    Past Medical History:   Diagnosis Date    Anticoagulant long-term use     Arthritis     Carotid artery occlusion     Depression 11/29/2012    Diverticulosis     Former smoker     GERD (gastroesophageal reflux disease)     HLD (hyperlipidemia) 11/29/2012    HTN (hypertension) 11/29/2012    Infectious gastroenteritis     Ischemic colitis        Past Surgical History:   Procedure  Laterality Date    ANGIOGRAM-CAROTID Bilateral 2014    Performed [facility] at [facility]    APPENDECTOMY      Block-nerve-medial branch-lumbar L2,3,4,5 Right 10/15/2018    [facility] OR    carotid angiogram       SECTION      x 3     COLONOSCOPY  2009    [place]. [place], [place] in 5 years    COLONOSCOPY N/A 2016    [facility] ENDO    Endarterectomy-Carotid - RIGHT Right 2018    [facility] by [facility] at [facility] OR    ESOPHAGOGASTRODUODENOSCOPY (EGD) N/A 2017    Performed [facility] at [facility] ENDO    HAND SURGERY Right 2016    knlaura noland/Dr. Abhinav Rolle    HEMORRHOID SURGERY      HYSTERECTOMY  age 35    TAHUSO benign reasons    hysteretomy      Injection,steroid,epidural,transforaminal approach l2/3, L3/4 Right 2018    [facility] OR    Injection,steroid,epidural,transforaminal approach L4/5 Right 2018    [facility] at [facility] OR    JOINT REPLACEMENT Right     1st interphalangeal joint of 3rd finger    OOPHORECTOMY      one remains    RADIOFREQUENCY ABLATION, NERVE, SPINAL, LUMBAR, MEDIAL BRANCH, THERMAL- L2,L3,L4,L5 Right 10/25/2018    Per[facility] at [facility] OR    TONSILLECTOMY         Social History     Socioeconomic History    Marital status:      Spouse name: None    Number of children: 3    Years of education: None    Highest education level: None   Social Needs    Financial resource strain: None    Food insecurity - worry: None    Food insecurity - inability: None    Transportation needs - medical: None    Transportation needs - non-medical: None   Occupational History    Occupation: retired     Employer: [employer]   Tobacco Use    Smoking status: Former Smoker     Packs/day: 0.50     Years: 20.00     Pack years: 10.00     Last attempt to quit:  3/28/2013     Years since quittin.8    Smokeless tobacco: Never Used    Tobacco comment: quit 2 years ago after intermittent use for 40 years   Substance and Sexual Activity    Alcohol use: Yes     Alcohol/week: 0.0 oz     Comment: 0-2 ETOHic drinks per day    Drug use: No    Sexual activity: Yes     Partners: Male   Other Topics Concern    None   Social History Narrative    None         Medications/Allergies: See med card    Vitals:    19 1412   BP: 116/67   Pulse: 80   Resp: 18   Temp: 97.8 °F (36.6 °C)   TempSrc: Oral   Weight: 68.8 kg (151 lb 10.8 oz)   PainSc:   4   PainLoc: Back         Physical exam:  Gen: A and O x3, pleasant, well-groomed  Skin: No rashes or obvious lesions  HEENT: PERRLA, no obvious deformities on ears or in canals. Trachea midline.  CVS: Regular rate and rhythm, normal palpable pulses.  Resp:No increased work of breathing, symmetrical chest rise.  Abdomen: Soft, NT/ND.  Musculoskeletal No antalgic gait.     Neuro:  Lower extremities: 5/5 strength bilaterally  Reflexes: Patellar 2+, Achilles 2+ bilaterally.  Sensory:  Intact and symmetrical to light touch and pinprick in L2-S1 dermatomes bilaterally.    Lumbar spine:  Lumbar spine:   Range of motion is moderately reduced with flexion and extension with increased right low back pain during each maneuver.  Mika's test causes no increased pain on either side.    Supine straight leg raise is   Negative for any leg pain bilaterally.  Internal and external rotation of the hip causes no increased pain on either side.  Myofascial exam:  Mild tenderness to palpation to the right upper/lateral gluteal region.    Imagin18 MRI L-spine  Vertebral column: There is multilevel degenerative change.  There is no fracture.  Comparison plain films demonstrate a very gentle rotary levocurvature of the lumbar spine.  There is 2 mm retrolisthesis of L2 on L3.  This is exaggerated by endplate osteophyte formation.  There is moderate  disc space narrowing at the L2-3 level where there is also associated degenerative endplate signal change.  All of the discs are mildly desiccated.  There is no other significant disc space narrowing.  There is mild disc space narrowing at the L3-4 level.  Baseline marrow signal intensity is within normal limits.  T12-L1: Unremarkable.  There is no spinal canal or significant foraminal stenosis.  L1-2: There is mild facet joint arthropathy.  There is no spinal canal or significant foraminal stenosis.  L2-3: There is disc space narrowing.  There is trace retrolisthesis of L2 on L3.  There is a diffuse disc bulge with osteophytic ridging.  There is mild-to-moderate facet joint arthropathy with ligamentum flavum thickening, right greater than left.  There is no spinal stenosis.  There is mild bilateral foraminal stenosis.  L3-4: There is mild disc space narrowing.  There is a mild-to-moderate diffuse disc bulge.  There is moderate-to-marked facet joint arthropathy with ligamentum flavum thickening.  There is mildly prominent dorsal epidural fat.  There is moderate central spinal stenosis.  AP measurement of the dural sac is 7 mm there is mild bilateral foraminal stenosis.  L4-5: There is a diffuse disc bulge with osteophytic ridging slightly eccentric to the right.  There is moderate facet joint arthropathy with ligamentum flavum thickening.  There is borderline spinal stenosis.  There is mild bilateral foraminal stenosis.  L5-S1: There is moderate facet joint arthropathy.  There is minimal bulging of the annulus.  There is no spinal canal or significant foraminal stenosis.  Soft tissues, other: The prevertebral soft tissues are normal.  The aorta is somewhat ectatic.    Assessment:  The patient is a 73-year-old woman with a history of carotid artery stenosis, osteoarthritis who presents in referral from Ynes Uriostegui for back and right leg pain.    1. Lumbar radiculopathy     2. DDD (degenerative disc disease),  lumbar           Plan:  1.  Since she fell and is still experiencing significant pain, we will schedule the lumbar spine MRI that was ordered by Dr. Greer a couple weeks ago.  She will follow-up to review these images and we will likely set up and interventional procedure and aquatic therapy.  She does not want to do land-based physical therapy.

## 2019-02-16 ENCOUNTER — HOSPITAL ENCOUNTER (OUTPATIENT)
Dept: RADIOLOGY | Facility: HOSPITAL | Age: 74
Discharge: HOME | End: 2019-02-16
Attending: ANESTHESIOLOGY
Payer: MEDICARE

## 2019-02-16 DIAGNOSIS — M51.36 DDD (DEGENERATIVE DISC DISEASE), LUMBAR: ICD-10-CM

## 2019-02-16 PROCEDURE — 72148 MRI LUMBAR SPINE WITHOUT CONTRAST: ICD-10-PCS | Mod: 26,HCNC,, | Performed by: RADIOLOGY

## 2019-02-16 PROCEDURE — 72148 MRI LUMBAR SPINE W/O DYE: CPT | Mod: 26,HCNC,, | Performed by: RADIOLOGY

## 2019-02-16 PROCEDURE — 72148 MRI LUMBAR SPINE W/O DYE: CPT | Mod: TC,HCNC,PO

## 2019-02-18 ENCOUNTER — TELEPHONE (OUTPATIENT)
Dept: VASCULAR SURGERY | Facility: CLINIC | Age: 74
End: 2019-02-18

## 2019-02-18 ENCOUNTER — OFFICE VISIT (OUTPATIENT)
Dept: PAIN MEDICINE | Facility: CLINIC | Age: 74
End: 2019-02-18
Payer: MEDICARE

## 2019-02-18 VITALS
RESPIRATION RATE: 18 BRPM | HEART RATE: 73 BPM | BODY MASS INDEX: 27.16 KG/M2 | DIASTOLIC BLOOD PRESSURE: 79 MMHG | WEIGHT: 153.31 LBS | SYSTOLIC BLOOD PRESSURE: 152 MMHG | TEMPERATURE: 97 F

## 2019-02-18 DIAGNOSIS — M54.16 LUMBAR RADICULOPATHY: Primary | ICD-10-CM

## 2019-02-18 DIAGNOSIS — I65.23 CAROTID STENOSIS, ASYMPTOMATIC, BILATERAL: Primary | ICD-10-CM

## 2019-02-18 DIAGNOSIS — M51.36 DDD (DEGENERATIVE DISC DISEASE), LUMBAR: ICD-10-CM

## 2019-02-18 PROCEDURE — 3078F DIAST BP <80 MM HG: CPT | Mod: HCNC,CPTII,S$GLB, | Performed by: PHYSICIAN ASSISTANT

## 2019-02-18 PROCEDURE — 3078F PR MOST RECENT DIASTOLIC BLOOD PRESSURE < 80 MM HG: ICD-10-PCS | Mod: HCNC,CPTII,S$GLB, | Performed by: PHYSICIAN ASSISTANT

## 2019-02-18 PROCEDURE — 1101F PT FALLS ASSESS-DOCD LE1/YR: CPT | Mod: HCNC,CPTII,S$GLB, | Performed by: PHYSICIAN ASSISTANT

## 2019-02-18 PROCEDURE — 99999 PR PBB SHADOW E&M-EST. PATIENT-LVL III: CPT | Mod: PBBFAC,HCNC,, | Performed by: PHYSICIAN ASSISTANT

## 2019-02-18 PROCEDURE — 99499 RISK ADDL DX/OHS AUDIT: ICD-10-PCS | Mod: HCNC,S$GLB,, | Performed by: PHYSICIAN ASSISTANT

## 2019-02-18 PROCEDURE — 99214 PR OFFICE/OUTPT VISIT, EST, LEVL IV, 30-39 MIN: ICD-10-PCS | Mod: HCNC,S$GLB,, | Performed by: PHYSICIAN ASSISTANT

## 2019-02-18 PROCEDURE — 1101F PR PT FALLS ASSESS DOC 0-1 FALLS W/OUT INJ PAST YR: ICD-10-PCS | Mod: HCNC,CPTII,S$GLB, | Performed by: PHYSICIAN ASSISTANT

## 2019-02-18 PROCEDURE — 3077F SYST BP >= 140 MM HG: CPT | Mod: HCNC,CPTII,S$GLB, | Performed by: PHYSICIAN ASSISTANT

## 2019-02-18 PROCEDURE — 99999 PR PBB SHADOW E&M-EST. PATIENT-LVL III: ICD-10-PCS | Mod: PBBFAC,HCNC,, | Performed by: PHYSICIAN ASSISTANT

## 2019-02-18 PROCEDURE — 99214 OFFICE O/P EST MOD 30 MIN: CPT | Mod: HCNC,S$GLB,, | Performed by: PHYSICIAN ASSISTANT

## 2019-02-18 PROCEDURE — 3077F PR MOST RECENT SYSTOLIC BLOOD PRESSURE >= 140 MM HG: ICD-10-PCS | Mod: HCNC,CPTII,S$GLB, | Performed by: PHYSICIAN ASSISTANT

## 2019-02-18 PROCEDURE — 99499 UNLISTED E&M SERVICE: CPT | Mod: HCNC,S$GLB,, | Performed by: PHYSICIAN ASSISTANT

## 2019-02-18 NOTE — PROGRESS NOTES
This note was completed with dictation software and grammatical errors may exist.    CC:  Right back and leg pain    HPI:  The patient is a 73-year-old woman with a history of carotid artery stenosis, osteoarthritis who presents in referral from Ynes Uriostegui for back and right leg pain. She returns in follow-up today to review her new lumbar spine MRI results.  She continues to have pain in the right low back radiating along the iliac crest into the anterolateral thigh.  This is worse with going from a seated to standing position and also with lying on her right side.  She has been taking tramadol with some improvement.  She denies weakness, numbness, bladder or bowel incontinence.    Pain intervention history: She was given a prescription for gabapentin 100 mg 3 times daily but states that this was making her lightheaded.  She was prescribed physical therapy. She is status post right L4/5 transforaminal injection on 08/27/2018 with 100% relief of her right leg pain. She is status post right L2, 3, 4, 5 medial branch block on 10/15/2018 with 100% relief for 6 hr, status post radiofrequency ablation of right L2, 3, 4, 5 medial branch RFA on 10/25/2018. She returns in follow-up today, she is status post right L2/3 and L3/4 transforaminal injection on 12/14/18 with What she reports is 75% relief.    ROS:  She reports hypertension.  Balance of review of systems is negative.    Past Medical History:   Diagnosis Date    Anticoagulant long-term use     Arthritis     Carotid artery occlusion     Depression 11/29/2012    Diverticulosis     Former smoker     GERD (gastroesophageal reflux disease)     HLD (hyperlipidemia) 11/29/2012    HTN (hypertension) 11/29/2012    Infectious gastroenteritis     Ischemic colitis        Past Surgical History:   Procedure Laterality Date    ANGIOGRAM-CAROTID Bilateral 7/17/2014    Per[facility]    APPENDECTOMY      Block-nerve-medial  branch-lumbar L2,3,4,5 Right 10/15/2018    Performed by Jakub Greer [facility] at [facility] OR    carotid angiogram       SECTION      x 3     COLONOSCOPY  2009    Dr. Reyes in [place], repeat in 5 years    COLONOSCOPY N/A 2016    Performed by Grzegorz Sousa Jr., MD at [facility] ENDO    Endarterectomy-Carotid - RIGHT Right 2018    Performed by Safia Thomas MD at [facility] OR    ESOPHAGOGASTRODUODENOSCOPY (EGD) N/A 2017    Performed by Grzeg[facility]z S[facility] Jr[facility] at [facility] ENDO    HAND SURGERY Right 2016    knuckvicky implant/Dr. Abhinav Rolle    HEMORRHOID SURGERY      HYSTERECTOMY  age 35    TAHUSO benign reasons    hysteretomy      Injection,steroid,epidural,transforaminal approach l2/3, L3/4 Right 2018    Per[facility] at [facility] OR    Injection,steroid,epidural,transforaminal approach L4/5 Right 2018    Per[facility] at [facility] OR    JOINT REPLACEMENT Right     1st interphalangeal joint of 3rd finger    OOPHORECTOMY      one remains    RADIOFREQUENCY ABLATION, NERVE, SPINAL, LUMBAR, MEDIAL BRANCH, THERMAL- L2,L3,L4,L5 Right 10/25/2018    Per[facility] at [facility] OR    TONSILLECTOMY         Social History     Socioeconomic History    Marital status:      Spouse name: None    Number of children: 3    Years of education: None    Highest education level: None   Social Needs    Financial resource strain: None    Food insecurity - worry: None    Food insecurity - inability: None    Transportation needs - medical: None    Transportation needs - non-medical: None   Occupational History    Occupation: retired     Employer: [employer]   Tobacco Use    Smoking status: Former Smoker     Packs/day: 0.50     Years: 20.00     Pack years: 10.00     Last attempt to quit: 3/28/2013     Years since quittin.8    Smokeless tobacco: Never Used    Tobacco comment: quit 2 years ago after intermittent use for 40 years    Substance and Sexual Activity    Alcohol use: Yes     Alcohol/week: 0.0 oz     Comment: 0-2 ETOHic drinks per day    Drug use: No    Sexual activity: Yes     Partners: Male   Other Topics Concern    None   Social History Narrative    None         Medications/Allergies: See med card    Vitals:    19 1530   BP: (!) 152/79   Pulse: 73   Resp: 18   Temp: 97.4 °F (36.3 °C)   TempSrc: Oral   Weight: 69.6 kg (153 lb 5.3 oz)   PainSc:   4   PainLoc: Back         Physical exam:  Gen: A and O x3, pleasant, well-groomed  Skin: No rashes or obvious lesions  HEENT: PERRLA, no obvious deformities on ears or in canals. Trachea midline.  CVS: Regular rate and rhythm, normal palpable pulses.  Resp:No increased work of breathing, symmetrical chest rise.  Abdomen: Soft, NT/ND.  Musculoskeletal No antalgic gait.     Neuro:  Lower extremities: 5/5 strength bilaterally  Reflexes: Patellar 2+, Achilles 2+ bilaterally.  Sensory:  Intact and symmetrical to light touch and pinprick in L2-S1 dermatomes bilaterally.    Lumbar spine:  Lumbar spine:   Range of motion is moderately reduced with flexion and extension with increased right low back pain during each maneuver.  Mika's test causes no increased pain on either side.    Supine straight leg raise is   Negative for any leg pain bilaterally.  Internal and external rotation of the hip causes no increased pain on either side.  Myofascial exam:  Mild tenderness to palpation to the right upper/lateral gluteal region.    Imagin18 MRI L-spine  Vertebral column: There is multilevel degenerative change.  There is no fracture.  Comparison plain films demonstrate a very gentle rotary levocurvature of the lumbar spine.  There is 2 mm retrolisthesis of L2 on L3.  This is exaggerated by endplate osteophyte formation.  There is moderate disc space narrowing at the L2-3 level where there is also associated degenerative endplate signal change.  All of the discs are mildly  desiccated.  There is no other significant disc space narrowing.  There is mild disc space narrowing at the L3-4 level.  Baseline marrow signal intensity is within normal limits.  T12-L1: Unremarkable.  There is no spinal canal or significant foraminal stenosis.  L1-2: There is mild facet joint arthropathy.  There is no spinal canal or significant foraminal stenosis.  L2-3: There is disc space narrowing.  There is trace retrolisthesis of L2 on L3.  There is a diffuse disc bulge with osteophytic ridging.  There is mild-to-moderate facet joint arthropathy with ligamentum flavum thickening, right greater than left.  There is no spinal stenosis.  There is mild bilateral foraminal stenosis.  L3-4: There is mild disc space narrowing.  There is a mild-to-moderate diffuse disc bulge.  There is moderate-to-marked facet joint arthropathy with ligamentum flavum thickening.  There is mildly prominent dorsal epidural fat.  There is moderate central spinal stenosis.  AP measurement of the dural sac is 7 mm there is mild bilateral foraminal stenosis.  L4-5: There is a diffuse disc bulge with osteophytic ridging slightly eccentric to the right.  There is moderate facet joint arthropathy with ligamentum flavum thickening.  There is borderline spinal stenosis.  There is mild bilateral foraminal stenosis.  L5-S1: There is moderate facet joint arthropathy.  There is minimal bulging of the annulus.  There is no spinal canal or significant foraminal stenosis.  Soft tissues, other: The prevertebral soft tissues are normal.  The aorta is somewhat ectatic.    02/16/2019 MRI lumbar spine  Infrarenal aortic dilatation is noted to 2.7 cm prior to aortic bifurcation suggestive of aortic ectasia  Some mild adrenal thickening on the left is questioned that could be artifactual or relate to a process such as a adenoma or mass without convincing detrimental change since 06/11/2018.  Stable fluid signal intensities are noted within the left kidney 1  of 8 mm and 1 of 4 mm nonspecific on this examination but statistically favored relate to renal cysts.  Vertebral body heights appear well preserved.  Mild intervertebral disc height loss is noted at the L2-L3 L3-L4 levels in particular.  A mild retrolisthesis is noted of the L2 on L3 vertebral body of 4 mm  No STIR signal abnormality is noted to suggest an aggressive bone marrow replacement process or recent fracture.  The spinal cord appears to terminate at the L1 level.  At the T12-L1  level, no significant central canal stenosis, neural foraminal stenosis, or disc bulge is noted.  At the L1-L2 level, no significant central canal stenosis, neural foraminal stenosis, or disc bulge is noted. Mild ligamentous hypertrophy is noted  At the L2-L3 level, facet arthropathy and ligamentous hypertrophy appears to be present.  Posterior disc osteophyte spurring or bulge is noted of 3 mm.  Ligamentous hypertrophy is noted.  Broad-based bulge is noted towards the far lateral left and left neural foramen greater than far lateral right and towards the right neural foramen.  Possible contact of the exiting left nerve root is noted.  The broad-based bulge far laterally is of 4 mm.  Mild right neural foraminal narrowing is noted.  Mild to moderate left neural foraminal stenosis is noted.  Mild thecal sac narrowing is noted to 10 mm.  A similar appearance is noted on the prior.  At the L3-L4 level, bilateral facet arthropathy and ligamentous hypertrophy appears to be present.  Broad-based bulging is noted towards each neural foramen and far laterally bilaterally with a bulge far lateral to the left of 4 mm greater than bulging to the right.  Mild left neural foraminal stenosis greater than right-sided neural foraminal stenosis appears to be present.  Moderate thecal sac narrowing is noted to 8 mm a similar appearance is noted on the prior.  At the L4-L5 level, ligamentous hypertrophy and facet arthropathy appears to be present.   Broad-based bulge is noted far laterally bilaterally.  Moderate thecal sac narrowing is noted to 8 mm.  Mild to moderate right neural foraminal stenosis is noted with mild left neural foraminal stenosis.  Some increased T2 signal is noted within the disc far lateral to the left as can be seen with annular tear.  At the L5-S1 level, facet arthropathy and ligamentous hypertrophy is noted.  Broad-based bulge is noted towards the left neural foramen and far lateral left of 4 mm greater than towards the right.  Mild left greater than right neural foraminal narrowing is noted.  No significant central canal stenosis is noted.  Abnormal wall thickening is noted to the distal colon in its partially visualized portion.  This could relate to scarring, lack of distension, or an infiltrative or inflammatory process.  Correlation with the patient's history of colonoscopy is suggested.      Assessment:  The patient is a 73-year-old woman with a history of carotid artery stenosis, osteoarthritis who presents in referral from Ynes Uriostegui for back and right leg pain.    1. Lumbar radiculopathy     2. DDD (degenerative disc disease), lumbar           Plan:  1.  I reviewed the patient's new lumbar spine MRI with her and we discussed that she has some degree of stenosis at L2/3, L3/4 and L4/5.  She had short-term relief following a right L2/3 and L3/4 transforaminal epidural steroid injection. We discussed trying a different approach and believe she would benefit from an L5/S1 interlaminar DOROTHEA to the right to cover all 3 levels.  However, there were some incidental findings on her MRI.  She is going to follow-up her primary care physician next week and discuss the abdominal aorta dilatation and sigmoid colon wall thickening.  If no further testing is necessary she will contact me to schedule her lumbar DOROTHEA.

## 2019-02-18 NOTE — TELEPHONE ENCOUNTER
----- Message from Aisha Saxena sent at 2/18/2019 12:24 PM CST -----  Type:  Test Results    Who Called:  Patient  Name of Test (Lab/Mammo/Etc):  u/s  Date of Test:  2/12/19  Ordering Provider:  Martha  Where the test was performed:  [facility]  Best Call Back Number:  [phone] (home)     Additional Information:  Na

## 2019-02-24 DIAGNOSIS — I10 ESSENTIAL HYPERTENSION: ICD-10-CM

## 2019-02-24 DIAGNOSIS — Z82.49 FAMILY HISTORY OF EARLY CAD: ICD-10-CM

## 2019-02-24 DIAGNOSIS — I65.23 BILATERAL CAROTID ARTERY STENOSIS: ICD-10-CM

## 2019-02-24 DIAGNOSIS — Z87.19 HISTORY OF ISCHEMIC COLITIS: ICD-10-CM

## 2019-02-24 DIAGNOSIS — E78.2 MIXED HYPERLIPIDEMIA: ICD-10-CM

## 2019-02-25 ENCOUNTER — OFFICE VISIT (OUTPATIENT)
Dept: FAMILY MEDICINE | Facility: CLINIC | Age: 74
End: 2019-02-25
Payer: MEDICARE

## 2019-02-25 VITALS
WEIGHT: 153.25 LBS | OXYGEN SATURATION: 98 % | HEART RATE: 79 BPM | DIASTOLIC BLOOD PRESSURE: 76 MMHG | BODY MASS INDEX: 27.15 KG/M2 | HEIGHT: 63 IN | SYSTOLIC BLOOD PRESSURE: 126 MMHG

## 2019-02-25 DIAGNOSIS — I10 ESSENTIAL HYPERTENSION: ICD-10-CM

## 2019-02-25 DIAGNOSIS — N89.8 VAGINAL DISCHARGE: ICD-10-CM

## 2019-02-25 DIAGNOSIS — I77.819 AORTIC ECTASIA: ICD-10-CM

## 2019-02-25 DIAGNOSIS — I10 ESSENTIAL HYPERTENSION: Primary | ICD-10-CM

## 2019-02-25 DIAGNOSIS — I25.119 ATHEROSCLEROSIS OF NATIVE CORONARY ARTERY OF NATIVE HEART WITH ANGINA PECTORIS: ICD-10-CM

## 2019-02-25 DIAGNOSIS — E78.2 MIXED HYPERLIPIDEMIA: ICD-10-CM

## 2019-02-25 DIAGNOSIS — K21.9 GASTROESOPHAGEAL REFLUX DISEASE, ESOPHAGITIS PRESENCE NOT SPECIFIED: ICD-10-CM

## 2019-02-25 DIAGNOSIS — F32.A DEPRESSION, UNSPECIFIED DEPRESSION TYPE: ICD-10-CM

## 2019-02-25 DIAGNOSIS — F33.42 RECURRENT MAJOR DEPRESSIVE DISORDER, IN FULL REMISSION: ICD-10-CM

## 2019-02-25 DIAGNOSIS — H53.9 VISION CHANGES: ICD-10-CM

## 2019-02-25 PROCEDURE — 99214 OFFICE O/P EST MOD 30 MIN: CPT | Mod: HCNC,S$GLB,, | Performed by: FAMILY MEDICINE

## 2019-02-25 PROCEDURE — 99499 RISK ADDL DX/OHS AUDIT: ICD-10-PCS | Mod: HCNC,S$GLB,, | Performed by: FAMILY MEDICINE

## 2019-02-25 PROCEDURE — 3074F SYST BP LT 130 MM HG: CPT | Mod: HCNC,CPTII,S$GLB, | Performed by: FAMILY MEDICINE

## 2019-02-25 PROCEDURE — 1101F PT FALLS ASSESS-DOCD LE1/YR: CPT | Mod: HCNC,CPTII,S$GLB, | Performed by: FAMILY MEDICINE

## 2019-02-25 PROCEDURE — 99999 PR PBB SHADOW E&M-EST. PATIENT-LVL IV: ICD-10-PCS | Mod: PBBFAC,HCNC,, | Performed by: FAMILY MEDICINE

## 2019-02-25 PROCEDURE — 3074F PR MOST RECENT SYSTOLIC BLOOD PRESSURE < 130 MM HG: ICD-10-PCS | Mod: HCNC,CPTII,S$GLB, | Performed by: FAMILY MEDICINE

## 2019-02-25 PROCEDURE — 1101F PR PT FALLS ASSESS DOC 0-1 FALLS W/OUT INJ PAST YR: ICD-10-PCS | Mod: HCNC,CPTII,S$GLB, | Performed by: FAMILY MEDICINE

## 2019-02-25 PROCEDURE — 99214 PR OFFICE/OUTPT VISIT, EST, LEVL IV, 30-39 MIN: ICD-10-PCS | Mod: HCNC,S$GLB,, | Performed by: FAMILY MEDICINE

## 2019-02-25 PROCEDURE — 99999 PR PBB SHADOW E&M-EST. PATIENT-LVL IV: CPT | Mod: PBBFAC,HCNC,, | Performed by: FAMILY MEDICINE

## 2019-02-25 PROCEDURE — 3078F DIAST BP <80 MM HG: CPT | Mod: HCNC,CPTII,S$GLB, | Performed by: FAMILY MEDICINE

## 2019-02-25 PROCEDURE — 3078F PR MOST RECENT DIASTOLIC BLOOD PRESSURE < 80 MM HG: ICD-10-PCS | Mod: HCNC,CPTII,S$GLB, | Performed by: FAMILY MEDICINE

## 2019-02-25 PROCEDURE — 99499 UNLISTED E&M SERVICE: CPT | Mod: HCNC,S$GLB,, | Performed by: FAMILY MEDICINE

## 2019-02-25 RX ORDER — DULOXETIN HYDROCHLORIDE 60 MG/1
60 CAPSULE, DELAYED RELEASE ORAL DAILY
Qty: 90 CAPSULE | Refills: 0 | Status: CANCELLED | OUTPATIENT
Start: 2019-02-25

## 2019-02-25 RX ORDER — CARVEDILOL 3.12 MG/1
3.12 TABLET ORAL 2 TIMES DAILY
Qty: 180 TABLET | Refills: 4 | Status: SHIPPED | OUTPATIENT
Start: 2019-02-25 | End: 2020-03-03 | Stop reason: SDUPTHER

## 2019-02-25 RX ORDER — LOSARTAN POTASSIUM 100 MG/1
TABLET ORAL
Qty: 90 TABLET | Refills: 1 | Status: SHIPPED | OUTPATIENT
Start: 2019-02-25 | End: 2019-02-27 | Stop reason: SDUPTHER

## 2019-02-25 RX ORDER — PANTOPRAZOLE SODIUM 40 MG/1
40 TABLET, DELAYED RELEASE ORAL
Qty: 90 TABLET | Refills: 3 | Status: SHIPPED | OUTPATIENT
Start: 2019-02-25 | End: 2019-11-01 | Stop reason: SDUPTHER

## 2019-02-25 RX ORDER — DULOXETIN HYDROCHLORIDE 60 MG/1
60 CAPSULE, DELAYED RELEASE ORAL DAILY
Qty: 90 CAPSULE | Refills: 3 | Status: SHIPPED | OUTPATIENT
Start: 2019-02-25 | End: 2020-03-23

## 2019-02-25 NOTE — PROGRESS NOTES
Subjective:       Patient ID: Haley Castro is a 73 y.o. female.    Chief Complaint: Hypertension (4 month f/u) and Hyperlipidemia    Here for f/u htn and chronic medical issues. Doing well overall.      Hypertension   This is a chronic problem. The current episode started more than 1 year ago. The problem is controlled. Pertinent negatives include no chest pain, palpitations or shortness of breath.   Hyperlipidemia   This is a chronic problem. The current episode started more than 1 year ago. The problem is controlled. Pertinent negatives include no chest pain or shortness of breath.     Review of Systems   Constitutional: Negative for chills, fatigue and fever.   Respiratory: Negative for cough, chest tightness and shortness of breath.    Cardiovascular: Negative for chest pain, palpitations and leg swelling.   Endocrine: Negative for cold intolerance and heat intolerance.   Skin: Negative for rash.   Psychiatric/Behavioral: Negative for dysphoric mood. The patient is not nervous/anxious.        Objective:      Physical Exam   Constitutional: She appears well-developed and well-nourished.   HENT:   Head: Normocephalic and atraumatic.   Cardiovascular: Normal rate, regular rhythm and normal heart sounds.   Pulmonary/Chest: Effort normal and breath sounds normal.   Psychiatric: She has a normal mood and affect.   Nursing note and vitals reviewed.      Assessment:       1. Essential hypertension    2. Mixed hyperlipidemia    3. Aortic ectasia    4. Atherosclerosis of native coronary artery of native heart with angina pectoris    5. Vision changes    6. Gastroesophageal reflux disease, esophagitis presence not specified    7. Depression, unspecified depression type    8. Recurrent major depressive disorder, in full remission    9. Vaginal discharge        Plan:       Essential hypertension  -     NURSING COMMUNICATION: Create [facility]  -     Hypertension Digital Medicine (HDMP) Enrollment Order    Mixed  hyperlipidemia    Aortic ectasia  -     US Abdominal Aorta; Future; Expected date: 02/25/2019    Atherosclerosis of native coronary artery of native heart with angina pectoris    Vision changes  -     Ambulatory referral to Ophthalmology    Gastroesophageal reflux disease, esophagitis presence not specified  -     pantoprazole (PROTONIX) 40 MG tablet; Take 1 tablet (40 mg total) by mouth before breakfast.  Dispense: 90 tablet; Refill: 3    Depression, unspecified depression type  -     DULoxetine (CYMBALTA) 60 MG capsule; Take 1 capsule (60 mg total) by mouth once daily.  Dispense: 90 capsule; Refill: 3    Recurrent major depressive disorder, in full remission    Vaginal discharge  -     Ambulatory referral to Obstetrics / Gynecology      MRI showed some dilation of aorta so will get u/s but likely not concerning. Already established with vascular surgery.  Would like another optho here.  Will monitor chronic medical issues and continue current plan of care.    Depression stable on current meds.  Follow-up in about 4 months (around 6/25/2019), or if symptoms worsen or fail to improve.

## 2019-02-28 RX ORDER — LOSARTAN POTASSIUM 100 MG/1
100 TABLET ORAL DAILY
Qty: 90 TABLET | Refills: 1 | Status: SHIPPED | OUTPATIENT
Start: 2019-02-28 | End: 2019-11-01 | Stop reason: SDUPTHER

## 2019-03-06 ENCOUNTER — PES CALL (OUTPATIENT)
Dept: ADMINISTRATIVE | Facility: CLINIC | Age: 74
End: 2019-03-06

## 2019-03-11 ENCOUNTER — HOSPITAL ENCOUNTER (OUTPATIENT)
Dept: RADIOLOGY | Facility: HOSPITAL | Age: 74
Discharge: HOME | End: 2019-03-11
Attending: FAMILY MEDICINE
Payer: MEDICARE

## 2019-03-11 DIAGNOSIS — I77.819 AORTIC ECTASIA: ICD-10-CM

## 2019-03-11 PROCEDURE — 76775 US EXAM ABDO BACK WALL LIM: CPT | Mod: 26,HCNC,, | Performed by: RADIOLOGY

## 2019-03-11 PROCEDURE — 76775 US EXAM ABDO BACK WALL LIM: CPT | Mod: TC,HCNC,PO

## 2019-03-11 PROCEDURE — 76775 US ABDOMINAL AORTA: ICD-10-PCS | Mod: 26,HCNC,, | Performed by: RADIOLOGY

## 2019-03-12 ENCOUNTER — OFFICE VISIT (OUTPATIENT)
Dept: OBSTETRICS AND GYNECOLOGY | Facility: CLINIC | Age: 74
End: 2019-03-12
Payer: MEDICARE

## 2019-03-12 VITALS
BODY MASS INDEX: 26.57 KG/M2 | RESPIRATION RATE: 18 BRPM | WEIGHT: 149.94 LBS | SYSTOLIC BLOOD PRESSURE: 124 MMHG | DIASTOLIC BLOOD PRESSURE: 70 MMHG | HEIGHT: 63 IN

## 2019-03-12 DIAGNOSIS — Z01.419 ENCOUNTER FOR ANNUAL ROUTINE GYNECOLOGICAL EXAMINATION: Primary | ICD-10-CM

## 2019-03-12 DIAGNOSIS — N89.8 VAGINAL DISCHARGE: ICD-10-CM

## 2019-03-12 DIAGNOSIS — Z12.31 SCREENING MAMMOGRAM, ENCOUNTER FOR: ICD-10-CM

## 2019-03-12 PROCEDURE — 99214 PR OFFICE/OUTPT VISIT, EST, LEVL IV, 30-39 MIN: ICD-10-PCS | Mod: HCNC,S$GLB,, | Performed by: OBSTETRICS & GYNECOLOGY

## 2019-03-12 PROCEDURE — 99999 PR PBB SHADOW E&M-EST. PATIENT-LVL IV: CPT | Mod: PBBFAC,HCNC,, | Performed by: OBSTETRICS & GYNECOLOGY

## 2019-03-12 PROCEDURE — 99999 PR PBB SHADOW E&M-EST. PATIENT-LVL IV: ICD-10-PCS | Mod: PBBFAC,HCNC,, | Performed by: OBSTETRICS & GYNECOLOGY

## 2019-03-12 PROCEDURE — 87624 HPV HI-RISK TYP POOLED RSLT: CPT | Mod: HCNC

## 2019-03-12 PROCEDURE — 3078F PR MOST RECENT DIASTOLIC BLOOD PRESSURE < 80 MM HG: ICD-10-PCS | Mod: HCNC,CPTII,S$GLB, | Performed by: OBSTETRICS & GYNECOLOGY

## 2019-03-12 PROCEDURE — 1101F PR PT FALLS ASSESS DOC 0-1 FALLS W/OUT INJ PAST YR: ICD-10-PCS | Mod: HCNC,CPTII,S$GLB, | Performed by: OBSTETRICS & GYNECOLOGY

## 2019-03-12 PROCEDURE — 1101F PT FALLS ASSESS-DOCD LE1/YR: CPT | Mod: HCNC,CPTII,S$GLB, | Performed by: OBSTETRICS & GYNECOLOGY

## 2019-03-12 PROCEDURE — 3078F DIAST BP <80 MM HG: CPT | Mod: HCNC,CPTII,S$GLB, | Performed by: OBSTETRICS & GYNECOLOGY

## 2019-03-12 PROCEDURE — 88141 LIQUID-BASED PAP SMEAR, SCREENING: ICD-10-PCS | Mod: HCNC,,, | Performed by: PATHOLOGY

## 2019-03-12 PROCEDURE — 88141 CYTOPATH C/V INTERPRET: CPT | Mod: HCNC,,, | Performed by: PATHOLOGY

## 2019-03-12 PROCEDURE — 3074F SYST BP LT 130 MM HG: CPT | Mod: HCNC,CPTII,S$GLB, | Performed by: OBSTETRICS & GYNECOLOGY

## 2019-03-12 PROCEDURE — 99214 OFFICE O/P EST MOD 30 MIN: CPT | Mod: HCNC,S$GLB,, | Performed by: OBSTETRICS & GYNECOLOGY

## 2019-03-12 PROCEDURE — 3074F PR MOST RECENT SYSTOLIC BLOOD PRESSURE < 130 MM HG: ICD-10-PCS | Mod: HCNC,CPTII,S$GLB, | Performed by: OBSTETRICS & GYNECOLOGY

## 2019-03-12 PROCEDURE — 88175 CYTOPATH C/V AUTO FLUID REDO: CPT | Mod: HCNC | Performed by: PATHOLOGY

## 2019-03-12 NOTE — LETTER
March 12, 2019      SUDHA Sparks MD  [address] [facility] [address]  [facility] [address]           [facility]  [address] [address] [address]  Phone: [phone]  Fax: [phone]          Patient: Haley Castro   MR Number: [id number]   YOB: 1945   Date of Visit: 3/12/2019       Dear Dr. SUDHA Sparks:    Thank you for referring Haley Castro to me for evaluation. Attached you will find relevant portions of my assessment and plan of care.    If you have questions, please do not hesitate to call me. I look forward to following Haley Castro along with you.    Sincerely,    Riaz Ch MD    Enclosure  CC:  No Recipients    If you would like to receive this communication electronically, please contact [email or web address] or [phone] to request more information on [employer] Link access.    For providers and/or their staff who would like to refer a patient to [facility], please contact us through our one-stop-shop provider referral line, [facility], at [phone].    If you feel you have received this communication in error or would no longer like to receive these types of communications, please e-mail [email or web address]

## 2019-03-12 NOTE — PROGRESS NOTES
Subjective:       Patient ID: Haley Castro is a 73 y.o. female.    Chief Complaint:  Establish Care (hormone issues/ cant take premarin) and Vaginal Discharge (dark pink/ or brown tint x 2-3 years)      History of Present Illness  HPI  73 y.o.  with complaint of pink or brown vaginal discharge for several years. Patient was given Premarin vaginal cream 2 years ago but was told by her Cardiologist not to use it due to concerns of potential blood clots from estrogen.Patient has had a hysterectomy.    GYN & OB History  Patient's last menstrual period was 1980.   Date of Last Pap: No result found    OB History    Para Term  AB Living   3 3 3         SAB TAB Ectopic Multiple Live Births                  # Outcome Date GA Lbr Estuardo/2nd Weight Sex Delivery Anes PTL Lv   3 Term            2 Term            1 Term                   Review of Systems  Review of Systems   Constitutional: Negative for activity change, appetite change, chills, diaphoresis, fatigue, fever and unexpected weight change.   HENT: Negative for nasal congestion, mouth sores and tinnitus.    Eyes: Negative for discharge and visual disturbance.   Respiratory: Negative for cough, shortness of breath and wheezing.    Cardiovascular: Negative for chest pain, palpitations and leg swelling.   Gastrointestinal: Negative for abdominal pain, bloating, blood in stool, constipation, diarrhea, nausea, vomiting, reflux and fecal incontinence.   Endocrine: Negative for diabetes, hair loss, hot flashes, hyperthyroidism and hypothyroidism.   Genitourinary: Positive for dyspareunia, vaginal discharge and vaginal dryness. Negative for bladder incontinence, decreased libido, dysmenorrhea, dysuria, flank pain, frequency, genital sores, hematuria, hot flashes, menorrhagia, menstrual problem, pelvic pain, urgency, vaginal bleeding, vaginal pain, urinary incontinence, postcoital bleeding, postmenopausal bleeding and vaginal odor.   Musculoskeletal:  Negative for arthralgias, back pain, joint swelling, leg pain and myalgias.   Integumentary:  Negative for rash, acne, hair changes, mole/lesion, breast mass, nipple discharge, breast skin changes and breast tenderness.   Neurological: Negative for vertigo, seizures, syncope, numbness and headaches.   Hematological: Negative for adenopathy. Does not bruise/bleed easily.   Psychiatric/Behavioral: Positive for depression. Negative for sleep disturbance. The patient is not nervous/anxious.    Breast: Negative for asymmetry, breast self exam, lump, mass, mastodynia, nipple discharge, skin changes and tenderness          Objective:    Physical Exam:   Constitutional: She is oriented to person, place, and time. She appears well-developed and well-nourished. No distress.    HENT:   Head: Normocephalic.   Nose: No epistaxis.    Eyes: Pupils are equal, round, and reactive to light.    Neck: Normal range of motion.    Cardiovascular: Normal rate.     Pulmonary/Chest: Effort normal.   Breasts: no palpable masses or nipple discharge        Abdominal: Soft. She exhibits no distension. There is no tenderness.     Genitourinary: Vaginal discharge found.   Genitourinary Comments: Pap smear and vaginal cultures done. Vaginal canal appears atrophic and friable           Musculoskeletal: Normal range of motion and moves all extremeties.       Neurological: She is alert and oriented to person, place, and time.    Skin: Skin is warm and dry.    Psychiatric: She has a normal mood and affect. Her behavior is normal. Judgment and thought content normal.          Assessment:        1. Encounter for annual routine gynecological examination    2. Vaginal discharge    3. Screening mammogram, encounter for                Plan:      Based on pap smear and vaginal cultures to again recommend low dose vaginal estrogen. Risk and benefits discussed.

## 2019-03-14 ENCOUNTER — OFFICE VISIT (OUTPATIENT)
Dept: OBSTETRICS AND GYNECOLOGY | Facility: CLINIC | Age: 74
End: 2019-03-14
Payer: MEDICARE

## 2019-03-14 VITALS
RESPIRATION RATE: 18 BRPM | BODY MASS INDEX: 26.57 KG/M2 | HEIGHT: 63 IN | DIASTOLIC BLOOD PRESSURE: 70 MMHG | WEIGHT: 149.94 LBS | SYSTOLIC BLOOD PRESSURE: 116 MMHG

## 2019-03-14 DIAGNOSIS — N95.2 VAGINAL ATROPHY: ICD-10-CM

## 2019-03-14 DIAGNOSIS — N89.8 VAGINAL DISCHARGE: Primary | ICD-10-CM

## 2019-03-14 LAB
HPV HR 12 DNA CVX QL NAA+PROBE: NEGATIVE
HPV16 AG SPEC QL: NEGATIVE
HPV18 DNA SPEC QL NAA+PROBE: NEGATIVE

## 2019-03-14 PROCEDURE — 1101F PT FALLS ASSESS-DOCD LE1/YR: CPT | Mod: HCNC,CPTII,S$GLB, | Performed by: OBSTETRICS & GYNECOLOGY

## 2019-03-14 PROCEDURE — 87480 CANDIDA DNA DIR PROBE: CPT | Mod: HCNC

## 2019-03-14 PROCEDURE — 3074F PR MOST RECENT SYSTOLIC BLOOD PRESSURE < 130 MM HG: ICD-10-PCS | Mod: HCNC,CPTII,S$GLB, | Performed by: OBSTETRICS & GYNECOLOGY

## 2019-03-14 PROCEDURE — 99213 PR OFFICE/OUTPT VISIT, EST, LEVL III, 20-29 MIN: ICD-10-PCS | Mod: HCNC,S$GLB,, | Performed by: OBSTETRICS & GYNECOLOGY

## 2019-03-14 PROCEDURE — 3078F PR MOST RECENT DIASTOLIC BLOOD PRESSURE < 80 MM HG: ICD-10-PCS | Mod: HCNC,CPTII,S$GLB, | Performed by: OBSTETRICS & GYNECOLOGY

## 2019-03-14 PROCEDURE — 1101F PR PT FALLS ASSESS DOC 0-1 FALLS W/OUT INJ PAST YR: ICD-10-PCS | Mod: HCNC,CPTII,S$GLB, | Performed by: OBSTETRICS & GYNECOLOGY

## 2019-03-14 PROCEDURE — 3078F DIAST BP <80 MM HG: CPT | Mod: HCNC,CPTII,S$GLB, | Performed by: OBSTETRICS & GYNECOLOGY

## 2019-03-14 PROCEDURE — 99999 PR PBB SHADOW E&M-EST. PATIENT-LVL IV: ICD-10-PCS | Mod: PBBFAC,HCNC,, | Performed by: OBSTETRICS & GYNECOLOGY

## 2019-03-14 PROCEDURE — 99213 OFFICE O/P EST LOW 20 MIN: CPT | Mod: HCNC,S$GLB,, | Performed by: OBSTETRICS & GYNECOLOGY

## 2019-03-14 PROCEDURE — 3074F SYST BP LT 130 MM HG: CPT | Mod: HCNC,CPTII,S$GLB, | Performed by: OBSTETRICS & GYNECOLOGY

## 2019-03-14 PROCEDURE — 99999 PR PBB SHADOW E&M-EST. PATIENT-LVL IV: CPT | Mod: PBBFAC,HCNC,, | Performed by: OBSTETRICS & GYNECOLOGY

## 2019-03-14 NOTE — PROGRESS NOTES
Subjective:       Patient ID: Haley Castro is a 73 y.o. female.    Chief Complaint:  No chief complaint on file.      History of Present Illness  HPI  73 y.o. with complaint of vaginal discharge. Patient has pap smear pending and vaginal culture unable to be run at lab. Patient agreed to return for repeat cultures. Again discussed the need for patient to take the estrogen cream originally prescribed by Dr. Lo last year.    GYN & OB History  Patient's last menstrual period was 1980.   Date of Last Pap: 3/13/2019    OB History    Para Term  AB Living   3 3 3         SAB TAB Ectopic Multiple Live Births                  # Outcome Date GA Lbr Estuardo/2nd Weight Sex Delivery Anes PTL Lv   3 Term            2 Term            1 Term                   Review of Systems  Review of Systems   Constitutional: Negative for activity change, appetite change, chills, diaphoresis, fatigue, fever and unexpected weight change.   HENT: Negative for nasal congestion, mouth sores and tinnitus.    Eyes: Negative for discharge and visual disturbance.   Respiratory: Negative for cough, shortness of breath and wheezing.    Cardiovascular: Negative for chest pain, palpitations and leg swelling.   Gastrointestinal: Negative for abdominal pain, bloating, blood in stool, constipation, diarrhea, nausea, vomiting, reflux and fecal incontinence.   Endocrine: Negative for diabetes, hair loss, hot flashes, hyperthyroidism and hypothyroidism.   Genitourinary: Positive for vaginal discharge and vaginal dryness. Negative for bladder incontinence, decreased libido, dysmenorrhea, dyspareunia, dysuria, flank pain, frequency, genital sores, hematuria, hot flashes, menorrhagia, menstrual problem, pelvic pain, urgency, vaginal bleeding, vaginal pain, urinary incontinence, postcoital bleeding, postmenopausal bleeding and vaginal odor.   Musculoskeletal: Negative for arthralgias, back pain, joint swelling, leg pain and myalgias.    Integumentary:  Negative for rash, acne, hair changes, mole/lesion, breast mass, nipple discharge, breast skin changes and breast tenderness.   Neurological: Negative for vertigo, seizures, syncope, numbness and headaches.   Hematological: Negative for adenopathy. Does not bruise/bleed easily.   Psychiatric/Behavioral: Negative for depression and sleep disturbance. The patient is not nervous/anxious.    Breast: Negative for asymmetry, breast self exam, lump, mass, mastodynia, nipple discharge, skin changes and tenderness          Objective:    Physical Exam:   Constitutional: She is oriented to person, place, and time. She appears well-developed and well-nourished. No distress.    HENT:   Head: Normocephalic.   Nose: No epistaxis.    Eyes: Pupils are equal, round, and reactive to light.    Neck: Normal range of motion.    Cardiovascular: Normal rate.     Pulmonary/Chest: Effort normal.          Genitourinary: Vaginal discharge found.   Genitourinary Comments: Vaginal cultures done today. No active bleeding noted from vaginal canal.           Musculoskeletal: Normal range of motion and moves all extremeties.       Neurological: She is alert and oriented to person, place, and time.    Skin: Skin is warm and dry.    Psychiatric: She has a normal mood and affect. Her behavior is normal. Judgment and thought content normal.          Assessment:        1. Vaginal discharge    2. Vaginal atrophy                Plan:      Premarin vaginal cream as directed

## 2019-03-18 LAB
BACTERIAL VAGINOSIS DNA: NEGATIVE
CANDIDA GLABRATA DNA: NEGATIVE
CANDIDA KRUSEI DNA: NEGATIVE
CANDIDA RRNA VAG QL PROBE: NEGATIVE
T VAGINALIS RRNA GENITAL QL PROBE: NEGATIVE

## 2019-03-20 ENCOUNTER — PATIENT MESSAGE (OUTPATIENT)
Dept: OBSTETRICS AND GYNECOLOGY | Facility: CLINIC | Age: 74
End: 2019-03-20

## 2019-03-21 ENCOUNTER — TELEPHONE (OUTPATIENT)
Dept: FAMILY MEDICINE | Facility: CLINIC | Age: 74
End: 2019-03-21

## 2019-03-21 NOTE — TELEPHONE ENCOUNTER
----- Message from Stacie Conley sent at 3/20/2019 12:16 PM CDT -----  Contact: pt  Calling to get results from last test  and please advise. [phone]

## 2019-03-22 NOTE — TELEPHONE ENCOUNTER
"Spoke with pt and informed her "nothing specific noted regarding the issue from mri; can f/u with her vascular surgeon as planned." Pt verbalized understanding.  "

## 2019-03-22 NOTE — TELEPHONE ENCOUNTER
----- Message from RT Ramez sent at 3/22/2019  8:33 AM CDT -----  Contact: pt    pt , requesting her test US of the aorta test results, been waiting since Wednesday, thanks.

## 2019-04-16 ENCOUNTER — TELEPHONE (OUTPATIENT)
Dept: PAIN MEDICINE | Facility: CLINIC | Age: 74
End: 2019-04-16

## 2019-04-16 DIAGNOSIS — M54.16 LUMBAR RADICULOPATHY: Primary | ICD-10-CM

## 2019-04-16 RX ORDER — ALPRAZOLAM 0.5 MG/1
1 TABLET, ORALLY DISINTEGRATING ORAL ONCE AS NEEDED
Status: CANCELLED | OUTPATIENT
Start: 2019-05-06 | End: 2030-10-01

## 2019-04-16 NOTE — TELEPHONE ENCOUNTER
Spoke with patient. She stated she has worsening back, hip, and leg pain on the right side. She stated it now hurts to bare weight on her right leg which is making her limp. The abdominal aorta has been addressed by Dr. Thomas and Bridger and a follow up in 1 year was indicated. The lumbar DOROTHEA has been scheduled for 5/6 with a 4 week follow up. Pre-op instructions were reviewed and sent to patient.

## 2019-04-16 NOTE — TELEPHONE ENCOUNTER
----- Message from Jose Savage sent at 4/15/2019 10:34 AM CDT -----  Contact: same  Patient called in and stated she would like a call back to schedule an injection appt for her back.  Call back number is [phone]

## 2019-04-16 NOTE — TELEPHONE ENCOUNTER
Patient is scheduled for a lumbar epidural steroid injection on 5/6 and will need to stop her Aspirin 7 days before. Please advise if this is okay. Thanks.

## 2019-04-29 RX ORDER — EZETIMIBE 10 MG/1
10 TABLET ORAL NIGHTLY
Qty: 90 TABLET | Refills: 4 | Status: SHIPPED | OUTPATIENT
Start: 2019-04-29 | End: 2020-03-03 | Stop reason: SDUPTHER

## 2019-05-03 DIAGNOSIS — M51.36 DDD (DEGENERATIVE DISC DISEASE), LUMBAR: Primary | ICD-10-CM

## 2019-05-06 ENCOUNTER — HOSPITAL ENCOUNTER (OUTPATIENT)
Dept: RADIOLOGY | Facility: HOSPITAL | Age: 74
Discharge: HOME | End: 2019-05-06
Attending: ANESTHESIOLOGY | Admitting: ANESTHESIOLOGY
Payer: MEDICARE

## 2019-05-06 ENCOUNTER — HOSPITAL ENCOUNTER (OUTPATIENT)
Facility: HOSPITAL | Age: 74
Discharge: HOME | End: 2019-05-06
Attending: ANESTHESIOLOGY | Admitting: ANESTHESIOLOGY
Payer: MEDICARE

## 2019-05-06 DIAGNOSIS — M51.36 DDD (DEGENERATIVE DISC DISEASE), LUMBAR: ICD-10-CM

## 2019-05-06 DIAGNOSIS — M54.16 LUMBAR RADICULOPATHY: Primary | ICD-10-CM

## 2019-05-06 PROCEDURE — A4216 STERILE WATER/SALINE, 10 ML: HCPCS | Mod: HCNC,PO | Performed by: ANESTHESIOLOGY

## 2019-05-06 PROCEDURE — 62323 NJX INTERLAMINAR LMBR/SAC: CPT | Mod: HCNC,,, | Performed by: ANESTHESIOLOGY

## 2019-05-06 PROCEDURE — 76000 FLUOROSCOPY <1 HR PHYS/QHP: CPT | Mod: TC,HCNC,PO

## 2019-05-06 PROCEDURE — 62323 PR INJ LUMBAR/SACRAL, W/IMAGING GUIDANCE: ICD-10-PCS | Mod: HCNC,,, | Performed by: ANESTHESIOLOGY

## 2019-05-06 PROCEDURE — 62323 NJX INTERLAMINAR LMBR/SAC: CPT | Mod: HCNC,PO | Performed by: ANESTHESIOLOGY

## 2019-05-06 PROCEDURE — 25000003 PHARM REV CODE 250: Mod: HCNC,PO | Performed by: ANESTHESIOLOGY

## 2019-05-06 PROCEDURE — 25500020 PHARM REV CODE 255: Mod: HCNC,PO | Performed by: ANESTHESIOLOGY

## 2019-05-06 PROCEDURE — 63600175 PHARM REV CODE 636 W HCPCS: Mod: HCNC,PO | Performed by: ANESTHESIOLOGY

## 2019-05-06 RX ORDER — TRAMADOL HYDROCHLORIDE 50 MG/1
50 TABLET ORAL EVERY 12 HOURS PRN
Qty: 60 TABLET | Refills: 0 | Status: SHIPPED | OUTPATIENT
Start: 2019-05-06 | End: 2019-07-03

## 2019-05-06 RX ORDER — LIDOCAINE HYDROCHLORIDE 10 MG/ML
INJECTION, SOLUTION EPIDURAL; INFILTRATION; INTRACAUDAL; PERINEURAL
Status: DISCONTINUED | OUTPATIENT
Start: 2019-05-06 | End: 2019-05-06 | Stop reason: HOSPADM

## 2019-05-06 RX ORDER — METHYLPREDNISOLONE ACETATE 80 MG/ML
INJECTION, SUSPENSION INTRA-ARTICULAR; INTRALESIONAL; INTRAMUSCULAR; SOFT TISSUE
Status: DISCONTINUED | OUTPATIENT
Start: 2019-05-06 | End: 2019-05-06 | Stop reason: HOSPADM

## 2019-05-06 RX ORDER — SODIUM CHLORIDE 9 MG/ML
INJECTION, SOLUTION INTRAMUSCULAR; INTRAVENOUS; SUBCUTANEOUS
Status: DISCONTINUED | OUTPATIENT
Start: 2019-05-06 | End: 2019-05-06 | Stop reason: HOSPADM

## 2019-05-06 RX ORDER — GABAPENTIN 100 MG/1
100 CAPSULE ORAL 3 TIMES DAILY
Qty: 90 CAPSULE | Refills: 2 | Status: SHIPPED | OUTPATIENT
Start: 2019-05-06 | End: 2019-08-10 | Stop reason: SDUPTHER

## 2019-05-06 RX ORDER — ALPRAZOLAM 0.5 MG/1
1 TABLET, ORALLY DISINTEGRATING ORAL ONCE AS NEEDED
Status: COMPLETED | OUTPATIENT
Start: 2019-05-06 | End: 2019-05-06

## 2019-05-06 RX ADMIN — ALPRAZOLAM 1 MG: 0.5 TABLET, ORALLY DISINTEGRATING ORAL at 01:05

## 2019-05-06 NOTE — H&P
CC: Back pain    HPI: The patient is a 73yo woman with a history of lumbar radiculopathy here for L5/S1 DOROTHEA. There are no major changes in history and physical from 19.    Past Medical History:   Diagnosis Date    Anticoagulant long-term use     Arthritis     Carotid artery occlusion     Depression 2012    Diverticulosis     Former smoker     GERD (gastroesophageal reflux disease)     HLD (hyperlipidemia) 2012    HTN (hypertension) 2012    Infectious gastroenteritis     Ischemic colitis        Past Surgical History:   Procedure Laterality Date    ANGIOGRAM-CAROTID Bilateral 2014    [facility]    APPENDECTOMY      Block-nerve-medial branch-lumbar L2,3,4,5 Right 10/15/2018    Performed by [facility]er [facility] at [facility] OR    carotid angiogram       SECTION      x 3     COLONOSCOPY  2009    Dr. Reyes [place], repeat in 5 years    COLONOSCOPY N/A 2016    Performed by [facility] Jr[facility] at [facility] ENDO    Endarterectomy-Carotid - RIGHT Right 2018    Performed by Safia [facility] at [facility] OR    ESOPHAGOGASTRODUODENOSCOPY (EGD) N/A 2017    Per[facility] at [facility] ENDO    HAND SURGERY Right 2016    knlaura implant/Dr. Abhinav Rolle    HEMORRHOID SURGERY      HYSTERECTOMY  age 35    TAHUSO benign reasons    hysteretomy      Injection,steroid,epidural,transforaminal approach l2/3, L3/4 Right 2018    [facility] OR    Injection,steroid,epidural,transforaminal approach L4/5 Right 2018    [facility] at [facility] OR    JOINT REPLACEMENT Right     1st interphalangeal joint of 3rd finger    OOPHORECTOMY      one remains    RADIOFREQUENCY ABLATION, NERVE, SPINAL, LUMBAR, MEDIAL BRANCH, THERMAL- L2,L3,L4,L5 Right 10/25/2018    [facility] at [facility] OR    TONSILLECTOMY         Family History   Problem  Relation Age of Onset    Heart disease Mother 85        MI    Heart disease Father 55        MI    Colon cancer Neg Hx     Colon polyps Neg Hx     Crohn's disease Neg Hx     Ulcerative colitis Neg Hx        Social History     Socioeconomic History    Marital status:      Spouse name: Not on file    Number of children: 3    Years of education: Not on file    Highest education level: Not on file   Occupational History    Occupation: retired     Employer: [employer]   Social Needs    Financial resource strain: Not on file    Food insecurity:     Worry: Not on file     Inability: Not on file    Transportation needs:     Medical: Not on file     Non-medical: Not on file   Tobacco Use    Smoking status: Former Smoker     Packs/day: 0.50     Years: 20.00     Pack years: 10.00     Last attempt to quit: 3/28/2013     Years since quittin.1    Smokeless tobacco: Never Used    Tobacco comment: quit 2 years ago after intermittent use for 40 years   Substance and Sexual Activity    Alcohol use: Yes     Alcohol/week: 0.0 oz     Comment: 0-2 ETOHic drinks per day    Drug use: No    Sexual activity: Yes     Partners: Male   Lifestyle    Physical activity:     Days per week: Not on file     Minutes per session: Not on file    Stress: Not on file   Relationships    Social connections:     Talks on phone: Not on file     Gets together: Not on file     Attends Holiness service: Not on file     Active member of club or organization: Not on file     Attends meetings of clubs or organizations: Not on file     Relationship status: Not on file   Other Topics Concern    Not on file   Social History Narrative    Not on file       No current facility-administered medications for this encounter.        Review of patient's allergies indicates:   Allergen Reactions    No known drug allergies        Vitals:    19 1342   BP: 136/63   Pulse: 71   Resp: 16   Temp: 97 °F (36.1 °C)   TempSrc: Skin   SpO2: 97%  "  Weight: 66.2 kg (146 lb)   Height: 5' 3" (1.6 m)       REVIEW OF SYSTEMS:     GENERAL: No weight loss, malaise or fevers.  HEENT:  No recent changes in vision or hearing  NECK: Negative for lumps, no difficulty with swallowing.  RESPIRATORY: Negative for cough, wheezing or shortness of breath, patient denies any recent URI.  CARDIOVASCULAR: Negative for chest pain, leg swelling or palpitations.  GI: Negative for abdominal discomfort, blood in stools or black stools or change in bowel habits.  MUSCULOSKELETAL: See HPI.  SKIN: Negative for lesions, rash, and itching.  PSYCH: No suicidal or homicidal ideations, no current mood disturbances.  HEMATOLOGY/LYMPHOLOGY: Negative for prolonged bleeding, bruising easily or swollen nodes. Patient is not currently taking any anti-coagulants  ENDO: No history of diabetes or thyroid dysfunction  NEURO: No history of syncope, paralysis, seizures or tremors.All other reviewed and negative other than HPI.    Physical exam:  Gen: A and O x3, pleasant, well-groomed  Skin: No rashes or obvious lesions  HEENT: PERRLA, no obvious deformities on ears or in canals. No thyroid masses, trachea midline, no palpable lymph nodes in neck, axilla.  CVS: Regular rate and rhythm, normal S1 and S2, no murmurs.  Resp: Clear to auscultation bilaterally.  Abdomen: Soft, NT/ND, normal bowel sounds present.  Musculoskeletal/Neuro: Moving all extremities    Assessment:  Lumbar radiculopathy  -     Case Request Operating Room: Injection-steroid-epidural-lumbar  -     Place in Outpatient; Standing  -     Diet NPO; Standing  -     alprazolam ODT dissolvable tablet 1 mg  -     Notify physician ; Standing  -     Notify physician ; Standing  -     Notify physician (specify); Standing  -     Verify informed consent; Standing  -     Vital signs; Standing    Other orders  -     IP VTE LOW RISK PATIENT; Standing          "

## 2019-05-06 NOTE — DISCHARGE SUMMARY
[facility]  Discharge Note  Short Stay    Admit Date: 5/6/2019    Discharge Date: 5/6/2019    Attending Physician: Jakub Greer MD     Discharge Provider: Jakub Greer    Diagnoses:  Active Hospital Problems    Diagnosis  POA    *Lumbar radiculopathy [M54.16]  Yes      Resolved Hospital Problems   No resolved problems to display.       Discharged Condition: good    Final Diagnoses: Lumbar radiculopathy [M54.16]    Disposition: Home or Self Care    Hospital Course: no complications, uneventful    Outcome of Hospitalization, Treatment, Procedure, or Surgery:  Patient was admitted for outpatient procedure. The patient underwent procedure without complications and are discharged home    Follow up/Patient Instructions:  Follow up as scheduled in Pain Management clinic in 3-4 weeks/Patient has received instructions and follow up date and time    Medications:  Continue previous medications    Discharge Procedure Orders   Call MD for:  temperature >100.4     Call MD for:  severe uncontrolled pain     Call MD for:  redness, tenderness, or signs of infection (pain, swelling, redness, odor or green/yellow discharge around incision site)     Call MD for:  severe persistent headache     No dressing needed         Discharge Procedure Orders (must include Diet, Follow-up, Activity):   Discharge Procedure Orders (must include Diet, Follow-up, Activity)   Call MD for:  temperature >100.4     Call MD for:  severe uncontrolled pain     Call MD for:  redness, tenderness, or signs of infection (pain, swelling, redness, odor or green/yellow discharge around incision site)     Call MD for:  severe persistent headache     No dressing needed

## 2019-05-06 NOTE — OP NOTE

## 2019-05-07 VITALS
BODY MASS INDEX: 25.87 KG/M2 | OXYGEN SATURATION: 95 % | WEIGHT: 146 LBS | SYSTOLIC BLOOD PRESSURE: 137 MMHG | HEART RATE: 69 BPM | TEMPERATURE: 97 F | HEIGHT: 63 IN | DIASTOLIC BLOOD PRESSURE: 60 MMHG | RESPIRATION RATE: 16 BRPM

## 2019-06-03 ENCOUNTER — OFFICE VISIT (OUTPATIENT)
Dept: PAIN MEDICINE | Facility: CLINIC | Age: 74
End: 2019-06-03
Payer: MEDICARE

## 2019-06-03 ENCOUNTER — TELEPHONE (OUTPATIENT)
Dept: PAIN MEDICINE | Facility: CLINIC | Age: 74
End: 2019-06-03

## 2019-06-03 VITALS
HEART RATE: 85 BPM | DIASTOLIC BLOOD PRESSURE: 70 MMHG | WEIGHT: 148.56 LBS | HEIGHT: 63 IN | SYSTOLIC BLOOD PRESSURE: 143 MMHG | BODY MASS INDEX: 26.32 KG/M2

## 2019-06-03 DIAGNOSIS — M48.062 SPINAL STENOSIS OF LUMBAR REGION WITH NEUROGENIC CLAUDICATION: ICD-10-CM

## 2019-06-03 DIAGNOSIS — I73.9 PVD (PERIPHERAL VASCULAR DISEASE): ICD-10-CM

## 2019-06-03 DIAGNOSIS — M51.36 DDD (DEGENERATIVE DISC DISEASE), LUMBAR: ICD-10-CM

## 2019-06-03 DIAGNOSIS — M47.816 LUMBAR SPONDYLOSIS: ICD-10-CM

## 2019-06-03 DIAGNOSIS — M54.16 RIGHT LUMBAR RADICULOPATHY: Primary | ICD-10-CM

## 2019-06-03 DIAGNOSIS — I65.29 STENOSIS OF CAROTID ARTERY, UNSPECIFIED LATERALITY: ICD-10-CM

## 2019-06-03 PROCEDURE — 1100F PR PT FALLS ASSESS DOC 2+ FALLS/FALL W/INJURY/YR: ICD-10-PCS | Mod: HCNC,CPTII,S$GLB, | Performed by: ANESTHESIOLOGY

## 2019-06-03 PROCEDURE — 99499 UNLISTED E&M SERVICE: CPT | Mod: HCNC,S$GLB,, | Performed by: ANESTHESIOLOGY

## 2019-06-03 PROCEDURE — 99999 PR PBB SHADOW E&M-EST. PATIENT-LVL II: CPT | Mod: PBBFAC,HCNC,, | Performed by: ANESTHESIOLOGY

## 2019-06-03 PROCEDURE — 99214 PR OFFICE/OUTPT VISIT, EST, LEVL IV, 30-39 MIN: ICD-10-PCS | Mod: HCNC,S$GLB,, | Performed by: ANESTHESIOLOGY

## 2019-06-03 PROCEDURE — 3288F FALL RISK ASSESSMENT DOCD: CPT | Mod: HCNC,CPTII,S$GLB, | Performed by: ANESTHESIOLOGY

## 2019-06-03 PROCEDURE — 99499 RISK ADDL DX/OHS AUDIT: ICD-10-PCS | Mod: HCNC,S$GLB,, | Performed by: ANESTHESIOLOGY

## 2019-06-03 PROCEDURE — 3077F PR MOST RECENT SYSTOLIC BLOOD PRESSURE >= 140 MM HG: ICD-10-PCS | Mod: HCNC,CPTII,S$GLB, | Performed by: ANESTHESIOLOGY

## 2019-06-03 PROCEDURE — 3078F PR MOST RECENT DIASTOLIC BLOOD PRESSURE < 80 MM HG: ICD-10-PCS | Mod: HCNC,CPTII,S$GLB, | Performed by: ANESTHESIOLOGY

## 2019-06-03 PROCEDURE — 3077F SYST BP >= 140 MM HG: CPT | Mod: HCNC,CPTII,S$GLB, | Performed by: ANESTHESIOLOGY

## 2019-06-03 PROCEDURE — 99999 PR PBB SHADOW E&M-EST. PATIENT-LVL II: ICD-10-PCS | Mod: PBBFAC,HCNC,, | Performed by: ANESTHESIOLOGY

## 2019-06-03 PROCEDURE — 99214 OFFICE O/P EST MOD 30 MIN: CPT | Mod: HCNC,S$GLB,, | Performed by: ANESTHESIOLOGY

## 2019-06-03 PROCEDURE — 3078F DIAST BP <80 MM HG: CPT | Mod: HCNC,CPTII,S$GLB, | Performed by: ANESTHESIOLOGY

## 2019-06-03 PROCEDURE — 1100F PTFALLS ASSESS-DOCD GE2>/YR: CPT | Mod: HCNC,CPTII,S$GLB, | Performed by: ANESTHESIOLOGY

## 2019-06-03 PROCEDURE — 3288F PR FALLS RISK ASSESSMENT DOCUMENTED: ICD-10-PCS | Mod: HCNC,CPTII,S$GLB, | Performed by: ANESTHESIOLOGY

## 2019-06-03 RX ORDER — ALPRAZOLAM 0.5 MG/1
1 TABLET, ORALLY DISINTEGRATING ORAL ONCE AS NEEDED
Status: CANCELLED | OUTPATIENT
Start: 2019-06-19 | End: 2030-11-14

## 2019-06-03 NOTE — TELEPHONE ENCOUNTER
Patient takes aspirin as a preventative and has been instructed to stop 7 days before the injection.

## 2019-06-03 NOTE — H&P (VIEW-ONLY)
This note was completed with dictation software and grammatical errors may exist.    CC:  Right back and leg pain    HPI:  The patient is a 74-year-old woman with a history of carotid artery stenosis, osteoarthritis who presents in referral from [facility] Uriostegui for back and right leg pain. She is status post L5/S1 DOROTHEA on 05/06/2019 with 100% relief for about two weeks but then she fell on her right side.  She had significant bruising to her right buttock, right knee and her same right leg pain returned.  It is painful in the right low back, buttock, lateral hip, thigh into the right calf.  She is having difficulty putting weight on her right leg because of pain.  She denies any jose weakness, no bowel or bladder incontinence.  She reports that she is taking 200 mg of gabapentin twice daily with unclear relief, she has tried tramadol with no major benefit.  She tried taking Aleve which helps a great deal but she was concerned about other effects this may have.    Pain intervention history: She was given a prescription for gabapentin 100 mg 3 times daily but states that this was making her lightheaded.  She was prescribed physical therapy. She is status post right L4/5 transforaminal injection on 08/27/2018 with 100% relief of her right leg pain. She is status post right L2, 3, 4, 5 medial branch block on 10/15/2018 with 100% relief for 6 hr, status post radiofrequency ablation of right L2, 3, 4, 5 medial branch RFA on 10/25/2018. She returns in follow-up today, she is status post right L2/3 and L3/4 transforaminal injection on 12/14/18 with What she reports is 75% relief. She is status post L5/S1 DOROTHEA on 05/06/2019 with 100% relief for about two weeks but then she fell on her right side.    ROS:  She reports hypertension.  Balance of review of systems is negative.    Past Medical History:   Diagnosis Date    Anticoagulant long-term use     Arthritis     Carotid artery occlusion     Depression 11/29/2012     Diverticulosis     Former smoker     GERD (gastroesophageal reflux disease)     HLD (hyperlipidemia) 2012    HTN (hypertension) 2012    Infectious gastroenteritis     Ischemic colitis        Past Surgical History:   Procedure Laterality Date    ANGIOGRAM-CAROTID Bilateral 2014    [facility] by [facility]    APPENDECTOMY      Block-nerve-medial branch-lumbar L2,3,4,5 Right 10/15/2018    [facility] by [facility] OR    carotid angiogram       SECTION      x 3     COLONOSCOPY  2009    Dr. Reyes [place], repeat in 5 years    COLONOSCOPY N/A 2016    [facility] END[facility]    Endarterectomy-Carotid - RIGHT Right 2018    [facility] by [facility] at [facility] OR    ESOPHAGOGASTRODUODENOSCOPY (EGD) N/A 2017    Performed by [facility] at [facility] ENDO    HAND SURGERY Right 2016    [facility] noland/Dr. Abhinav Rolle    HEMORRHOID SURGERY      HYSTERECTOMY  age 35    TAHUSO benign reasons    hysteretomy      Injection,steroid,epidural,transforaminal approach l2/3, L3/4 Right 2018    Performed by [facility] OR    Injection,steroid,epidural,transforaminal approach L4/5 Right 2018    [facility]formed by [facility] OR    Injection-steroid-epidural-lumbar N/A 2019    Performed by [facility] at [facility] OR    JOINT REPLACEMENT Right     1st interphalangeal joint of 3rd finger    OOPHORECTOMY      one remains    RADIOFREQUENCY ABLATION, NERVE, SPINAL, LUMBAR, MEDIAL BRANCH, THERMAL- L2,L3,L4,L5 Right 10/25/2018    Performed by [facility] at [facility] OR    TONSILLECTOMY         Social History     Socioeconomic History    Marital status:      Spouse name: Not on file    Number of children: 3    Years of education: Not on file    Highest education level: Not on file   Occupational History    Occupation: retired  "    Employer: [employer]   Social Needs    Financial resource strain: Not on file    Food insecurity:     Worry: Not on file     Inability: Not on file    Transportation needs:     Medical: Not on file     Non-medical: Not on file   Tobacco Use    Smoking status: Former Smoker     Packs/day: 0.50     Years: 20.00     Pack years: 10.00     Last attempt to quit: 3/28/2013     Years since quittin.1    Smokeless tobacco: Never Used    Tobacco comment: quit 2 years ago after intermittent use for 40 years   Substance and Sexual Activity    Alcohol use: Yes     Alcohol/week: 0.0 oz     Comment: 0-2 ETOHic drinks per day    Drug use: No    Sexual activity: Yes     Partners: Male   Lifestyle    Physical activity:     Days per week: Not on file     Minutes per session: Not on file    Stress: Not on file   Relationships    Social connections:     Talks on phone: Not on file     Gets together: Not on file     Attends Uatsdin service: Not on file     Active member of club or organization: Not on file     Attends meetings of clubs or organizations: Not on file     Relationship status: Not on file   Other Topics Concern    Not on file   Social History Narrative    Not on file         Medications/Allergies: See med card    Vitals:    19 0907   BP: (!) 143/70   Pulse: 85   Weight: 67.4 kg (148 lb 9.4 oz)   Height: 5' 3" (1.6 m)   PainSc:   8   PainLoc: Leg         Physical exam:  Gen: A and O x3, pleasant, well-groomed  Skin: No rashes or obvious lesions  HEENT: PERRLA, no obvious deformities on ears or in canals. Trachea midline.  CVS: Regular rate and rhythm, normal palpable pulses.  Resp:No increased work of breathing, symmetrical chest rise.  Abdomen: Soft, NT/ND.  Musculoskeletal No antalgic gait.     Neuro:  Lower extremities: 5/5 strength bilaterally, 4/5 right hip flexion but may be limited by pain  Reflexes: Patellar 2+, Achilles 2+ bilaterally.  Sensory:  Intact and symmetrical to light touch and " pinprick in L2-S1 dermatomes bilaterally.    Lumbar spine:  Lumbar spine:   Range of motion is moderately reduced with flexion and extension with increased right low back pain during each maneuver.  Mika's test causes no increased pain on either side.    Supine straight leg raise is   Negative for any leg pain bilaterally.  Internal and external rotation of the hip causes no increased pain on either side.  Myofascial exam:  Mild tenderness to palpation to the right upper/lateral gluteal region.    Imagin18 MRI L-spine  Vertebral column: There is multilevel degenerative change.  There is no fracture.  Comparison plain films demonstrate a very gentle rotary levocurvature of the lumbar spine.  There is 2 mm retrolisthesis of L2 on L3.  This is exaggerated by endplate osteophyte formation.  There is moderate disc space narrowing at the L2-3 level where there is also associated degenerative endplate signal change.  All of the discs are mildly desiccated.  There is no other significant disc space narrowing.  There is mild disc space narrowing at the L3-4 level.  Baseline marrow signal intensity is within normal limits.  T12-L1: Unremarkable.  There is no spinal canal or significant foraminal stenosis.  L1-2: There is mild facet joint arthropathy.  There is no spinal canal or significant foraminal stenosis.  L2-3: There is disc space narrowing.  There is trace retrolisthesis of L2 on L3.  There is a diffuse disc bulge with osteophytic ridging.  There is mild-to-moderate facet joint arthropathy with ligamentum flavum thickening, right greater than left.  There is no spinal stenosis.  There is mild bilateral foraminal stenosis.  L3-4: There is mild disc space narrowing.  There is a mild-to-moderate diffuse disc bulge.  There is moderate-to-marked facet joint arthropathy with ligamentum flavum thickening.  There is mildly prominent dorsal epidural fat.  There is moderate central spinal stenosis.  AP measurement of  the dural sac is 7 mm there is mild bilateral foraminal stenosis.  L4-5: There is a diffuse disc bulge with osteophytic ridging slightly eccentric to the right.  There is moderate facet joint arthropathy with ligamentum flavum thickening.  There is borderline spinal stenosis.  There is mild bilateral foraminal stenosis.  L5-S1: There is moderate facet joint arthropathy.  There is minimal bulging of the annulus.  There is no spinal canal or significant foraminal stenosis.  Soft tissues, other: The prevertebral soft tissues are normal.  The aorta is somewhat ectatic.    02/16/2019 MRI lumbar spine  Infrarenal aortic dilatation is noted to 2.7 cm prior to aortic bifurcation suggestive of aortic ectasia  Some mild adrenal thickening on the left is questioned that could be artifactual or relate to a process such as a adenoma or mass without convincing detrimental change since 06/11/2018.  Stable fluid signal intensities are noted within the left kidney 1 of 8 mm and 1 of 4 mm nonspecific on this examination but statistically favored relate to renal cysts.  Vertebral body heights appear well preserved.  Mild intervertebral disc height loss is noted at the L2-L3 L3-L4 levels in particular.  A mild retrolisthesis is noted of the L2 on L3 vertebral body of 4 mm  No STIR signal abnormality is noted to suggest an aggressive bone marrow replacement process or recent fracture.  The spinal cord appears to terminate at the L1 level.  At the T12-L1  level, no significant central canal stenosis, neural foraminal stenosis, or disc bulge is noted.  At the L1-L2 level, no significant central canal stenosis, neural foraminal stenosis, or disc bulge is noted. Mild ligamentous hypertrophy is noted  At the L2-L3 level, facet arthropathy and ligamentous hypertrophy appears to be present.  Posterior disc osteophyte spurring or bulge is noted of 3 mm.  Ligamentous hypertrophy is noted.  Broad-based bulge is noted towards the far lateral left  and left neural foramen greater than far lateral right and towards the right neural foramen.  Possible contact of the exiting left nerve root is noted.  The broad-based bulge far laterally is of 4 mm.  Mild right neural foraminal narrowing is noted.  Mild to moderate left neural foraminal stenosis is noted.  Mild thecal sac narrowing is noted to 10 mm.  A similar appearance is noted on the prior.  At the L3-L4 level, bilateral facet arthropathy and ligamentous hypertrophy appears to be present.  Broad-based bulging is noted towards each neural foramen and far laterally bilaterally with a bulge far lateral to the left of 4 mm greater than bulging to the right.  Mild left neural foraminal stenosis greater than right-sided neural foraminal stenosis appears to be present.  Moderate thecal sac narrowing is noted to 8 mm a similar appearance is noted on the prior.  At the L4-L5 level, ligamentous hypertrophy and facet arthropathy appears to be present.  Broad-based bulge is noted far laterally bilaterally.  Moderate thecal sac narrowing is noted to 8 mm.  Mild to moderate right neural foraminal stenosis is noted with mild left neural foraminal stenosis.  Some increased T2 signal is noted within the disc far lateral to the left as can be seen with annular tear.  At the L5-S1 level, facet arthropathy and ligamentous hypertrophy is noted.  Broad-based bulge is noted towards the left neural foramen and far lateral left of 4 mm greater than towards the right.  Mild left greater than right neural foraminal narrowing is noted.  No significant central canal stenosis is noted.  Abnormal wall thickening is noted to the distal colon in its partially visualized portion.  This could relate to scarring, lack of distension, or an infiltrative or inflammatory process.  Correlation with the patient's history of colonoscopy is suggested.      Assessment:  The patient is a 74-year-old woman with a history of carotid artery stenosis,  osteoarthritis who presents in referral from Y[facility] Uriostegui for back and right leg pain.    1. Right lumbar radiculopathy     2. DDD (degenerative disc disease), lumbar     3. Lumbar spondylosis     4. Spinal stenosis of lumbar region with neurogenic claudication     5. Stenosis of carotid artery, unspecified laterality     6. PVD (peripheral vascular disease)           Plan:  1.  Unfortunately, she had complete relief but then fell so it is difficult to say whether not the pain would have returned or not if she had not fallen.  I suggest we try the injection one more time, I will set her up for an L5/S1 DOROTHEA with spread to the right side.  She states that this injection help more than any the other injections.  I am still not 100% sure which level is causing her pain but this type of injection covers all of these levels.  If she is not getting lasting relief, we discussed that we would get any EMG and refer her back to Neurosurgery.  2.  The tramadol does not seem to be helping so I told her to discontinue it.  She will continue taking gabapentin and if she gets relief we can always try weaning off of this.  She does get best relief from Aleve and I suggested that she could take this every once in a while but make sure to take it with food.  Since she is currently on aspirin for peripheral vascular disease, I explained there are concerns about her taking Aleve with this but if this is something on a rare occasion, I think she is fine, kidney function looks fine.  3.  She will have to discontinue the aspirin for one week prior to the procedure to decrease risk of epidural hematoma.

## 2019-06-03 NOTE — PROGRESS NOTES
This note was completed with dictation software and grammatical errors may exist.    CC:  Right back and leg pain    HPI:  The patient is a 74-year-old woman with a history of carotid artery stenosis, osteoarthritis who presents in referral from [facility] Uriostegui for back and right leg pain. She is status post L5/S1 DOROTHEA on 05/06/2019 with 100% relief for about two weeks but then she fell on her right side.  She had significant bruising to her right buttock, right knee and her same right leg pain returned.  It is painful in the right low back, buttock, lateral hip, thigh into the right calf.  She is having difficulty putting weight on her right leg because of pain.  She denies any jose weakness, no bowel or bladder incontinence.  She reports that she is taking 200 mg of gabapentin twice daily with unclear relief, she has tried tramadol with no major benefit.  She tried taking Aleve which helps a great deal but she was concerned about other effects this may have.    Pain intervention history: She was given a prescription for gabapentin 100 mg 3 times daily but states that this was making her lightheaded.  She was prescribed physical therapy. She is status post right L4/5 transforaminal injection on 08/27/2018 with 100% relief of her right leg pain. She is status post right L2, 3, 4, 5 medial branch block on 10/15/2018 with 100% relief for 6 hr, status post radiofrequency ablation of right L2, 3, 4, 5 medial branch RFA on 10/25/2018. She returns in follow-up today, she is status post right L2/3 and L3/4 transforaminal injection on 12/14/18 with What she reports is 75% relief. She is status post L5/S1 DOROTHEA on 05/06/2019 with 100% relief for about two weeks but then she fell on her right side.    ROS:  She reports hypertension.  Balance of review of systems is negative.    Past Medical History:   Diagnosis Date    Anticoagulant long-term use     Arthritis     Carotid artery occlusion     Depression 11/29/2012     Diverticulosis     Former smoker     GERD (gastroesophageal reflux disease)     HLD (hyperlipidemia) 2012    HTN (hypertension) 2012    Infectious gastroenteritis     Ischemic colitis        Past Surgical History:   Procedure Laterality Date    ANGIOGRAM-CAROTID Bilateral 2014    Performed [facility]    APPENDECTOMY      Block-nerve-medial branch-lumbar L2,3,4,5 Right 10/15/2018    [facility] at [facility] OR    carotid angiogram       SECTION      x 3     COLONOSCOPY  2009    Dr. Reyes in [place], repeat in 5 years    COLONOSCOPY N/A 2016    Performed [facility]z [facility]ousa Jr[facility] at [facility] ENDO    Endarterectomy-Carotid - RIGHT Right 2018    [facility]ia [facility]    ESOPHAGOGASTRODUODENOSCOPY (EGD) N/A 2017    Performed [facility] at [facility] ENDO    HAND SURGERY Right 2016    kn[facility] noland/Dr. Abhinav Rolle    HEMORRHOID SURGERY      HYSTERECTOMY  age 35    TAHUSO benign reasons    hysteretomy      Injection,steroid,epidural,transforaminal approach l2/3, L3/4 Right 2018    [facility] OR    Injection,steroid,epidural,transforaminal approach L4/5 Right 2018    [facility] OR    Injection-steroid-epidural-lumbar N/A 2019    Performed by [facility]ub [facility]er [facility] at [facility]    JOINT REPLACEMENT Right     1st interphalangeal joint of 3rd finger    OOPHORECTOMY      one remains    RADIOFREQUENCY ABLATION, NERVE, SPINAL, LUMBAR, MEDIAL BRANCH, THERMAL- L2,L3,L4,L5 Right 10/25/2018    [facility] at [facility] OR    TONSILLECTOMY         Social History     Socioeconomic History    Marital status:      Spouse name: Not on file    Number of children: 3    Years of education: Not on file    Highest education level: Not on file   Occupational History    Occupation: retired  "    Employer: [employer]   Social Needs    Financial resource strain: Not on file    Food insecurity:     Worry: Not on file     Inability: Not on file    Transportation needs:     Medical: Not on file     Non-medical: Not on file   Tobacco Use    Smoking status: Former Smoker     Packs/day: 0.50     Years: 20.00     Pack years: 10.00     Last attempt to quit: 3/28/2013     Years since quittin.1    Smokeless tobacco: Never Used    Tobacco comment: quit 2 years ago after intermittent use for 40 years   Substance and Sexual Activity    Alcohol use: Yes     Alcohol/week: 0.0 oz     Comment: 0-2 ETOHic drinks per day    Drug use: No    Sexual activity: Yes     Partners: Male   Lifestyle    Physical activity:     Days per week: Not on file     Minutes per session: Not on file    Stress: Not on file   Relationships    Social connections:     Talks on phone: Not on file     Gets together: Not on file     Attends Taoism service: Not on file     Active member of club or organization: Not on file     Attends meetings of clubs or organizations: Not on file     Relationship status: Not on file   Other Topics Concern    Not on file   Social History Narrative    Not on file         Medications/Allergies: See med card    Vitals:    19 0907   BP: (!) 143/70   Pulse: 85   Weight: 67.4 kg (148 lb 9.4 oz)   Height: 5' 3" (1.6 m)   PainSc:   8   PainLoc: Leg         Physical exam:  Gen: A and O x3, pleasant, well-groomed  Skin: No rashes or obvious lesions  HEENT: PERRLA, no obvious deformities on ears or in canals. Trachea midline.  CVS: Regular rate and rhythm, normal palpable pulses.  Resp:No increased work of breathing, symmetrical chest rise.  Abdomen: Soft, NT/ND.  Musculoskeletal No antalgic gait.     Neuro:  Lower extremities: 5/5 strength bilaterally, 4/5 right hip flexion but may be limited by pain  Reflexes: Patellar 2+, Achilles 2+ bilaterally.  Sensory:  Intact and symmetrical to light touch and " pinprick in L2-S1 dermatomes bilaterally.    Lumbar spine:  Lumbar spine:   Range of motion is moderately reduced with flexion and extension with increased right low back pain during each maneuver.  Mika's test causes no increased pain on either side.    Supine straight leg raise is   Negative for any leg pain bilaterally.  Internal and external rotation of the hip causes no increased pain on either side.  Myofascial exam:  Mild tenderness to palpation to the right upper/lateral gluteal region.    Imagin18 MRI L-spine  Vertebral column: There is multilevel degenerative change.  There is no fracture.  Comparison plain films demonstrate a very gentle rotary levocurvature of the lumbar spine.  There is 2 mm retrolisthesis of L2 on L3.  This is exaggerated by endplate osteophyte formation.  There is moderate disc space narrowing at the L2-3 level where there is also associated degenerative endplate signal change.  All of the discs are mildly desiccated.  There is no other significant disc space narrowing.  There is mild disc space narrowing at the L3-4 level.  Baseline marrow signal intensity is within normal limits.  T12-L1: Unremarkable.  There is no spinal canal or significant foraminal stenosis.  L1-2: There is mild facet joint arthropathy.  There is no spinal canal or significant foraminal stenosis.  L2-3: There is disc space narrowing.  There is trace retrolisthesis of L2 on L3.  There is a diffuse disc bulge with osteophytic ridging.  There is mild-to-moderate facet joint arthropathy with ligamentum flavum thickening, right greater than left.  There is no spinal stenosis.  There is mild bilateral foraminal stenosis.  L3-4: There is mild disc space narrowing.  There is a mild-to-moderate diffuse disc bulge.  There is moderate-to-marked facet joint arthropathy with ligamentum flavum thickening.  There is mildly prominent dorsal epidural fat.  There is moderate central spinal stenosis.  AP measurement of  the dural sac is 7 mm there is mild bilateral foraminal stenosis.  L4-5: There is a diffuse disc bulge with osteophytic ridging slightly eccentric to the right.  There is moderate facet joint arthropathy with ligamentum flavum thickening.  There is borderline spinal stenosis.  There is mild bilateral foraminal stenosis.  L5-S1: There is moderate facet joint arthropathy.  There is minimal bulging of the annulus.  There is no spinal canal or significant foraminal stenosis.  Soft tissues, other: The prevertebral soft tissues are normal.  The aorta is somewhat ectatic.    02/16/2019 MRI lumbar spine  Infrarenal aortic dilatation is noted to 2.7 cm prior to aortic bifurcation suggestive of aortic ectasia  Some mild adrenal thickening on the left is questioned that could be artifactual or relate to a process such as a adenoma or mass without convincing detrimental change since 06/11/2018.  Stable fluid signal intensities are noted within the left kidney 1 of 8 mm and 1 of 4 mm nonspecific on this examination but statistically favored relate to renal cysts.  Vertebral body heights appear well preserved.  Mild intervertebral disc height loss is noted at the L2-L3 L3-L4 levels in particular.  A mild retrolisthesis is noted of the L2 on L3 vertebral body of 4 mm  No STIR signal abnormality is noted to suggest an aggressive bone marrow replacement process or recent fracture.  The spinal cord appears to terminate at the L1 level.  At the T12-L1  level, no significant central canal stenosis, neural foraminal stenosis, or disc bulge is noted.  At the L1-L2 level, no significant central canal stenosis, neural foraminal stenosis, or disc bulge is noted. Mild ligamentous hypertrophy is noted  At the L2-L3 level, facet arthropathy and ligamentous hypertrophy appears to be present.  Posterior disc osteophyte spurring or bulge is noted of 3 mm.  Ligamentous hypertrophy is noted.  Broad-based bulge is noted towards the far lateral left  and left neural foramen greater than far lateral right and towards the right neural foramen.  Possible contact of the exiting left nerve root is noted.  The broad-based bulge far laterally is of 4 mm.  Mild right neural foraminal narrowing is noted.  Mild to moderate left neural foraminal stenosis is noted.  Mild thecal sac narrowing is noted to 10 mm.  A similar appearance is noted on the prior.  At the L3-L4 level, bilateral facet arthropathy and ligamentous hypertrophy appears to be present.  Broad-based bulging is noted towards each neural foramen and far laterally bilaterally with a bulge far lateral to the left of 4 mm greater than bulging to the right.  Mild left neural foraminal stenosis greater than right-sided neural foraminal stenosis appears to be present.  Moderate thecal sac narrowing is noted to 8 mm a similar appearance is noted on the prior.  At the L4-L5 level, ligamentous hypertrophy and facet arthropathy appears to be present.  Broad-based bulge is noted far laterally bilaterally.  Moderate thecal sac narrowing is noted to 8 mm.  Mild to moderate right neural foraminal stenosis is noted with mild left neural foraminal stenosis.  Some increased T2 signal is noted within the disc far lateral to the left as can be seen with annular tear.  At the L5-S1 level, facet arthropathy and ligamentous hypertrophy is noted.  Broad-based bulge is noted towards the left neural foramen and far lateral left of 4 mm greater than towards the right.  Mild left greater than right neural foraminal narrowing is noted.  No significant central canal stenosis is noted.  Abnormal wall thickening is noted to the distal colon in its partially visualized portion.  This could relate to scarring, lack of distension, or an infiltrative or inflammatory process.  Correlation with the patient's history of colonoscopy is suggested.      Assessment:  The patient is a 74-year-old woman with a history of carotid artery stenosis,  osteoarthritis who presents in referral from Y[facility] Uriostegui for back and right leg pain.    1. Right lumbar radiculopathy     2. DDD (degenerative disc disease), lumbar     3. Lumbar spondylosis     4. Spinal stenosis of lumbar region with neurogenic claudication     5. Stenosis of carotid artery, unspecified laterality     6. PVD (peripheral vascular disease)           Plan:  1.  Unfortunately, she had complete relief but then fell so it is difficult to say whether not the pain would have returned or not if she had not fallen.  I suggest we try the injection one more time, I will set her up for an L5/S1 DOROTHEA with spread to the right side.  She states that this injection help more than any the other injections.  I am still not 100% sure which level is causing her pain but this type of injection covers all of these levels.  If she is not getting lasting relief, we discussed that we would get any EMG and refer her back to Neurosurgery.  2.  The tramadol does not seem to be helping so I told her to discontinue it.  She will continue taking gabapentin and if she gets relief we can always try weaning off of this.  She does get best relief from Aleve and I suggested that she could take this every once in a while but make sure to take it with food.  Since she is currently on aspirin for peripheral vascular disease, I explained there are concerns about her taking Aleve with this but if this is something on a rare occasion, I think she is fine, kidney function looks fine.  3.  She will have to discontinue the aspirin for one week prior to the procedure to decrease risk of epidural hematoma.

## 2019-06-18 DIAGNOSIS — M51.36 DDD (DEGENERATIVE DISC DISEASE), LUMBAR: Primary | ICD-10-CM

## 2019-06-19 ENCOUNTER — HOSPITAL ENCOUNTER (OUTPATIENT)
Dept: RADIOLOGY | Facility: HOSPITAL | Age: 74
Discharge: HOME | End: 2019-06-19
Attending: ANESTHESIOLOGY | Admitting: ANESTHESIOLOGY
Payer: MEDICARE

## 2019-06-19 ENCOUNTER — HOSPITAL ENCOUNTER (OUTPATIENT)
Facility: HOSPITAL | Age: 74
Discharge: HOME | End: 2019-06-19
Attending: ANESTHESIOLOGY | Admitting: ANESTHESIOLOGY
Payer: MEDICARE

## 2019-06-19 DIAGNOSIS — M51.36 DDD (DEGENERATIVE DISC DISEASE), LUMBAR: ICD-10-CM

## 2019-06-19 DIAGNOSIS — M54.16 RIGHT LUMBAR RADICULOPATHY: Primary | ICD-10-CM

## 2019-06-19 PROCEDURE — 25500020 PHARM REV CODE 255: Mod: HCNC,PO | Performed by: ANESTHESIOLOGY

## 2019-06-19 PROCEDURE — 62323 NJX INTERLAMINAR LMBR/SAC: CPT | Mod: HCNC,,, | Performed by: ANESTHESIOLOGY

## 2019-06-19 PROCEDURE — 62323 PR INJ LUMBAR/SACRAL, W/IMAGING GUIDANCE: ICD-10-PCS | Mod: HCNC,,, | Performed by: ANESTHESIOLOGY

## 2019-06-19 PROCEDURE — 25000003 PHARM REV CODE 250: Mod: HCNC,PO | Performed by: ANESTHESIOLOGY

## 2019-06-19 PROCEDURE — 63600175 PHARM REV CODE 636 W HCPCS: Mod: HCNC,PO | Performed by: ANESTHESIOLOGY

## 2019-06-19 PROCEDURE — 62323 NJX INTERLAMINAR LMBR/SAC: CPT | Mod: HCNC,PO | Performed by: ANESTHESIOLOGY

## 2019-06-19 PROCEDURE — A4216 STERILE WATER/SALINE, 10 ML: HCPCS | Mod: HCNC,PO | Performed by: ANESTHESIOLOGY

## 2019-06-19 PROCEDURE — 76000 FLUOROSCOPY <1 HR PHYS/QHP: CPT | Mod: TC,HCNC,PO

## 2019-06-19 RX ORDER — METHYLPREDNISOLONE ACETATE 80 MG/ML
INJECTION, SUSPENSION INTRA-ARTICULAR; INTRALESIONAL; INTRAMUSCULAR; SOFT TISSUE
Status: DISCONTINUED | OUTPATIENT
Start: 2019-06-19 | End: 2019-06-19 | Stop reason: HOSPADM

## 2019-06-19 RX ORDER — SODIUM CHLORIDE 9 MG/ML
INJECTION, SOLUTION INTRAMUSCULAR; INTRAVENOUS; SUBCUTANEOUS
Status: DISCONTINUED | OUTPATIENT
Start: 2019-06-19 | End: 2019-06-19 | Stop reason: HOSPADM

## 2019-06-19 RX ORDER — LIDOCAINE HYDROCHLORIDE 10 MG/ML
INJECTION, SOLUTION EPIDURAL; INFILTRATION; INTRACAUDAL; PERINEURAL
Status: DISCONTINUED | OUTPATIENT
Start: 2019-06-19 | End: 2019-06-19 | Stop reason: HOSPADM

## 2019-06-19 RX ORDER — ALPRAZOLAM 0.5 MG/1
1 TABLET, ORALLY DISINTEGRATING ORAL ONCE AS NEEDED
Status: COMPLETED | OUTPATIENT
Start: 2019-06-19 | End: 2019-06-19

## 2019-06-19 RX ADMIN — ALPRAZOLAM 1 MG: 0.5 TABLET, ORALLY DISINTEGRATING ORAL at 02:06

## 2019-06-19 NOTE — OP NOTE

## 2019-06-19 NOTE — DISCHARGE INSTRUCTIONS
Home care instructions   Apply ice pack to the injection site for 20 minutes periods for the first 24 hrs for soreness/discomfort at injection site DO NOT USE HEAT FOR 24 HOURS  Keep site clean and dry for 24 hours, remove bandaid when desired  Do not drive until tomorrow  Take care when walking after a lumbar injection  STEROIDS or RADIOFREQUENCY   May take 10-14 days for full affects.  Avoid strenuous exercises for 2 days    Resume home medication as prescribed today  Resume Aspirin, Plavix, or Coumadin the day after the procedure unless otherwise instructed.    SEE IMMEDIATE MEDICAL HELP FOR:  Severe increase in your usual pain or appearance of new pain  Prolonged or increasing weakness or numbness in the legs or arms  Drainage, redness, active bleeding, or increased swelling at the injection site  Temperature over 100.0 degrees F.  Headache that increases when your head is upright and decreases when you lie flat    CALL 911 OR GO DIRECTLY TO EMERGENCY DEPARTMENT FOR:  Shortness of breath, chest pain, or problems breathing      Recovery After Procedural Sedation (Adult)  You have been given medicine by vein to make you sleep during your surgery. This may have included both a pain medicine and sleeping medicine. Most of the effects have worn off. But you may still have some drowsiness for the next 6 to 8 hours.  Home care  Follow these guidelines when you get home:  · For the next 8 hours, you should be watched by a responsible adult. This person should make sure your condition is not getting worse.  · Don't drink any alcohol for the next 24 hours.  · Don't drive, operate dangerous machinery, or make important business or personal decisions during the next 24 hours.  Note: Your healthcare provider may tell you not to take any medicine by mouth for pain or sleep in the next 4 hours. These medicines may react with the medicines you were given in the hospital. This could cause a much stronger response than  usual.  Follow-up care  Follow up with your healthcare provider if you are not alert and back to your usual level of activity within 12 hours.  When to seek medical advice  Call your healthcare provider right away if any of these occur:  · Drowsiness gets worse  · Weakness or dizziness gets worse  · Repeated vomiting  · You can't be awakened   Date Last Reviewed: 10/18/2016  © 8220-8660 [employer]. [address] [place] [address], [place] [address]. All rights reserved. This information is not intended as a substitute for professional medical care. Always follow your healthcare professional's instructions.

## 2019-06-19 NOTE — DISCHARGE SUMMARY
[facility]  Discharge Note  Short Stay    Admit Date: 6/19/2019    Discharge Date: 6/19/2019    Attending Physician: Jakub Greer MD     Discharge Provider: Jakub Greer    Diagnoses:  Active Hospital Problems    Diagnosis  POA    *Right lumbar radiculopathy [M54.16]  Yes      Resolved Hospital Problems   No resolved problems to display.       Discharged Condition: good    Final Diagnoses: Right lumbar radiculopathy [M54.16]    Disposition: Home or Self Care    Hospital Course: no complications, uneventful    Outcome of Hospitalization, Treatment, Procedure, or Surgery:  Patient was admitted for outpatient procedure. The patient underwent procedure without complications and are discharged home    Follow up/Patient Instructions:  Follow up as scheduled in Pain Management clinic in 3-4 weeks/Patient has received instructions and follow up date and time    Medications:  Continue previous medications    Discharge Procedure Orders   Call MD for:  temperature >100.4     Call MD for:  severe uncontrolled pain     Call MD for:  redness, tenderness, or signs of infection (pain, swelling, redness, odor or green/yellow discharge around incision site)     Call MD for:  severe persistent headache     No dressing needed         Discharge Procedure Orders (must include Diet, Follow-up, Activity):   Discharge Procedure Orders (must include Diet, Follow-up, Activity)   Call MD for:  temperature >100.4     Call MD for:  severe uncontrolled pain     Call MD for:  redness, tenderness, or signs of infection (pain, swelling, redness, odor or green/yellow discharge around incision site)     Call MD for:  severe persistent headache     No dressing needed

## 2019-06-20 VITALS
OXYGEN SATURATION: 97 % | RESPIRATION RATE: 16 BRPM | DIASTOLIC BLOOD PRESSURE: 57 MMHG | SYSTOLIC BLOOD PRESSURE: 122 MMHG | HEIGHT: 63 IN | BODY MASS INDEX: 26.4 KG/M2 | HEART RATE: 71 BPM | WEIGHT: 149 LBS | TEMPERATURE: 97 F

## 2019-06-25 ENCOUNTER — TELEPHONE (OUTPATIENT)
Dept: FAMILY MEDICINE | Facility: CLINIC | Age: 74
End: 2019-06-25

## 2019-06-25 NOTE — TELEPHONE ENCOUNTER
----- Message from Suman Gallagher sent at 6/25/2019 10:38 AM CDT -----  Type: Needs Medical Advice    Who Called:  Patient    Best Call Back Number: [phone]  Additional Information: Patient states that she needs to drop off a form for clearance for cataract surgery.  Please call to advise.

## 2019-07-03 ENCOUNTER — OFFICE VISIT (OUTPATIENT)
Dept: FAMILY MEDICINE | Facility: CLINIC | Age: 74
End: 2019-07-03
Payer: MEDICARE

## 2019-07-03 VITALS
HEIGHT: 63 IN | DIASTOLIC BLOOD PRESSURE: 62 MMHG | WEIGHT: 147.25 LBS | OXYGEN SATURATION: 97 % | HEART RATE: 83 BPM | BODY MASS INDEX: 26.09 KG/M2 | TEMPERATURE: 98 F | SYSTOLIC BLOOD PRESSURE: 110 MMHG

## 2019-07-03 DIAGNOSIS — H26.8 OTHER CATARACT OF LEFT EYE: ICD-10-CM

## 2019-07-03 DIAGNOSIS — Z01.818 PRE-OPERATIVE CLEARANCE: Primary | ICD-10-CM

## 2019-07-03 DIAGNOSIS — I65.29 STENOSIS OF CAROTID ARTERY, UNSPECIFIED LATERALITY: ICD-10-CM

## 2019-07-03 PROCEDURE — 1101F PT FALLS ASSESS-DOCD LE1/YR: CPT | Mod: HCNC,CPTII,S$GLB, | Performed by: NURSE PRACTITIONER

## 2019-07-03 PROCEDURE — 1101F PR PT FALLS ASSESS DOC 0-1 FALLS W/OUT INJ PAST YR: ICD-10-PCS | Mod: HCNC,CPTII,S$GLB, | Performed by: NURSE PRACTITIONER

## 2019-07-03 PROCEDURE — 99999 PR PBB SHADOW E&M-EST. PATIENT-LVL IV: CPT | Mod: PBBFAC,HCNC,, | Performed by: NURSE PRACTITIONER

## 2019-07-03 PROCEDURE — 99214 OFFICE O/P EST MOD 30 MIN: CPT | Mod: HCNC,S$GLB,, | Performed by: NURSE PRACTITIONER

## 2019-07-03 PROCEDURE — 3074F SYST BP LT 130 MM HG: CPT | Mod: HCNC,CPTII,S$GLB, | Performed by: NURSE PRACTITIONER

## 2019-07-03 PROCEDURE — 99214 PR OFFICE/OUTPT VISIT, EST, LEVL IV, 30-39 MIN: ICD-10-PCS | Mod: HCNC,S$GLB,, | Performed by: NURSE PRACTITIONER

## 2019-07-03 PROCEDURE — 3078F PR MOST RECENT DIASTOLIC BLOOD PRESSURE < 80 MM HG: ICD-10-PCS | Mod: HCNC,CPTII,S$GLB, | Performed by: NURSE PRACTITIONER

## 2019-07-03 PROCEDURE — 3074F PR MOST RECENT SYSTOLIC BLOOD PRESSURE < 130 MM HG: ICD-10-PCS | Mod: HCNC,CPTII,S$GLB, | Performed by: NURSE PRACTITIONER

## 2019-07-03 PROCEDURE — 3078F DIAST BP <80 MM HG: CPT | Mod: HCNC,CPTII,S$GLB, | Performed by: NURSE PRACTITIONER

## 2019-07-03 PROCEDURE — 99999 PR PBB SHADOW E&M-EST. PATIENT-LVL IV: ICD-10-PCS | Mod: PBBFAC,HCNC,, | Performed by: NURSE PRACTITIONER

## 2019-07-03 NOTE — PROGRESS NOTES
Subjective:       Patient ID: Haley Castro is a 74 y.o. female.    Chief Complaint: pre op clearance (here for pre op for cataracts surgery)  She was last seen in primary care by [facility]ger on 02/25/2019.  I last saw her on 09/11/2018.  HPI   She is here today to have clearance for cataract surgery to left eye on 07/31/2019 per Michael Mendoza in [place].   Vitals:    07/03/19 1403   BP: 110/62   Pulse: 83   Temp: 97.8 °F (36.6 °C)     Past Medical History:   Diagnosis Date    Anticoagulant long-term use     Arthritis     Carotid artery occlusion     Depression 11/29/2012    Diverticulosis     Former smoker     GERD (gastroesophageal reflux disease)     HLD (hyperlipidemia) 11/29/2012    HTN (hypertension) 11/29/2012    Infectious gastroenteritis     Ischemic colitis      Lab Results   Component Value Date    WBC 7.69 01/31/2019    HGB 13.4 01/31/2019    HCT 41.9 01/31/2019    MCV 95 01/31/2019     (H) 01/31/2019     CMP  Sodium   Date Value Ref Range Status   01/31/2019 141 136 - 145 mmol/L Final     Potassium   Date Value Ref Range Status   01/31/2019 4.4 3.5 - 5.1 mmol/L Final     Chloride   Date Value Ref Range Status   01/31/2019 103 95 - 110 mmol/L Final     CO2   Date Value Ref Range Status   01/31/2019 30 (H) 23 - 29 mmol/L Final     Glucose   Date Value Ref Range Status   01/31/2019 107 70 - 110 mg/dL Final     BUN, Bld   Date Value Ref Range Status   01/31/2019 18 8 - 23 mg/dL Final     Creatinine   Date Value Ref Range Status   01/31/2019 0.7 0.5 - 1.4 mg/dL Final     Calcium   Date Value Ref Range Status   01/31/2019 10.2 8.7 - 10.5 mg/dL Final     Total Protein   Date Value Ref Range Status   01/31/2019 7.4 6.0 - 8.4 g/dL Final     Albumin   Date Value Ref Range Status   01/31/2019 3.7 3.5 - 5.2 g/dL Final     Total Bilirubin   Date Value Ref Range Status   01/31/2019 0.7 0.1 - 1.0 mg/dL Final     Comment:     For infants and newborns, interpretation of results should be based  on  gestational age, weight and in agreement with clinical  observations.  Premature Infant recommended reference ranges:  Up to 24 hours.............<8.0 mg/dL  Up to 48 hours............<12.0 mg/dL  3-5 days..................<15.0 mg/dL  6-29 days.................<15.0 mg/dL       Alkaline Phosphatase   Date Value Ref Range Status   01/31/2019 90 55 - 135 U/L Final     AST   Date Value Ref Range Status   01/31/2019 23 10 - 40 U/L Final     ALT   Date Value Ref Range Status   01/31/2019 14 10 - 44 U/L Final     Anion Gap   Date Value Ref Range Status   01/31/2019 8 8 - 16 mmol/L Final     eGFR if    Date Value Ref Range Status   01/31/2019 >60.0 >60 mL/min/1.73 m^2 Final     eGFR if non    Date Value Ref Range Status   01/31/2019 >60.0 >60 mL/min/1.73 m^2 Final     Comment:     Calculation used to obtain the estimated glomerular filtration  rate (eGFR) is the CKD-EPI equation.        Lab Results   Component Value Date    HGBA1C 5.8 06/27/2016     Anticoagulants: YES (Daily aspirin)    Review of Systems   Constitutional: Negative for fever.   Eyes: Positive for visual disturbance. Negative for photophobia, pain, discharge, redness and itching.       Objective:      Physical Exam   Constitutional: She is oriented to person, place, and time. Vital signs are normal. She appears well-developed and well-nourished. She is active and cooperative.   HENT:   Head: Normocephalic and atraumatic.   Right Ear: Hearing, tympanic membrane, external ear and ear canal normal.   Left Ear: Hearing, tympanic membrane, external ear and ear canal normal.   Nose: Nose normal.   Mouth/Throat: Uvula is midline, oropharynx is clear and moist and mucous membranes are normal.   Eyes: Pupils are equal, round, and reactive to light. Conjunctivae, EOM and lids are normal. Right eye exhibits no chemosis, no discharge, no exudate and no hordeolum. No foreign body present in the right eye. Left eye exhibits no chemosis,  no discharge, no exudate and no hordeolum. No foreign body present in the left eye. No scleral icterus.   Neck: Trachea normal, normal range of motion, full passive range of motion without pain and phonation normal. Neck supple.   Cardiovascular: Normal rate, regular rhythm, normal heart sounds and intact distal pulses.   Pulmonary/Chest: Effort normal and breath sounds normal.   Abdominal: Soft. Bowel sounds are normal. There is no tenderness.   Musculoskeletal: Normal range of motion.   Lymphadenopathy:        Head (right side): No submental, no submandibular, no tonsillar, no preauricular, no posterior auricular and no occipital adenopathy present.        Head (left side): No submental, no submandibular, no tonsillar, no preauricular, no posterior auricular and no occipital adenopathy present.     She has no cervical adenopathy.   Neurological: She is alert and oriented to person, place, and time.   Skin: Skin is warm and dry. Laceration noted.        scratch area noted from cat a few weeks ago   Psychiatric: She has a normal mood and affect. Her speech is normal and behavior is normal. Judgment and thought content normal. Cognition and memory are normal.   Nursing note and vitals reviewed.      Assessment & Plan:       Pre-operative clearance    Other cataract of left eye    Stenosis of carotid artery, unspecified laterality        Above form faxed to number on paper and original returned to patient with a printed copy of her last EKG.      Medication List with Changes/Refills   Current Medications    AMLODIPINE (NORVASC) 5 MG TABLET    Take 1 tablet (5 mg total) by mouth once daily.    ASPIRIN (ECOTRIN) 325 MG EC TABLET    Take 1 tablet (325 mg total) by mouth once daily.    ATORVASTATIN (LIPITOR) 40 MG TABLET    Take 1 tablet (40 mg total) by mouth once daily.    CARVEDILOL (COREG) 3.125 MG TABLET    TAKE 1 TABLET (3.125 MG TOTAL) BY MOUTH 2 (TWO) TIMES DAILY.    CONJUGATED ESTROGENS (PREMARIN) VAGINAL CREAM     Place 0.5 g vaginally once daily.    DULOXETINE (CYMBALTA) 60 MG CAPSULE    Take 1 capsule (60 mg total) by mouth once daily.    EZETIMIBE (ZETIA) 10 MG TABLET    Take 1 tablet (10 mg total) by mouth every evening.    GABAPENTIN (NEURONTIN) 100 MG CAPSULE    Take 1 capsule (100 mg total) by mouth 3 (three) times daily.    LOSARTAN (COZAAR) 100 MG TABLET    Take 1 tablet (100 mg total) by mouth once daily.    PANTOPRAZOLE (PROTONIX) 40 MG TABLET    Take 1 tablet (40 mg total) by mouth before breakfast.   Discontinued Medications    GABAPENTIN (NEURONTIN) 100 MG CAPSULE    Take 1 capsule (100 mg total) by mouth 3 (three) times daily. Will restart 06/20/2018    TRAMADOL (ULTRAM) 50 MG TABLET    Take 1 tablet (50 mg total) by mouth every 12 (twelve) hours as needed for Pain.    TRAMADOL (ULTRAM) 50 MG TABLET    Take 1 tablet (50 mg total) by mouth every 12 (twelve) hours as needed for Pain.     No follow-ups on file.

## 2019-07-04 DIAGNOSIS — I10 ESSENTIAL HYPERTENSION: ICD-10-CM

## 2019-07-08 RX ORDER — AMLODIPINE BESYLATE 5 MG/1
TABLET ORAL
Qty: 90 TABLET | Refills: 4 | Status: SHIPPED | OUTPATIENT
Start: 2019-07-08 | End: 2019-08-02 | Stop reason: SDUPTHER

## 2019-07-15 ENCOUNTER — TELEPHONE (OUTPATIENT)
Dept: FAMILY MEDICINE | Facility: CLINIC | Age: 74
End: 2019-07-15

## 2019-07-15 NOTE — TELEPHONE ENCOUNTER
----- Message from Yamilka Mendieta sent at 7/15/2019  7:34 AM CDT -----  Contact: Self  Type: Needs Medical Advice    Who Called:  patient  Symptoms (please be specific):  Starting last Tuesday and Wednesday she had diarrhea along with severe cramps  How long has patient had these symptoms: Now she can't go to the bathroom  Pharmacy name and phone #:    [id number] [place] [facility] [address] [facility]  [address] [address]  Phone: [phone] Fax: [phone]  Best Call Back Number: [phone] (home)   Additional Information: She has not had a BM since then and wants to know what she can take to go to the bathroom now. Please call back to advise.

## 2019-07-17 ENCOUNTER — OFFICE VISIT (OUTPATIENT)
Dept: PAIN MEDICINE | Facility: CLINIC | Age: 74
End: 2019-07-17
Payer: MEDICARE

## 2019-07-17 VITALS
BODY MASS INDEX: 26.08 KG/M2 | WEIGHT: 147.19 LBS | DIASTOLIC BLOOD PRESSURE: 75 MMHG | HEART RATE: 77 BPM | HEIGHT: 63 IN | SYSTOLIC BLOOD PRESSURE: 137 MMHG

## 2019-07-17 DIAGNOSIS — M54.16 RIGHT LUMBAR RADICULOPATHY: Primary | ICD-10-CM

## 2019-07-17 DIAGNOSIS — M47.816 LUMBAR SPONDYLOSIS: ICD-10-CM

## 2019-07-17 PROCEDURE — 99213 OFFICE O/P EST LOW 20 MIN: CPT | Mod: HCNC,S$GLB,, | Performed by: ANESTHESIOLOGY

## 2019-07-17 PROCEDURE — 99213 PR OFFICE/OUTPT VISIT, EST, LEVL III, 20-29 MIN: ICD-10-PCS | Mod: HCNC,S$GLB,, | Performed by: ANESTHESIOLOGY

## 2019-07-17 PROCEDURE — 99999 PR PBB SHADOW E&M-EST. PATIENT-LVL III: CPT | Mod: PBBFAC,HCNC,, | Performed by: ANESTHESIOLOGY

## 2019-07-17 PROCEDURE — 99999 PR PBB SHADOW E&M-EST. PATIENT-LVL III: ICD-10-PCS | Mod: PBBFAC,HCNC,, | Performed by: ANESTHESIOLOGY

## 2019-07-17 PROCEDURE — 3075F SYST BP GE 130 - 139MM HG: CPT | Mod: HCNC,CPTII,S$GLB, | Performed by: ANESTHESIOLOGY

## 2019-07-17 PROCEDURE — 1101F PR PT FALLS ASSESS DOC 0-1 FALLS W/OUT INJ PAST YR: ICD-10-PCS | Mod: HCNC,CPTII,S$GLB, | Performed by: ANESTHESIOLOGY

## 2019-07-17 PROCEDURE — 3078F DIAST BP <80 MM HG: CPT | Mod: HCNC,CPTII,S$GLB, | Performed by: ANESTHESIOLOGY

## 2019-07-17 PROCEDURE — 3078F PR MOST RECENT DIASTOLIC BLOOD PRESSURE < 80 MM HG: ICD-10-PCS | Mod: HCNC,CPTII,S$GLB, | Performed by: ANESTHESIOLOGY

## 2019-07-17 PROCEDURE — 1101F PT FALLS ASSESS-DOCD LE1/YR: CPT | Mod: HCNC,CPTII,S$GLB, | Performed by: ANESTHESIOLOGY

## 2019-07-17 PROCEDURE — 3075F PR MOST RECENT SYSTOLIC BLOOD PRESS GE 130-139MM HG: ICD-10-PCS | Mod: HCNC,CPTII,S$GLB, | Performed by: ANESTHESIOLOGY

## 2019-07-18 ENCOUNTER — TELEPHONE (OUTPATIENT)
Dept: GASTROENTEROLOGY | Facility: CLINIC | Age: 74
End: 2019-07-18

## 2019-07-18 ENCOUNTER — OFFICE VISIT (OUTPATIENT)
Dept: FAMILY MEDICINE | Facility: CLINIC | Age: 74
End: 2019-07-18
Payer: MEDICARE

## 2019-07-18 VITALS
BODY MASS INDEX: 26.13 KG/M2 | HEART RATE: 88 BPM | DIASTOLIC BLOOD PRESSURE: 78 MMHG | SYSTOLIC BLOOD PRESSURE: 138 MMHG | WEIGHT: 147.5 LBS | HEIGHT: 63 IN | OXYGEN SATURATION: 97 %

## 2019-07-18 DIAGNOSIS — K59.00 CONSTIPATION, UNSPECIFIED CONSTIPATION TYPE: Primary | ICD-10-CM

## 2019-07-18 PROCEDURE — 99999 PR PBB SHADOW E&M-EST. PATIENT-LVL III: CPT | Mod: PBBFAC,HCNC,, | Performed by: INTERNAL MEDICINE

## 2019-07-18 PROCEDURE — 3075F PR MOST RECENT SYSTOLIC BLOOD PRESS GE 130-139MM HG: ICD-10-PCS | Mod: HCNC,CPTII,S$GLB, | Performed by: INTERNAL MEDICINE

## 2019-07-18 PROCEDURE — 99213 OFFICE O/P EST LOW 20 MIN: CPT | Mod: HCNC,S$GLB,, | Performed by: INTERNAL MEDICINE

## 2019-07-18 PROCEDURE — 99213 PR OFFICE/OUTPT VISIT, EST, LEVL III, 20-29 MIN: ICD-10-PCS | Mod: HCNC,S$GLB,, | Performed by: INTERNAL MEDICINE

## 2019-07-18 PROCEDURE — 1101F PR PT FALLS ASSESS DOC 0-1 FALLS W/OUT INJ PAST YR: ICD-10-PCS | Mod: HCNC,CPTII,S$GLB, | Performed by: INTERNAL MEDICINE

## 2019-07-18 PROCEDURE — 3078F DIAST BP <80 MM HG: CPT | Mod: HCNC,CPTII,S$GLB, | Performed by: INTERNAL MEDICINE

## 2019-07-18 PROCEDURE — 3078F PR MOST RECENT DIASTOLIC BLOOD PRESSURE < 80 MM HG: ICD-10-PCS | Mod: HCNC,CPTII,S$GLB, | Performed by: INTERNAL MEDICINE

## 2019-07-18 PROCEDURE — 3075F SYST BP GE 130 - 139MM HG: CPT | Mod: HCNC,CPTII,S$GLB, | Performed by: INTERNAL MEDICINE

## 2019-07-18 PROCEDURE — 1101F PT FALLS ASSESS-DOCD LE1/YR: CPT | Mod: HCNC,CPTII,S$GLB, | Performed by: INTERNAL MEDICINE

## 2019-07-18 PROCEDURE — 99999 PR PBB SHADOW E&M-EST. PATIENT-LVL III: ICD-10-PCS | Mod: PBBFAC,HCNC,, | Performed by: INTERNAL MEDICINE

## 2019-07-18 NOTE — PROGRESS NOTES
"  Subjective     Haley Castro is a 74 y.o. old, female here for Abdominal Pain (1 week)    Patient presents with 1 week of constipation and lower quadrant abdominal pain.  She has a history of ischemic colitis 3 years ago with dissimilar symptoms. 10 days ago she had a few loose stools and since then has not had a significant bowel movement. She has sat a few times and had a little liquid but no significant BM. She was instructed over the phone to take colace which had no effect. She took one dose of miralax earlier with no effect.       Review of Systems   Constitutional: Negative.    Gastrointestinal: Positive for abdominal pain and constipation. Negative for blood in stool, melena and vomiting.   Genitourinary: Positive for urgency.     Medications     Outpatient Medications Marked as Taking for the 7/18/19 encounter (Office Visit) with Teo Guillen MD   Medication Sig Dispense Refill    amLODIPine (NORVASC) 5 MG tablet TAKE 1 TABLET ONCE A DAY 90 tablet 4    atorvastatin (LIPITOR) 40 MG tablet Take 1 tablet (40 mg total) by mouth once daily. 90 tablet 1    carvedilol (COREG) 3.125 MG tablet TAKE 1 TABLET (3.125 MG TOTAL) BY MOUTH 2 (TWO) TIMES DAILY. 180 tablet 4    conjugated estrogens (PREMARIN) vaginal cream Place 0.5 g vaginally once daily. 45 g 5    DULoxetine (CYMBALTA) 60 MG capsule Take 1 capsule (60 mg total) by mouth once daily. 90 capsule 3    ezetimibe (ZETIA) 10 mg tablet Take 1 tablet (10 mg total) by mouth every evening. 90 tablet 4    gabapentin (NEURONTIN) 100 MG capsule Take 1 capsule (100 mg total) by mouth 3 (three) times daily. 90 capsule 2    losartan (COZAAR) 100 MG tablet Take 1 tablet (100 mg total) by mouth once daily. 90 tablet 1    pantoprazole (PROTONIX) 40 MG tablet Take 1 tablet (40 mg total) by mouth before breakfast. 90 tablet 3     Objective     /78   Pulse 88   Ht 5' 3" (1.6 m)   Wt 66.9 kg (147 lb 7.8 oz)   LMP 03/28/1980   SpO2 97%   BMI 26.13 " kg/m²   Physical Exam   Constitutional: She appears well-developed. No distress.   Abdominal: Soft. Bowel sounds are increased. There is tenderness in the right lower quadrant, suprapubic area and left lower quadrant. There is no rebound.     Assessment and Plan     1. Constipation, unspecified constipation type  Will start a cleanout with 10 ml mag citrate and miralax she has already purchased. Take mag citrate, and take 17 g miralax every 30 minutes. Enema if necessary tomorrow. Doubt ischemic colitis but consider imaging if needed.    ___________________  Teo Guillen MD  Internal Medicine and Pediatrics

## 2019-07-18 NOTE — TELEPHONE ENCOUNTER
----- Message from Jose Savage sent at 7/17/2019  7:28 AM CDT -----  Contact: same  Patient called in and stated she has not had a bowel movement in over 5 days & would like to talk to someone to see what she should do.  Patient call back number is [phone].  Patient asked to please call ASAP

## 2019-07-19 ENCOUNTER — NURSE TRIAGE (OUTPATIENT)
Dept: ADMINISTRATIVE | Facility: CLINIC | Age: 74
End: 2019-07-19

## 2019-07-19 NOTE — TELEPHONE ENCOUNTER
Reason for Disposition   SEVERE abdominal pain (e.g., excruciating)    Protocols used: ABDOMINAL PAIN - FEMALE-A-OH    Pt reports that she was seen by GI yesterday for constipation and given instructions for laxatives. Pt reports that she is going to the bathroom but she is having severe abdominal pain. Rating pain 10/10. Advised ER

## 2019-07-25 ENCOUNTER — HOSPITAL ENCOUNTER (OUTPATIENT)
Dept: RADIOLOGY | Facility: HOSPITAL | Age: 74
Discharge: HOME | End: 2019-07-25
Attending: NURSE PRACTITIONER
Payer: MEDICARE

## 2019-07-25 ENCOUNTER — OFFICE VISIT (OUTPATIENT)
Dept: GASTROENTEROLOGY | Facility: CLINIC | Age: 74
End: 2019-07-25
Payer: MEDICARE

## 2019-07-25 VITALS
BODY MASS INDEX: 25.94 KG/M2 | DIASTOLIC BLOOD PRESSURE: 84 MMHG | RESPIRATION RATE: 16 BRPM | HEIGHT: 63 IN | HEART RATE: 79 BPM | WEIGHT: 146.38 LBS | SYSTOLIC BLOOD PRESSURE: 154 MMHG

## 2019-07-25 DIAGNOSIS — Z79.01 ANTICOAGULANT LONG-TERM USE: ICD-10-CM

## 2019-07-25 DIAGNOSIS — R19.4 CHANGE IN BOWEL HABITS: ICD-10-CM

## 2019-07-25 DIAGNOSIS — R19.5 RED STOOL: ICD-10-CM

## 2019-07-25 DIAGNOSIS — K59.00 CONSTIPATION, UNSPECIFIED CONSTIPATION TYPE: ICD-10-CM

## 2019-07-25 DIAGNOSIS — R10.9 ABDOMINAL CRAMPING: ICD-10-CM

## 2019-07-25 DIAGNOSIS — K59.00 CONSTIPATION, UNSPECIFIED CONSTIPATION TYPE: Primary | ICD-10-CM

## 2019-07-25 PROCEDURE — 99499 RISK ADDL DX/OHS AUDIT: ICD-10-PCS | Mod: HCNC,S$GLB,, | Performed by: NURSE PRACTITIONER

## 2019-07-25 PROCEDURE — 3079F PR MOST RECENT DIASTOLIC BLOOD PRESSURE 80-89 MM HG: ICD-10-PCS | Mod: HCNC,CPTII,S$GLB, | Performed by: NURSE PRACTITIONER

## 2019-07-25 PROCEDURE — 74019 RADEX ABDOMEN 2 VIEWS: CPT | Mod: 26,HCNC,, | Performed by: RADIOLOGY

## 2019-07-25 PROCEDURE — 99499 UNLISTED E&M SERVICE: CPT | Mod: HCNC,S$GLB,, | Performed by: NURSE PRACTITIONER

## 2019-07-25 PROCEDURE — 74019 RADEX ABDOMEN 2 VIEWS: CPT | Mod: TC,HCNC,FY,PO

## 2019-07-25 PROCEDURE — 99999 PR PBB SHADOW E&M-EST. PATIENT-LVL V: ICD-10-PCS | Mod: PBBFAC,HCNC,, | Performed by: NURSE PRACTITIONER

## 2019-07-25 PROCEDURE — 99999 PR PBB SHADOW E&M-EST. PATIENT-LVL V: CPT | Mod: PBBFAC,HCNC,, | Performed by: NURSE PRACTITIONER

## 2019-07-25 PROCEDURE — 99214 OFFICE O/P EST MOD 30 MIN: CPT | Mod: HCNC,S$GLB,, | Performed by: NURSE PRACTITIONER

## 2019-07-25 PROCEDURE — 74019 XR ABDOMEN FLAT AND ERECT: ICD-10-PCS | Mod: 26,HCNC,, | Performed by: RADIOLOGY

## 2019-07-25 PROCEDURE — 3077F PR MOST RECENT SYSTOLIC BLOOD PRESSURE >= 140 MM HG: ICD-10-PCS | Mod: HCNC,CPTII,S$GLB, | Performed by: NURSE PRACTITIONER

## 2019-07-25 PROCEDURE — 1101F PT FALLS ASSESS-DOCD LE1/YR: CPT | Mod: HCNC,CPTII,S$GLB, | Performed by: NURSE PRACTITIONER

## 2019-07-25 PROCEDURE — 99214 PR OFFICE/OUTPT VISIT, EST, LEVL IV, 30-39 MIN: ICD-10-PCS | Mod: HCNC,S$GLB,, | Performed by: NURSE PRACTITIONER

## 2019-07-25 PROCEDURE — 3079F DIAST BP 80-89 MM HG: CPT | Mod: HCNC,CPTII,S$GLB, | Performed by: NURSE PRACTITIONER

## 2019-07-25 PROCEDURE — 1101F PR PT FALLS ASSESS DOC 0-1 FALLS W/OUT INJ PAST YR: ICD-10-PCS | Mod: HCNC,CPTII,S$GLB, | Performed by: NURSE PRACTITIONER

## 2019-07-25 PROCEDURE — 3077F SYST BP >= 140 MM HG: CPT | Mod: HCNC,CPTII,S$GLB, | Performed by: NURSE PRACTITIONER

## 2019-07-25 RX ORDER — POLYETHYLENE GLYCOL 3350 17 G/17G
17 POWDER, FOR SOLUTION ORAL DAILY
Qty: 510 G | Refills: 2 | Status: SHIPPED | OUTPATIENT
Start: 2019-07-25 | End: 2019-08-10

## 2019-07-25 NOTE — PATIENT INSTRUCTIONS
Constipation (Adult)  Constipation means that you have bowel movements that are less frequent than usual. Stools often become very hard and difficult to pass.  Constipation is very common. At some point in life it affects almost everyone. Since everyone's bowel habits are different, what is constipation to one person may not be to another. Your healthcare provider may do tests to diagnose constipation. It depends on what he or she finds when evaluating you.    Symptoms of constipation include:  · Abdominal pain  · Bloating  · Vomiting  · Painful bowel movements  · Itching, swelling, bleeding, or pain around the anus  Causes  Constipation can have many causes. These include:  · Diet low in fiber  · Too much dairy  · Not drinking enough liquids  · Lack of exercise or physical activity. This is especially true for older adults.  · Changes in lifestyle or daily routine, including pregnancy, aging, work, and travel  · Frequent use or misuse of laxatives  · Ignoring the urge to have a bowel movement or delaying it until later  · Medicines, such as certain prescription pain medicines, iron supplements, antacids, certain antidepressants, and calcium supplements  · Diseases like irritable bowel syndrome, bowel obstructions, stroke, diabetes, thyroid disease, Parkinson disease, hemorrhoids, and colon cancer  Complications  Potential complications of constipation can include:  · Hemorrhoids  · Rectal bleeding from hemorrhoids or anal fissures (skin tears)  · Hernias  · Dependency on laxatives  · Chronic constipation  · Fecal impaction  · Bowel obstruction or perforation  Home care  All treatment should be done after talking with your healthcare provider. This is especially true if you have another medical problems, are taking prescription medicines, or are an older adult. Treatment most often involves lifestyle changes. You may also need medicines. Your healthcare provider will tell you which will work best for you. Follow the  advice below to help avoid this problem in the future.  Lifestyle changes  These lifestyle changes can help prevent constipation:  · Diet. Eat a high-fiber diet, with fresh fruit and vegetables, and reduce dairy intake, meats, and processed foods  · Fluids. It's important to get enough fluids each day. Drink plenty of water when you eat more fiber. If you are on diet that limits the amount of fluid you can have, talk about this with your healthcare provider.  · Regular exercise. Check with your healthcare provider first.  Medications  Take any medicines as directed. Some laxatives are safe to use only every now and then. Others can be taken on a regular basis. Talk with your doctor or pharmacist if you have questions.  Prescription pain medicines can cause constipation. If you are taking this kind of medicine, ask your healthcare provider if you should also take a stool softener.  Medicines you may take to treat constipation include:  · Fiber supplements  · Stool softeners  · Laxatives  · Enemas  · Rectal suppositories  Follow-up care  Follow up with your healthcare provider if symptoms don't get better in the next few days. You may need to have more tests or see a specialist.  Call 911  Call 911 if any of these occur:  · Trouble breathing  · Stiff, rigid abdomen that is severely painful to touch  · Confusion  · Fainting or loss of consciousness  · Rapid heart rate  · Chest pain  When to seek medical advice  Call your healthcare provider right away if any of these occur:  · Fever over 100.4°F (38°C)  · Failure to resume normal bowel movements  · Pain in your abdomen or back gets worse  · Nausea or vomiting  · Swelling in your abdomen  · Blood in the stool  · Black, tarry stool  · Involuntary weight loss  · Weakness  Date Last Reviewed: 12/30/2015  © 9815-5400 [employer] [address] [place] [address], [place] [address]. All rights reserved. This information is not intended as a substitute for professional  medical care. Always follow your healthcare professional's instructions.    Eating a High-Fiber Diet  Fiber is what gives strength and structure to plants. Most grains, beans, vegetables, and fruits contain fiber. Foods rich in fiber are often low in calories and fat, and they fill you up more. They may also reduce your risks for certain health problems. To find out the amount of fiber in canned, packaged, or frozen foods, read the Nutrition Facts label. It tells you how much fiber is in a serving.    Types of fiber and their benefits  There are two types of fiber: insoluble and soluble. They both aid digestion and help you maintain a healthy weight.  · Insoluble fiber. This is found in whole grains, cereals, certain fruits and vegetables such as apple skin, corn, and carrots. Insoluble fiber may prevent constipation and reduce the risk for certain types of cancer.  · Soluble fiber. This type of fiber is in oats, beans, and certain fruits and vegetables such as strawberries and peas. Soluble fiber can reduce cholesterol, which may help lower the risk for heart disease. It also helps control blood sugar levels.  Look for high-fiber foods  Try these foods to add fiber to your diet:  · Whole-grain breads and cereals. Try to eat 6 to 8 ounces a day. Include wheat and oat bran cereals, whole-wheat muffins or toast, and corn tortillas in your meals.  · Fruits. Try to eat 2 cups a day. Apples, oranges, strawberries, pears, and bananas are good sources. (Note: Fruit juice is low in fiber.)  · Vegetables. Try to eat at least 2.5 cups a day. Add asparagus, carrots, broccoli, peas, and corn to your meals.  · Beans. One cup of cooked lentils gives you over 15 grams of fiber. Try navy beans, lentils, and chickpeas.  · Seeds. A small handful of seeds gives you about 3 grams of fiber. Try sunflower seeds.  Keep track of your fiber  Keep track of how much fiber you eat. Start by reading food labels. Then eat a variety of foods high  in fiber. As you begin to eat more fiber, ask your healthcare provider how much water you should be drinking to keep your digestive system working smoothly.  You should aim for a certain amount of fiber in your diet each day. If you are a woman, that amount is between 25 and 28 grams per day. Men should aim for 30 to 33 grams per day. After age 50, your daily fiber needs drop to 22 grams for women and 28 grams for men.  Before you reach for the fiber supplements, think about this. Fiber is found naturally in healthy whole foods. It gives you that feeling of fullness after you eat. Taking fiber supplements or eating fiber-enriched foods will not give you this full feeling.  Your fiber intake is a good measure for the quality of your overall diet. If you are missing out on your daily amount of fiber, you may be lacking other important nutrients as well.  Date Last Reviewed: 5/11/2015  © 6063-3916 [employer]. [address] [place] [address], [place] [address]. All rights reserved. This information is not intended as a substitute for professional medical care. Always follow your healthcare professional's instructions.

## 2019-07-25 NOTE — PROGRESS NOTES
Subjective:       Patient ID: Haley Castro is a 74 y.o. female Body mass index is 25.93 kg/m².    Chief Complaint: Constipation (last BM 7/20/19)    Established patient of Dr. Sousa & myself.    Constipation   This is a new problem. Episode onset: started ~2 weeks ago. The problem is unchanged. Her stool frequency is 1 time per day (small pellet-like stool; red stool at times). The stool is described as pellet like and firm. The patient is not on a high fiber diet. She exercises regularly. There has not been adequate water intake. Associated symptoms include abdominal pain (lower abdominal cramps; has resolved since she took miralax and magnesium course), bloating, flatus (frequent) and hematochezia. Pertinent negatives include no diarrhea, difficulty urinating, fever, melena, nausea, rectal pain, vomiting or weight loss. She has tried laxatives (PAST: miralax 5 doses and magnesium citrate in 1 day as advised by Dr. Guillen which helped but now back to constipation) for the symptoms.     Review of Systems   Constitutional: Negative for appetite change, chills, fatigue, fever and weight loss.   HENT: Negative for sore throat and trouble swallowing.    Respiratory: Negative for cough, choking, chest tightness and shortness of breath.    Cardiovascular: Negative for chest pain.   Gastrointestinal: Positive for abdominal pain (lower abdominal cramps; has resolved since she took miralax and magnesium course), anal bleeding (pink tinge noted on tissue and a few times noted red stool; denies bleeding in between bowel movement), bloating, blood in stool, constipation, flatus (frequent) and hematochezia. Negative for diarrhea, melena, nausea, rectal pain and vomiting.        History of GERD controlled on protonix 40 mg once daily   Genitourinary: Negative for difficulty urinating, dysuria, flank pain, frequency, pelvic pain and urgency.   Neurological: Negative for weakness.       Patient's last menstrual period was  1980. s/p hysterectomy    Past Medical History:   Diagnosis Date    Anticoagulant long-term use     Arthritis     Carotid artery occlusion     Depression 2012    Diverticulosis     Former smoker     GERD (gastroesophageal reflux disease)     HLD (hyperlipidemia) 2012    HTN (hypertension) 2012    Infectious gastroenteritis     Ischemic colitis      Past Surgical History:   Procedure Laterality Date    ANGIOGRAM-CAROTID Bilateral 2014    Performed by [facility] at [facility]    APPENDECTOMY      Block-nerve-medial branch-lumbar L2,3,4,5 Right 10/15/2018    [facility] at [facility] OR    carotid angiogram       SECTION      x 3     COLONOSCOPY  2009    Dr. Reyes in [place], repeat in 5 years    COLONOSCOPY N/A 2016    Performed by [facility] Jr[facility] at [facility] END[facility]    Endarterectomy-Carotid - RIGHT Right 2018    Performed by Safia Thomas [facility] at [facility] OR    ESOPHAGOGASTRODUODENOSCOPY (EGD) N/A 2017    Performed by [facility] Jr[facility] at [facility] ENDO    HAND SURGERY Right 2016    [facility]/Dr. Abhinav Rolle    HEMORRHOID SURGERY      HYSTERECTOMY  age 35    TAHUSO benign reasons    hysteretomy      Injection,steroid,epidural,transforaminal approach l2/3, L3/4 Right 2018    [facility] at [facility] OR    Injection,steroid,epidural,transforaminal approach L4/5 Right 2018    Per[facility] by [facility] at [facility] OR    Injection-steroid-epidural-lumbar Right 2019    Performed by [facility] at [facility] OR    Injection-steroid-epidural-lumbar N/A 2019    Performed by [facility] at [facility] OR    JOINT REPLACEMENT Right     1st interphalangeal joint of 3rd finger    OOPHORECTOMY      one remains    RADIOFREQUENCY ABLATION, NERVE, SPINAL, LUMBAR, MEDIAL BRANCH, THERMAL- L2,L3,L4,L5 Right 10/25/2018    Performed by [facility]er MD at  [facility] OR    TONSILLECTOMY      UPPER GASTROINTESTINAL ENDOSCOPY  12/14/2017    Dr. Sousa: Tiny gastric islands of salmon-colored mucosa in distal esophagus, gastritis; biopsy: esophagus- reflux esophagitis, negative for lisa's, stomach- reactive gastropathy, negative for h pylori     Family History   Problem Relation Age of Onset    Heart disease Mother 85        MI    Heart disease Father 55        MI    Colon cancer Neg Hx     Colon polyps Neg Hx     Crohn's disease Neg Hx     Ulcerative colitis Neg Hx      Wt Readings from Last 10 Encounters:   07/25/19 66.4 kg (146 lb 6.2 oz)   07/18/19 66.9 kg (147 lb 7.8 oz)   07/17/19 66.7 kg (147 lb 2.5 oz)   07/03/19 66.8 kg (147 lb 4.3 oz)   06/19/19 67.6 kg (149 lb)   06/03/19 67.4 kg (148 lb 9.4 oz)   05/06/19 66.2 kg (146 lb)   03/14/19 68 kg (149 lb 14.6 oz)   03/12/19 68 kg (149 lb 14.6 oz)   02/25/19 69.5 kg (153 lb 3.5 oz)     Lab Results   Component Value Date    WBC 7.69 01/31/2019    HGB 13.4 01/31/2019    HCT 41.9 01/31/2019    MCV 95 01/31/2019     (H) 01/31/2019     CMP  Sodium   Date Value Ref Range Status   01/31/2019 141 136 - 145 mmol/L Final     Potassium   Date Value Ref Range Status   01/31/2019 4.4 3.5 - 5.1 mmol/L Final     Chloride   Date Value Ref Range Status   01/31/2019 103 95 - 110 mmol/L Final     CO2   Date Value Ref Range Status   01/31/2019 30 (H) 23 - 29 mmol/L Final     Glucose   Date Value Ref Range Status   01/31/2019 107 70 - 110 mg/dL Final     BUN, Bld   Date Value Ref Range Status   01/31/2019 18 8 - 23 mg/dL Final     Creatinine   Date Value Ref Range Status   01/31/2019 0.7 0.5 - 1.4 mg/dL Final     Calcium   Date Value Ref Range Status   01/31/2019 10.2 8.7 - 10.5 mg/dL Final     Total Protein   Date Value Ref Range Status   01/31/2019 7.4 6.0 - 8.4 g/dL Final     Albumin   Date Value Ref Range Status   01/31/2019 3.7 3.5 - 5.2 g/dL Final     Total Bilirubin   Date Value Ref Range Status   01/31/2019 0.7 0.1 - 1.0  "mg/dL Final     Comment:     For infants and newborns, interpretation of results should be based  on gestational age, weight and in agreement with clinical  observations.  Premature Infant recommended reference ranges:  Up to 24 hours.............<8.0 mg/dL  Up to 48 hours............<12.0 mg/dL  3-5 days..................<15.0 mg/dL  6-29 days.................<15.0 mg/dL       Alkaline Phosphatase   Date Value Ref Range Status   01/31/2019 90 55 - 135 U/L Final     AST   Date Value Ref Range Status   01/31/2019 23 10 - 40 U/L Final     ALT   Date Value Ref Range Status   01/31/2019 14 10 - 44 U/L Final     Anion Gap   Date Value Ref Range Status   01/31/2019 8 8 - 16 mmol/L Final     eGFR if    Date Value Ref Range Status   01/31/2019 >60.0 >60 mL/min/1.73 m^2 Final     eGFR if non    Date Value Ref Range Status   01/31/2019 >60.0 >60 mL/min/1.73 m^2 Final     Comment:     Calculation used to obtain the estimated glomerular filtration  rate (eGFR) is the CKD-EPI equation.        Lab Results   Component Value Date    LIPASE 45 11/27/2016     Lab Results   Component Value Date    TSH 2.024 01/31/2019     Reviewed prior medical records including 11/28/16 stool studies (negative), 11/27/16 ct abdomen/pelvis, 12/12/16 CTA abdomen/pelvis ("There is calcified plaque deposition within the common iliac arteries resulting in <50% luminal stenosis." "Widely patent origins of the celiac artery and SMA.") & endoscopy history (see surgical history).    11/28/16 Colonoscopy was reviewed and procedure report states:   " Impression:           - Severe Mid and Left-sided ischemic colitis.                        - Ulcerated mucosa in the mid sigmoid colon.                         Biopsied.                        - Congested, erythematous, inflamed and ulcerated                         mucosa in the proximal sigmoid colon, in the                         descending colon, at the splenic flexure and in " "the                         transverse colon.                        - Ulcerated mucosa at the hepatic flexure. Biopsied.                        - Ulcerated mucosa in the mid ascending colon.                         Biopsied.                        - Erythematous mucosa in the cecum.                        - Diverticulosis in the sigmoid colon.                        - Non-bleeding internal hemorrhoids.                        - The examination was otherwise normal.  Recommendation:       - Return patient to [facility] for ongoing care.                        - Clear liquid diet.                        - Check hypercoag panel in the morning.                        - Resume aspirin at full dose tomorrow.                        - Await pathology results.                        - May need to repeat CT scan (computed tomography)                         of the abdomen with IV contrast (CT angiogram). ".  Biopsy results:   "1. CECAL BIOPSIES:  - MODERATE ACUTE COLITIS.    2.-4. ASCENDING, HEPATIC FLEXURE, AND SIGMOID COLON BIOPSIES:  - ISCHEMIC COLITIS."  Objective:      Physical Exam   Constitutional: She is oriented to person, place, and time. She appears well-developed and well-nourished. No distress.   HENT:   Mouth/Throat: Oropharynx is clear and moist and mucous membranes are normal. No oral lesions. No oropharyngeal exudate.   Eyes: Pupils are equal, round, and reactive to light. Conjunctivae are normal. No scleral icterus.   Pulmonary/Chest: Effort normal and breath sounds normal. No respiratory distress. She has no wheezes.   Abdominal: Soft. Normal appearance and bowel sounds are normal. She exhibits no distension, no abdominal bruit and no mass. There is no tenderness. There is no rigidity, no rebound, no guarding, no tenderness at McBurney's point and negative Hyde's sign.   deferred rectal exam.   Neurological: She is alert and oriented to person, place, and time.   Skin: Skin is warm and dry. No rash " noted. She is not diaphoretic. No erythema. No pallor.   Non-jaundiced   Psychiatric: She has a normal mood and affect. Her behavior is normal. Judgment and thought content normal.   Nursing note and vitals reviewed.      Assessment:       1. Constipation, unspecified constipation type    2. Change in bowel habits    3. Red stool    4. Anticoagulant long-term use    5. Abdominal cramping        Plan:      Constipation, unspecified constipation type & Change in bowel habits  -  START   polyethylene glycol (GLYCOLAX) 17 gram/dose powder; Take 17 g by mouth once daily.  Dispense: 510 g; Refill: 2  -     X-Ray Abdomen Flat And Erect; Future; Expected date: 07/25/2019  Recommend daily exercise as tolerated, adequate water intake (six 8-oz glasses of water daily), and high fiber diet. OTC fiber supplements are recommended if diet does not reach daily fiber goal (20-30 grams daily), such as Metamucil, Citrucel, or FiberCon (take as directed, separate from other oral medications by >2 hours).  -Recommend taking an OTC stool softener such as Colace as directed to avoid hard stools and straining with bowel movements PRN  - If no improvement with above recommendations, try intermittently dosed Dulcolax OTC as directed (every 3-4  days) PRN to facilitate bowel movements  -If still no improvement with these measures, call/follow-up  - schedule Colonoscopy, discussed procedure with the patient, including risks and benefits, patient verbalized understanding    Red stool  - discussed about different etiologies that can cause rectal bleeding, such as but not limited to diverticulosis, polyps, colon mass, colon inflammation or infection, anal fissure or hemorrhoids.   - schedule Colonoscopy, discussed procedure with the patient, verbalized understanding   - You may resume normal activity as long as you feel well.  - Avoid/minimize aspirin and anti-inflammatory drugs such as ibuprofen (Advil, Motrin) and naproxen (Aleve and  Naprosyn).  - Avoid alcohol.    Anticoagulant long-term use  - informed patient that the anticoagulant(s) will likely need to be held for endoscopy, nurse will confirm with cardiologist/PCP.    Abdominal cramping  -     X-Ray Abdomen Flat And Erect; Future; Expected date: 07/25/2019  - Possible CT scan pending results of testing and if symptoms persist    Follow up in about 1 month (around 8/25/2019), or if symptoms worsen or fail to improve.    If no improvement in symptoms or symptoms worsen, call/follow-up at clinic or go to ER.

## 2019-07-26 ENCOUNTER — TELEPHONE (OUTPATIENT)
Dept: GASTROENTEROLOGY | Facility: CLINIC | Age: 74
End: 2019-07-26

## 2019-07-26 DIAGNOSIS — K59.00 CONSTIPATION, UNSPECIFIED CONSTIPATION TYPE: Primary | ICD-10-CM

## 2019-07-26 RX ORDER — POLYETHYLENE GLYCOL 3350, SODIUM SULFATE ANHYDROUS, SODIUM BICARBONATE, SODIUM CHLORIDE, POTASSIUM CHLORIDE 236; 22.74; 6.74; 5.86; 2.97 G/4L; G/4L; G/4L; G/4L; G/4L
4 POWDER, FOR SOLUTION ORAL ONCE
Qty: 4000 ML | Refills: 0 | Status: SHIPPED | OUTPATIENT
Start: 2019-07-26 | End: 2019-07-26

## 2019-07-26 NOTE — TELEPHONE ENCOUNTER
----- Message from Favian Hill sent at 7/26/2019  1:24 PM CDT -----  Contact: pt  Type:  Test Results    Who Called:  pt  Name of Test (Lab/Mammo/Etc):  EMIL MISC   Date of Test:  7/25/19  Ordering Provider:  Ancelmo  [facility] the [facility]  Best Call Back Number:  [phone]  Additional Information:

## 2019-07-26 NOTE — TELEPHONE ENCOUNTER
Spoke with pt and informed of results and recommendations per Abbie Ag NP. Pt verbalized understanding.

## 2019-07-26 NOTE — TELEPHONE ENCOUNTER
Please call to inform & review the results with the patient- radiology report of the Abdominal x-ray showed unremarkable bowel gas pattern, no signs of intestinal obstruction and a relatively large amounts of stool in the colon which is consistent with/suggest history of constipation.  Recommend a course of Golytely to help clear the stool from the colon (sent to pharmacy on file), then continue Miralax once daily.    Continue with previous recommendations. If no improvement in symptoms or symptoms worsen, call/follow-up at clinic or go to ER.  Please release results to patient's mychart once you have discussed results and recommendations with patient.  Thanks,

## 2019-07-27 ENCOUNTER — NURSE TRIAGE (OUTPATIENT)
Dept: ADMINISTRATIVE | Facility: CLINIC | Age: 74
End: 2019-07-27

## 2019-07-27 NOTE — TELEPHONE ENCOUNTER
Reason for Disposition   [1] MILD-MODERATE pain AND [2] constant AND [3] present > 2 hours    Protocols used: ABDOMINAL PAIN - FEMALE-A-AH    Patient called with complaint of constant abdominal pain and nausea. She took the galvolite to clear her colon, but she vomited 3 times. She states she went to defecate about 10-15 times today, but still she does not have the desire to eat or drink anything except for sips of Coke 0. She had a hysterectomy at age 35, colon ischemia a long time ago that cleared up, and she had an appendectomy. No problems with the gall bladder or stomach ulcers that she recalls.     Patient's bp is 158/81 HR 98. She has not drank or eaten anything but the bowel prep. She thinks she has purged a lot but she is not sure. Patient agrees to go to the ED to see it looks like the impaction has been alleviated or if there is an obstruction present.

## 2019-07-29 ENCOUNTER — TELEPHONE (OUTPATIENT)
Dept: GASTROENTEROLOGY | Facility: CLINIC | Age: 74
End: 2019-07-29

## 2019-07-29 DIAGNOSIS — R10.84 GENERALIZED ABDOMINAL PAIN: ICD-10-CM

## 2019-07-29 NOTE — TELEPHONE ENCOUNTER
I reviewed the patient's chart. Please contact patient to see how she is doing since the ER visit and update me with patient status.  Thanks,

## 2019-07-30 NOTE — TELEPHONE ENCOUNTER
Spoke with pt and pt stated she is still having some cramping and gurgling in abd since drinking the Golytely on Saturday. Pt wanting a message sent to NP to ask if this is normal. Pt was prescribed Bentyl and Zofran Sunday in ER. Pt scheduled for C-scope 8/12/19. Last BM was Sunday 7/28/19. Please advise. Thanks

## 2019-07-30 NOTE — TELEPHONE ENCOUNTER
Recommend patient take miralax (aka glycolax) 17 grams daily as directed for constipation. Continue with previous recommendations, including colonoscopy as scheduled, & bentyl and zofran as prescribed. Recommend ct scan to further evaluate symptoms.  If no improvement in symptoms or symptoms worsen, call/follow-up at clinic or go to ER  ALIZE Mcdaniel

## 2019-07-30 NOTE — TELEPHONE ENCOUNTER
----- Message from Elissa Burrell sent at 7/30/2019 12:06 PM CDT -----  Contact: patient  Type: Needs Medical Advice    Who Called:  patient  Symptoms (please be specific):  na  How long has patient had these symptoms:  alma delia  Pharmacy name and phone #:  na  Best Call Back Number:   Additional Information: Patient states Gaveylite-G for constipation and patient took it Saturday and cleared it out, but Saturday and Sunday night having severe stomach cramps and bloating.  Patient states she got a shot from ER and nausea med's but this does not help either., she took half of Gaveylite-G and threw up the rest.  Please call to advise and schedule., next apt is 8/16 Thanks!

## 2019-07-30 NOTE — TELEPHONE ENCOUNTER
Spoke with pt and informed of message per Abbie Ag NP. Scheduled pt for recommended CT. Pt agreed and verbalized understanding.

## 2019-08-02 ENCOUNTER — TELEPHONE (OUTPATIENT)
Dept: GASTROENTEROLOGY | Facility: CLINIC | Age: 74
End: 2019-08-02

## 2019-08-02 ENCOUNTER — HOSPITAL ENCOUNTER (OUTPATIENT)
Dept: RADIOLOGY | Facility: HOSPITAL | Age: 74
Discharge: HOME | End: 2019-08-02
Attending: NURSE PRACTITIONER
Payer: MEDICARE

## 2019-08-02 DIAGNOSIS — R10.84 GENERALIZED ABDOMINAL PAIN: ICD-10-CM

## 2019-08-02 PROBLEM — L98.8 FISTULA: Status: ACTIVE | Noted: 2019-08-02

## 2019-08-02 PROCEDURE — 74177 CT ABD & PELVIS W/CONTRAST: CPT | Mod: TC,HCNC,PO

## 2019-08-02 PROCEDURE — 25500020 PHARM REV CODE 255: Mod: HCNC,PO | Performed by: NURSE PRACTITIONER

## 2019-08-02 PROCEDURE — 74177 CT ABD & PELVIS W/CONTRAST: CPT | Mod: 26,HCNC,, | Performed by: RADIOLOGY

## 2019-08-02 PROCEDURE — 74177 CT ABDOMEN PELVIS WITH CONTRAST: ICD-10-PCS | Mod: 26,HCNC,, | Performed by: RADIOLOGY

## 2019-08-02 RX ADMIN — IOHEXOL 30 ML: 350 INJECTION, SOLUTION INTRAVENOUS at 11:08

## 2019-08-02 RX ADMIN — IOHEXOL 75 ML: 350 INJECTION, SOLUTION INTRAVENOUS at 11:08

## 2019-08-02 NOTE — TELEPHONE ENCOUNTER
"Discussed case and results of CT scan with Dr. Sousa. Dr. Sousa recommended patient to go to the [facility] ER to be admitted for treatment and management of CT findings (patient will need IV antibiotics).    I called the patient to review CT scan results and Dr. Sousa's recommendations. Patient did not answer. I left a message requesting patient to call back ASAP. I called emergency contact (her ) and the  reports he was with the patient currently and placed her on the phone and she put me on speaker for both of them to hear.    The radiology report of the CT abdomen pelvis showed "1. 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  A phlegmonous mass measuring 4.9 x 2.5 cm has developed in the left pelvis pericolonic fat without discrete abscess formation.  A pericolonic neoplasm cannot be excluded.  Close CT surveillance following appropriate therapy is recommended.  2. Colonic diverticulosis.  3. New small nonobstructing right renal calculus.  4. Fusiform distal abdominal aortic ectasia with maximum aortic diameter of 2.7 cm."    I reviewed the results and recommendations with the patient and the patient's . They both verbalized understanding and agreed with management plan. They report they will head to [facility] ER right now.  I called [facility] ER and spoke to one of the ER physicians to inform them that we are sending this patient to them for admission.    "

## 2019-08-05 PROBLEM — L02.91 PHLEGMON: Status: ACTIVE | Noted: 2019-08-05

## 2019-08-08 PROBLEM — K59.01 SLOW TRANSIT CONSTIPATION: Status: ACTIVE | Noted: 2019-08-08

## 2019-08-08 PROBLEM — K57.20 DIVERTICULITIS OF LARGE INTESTINE WITH PERFORATION AND ABSCESS WITHOUT BLEEDING: Status: ACTIVE | Noted: 2019-08-08

## 2019-08-08 PROBLEM — R10.9 ABDOMINAL CRAMPING: Status: ACTIVE | Noted: 2019-08-08

## 2019-08-09 ENCOUNTER — TELEPHONE (OUTPATIENT)
Dept: ENDOSCOPY | Facility: HOSPITAL | Age: 74
End: 2019-08-09

## 2019-08-09 NOTE — TELEPHONE ENCOUNTER
Patient left a voicemail stating her procedure for Dr. Sousa has been canceled per Dr. Sousa. Please call pt to reschedule.

## 2019-08-10 PROBLEM — K57.92 DIVERTICULITIS: Status: ACTIVE | Noted: 2019-08-10

## 2019-08-13 PROBLEM — Z90.81 POST-SPLENECTOMY: Status: ACTIVE | Noted: 2019-08-13

## 2019-08-24 DIAGNOSIS — E78.2 MIXED HYPERLIPIDEMIA: ICD-10-CM

## 2019-08-24 DIAGNOSIS — I65.23 BILATERAL CAROTID ARTERY STENOSIS: ICD-10-CM

## 2019-08-24 DIAGNOSIS — I10 ESSENTIAL HYPERTENSION: ICD-10-CM

## 2019-08-26 NOTE — PROGRESS NOTES
Refill Authorization Note     is requesting a refill authorization.    Brief assessment and rationale for refill: APPROVE: prr  Name and strength of medication: atorvastatin (LIPITOR) 40 MG tablet       Medication Therapy Plan: please review recent hospital encounter to determine if pt may require a sooner fov, will pend 6 month supply    Medication reconciliation completed: No   Pharmacist Review Requested: Yes          How patient will take medication: t1t po qd          Comments:   Last Lipids 12 months   Lab Results   Component Value Date    CHOL 147 01/31/2019    CHOL 143 10/25/2018    CHOL 192 05/16/2018    Lab Results   Component Value Date    HDL 62 01/31/2019    HDL 52 10/25/2018    HDL 55 05/16/2018      Lab Results   Component Value Date    TRIG 71 01/31/2019    TRIG 99 10/25/2018    TRIG 154 (H) 05/16/2018    Lab Results   Component Value Date    LDLCALC 70.8 01/31/2019    LDLCALC 71.2 10/25/2018    LDLCALC 106.2 05/16/2018       Lab Results   Component Value Date    CHOLHDL 42.2 01/31/2019    CHOLHDL 36.4 10/25/2018    CHOLHDL 28.6 05/16/2018        Last LFTs 12 months   Lab Results   Component Value Date    ALT 17 08/17/2019    ALT 15 08/16/2019    ALT <6 (L) 08/15/2019    Lab Results   Component Value Date    AST 22 08/17/2019    AST 24 08/16/2019    AST 19 08/15/2019      Lab Results   Component Value Date    BILITOT 0.1 (L) 08/17/2019    BILITOT 0.4 08/16/2019    BILITOT 0.7 08/15/2019    LFT/[id number]-wnl, or within 3x the UNL     APPOINTMENTS(past 12m or future 3m authorizing provider)    Date Provider   LAST VISIT  2/25/2019 SUDHA Sparks MD   NEXT VISIT  11/1/2019 SUDHA Sparks MD

## 2019-08-26 NOTE — TELEPHONE ENCOUNTER
I have reviewed and agree with the assessment below. Patient recently hospitalized (see note from 8/22). May benefit from earlier follow-up. Please, schedule for appointment (FOLLOW-UP). Thank you.

## 2019-08-28 RX ORDER — ATORVASTATIN CALCIUM 40 MG/1
TABLET, FILM COATED ORAL
Qty: 90 TABLET | Refills: 1 | Status: SHIPPED | OUTPATIENT
Start: 2019-08-28 | End: 2019-11-01 | Stop reason: SDUPTHER

## 2019-09-04 ENCOUNTER — TELEPHONE (OUTPATIENT)
Dept: FAMILY MEDICINE | Facility: CLINIC | Age: 74
End: 2019-09-04

## 2019-09-04 NOTE — TELEPHONE ENCOUNTER
Spoke to patient, who is calling regarding refill note for follow up appointment.   Patient is scheduled with Dr Sparks in November and with SURGEON next month.   While on phone, patient advises that she has not had bowel movement in three days. Reports that she is eating, but appetite is diminished since hospitalization. Is growing concerned.   Advised patient that as always, increased vegetables and roughage in diet is key and that warm prune juice may be used to help facilitate BM.   However, due to patient being post surgical with new colostomy, advised to call to surgeon today for further advise.

## 2019-09-04 NOTE — TELEPHONE ENCOUNTER
----- Message from Valerie Kaiser sent at 9/4/2019  9:20 AM CDT -----  Contact: self  Type:  Patient Returning Call    Who Called:  self  Who Left Message for Patient:  Ynes  Does the patient know what this is regarding?:  no  Best Call Back Number:  [phone] (home)   Additional Information: Patient was asked to return the call. Please call patient. Thanks!

## 2019-10-18 ENCOUNTER — PATIENT OUTREACH (OUTPATIENT)
Dept: ADMINISTRATIVE | Facility: HOSPITAL | Age: 74
End: 2019-10-18

## 2019-10-24 ENCOUNTER — PES CALL (OUTPATIENT)
Dept: ADMINISTRATIVE | Facility: CLINIC | Age: 74
End: 2019-10-24

## 2019-10-28 ENCOUNTER — PATIENT OUTREACH (OUTPATIENT)
Dept: ADMINISTRATIVE | Facility: HOSPITAL | Age: 74
End: 2019-10-28

## 2019-10-28 ENCOUNTER — TELEPHONE (OUTPATIENT)
Dept: ADMINISTRATIVE | Facility: HOSPITAL | Age: 74
End: 2019-10-28

## 2019-11-01 ENCOUNTER — LAB VISIT (OUTPATIENT)
Dept: LAB | Facility: HOSPITAL | Age: 74
End: 2019-11-01
Attending: FAMILY MEDICINE
Payer: MEDICARE

## 2019-11-01 ENCOUNTER — OFFICE VISIT (OUTPATIENT)
Dept: FAMILY MEDICINE | Facility: CLINIC | Age: 74
End: 2019-11-01
Payer: MEDICARE

## 2019-11-01 VITALS
BODY MASS INDEX: 23.36 KG/M2 | HEART RATE: 88 BPM | DIASTOLIC BLOOD PRESSURE: 70 MMHG | HEIGHT: 63 IN | SYSTOLIC BLOOD PRESSURE: 122 MMHG | WEIGHT: 131.81 LBS | OXYGEN SATURATION: 98 %

## 2019-11-01 DIAGNOSIS — I65.23 BILATERAL CAROTID ARTERY STENOSIS: ICD-10-CM

## 2019-11-01 DIAGNOSIS — I10 ESSENTIAL HYPERTENSION: ICD-10-CM

## 2019-11-01 DIAGNOSIS — Z00.00 WELLNESS EXAMINATION: Primary | ICD-10-CM

## 2019-11-01 DIAGNOSIS — Z78.0 POSTMENOPAUSAL: ICD-10-CM

## 2019-11-01 DIAGNOSIS — E78.2 MIXED HYPERLIPIDEMIA: ICD-10-CM

## 2019-11-01 DIAGNOSIS — Z13.820 SCREENING FOR OSTEOPOROSIS: ICD-10-CM

## 2019-11-01 DIAGNOSIS — K21.9 GASTROESOPHAGEAL REFLUX DISEASE, ESOPHAGITIS PRESENCE NOT SPECIFIED: ICD-10-CM

## 2019-11-01 PROBLEM — R10.9 ABDOMINAL CRAMPING: Status: RESOLVED | Noted: 2019-08-08 | Resolved: 2019-11-01

## 2019-11-01 PROBLEM — L98.8 FISTULA: Status: RESOLVED | Noted: 2019-08-02 | Resolved: 2019-11-01

## 2019-11-01 PROBLEM — K57.20 DIVERTICULITIS OF LARGE INTESTINE WITH PERFORATION AND ABSCESS WITHOUT BLEEDING: Status: RESOLVED | Noted: 2019-08-08 | Resolved: 2019-11-01

## 2019-11-01 PROBLEM — K59.01 SLOW TRANSIT CONSTIPATION: Status: RESOLVED | Noted: 2019-08-08 | Resolved: 2019-11-01

## 2019-11-01 LAB
ALBUMIN SERPL BCP-MCNC: 3.8 G/DL (ref 3.5–5.2)
ALP SERPL-CCNC: 75 U/L (ref 55–135)
ALT SERPL W/O P-5'-P-CCNC: 11 U/L (ref 10–44)
ANION GAP SERPL CALC-SCNC: 9 MMOL/L (ref 8–16)
AST SERPL-CCNC: 17 U/L (ref 10–40)
BILIRUB SERPL-MCNC: 0.4 MG/DL (ref 0.1–1)
BUN SERPL-MCNC: 13 MG/DL (ref 8–23)
CALCIUM SERPL-MCNC: 10.5 MG/DL (ref 8.7–10.5)
CHLORIDE SERPL-SCNC: 102 MMOL/L (ref 95–110)
CHOLEST SERPL-MCNC: 152 MG/DL (ref 120–199)
CHOLEST/HDLC SERPL: 2.5 {RATIO} (ref 2–5)
CO2 SERPL-SCNC: 29 MMOL/L (ref 23–29)
CREAT SERPL-MCNC: 0.7 MG/DL (ref 0.5–1.4)
EST. GFR  (AFRICAN AMERICAN): >60 ML/MIN/1.73 M^2
EST. GFR  (NON AFRICAN AMERICAN): >60 ML/MIN/1.73 M^2
GLUCOSE SERPL-MCNC: 113 MG/DL (ref 70–110)
HDLC SERPL-MCNC: 60 MG/DL (ref 40–75)
HDLC SERPL: 39.5 % (ref 20–50)
LDLC SERPL CALC-MCNC: 73.2 MG/DL (ref 63–159)
NONHDLC SERPL-MCNC: 92 MG/DL
POTASSIUM SERPL-SCNC: 4.9 MMOL/L (ref 3.5–5.1)
PROT SERPL-MCNC: 7.3 G/DL (ref 6–8.4)
SODIUM SERPL-SCNC: 140 MMOL/L (ref 136–145)
TRIGL SERPL-MCNC: 94 MG/DL (ref 30–150)
TSH SERPL DL<=0.005 MIU/L-ACNC: 1.74 UIU/ML (ref 0.4–4)

## 2019-11-01 PROCEDURE — 36415 COLL VENOUS BLD VENIPUNCTURE: CPT | Mod: HCNC,PO

## 2019-11-01 PROCEDURE — 99499 RISK ADDL DX/OHS AUDIT: ICD-10-PCS | Mod: HCNC,S$GLB,, | Performed by: FAMILY MEDICINE

## 2019-11-01 PROCEDURE — 3078F PR MOST RECENT DIASTOLIC BLOOD PRESSURE < 80 MM HG: ICD-10-PCS | Mod: HCNC,CPTII,S$GLB, | Performed by: FAMILY MEDICINE

## 2019-11-01 PROCEDURE — 99397 PER PM REEVAL EST PAT 65+ YR: CPT | Mod: HCNC,25,S$GLB, | Performed by: FAMILY MEDICINE

## 2019-11-01 PROCEDURE — 85027 COMPLETE CBC AUTOMATED: CPT | Mod: HCNC,PO

## 2019-11-01 PROCEDURE — 3074F PR MOST RECENT SYSTOLIC BLOOD PRESSURE < 130 MM HG: ICD-10-PCS | Mod: HCNC,CPTII,S$GLB, | Performed by: FAMILY MEDICINE

## 2019-11-01 PROCEDURE — 80053 COMPREHEN METABOLIC PANEL: CPT | Mod: HCNC,PO

## 2019-11-01 PROCEDURE — 84443 ASSAY THYROID STIM HORMONE: CPT | Mod: HCNC

## 2019-11-01 PROCEDURE — 85060 BLOOD SMEAR INTERPRETATION: CPT | Mod: HCNC,,, | Performed by: PATHOLOGY

## 2019-11-01 PROCEDURE — 99499 UNLISTED E&M SERVICE: CPT | Mod: HCNC,S$GLB,, | Performed by: FAMILY MEDICINE

## 2019-11-01 PROCEDURE — 99397 PR PREVENTIVE VISIT,EST,65 & OVER: ICD-10-PCS | Mod: HCNC,25,S$GLB, | Performed by: FAMILY MEDICINE

## 2019-11-01 PROCEDURE — G0008 ADMIN INFLUENZA VIRUS VAC: HCPCS | Mod: HCNC,S$GLB,, | Performed by: FAMILY MEDICINE

## 2019-11-01 PROCEDURE — 99999 PR PBB SHADOW E&M-EST. PATIENT-LVL III: CPT | Mod: PBBFAC,HCNC,, | Performed by: FAMILY MEDICINE

## 2019-11-01 PROCEDURE — 85060 PATHOLOGIST REVIEW: ICD-10-PCS | Mod: HCNC,,, | Performed by: PATHOLOGY

## 2019-11-01 PROCEDURE — 85007 BL SMEAR W/DIFF WBC COUNT: CPT | Mod: HCNC,PO

## 2019-11-01 PROCEDURE — 99999 PR PBB SHADOW E&M-EST. PATIENT-LVL III: ICD-10-PCS | Mod: PBBFAC,HCNC,, | Performed by: FAMILY MEDICINE

## 2019-11-01 PROCEDURE — 90662 FLU VACCINE - HIGH DOSE (65+) PRESERVATIVE FREE IM: ICD-10-PCS | Mod: HCNC,S$GLB,, | Performed by: FAMILY MEDICINE

## 2019-11-01 PROCEDURE — 3074F SYST BP LT 130 MM HG: CPT | Mod: HCNC,CPTII,S$GLB, | Performed by: FAMILY MEDICINE

## 2019-11-01 PROCEDURE — 80061 LIPID PANEL: CPT | Mod: HCNC

## 2019-11-01 PROCEDURE — G0008 FLU VACCINE - HIGH DOSE (65+) PRESERVATIVE FREE IM: ICD-10-PCS | Mod: HCNC,S$GLB,, | Performed by: FAMILY MEDICINE

## 2019-11-01 PROCEDURE — 90662 IIV NO PRSV INCREASED AG IM: CPT | Mod: HCNC,S$GLB,, | Performed by: FAMILY MEDICINE

## 2019-11-01 PROCEDURE — 3078F DIAST BP <80 MM HG: CPT | Mod: HCNC,CPTII,S$GLB, | Performed by: FAMILY MEDICINE

## 2019-11-01 RX ORDER — LOSARTAN POTASSIUM 100 MG/1
100 TABLET ORAL DAILY
Qty: 90 TABLET | Refills: 3 | Status: SHIPPED | OUTPATIENT
Start: 2019-11-01 | End: 2020-03-03

## 2019-11-01 RX ORDER — DIAZEPAM 2 MG/1
2 TABLET ORAL EVERY 6 HOURS PRN
Qty: 20 TABLET | Refills: 0 | Status: SHIPPED | OUTPATIENT
Start: 2019-11-01 | End: 2020-01-06

## 2019-11-01 RX ORDER — ATORVASTATIN CALCIUM 40 MG/1
40 TABLET, FILM COATED ORAL DAILY
Qty: 90 TABLET | Refills: 3 | Status: SHIPPED | OUTPATIENT
Start: 2019-11-01 | End: 2020-03-03 | Stop reason: SDUPTHER

## 2019-11-01 RX ORDER — PANTOPRAZOLE SODIUM 40 MG/1
40 TABLET, DELAYED RELEASE ORAL
Qty: 90 TABLET | Refills: 3 | Status: SHIPPED | OUTPATIENT
Start: 2019-11-01 | End: 2020-10-07

## 2019-11-01 NOTE — PROGRESS NOTES
Subjective:       Patient ID: Haley Castro is a 74 y.o. female.    Chief Complaint: Hypertension    Here for wellness and f/u htn and chronic medical issues. Had colostomy since last visit.   Reviewed hospital course.  Active and trying to eat well.    Hypertension   This is a chronic problem. The current episode started more than 1 year ago. The problem is controlled. Pertinent negatives include no chest pain, palpitations or shortness of breath.   Hyperlipidemia   This is a chronic problem. The current episode started more than 1 year ago. The problem is controlled. Pertinent negatives include no chest pain or shortness of breath.     Review of Systems   Constitutional: Negative for chills, fatigue and fever.   Respiratory: Negative for cough, chest tightness and shortness of breath.    Cardiovascular: Negative for chest pain, palpitations and leg swelling.   Endocrine: Negative for cold intolerance and heat intolerance.   Skin: Negative for rash.   Psychiatric/Behavioral: Negative for dysphoric mood. The patient is not nervous/anxious.        Objective:      Physical Exam   Constitutional: She appears well-developed and well-nourished.   HENT:   Head: Normocephalic and atraumatic.   Cardiovascular: Normal rate, regular rhythm and normal heart sounds.   Pulmonary/Chest: Effort normal and breath sounds normal.   Psychiatric: She has a normal mood and affect.   Nursing note and vitals reviewed.      Assessment:       1. Wellness examination    2. Essential hypertension    3. Mixed hyperlipidemia    4. Gastroesophageal reflux disease, esophagitis presence not specified    5. Bilateral carotid artery stenosis    6. Screening for osteoporosis    7. Postmenopausal        Plan:       Wellness examination    Essential hypertension  -     CBC auto differential; Future; Expected date: 11/01/2019  -     Comprehensive metabolic panel; Future; Expected date: 11/01/2019  -     TSH; Future; Expected date: 11/01/2019  -      losartan (COZAAR) 100 MG tablet; Take 1 tablet (100 mg total) by mouth once daily.  Dispense: 90 tablet; Refill: 3  -     atorvastatin (LIPITOR) 40 MG tablet; Take 1 tablet (40 mg total) by mouth once daily.  Dispense: 90 tablet; Refill: 3    Mixed hyperlipidemia  -     atorvastatin (LIPITOR) 40 MG tablet; Take 1 tablet (40 mg total) by mouth once daily.  Dispense: 90 tablet; Refill: 3  -     Lipid panel; Future; Expected date: 11/01/2019    Gastroesophageal reflux disease, esophagitis presence not specified  -     pantoprazole (PROTONIX) 40 MG tablet; Take 1 tablet (40 mg total) by mouth before breakfast.  Dispense: 90 tablet; Refill: 3    Bilateral carotid artery stenosis  -     atorvastatin (LIPITOR) 40 MG tablet; Take 1 tablet (40 mg total) by mouth once daily.  Dispense: 90 tablet; Refill: 3    Screening for osteoporosis  -     DXA Bone Density Spine And Hip; Future; Expected date: 11/01/2019    Postmenopausal  -     DXA Bone Density Spine And Hip; Future; Expected date: 11/01/2019      htn stable but discussed may need less bp   Flu shot today  S[facility]rix at [facility]  Will monitor chronic medical issues and continue current plan of care.      Follow up in about 5 months (around 4/1/2020), or if symptoms worsen or fail to improve.

## 2019-11-01 NOTE — PATIENT INSTRUCTIONS
Monitor home blood pressure. Goal is <140/90.  Hold norvasc/amlodipine if bp is low or you are symptomatic.  <100/50.  Can hold over the weekend and check pressures.

## 2019-11-04 LAB
ANISOCYTOSIS BLD QL SMEAR: SLIGHT
BASOPHILS # BLD AUTO: ABNORMAL K/UL (ref 0–0.2)
BASOPHILS NFR BLD: 2 % (ref 0–1.9)
DIFFERENTIAL METHOD: ABNORMAL
EOSINOPHIL # BLD AUTO: ABNORMAL K/UL (ref 0–0.5)
EOSINOPHIL NFR BLD: 2 % (ref 0–8)
ERYTHROCYTE [DISTWIDTH] IN BLOOD BY AUTOMATED COUNT: 17.2 % (ref 11.5–14.5)
GIANT PLATELETS BLD QL SMEAR: PRESENT
HCT VFR BLD AUTO: 39.7 % (ref 37–48.5)
HGB BLD-MCNC: 12.8 G/DL (ref 12–16)
HYPOCHROMIA BLD QL SMEAR: ABNORMAL
LYMPHOCYTES # BLD AUTO: ABNORMAL K/UL (ref 1–4.8)
LYMPHOCYTES NFR BLD: 40 % (ref 18–48)
MCH RBC QN AUTO: 26.6 PG (ref 27–31)
MCHC RBC AUTO-ENTMCNC: 32.2 G/DL (ref 32–36)
MCV RBC AUTO: 82 FL (ref 82–98)
MONOCYTES # BLD AUTO: ABNORMAL K/UL (ref 0.3–1)
MONOCYTES NFR BLD: 10 % (ref 4–15)
NEUTROPHILS NFR BLD: 46 % (ref 38–73)
PATH REV BLD -IMP: NORMAL
PATH REV BLD -IMP: NORMAL
PLATELET # BLD AUTO: 496 K/UL (ref 150–350)
PLATELET BLD QL SMEAR: ABNORMAL
PMV BLD AUTO: 11.7 FL (ref 9.2–12.9)
RBC # BLD AUTO: 4.82 M/UL (ref 4–5.4)
WBC # BLD AUTO: 6.92 K/UL (ref 3.9–12.7)

## 2019-11-15 ENCOUNTER — TELEPHONE (OUTPATIENT)
Dept: GASTROENTEROLOGY | Facility: CLINIC | Age: 74
End: 2019-11-15

## 2019-11-15 NOTE — TELEPHONE ENCOUNTER
----- Message from Haleigh Amaury sent at 11/15/2019  9:03 AM CST -----  Contact: Patient  Type: Needs Medical Advice    Who Called:  Patient  Best Call Back Number:   Additional Information: Calling to speak with the Nurse. She wants to schedule a Colonoscopy on 12/3/19. She wears an ostomy bag but will be having a reversal done on 12/4/19. They wants the Colonoscopy done before the surgery on the 4th. Also have the Nurse to call Dr Brandi Sigala Nurse at  ([facility])

## 2019-11-20 ENCOUNTER — TELEPHONE (OUTPATIENT)
Dept: CARDIOLOGY | Facility: CLINIC | Age: 74
End: 2019-11-20

## 2019-11-20 NOTE — TELEPHONE ENCOUNTER
----- Message from Nilda Loja sent at 11/20/2019  9:20 AM CST -----  Dr Alamo is closing patients Colostomy on 01/09/2020, she is having a colonoscopy on 01/08/20.  Please clear pt for surgery.  She does have an appointment at the end of January with Dr Cam.    Thanks,  I faxed a form to you, if that is more convenient.  Nilda AGUILAR

## 2020-01-02 ENCOUNTER — TELEPHONE (OUTPATIENT)
Dept: GASTROENTEROLOGY | Facility: CLINIC | Age: 75
End: 2020-01-02

## 2020-01-02 NOTE — TELEPHONE ENCOUNTER
----- Message from Elissa Burrell sent at 1/2/2020  9:20 AM CST -----  Contact: Charbel/  Type: Needs Medical Advice    Who Called:  Charbel Sauceda Call Back Number: [phone]  Additional Information: Charbel states patient has colonoscopy 1/8/20 and has questions about co-pays, Please call to advise.Thanks!

## 2020-01-02 NOTE — TELEPHONE ENCOUNTER
Informed pt that she would need to call the financial department or her insurance to find out what the copay would be. Pt verbalized understanding.

## 2020-01-08 ENCOUNTER — HOSPITAL ENCOUNTER (OUTPATIENT)
Facility: HOSPITAL | Age: 75
Discharge: HOME | End: 2020-01-08
Attending: INTERNAL MEDICINE | Admitting: INTERNAL MEDICINE
Payer: MEDICARE

## 2020-01-08 ENCOUNTER — ANESTHESIA EVENT (OUTPATIENT)
Dept: ENDOSCOPY | Facility: HOSPITAL | Age: 75
End: 2020-01-08
Payer: MEDICARE

## 2020-01-08 ENCOUNTER — ANESTHESIA (OUTPATIENT)
Dept: ENDOSCOPY | Facility: HOSPITAL | Age: 75
End: 2020-01-08
Payer: MEDICARE

## 2020-01-08 DIAGNOSIS — Z12.11 COLON CANCER SCREENING: ICD-10-CM

## 2020-01-08 PROCEDURE — 45385 COLONOSCOPY W/LESION REMOVAL: CPT | Mod: HCNC,PO | Performed by: INTERNAL MEDICINE

## 2020-01-08 PROCEDURE — 37000009 HC ANESTHESIA EA ADD 15 MINS: Mod: HCNC,PO | Performed by: INTERNAL MEDICINE

## 2020-01-08 PROCEDURE — 37000008 HC ANESTHESIA 1ST 15 MINUTES: Mod: HCNC,PO | Performed by: INTERNAL MEDICINE

## 2020-01-08 PROCEDURE — 45385 PR COLONOSCOPY,REMV LESN,SNARE: ICD-10-PCS | Mod: PT,HCNC,, | Performed by: INTERNAL MEDICINE

## 2020-01-08 PROCEDURE — 88305 TISSUE EXAM BY PATHOLOGIST: CPT | Mod: 26,HCNC,, | Performed by: PATHOLOGY

## 2020-01-08 PROCEDURE — D9220A PRA ANESTHESIA: Mod: PT,HCNC,CRNA, | Performed by: NURSE ANESTHETIST, CERTIFIED REGISTERED

## 2020-01-08 PROCEDURE — 63600175 PHARM REV CODE 636 W HCPCS: Mod: HCNC,PO | Performed by: INTERNAL MEDICINE

## 2020-01-08 PROCEDURE — D9220A PRA ANESTHESIA: ICD-10-PCS | Mod: PT,HCNC,ANES, | Performed by: ANESTHESIOLOGY

## 2020-01-08 PROCEDURE — 88305 TISSUE EXAM BY PATHOLOGIST: ICD-10-PCS | Mod: 26,HCNC,, | Performed by: PATHOLOGY

## 2020-01-08 PROCEDURE — D9220A PRA ANESTHESIA: ICD-10-PCS | Mod: PT,HCNC,CRNA, | Performed by: NURSE ANESTHETIST, CERTIFIED REGISTERED

## 2020-01-08 PROCEDURE — 63600175 PHARM REV CODE 636 W HCPCS: Mod: HCNC,PO | Performed by: NURSE ANESTHETIST, CERTIFIED REGISTERED

## 2020-01-08 PROCEDURE — 88305 TISSUE EXAM BY PATHOLOGIST: CPT | Mod: HCNC | Performed by: PATHOLOGY

## 2020-01-08 PROCEDURE — 27201089 HC SNARE, DISP (ANY): Mod: HCNC,PO | Performed by: INTERNAL MEDICINE

## 2020-01-08 PROCEDURE — 45385 COLONOSCOPY W/LESION REMOVAL: CPT | Mod: PT,HCNC,, | Performed by: INTERNAL MEDICINE

## 2020-01-08 PROCEDURE — 25000003 PHARM REV CODE 250: Mod: HCNC,PO | Performed by: INTERNAL MEDICINE

## 2020-01-08 PROCEDURE — D9220A PRA ANESTHESIA: Mod: PT,HCNC,ANES, | Performed by: ANESTHESIOLOGY

## 2020-01-08 RX ORDER — SODIUM CHLORIDE, SODIUM LACTATE, POTASSIUM CHLORIDE, CALCIUM CHLORIDE 600; 310; 30; 20 MG/100ML; MG/100ML; MG/100ML; MG/100ML
INJECTION, SOLUTION INTRAVENOUS CONTINUOUS
Status: DISCONTINUED | OUTPATIENT
Start: 2020-01-08 | End: 2020-01-08 | Stop reason: HOSPADM

## 2020-01-08 RX ORDER — SODIUM CHLORIDE 0.9 % (FLUSH) 0.9 %
10 SYRINGE (ML) INJECTION
Status: DISCONTINUED | OUTPATIENT
Start: 2020-01-08 | End: 2020-01-08 | Stop reason: HOSPADM

## 2020-01-08 RX ORDER — PROPOFOL 10 MG/ML
VIAL (ML) INTRAVENOUS
Status: DISCONTINUED | OUTPATIENT
Start: 2020-01-08 | End: 2020-01-08

## 2020-01-08 RX ORDER — LIDOCAINE HCL/PF 100 MG/5ML
SYRINGE (ML) INTRAVENOUS
Status: DISCONTINUED | OUTPATIENT
Start: 2020-01-08 | End: 2020-01-08

## 2020-01-08 RX ADMIN — PROPOFOL 30 MG: 10 INJECTION, EMULSION INTRAVENOUS at 12:01

## 2020-01-08 RX ADMIN — PROPOFOL 30 MG: 10 INJECTION, EMULSION INTRAVENOUS at 11:01

## 2020-01-08 RX ADMIN — LIDOCAINE HYDROCHLORIDE 100 MG: 20 INJECTION, SOLUTION INTRAVENOUS at 11:01

## 2020-01-08 RX ADMIN — SODIUM CHLORIDE, SODIUM LACTATE, POTASSIUM CHLORIDE, AND CALCIUM CHLORIDE: .6; .31; .03; .02 INJECTION, SOLUTION INTRAVENOUS at 11:01

## 2020-01-08 RX ADMIN — SALINE LAXATIVE 1 ENEMA: 7; 19 ENEMA RECTAL at 11:01

## 2020-01-08 RX ADMIN — SODIUM PHOSPHATE, DIBASIC AND SODIUM PHOSPHATE, MONOBASIC 1 ENEMA: 7; 19 ENEMA RECTAL at 11:01

## 2020-01-08 RX ADMIN — PROPOFOL 60 MG: 10 INJECTION, EMULSION INTRAVENOUS at 11:01

## 2020-01-08 NOTE — TRANSFER OF CARE
"Anesthesia Transfer of Care Note    Patient: Haley Castro    Procedure(s) Performed: Procedure(s) (LRB):  COLONOSCOPY (N/A)    Patient location: PACU    Anesthesia Type: general    Transport from OR: Transported from OR on room air with adequate spontaneous ventilation    Post pain: adequate analgesia    Post assessment: no apparent anesthetic complications and tolerated procedure well    Post vital signs: stable    Level of consciousness: awake and alert    Nausea/Vomiting: no nausea/vomiting    Complications: none    Transfer of care protocol was followed      Last vitals:   Visit Vitals  BP (!) 181/81 (BP Location: Right arm, Patient Position: Lying)   Pulse 86   Temp 36.4 °C (97.5 °F) (Skin)   Resp 16   Ht 5' 3" (1.6 m)   Wt 59 kg (130 lb)   LMP 03/28/1980   SpO2 100%   Breastfeeding? No   BMI 23.03 kg/m²     "

## 2020-01-08 NOTE — DISCHARGE INSTRUCTIONS
Recovery After Procedural Sedation (Adult)  You have been given medicine by vein to make you sleep during your surgery. This may have included both a pain medicine and sleeping medicine. Most of the effects have worn off. But you may still have some drowsiness for the next 6 to 8 hours.  Home care  Follow these guidelines when you get home:  · For the next 8 hours, you should be watched by a responsible adult. This person should make sure your condition is not getting worse.  · Don't drink any alcohol for the next 24 hours.  · Don't drive, operate dangerous machinery, or make important business or personal decisions during the next 24 hours.  Note: Your healthcare provider may tell you not to take any medicine by mouth for pain or sleep in the next 4 hours. These medicines may react with the medicines you were given in the hospital. This could cause a much stronger response than usual.  Follow-up care  Follow up with your healthcare provider if you are not alert and back to your usual level of activity within 12 hours.  When to seek medical advice  Call your healthcare provider right away if any of these occur:  · Drowsiness gets worse  · Weakness or dizziness gets worse  · Repeated vomiting  · You can't be awakened   Date Last Reviewed: 10/18/2016  © 4455-0338 [employer]. [address] [place] [address], [place] [address]. All rights reserved. This information is not intended as a substitute for professional medical care. Always follow your healthcare professional's instructions.      PROBIOTICS:  Now that your colon is so cleaned out, now is a good time for a round of PROBIOTICS.  Eat a container of Greek Yogurt, such as OIKOS or CHOBANI,  Or Activia or Dannon    Greek Yogurt.    Or Take a similar Probiotic product such as Align or Culturelle or Elisha-Q, every day for a month.                  (The products listed are non-prescription, but you may need to ask the pharmacist for their location.)   Repeat  this at least 5-6 times a year.      High-Fiber Diet  Fiber is in fruits, vegetables, cereals, and grains. Fiber passes through your body undigested. A high-fiber diet helps food move through your intestinal tract. The added bulk is helpful in preventing constipation. In people with diverticulosis, fiber helps clean out the pouches along the colon wall. It also prevents new pouches from forming. A high-fiber diet reduces the risk of colon cancer. It also lowers blood cholesterol and prevents high blood sugar in people with diabetes.    The fiber-rich foods listed below should be part of your diet. If you are not used to high-fiber foods, start with 1 or 2 foods from this list. Every 3 to 4 days add a new one to your diet. Do this until you are eating 4 high-fiber foods per day. This should give you 20 to 35 grams of fiber a day. It is also important to drink a lot of water when you are on this diet. You should have 6 to 8 glasses of water a day. Water makes the fiber swell and increases the benefit.  Foods high in dietary fiber  The following foods are high in dietary fiber:  · Breads. Breads made with 100% whole-wheat flour; ijeoma, wheat, or rye crackers; whole-grain tortillas, bran muffins.  · Cereals. Whole-grain and bran cereals with bran (shredded wheat, wheat flakes, raisin bran, corn bran); oatmeal, rolled oats, granola, and brown rice.  · Fruits. Fresh fruits and their edible skins (pears, prunes, raisins, berries, apples, and apricots); bananas, citrus fruit, mangoes, pineapple; and prune juice.  · Nuts. Any nuts and seeds.  · Vegetables. Best served raw or lightly cooked. All types, especially: green peas, celery, eggplant, potatoes, spinach, broccoli, [place] sprouts, winter squash, carrots, cauliflower, soybeans, lentils, and fresh and dried beans of all kinds.  · Other. Popcorn, any spices.  Date Last Reviewed: 8/1/2016  © 3670-6329 [employer]. [address] [place] [address], [place] [address].  All rights reserved. This information is not intended as a substitute for professional medical care. Always follow your healthcare professional's instructions.

## 2020-01-08 NOTE — H&P
History & Physical - Short Stay  Gastroenterology      SUBJECTIVE:     Procedure: Colonoscopy    Chief Complaint/Indication for Procedure: Screening, prior to colostomy takedown.    History of Present Illness:  Asymptomatic    Chaitanya RutledgeKARLI    11/15/19 11:40 AM   Note      ----- Message from Haleigh Rebolledo sent at 11/15/2019  9:03 AM CST -----  Contact: Patient  Type: Needs Medical Advice     Who Called:  Patient  Best Call Back Number:   Additional Information: Calling to speak with the Nurse. She wants to schedule a Colonoscopy on 12/3/19. She wears an ostomy bag but will be having a reversal done on 12/4/19. They wants the Colonoscopy done before the surgery on the 4th. Also have the Nurse to call Dr Brandi Sigala Nurse at  ([facility]               General Surgery  Discharge Summary        Patient Name: Haley Castro  MRN: [id number]  Admission Date: 8/10/2019  Hospital Length of Stay: 10 days  Discharge Date and Time: 8/21/2019  1:41 PM  Attending Physician: Brandi Alamo MD     HPI: admitted with pelvic abscess     Procedure(s) (LRB):  CREATION, COLOSTOMY (N/A)  COLECTOMY, SIGMOID (N/A)  SPLENECTOMY (N/A)  CYSTOSCOPY, Stent placement (Left)      Hospital Course: Underwent open exploration, sigmoid colectomy and end colostomy.  Slow to recover with difficulty getting motivated and getting OOB.  Ostomy output and diet tolerance on POD 5.  Home with home health and close surgical followup.      Indications for procedure:  The patient is an elderly woman with sigmoid diverticulitis and stricture who was managed conservatively for a recent focal perforation, in preparation for elective resection after resolution of acute inflammatory event.  She failed management as an outpatient and represented to the ED with pain and fever and phlegmon on CT.  She is taken for laparotomy, colectomy and colostomy creation.        Brandi Alamo MD   Physician   Surgery   Progress Notes       Signed   Encounter Date:  10/8/2019                       Subjective:       Patient ID: Haley Castro is a 74 y.o. female.     Chief Complaint: Post-op Evaluation (PO-colon)     HPI   73 yo woman RTC after recent colostomy creation and reports doing well.  She is anxious to be reversed.      Assessment:       1. Colostomy care        Plan:     Should be ready for reversal of colostomy in early December.  Will need barium enema prior to reversal.  Discussed with patient that there may be insufficient rectal length based on findings during surgery and I may refer her to colorectal surgery for reversal if there is the possibility of low rectal anastomosis.              Narrative     EXAMINATION:  FL GASTROGRAFIN ENEMA WATER SOLUBLE 11/06/2019 at 08:17    CLINICAL HISTORY:  eval for rectal stricture and rectal length prior to colostomy reversal;  Encounter for attention to colostomy.  Previous diverticulitis.    TECHNIQUE:  Diluted Gastrografin was utilized under gentle gravity instillation through the rectum.    COMPARISON:  No previous enema is currently available.  Abdomen report of 07/28/2019 and CT report of 08/10/2019.    FINDINGS:  The  radiograph demonstrates abundant stool throughout the colon suggestive of underlying constipation. There is slight decreased bone mineralization, degenerative change and atherosclerotic vascular calcifications appreciated.  No free air is appreciated.  Diluted Gastrografin was gently instilled under gravity pressure through the rectum.  The contrast flowed freely to the stump level which appears to be approximately 15 cm overall in length although limited with the serpiginous course.  No contrast extravasation to indicate active leak or fistula type formation is currently appreciated.  There is very little residual contrast present on the post evacuation portion of the study.  There appears to be minimal fecal residual present within.  No focal significant narrowing or  extrinsic compression was appreciated.      Impression       1. There is free flow of contrast to the rectal stump level with smooth margins overall.  2. No contrast extravasation was appreciated to indicate active leak or fistula.    Electronically signed by: Homer Hendricks MD  Date: 11/06/2019           PTA Medications   Medication Sig    amLODIPine (NORVASC) 5 MG tablet Take 5 mg by mouth every evening.    aspirin (ECOTRIN) 325 MG EC tablet Take 325 mg by mouth once daily.    atorvastatin (LIPITOR) 40 MG tablet Take 1 tablet (40 mg total) by mouth once daily.    carvedilol (COREG) 3.125 MG tablet TAKE 1 TABLET (3.125 MG TOTAL) BY MOUTH 2 (TWO) TIMES DAILY.    chlorhexidine (PERIDEX) 0.12 % solution Use as directed 10 mLs in the mouth or throat 2 (two) times daily.    DULoxetine (CYMBALTA) 60 MG capsule Take 1 capsule (60 mg total) by mouth once daily.    ezetimibe (ZETIA) 10 mg tablet Take 1 tablet (10 mg total) by mouth every evening.    gabapentin (NEURONTIN) 100 MG capsule Take 200 mg by mouth 2 (two) times daily.     losartan (COZAAR) 100 MG tablet Take 1 tablet (100 mg total) by mouth once daily.    mupirocin (BACTROBAN) 2 % ointment 1 g by Nasal route 2 (two) times daily.    pantoprazole (PROTONIX) 40 MG tablet Take 1 tablet (40 mg total) by mouth before breakfast.       Review of patient's allergies indicates:   Allergen Reactions    No known drug allergies         Past Medical History:   Diagnosis Date    Anticoagulant long-term use     Arthritis     Carotid artery occlusion     Depression 11/29/2012    Diverticulosis     Former smoker     GERD (gastroesophageal reflux disease)     HLD (hyperlipidemia) 11/29/2012    HTN (hypertension) 11/29/2012    Infectious gastroenteritis     Ischemic colitis      Past Surgical History:   Procedure Laterality Date    APPENDECTOMY      carotid angiogram      CAROTID ENDARTERECTOMY Right 7/30/2018    Procedure: Endarterectomy-Carotid - RIGHT;   Surgeon: Safia Thomas MD;  Location: [facility] OR;  Service: Cardiovascular;  Laterality: Right;  with patch  angioplasty     SECTION      x 3     COLON SURGERY      COLONOSCOPY N/A 2016    Procedure: COLONOSCOPY;  Surgeon: Grzegorz Sousa Jr., MD;  Location: [facility] ENDO;  Service: Endoscopy;  Laterality: N/A;    COLONOSCOPY  2009    Dr. Reyes in [place], repeat in 5 years    COLOSTOMY N/A 2019    Procedure: CREATION, COLOSTOMY;  Surgeon: Brandi Alamo MD;  Location: [facility] OR;  Service: General;  Laterality: N/A;    CYSTOSCOPY Left 2019    Procedure: CYSTOSCOPY, Stent placement;  Surgeon: Pablito Roblero MD;  Location: [facility] OR;  Service: Urology;  Laterality: Left;    EPIDURAL STEROID INJECTION INTO LUMBAR SPINE N/A 2019    Procedure: Injection-steroid-epidural-lumbar;  Surgeon: Jakub Greer MD;  Location: [facility] OR;  Service: Pain Management;  Laterality: N/A;  L5/S1 INTERLAMINAR TO RIGHT    EPIDURAL STEROID INJECTION INTO LUMBAR SPINE Right 2019    Procedure: Injection-steroid-epidural-lumbar;  Surgeon: Jakub Greer MD;  Location: [facility] OR;  Service: Pain Management;  Laterality: Right;  L5/S1 to right    HAND SURGERY Right 2016    [facility] implant/Dr. Abhinav Rolle    HEMORRHOID SURGERY      HYSTERECTOMY  age 35    TAHUSO benign reasons    INJECTION OF ANESTHETIC AGENT AROUND MEDIAL BRANCH NERVES INNERVATING LUMBAR FACET JOINT Right 10/15/2018    Procedure: Block-nerve-medial branch-lumbar L2,3,4,5;  Surgeon: Jakub Greer MD;  Location: [facility] OR;  Service: Pain Management;  Laterality: Right;    JOINT REPLACEMENT Right     1st interphalangeal joint of 3rd finger    OOPHORECTOMY      one remains    RADIOFREQUENCY ABLATION OF LUMBAR MEDIAL BRANCH NERVE AT SINGLE LEVEL Right 10/25/2018    Procedure: RADIOFREQUENCY ABLATION, NERVE, SPINAL, LUMBAR, MEDIAL BRANCH, THERMAL- L2,L3,L4,L5;  Surgeon: Jakub Greer MD;  Location: [facility] OR;  Service:  "Pain Management;  Laterality: Right;    SPLENECTOMY N/A 2019    Procedure: SPLENECTOMY;  Surgeon: Brandi Alamo MD;  Location[facility];  Service: General;  Laterality: N/A;    TONSILLECTOMY      UPPER GASTROINTESTINAL ENDOSCOPY  2017    Dr. Sousa: Tiny gastric islands of salmon-colored mucosa in distal esophagus, gastritis; biopsy: esophagus- reflux esophagitis, negative for lisa's, stomach- reactive gastropathy, negative for h pylori     Family History   Problem Relation Age of Onset    Heart disease Mother 85        MI    Heart disease Father 55        MI    Colon cancer Neg Hx     Colon polyps Neg Hx     Crohn's disease Neg Hx     Ulcerative colitis Neg Hx      Social History     Tobacco Use    Smoking status: Former Smoker     Packs/day: 0.50     Years: 40.00     Pack years: 20.00     Types: Cigarettes     Start date: 1964     Last attempt to quit: 3/28/2013     Years since quittin.7    Smokeless tobacco: Never Used   Substance Use Topics    Alcohol use: Yes     Alcohol/week: 0.0 standard drinks     Comment: 0-2 ETOHic drinks per day; wine and gin    Drug use: No         OBJECTIVE:     Vital Signs (Most Recent)  Temp: 97.5 °F (36.4 °C) (20 1135)  Pulse: 86 (20 1135)  Resp: 16 (20 1135)  BP: (!) 181/81 (20 1135)  SpO2: 100 % (20 1135)    Physical Exam:  :Ht 5' 3" (1.6 m)   Wt 59.8 kg (131 lb 13.4 oz) BMI 23.35 kg/m²                                                         GENERAL:  Comfortable, in no acute distress.                                 HEENT EXAM:  Nonicteric.  No adenopathy.  Oropharynx is clear.               NECK:  Supple.                                                               LUNGS:  Clear.                                                               CARDIAC:  Regular rate and rhythm.  S1, S2.  No murmur.                      ABDOMEN:  Colostomy left side, healthy.  Soft, positive bowel sounds, nontender.  No " hepatosplenomegaly or masses.  No rebound or guarding.                                             EXTREMITIES:  No edema.     MENTAL STATUS:  Alert and oriented.    ASSESSMENT/PLAN:     Assessment: Colorectal cancer screening, prior to colostomy takedown.    Plan: Colonoscopy    Anesthesia Plan:   MAC / General Anaesthesia    ASA Grade: ASA 2 - Patient with mild systemic disease with no functional limitations    MALLAMPATI SCORE: I (soft palate, uvula, fauces, and tonsillar pillars visible)

## 2020-01-08 NOTE — ANESTHESIA PREPROCEDURE EVALUATION
01/08/2020  Haley Castro is a 74 y.o., female.    Anesthesia Evaluation         Review of Systems  Anesthesia Hx:  No problems with previous Anesthesia   Cardiovascular:   Hypertension CAD   Angina    Pulmonary:  Pulmonary Normal    Hepatic/GI:   GERD    Musculoskeletal:   Arthritis     Neurological:   Neuromuscular Disease,    Endocrine:  Endocrine Normal    Psych:   Psychiatric History          Physical Exam  General:  Well nourished    Airway/Jaw/Neck:  Airway Findings: Mouth Opening: Normal Tongue: Normal  General Airway Assessment: Adult  Mallampati: II  TM Distance: Normal, at least 6 cm       Chest/Lungs:  Chest/Lungs Clear    Heart/Vascular:  Heart Findings: Normal            Anesthesia Plan  Type of Anesthesia, risks & benefits discussed:  Anesthesia Type:  general  Patient's Preference:   Intra-op Monitoring Plan: standard ASA monitors  Intra-op Monitoring Plan Comments:   Post Op Pain Control Plan: multimodal analgesia and IV/PO Opioids PRN  Post Op Pain Control Plan Comments: Opioids and analgesic adjuvants as needed  Regional blocks if applicable/indicated  Induction:   IV  Beta Blocker:  Patient is on a Beta-Blocker and has received one dose within the past 24 hours (No further documentation required).       Informed Consent: Patient understands risks and agrees with Anesthesia plan.  Questions answered. Anesthesia consent signed with patient.  ASA Score: 3     Day of Surgery Review of History & Physical:    H&P update referred to the surgeon.     Anesthesia Plan Notes: I have personally evaluated the patient and discussed risk/benefits/alternatives of general anesthesia.        Ready For Surgery From Anesthesia Perspective.

## 2020-01-08 NOTE — BRIEF OP NOTE
Discharge Note  Short Stay      SUMMARY     Admit Date: 1/8/2020    Attending Physician: Grzegorz Sousa Jr., MD     Discharge Physician: Grzegorz Sousa Jr., MD    Discharge Date: 1/8/2020 1:01 PM    Final Diagnosis: Screening for colon cancer [Z12.11]  Impression:          - The rectum is normal.                       - The rectal pouch and area at 15 cm proximal to the                        anus are normal.                       - The surgical stoma is normal.                       - The descending colon is normal.                       - One 2 to 3 mm polyp in the distal transverse                        colon, removed with a cold snare. Resected and                        retrieved.                       - The transverse colon, hepatic flexure, ascending                        colon and cecum are normal.                       - The examined portion of the ileum was normal.  Recommendation:      - Discharge patient to home.                       - Return to referring physician, Dr. Alamo,                        tomorrow for surgery, takedown of the colostomy.                       - High fiber diet after recovers from surgery.                       - Repeat colonoscopy in 5 years for screening                        purposes.                       - Patient has a contact number available for                        emergencies. The signs and symptoms of potential                        delayed complications were discussed with the                        patient. Return to normal activities tomorrow.                        Written discharge instructions were provided to the                        patient.                       - Return to normal activities tomorrow.  Grzegorz Sousa MD  1/8/2020   Disposition: HOME OR SELF CARE    Patient Instructions:   Current Discharge Medication List      CONTINUE these medications which have NOT CHANGED    Details   amLODIPine (NORVASC) 5 MG tablet Take 5 mg by  mouth every evening.      aspirin (ECOTRIN) 325 MG EC tablet Take 325 mg by mouth once daily.      atorvastatin (LIPITOR) 40 MG tablet Take 1 tablet (40 mg total) by mouth once daily.  Qty: 90 tablet, Refills: 3    Associated Diagnoses: Mixed hyperlipidemia; Essential hypertension; Bilateral carotid artery stenosis      carvedilol (COREG) 3.125 MG tablet TAKE 1 TABLET (3.125 MG TOTAL) BY MOUTH 2 (TWO) TIMES DAILY.  Qty: 180 tablet, Refills: 4    Associated Diagnoses: History of ischemic colitis; Family history of early CAD; Essential hypertension; Bilateral carotid artery stenosis; Mixed hyperlipidemia      chlorhexidine (PERIDEX) 0.12 % solution Use as directed 10 mLs in the mouth or throat 2 (two) times daily.      DULoxetine (CYMBALTA) 60 MG capsule Take 1 capsule (60 mg total) by mouth once daily.  Qty: 90 capsule, Refills: 3    Associated Diagnoses: Depression, unspecified depression type      ezetimibe (ZETIA) 10 mg tablet Take 1 tablet (10 mg total) by mouth every evening.  Qty: 90 tablet, Refills: 4      gabapentin (NEURONTIN) 100 MG capsule Take 200 mg by mouth 2 (two) times daily.       losartan (COZAAR) 100 MG tablet Take 1 tablet (100 mg total) by mouth once daily.  Qty: 90 tablet, Refills: 3    Associated Diagnoses: Essential hypertension      mupirocin (BACTROBAN) 2 % ointment 1 g by Nasal route 2 (two) times daily.      pantoprazole (PROTONIX) 40 MG tablet Take 1 tablet (40 mg total) by mouth before breakfast.  Qty: 90 tablet, Refills: 3    Associated Diagnoses: Gastroesophageal reflux disease, esophagitis presence not specified             Discharge Procedure Orders (must include Diet, Follow-up, Activity)    Follow Up:  Follow up with PCP as per your routine.  Please follow a high fiber diet after you recover from sugery.  Activity as tolerated.    No driving day of procedure.    PROBIOTICS:  Now that your colon is so cleaned out, now is a good time for a round of PROBIOTICS.  Eat a container of  Greek Yogurt, such as OIKOS or CHOBANI,  Or Activia or Dannon    Greek Yogurt.    Or Take a similar Probiotic product such as Align or Culturelle or Elisha-Q, every day for a month.                  (The products listed are non-prescription, but you may need to ask the pharmacist for their location.)   Repeat this at least 5-6 times a year.

## 2020-01-08 NOTE — [employer] PATIENT INSTRUCTIONS
Discharge Summary/Instructions for after Colonoscopy with   Biopsy/Polypectomy  Haley Castro    Wednesday, January 08, 2020  Grzegorz Sousa MD  RESTRICTIONS ON ACTIVITY:  - Do not drive a car or operate machinery until the day after the procedure.      - The following day: return to full activity including work.  - For  3 days: No heavy lifting, straining or running.  - Diet: You can have solid foods, but no gassy foods (i.e. beans, broccoli,   cabbage, etc).  TREATMENT FOR COMMON SIDE EFFECTS:  - Mild abdominal pain and bloating or excessive gas: rest, eat lightly and   use a heating pad.  SYMPTOMS TO WATCH FOR AND REPORT TO YOUR PHYSICIAN:  1. Severe abdominal pain.  2. Fever within 24 hours after a procedure.  3. A large amount of rectal bleeding. (A small amount of blood from the   rectum is not serious, especially if hemorrhoids are present.  3.  Because air was put into your colon during the procedure, expelling   large amounts of air from your rectum is normal.  4.  You may not have a bowel movement for 1-3 days because of the   colonoscopy prep.  This is normal.  5.  Call immediately if you notice any of the following:   Chills and/or fever over 101   Persistent vomiting   Severe abdominal pain, other than gas cramps   Severe chest pain   Black, tarry stools   Any bleeding - exceeding one tablespoon  Your doctor recommends these additional instructions:  You are being discharged to home.   Return to your referring physician, Dr. Alamo, tomorrow.   Your physician has recommended a repeat colonoscopy in 5 years for screening   purposes.  None  If you have any questions or problems, please call your physician.  EMERGENCY PHONE NUMBER: [phone]  LAB RESULTS: Call in two (2) weeks for lab results, [phone]  ___________________________________________  Nurse Signature  ___________________________________________  Patient/Designated Responsible Party Signature  Grzegorz Sousa  MD  1/8/2020 12:59:03 PM  This report has been verified and signed electronically.  [employer]

## 2020-01-09 VITALS
TEMPERATURE: 98 F | HEART RATE: 68 BPM | SYSTOLIC BLOOD PRESSURE: 132 MMHG | OXYGEN SATURATION: 100 % | HEIGHT: 63 IN | DIASTOLIC BLOOD PRESSURE: 74 MMHG | RESPIRATION RATE: 18 BRPM | BODY MASS INDEX: 23.04 KG/M2 | WEIGHT: 130 LBS

## 2020-01-09 NOTE — ANESTHESIA POSTPROCEDURE EVALUATION
Anesthesia Post Evaluation    Patient: Haley Castro    Procedure(s) Performed: Procedure(s) (LRB):  COLONOSCOPY (N/A)    Final Anesthesia Type: general    Patient location during evaluation: PACU  Patient participation: Yes- Able to Participate  Level of consciousness: awake and alert and oriented  Post-procedure vital signs: reviewed and stable  Pain management: adequate  Airway patency: patent    PONV status at discharge: No PONV  Anesthetic complications: no      Cardiovascular status: blood pressure returned to baseline  Respiratory status: unassisted, spontaneous ventilation and room air  Hydration status: euvolemic  Follow-up not needed.          Vitals Value Taken Time   /67 1/9/2020  5:31 AM   Temp 36.7 °C (98.1 °F) 1/9/2020  5:31 AM   Pulse 78 1/9/2020  5:31 AM   Resp 18 1/9/2020  5:31 AM   SpO2 99 % 1/9/2020  5:31 AM         Event Time     Out of Recovery 13:30:50          Pain/Olivia Score: Olivia Score: 10 (1/8/2020  1:10 PM)

## 2020-02-03 LAB
FINAL PATHOLOGIC DIAGNOSIS: NORMAL
GROSS: NORMAL

## 2020-02-12 ENCOUNTER — TELEPHONE (OUTPATIENT)
Dept: CARDIOLOGY | Facility: CLINIC | Age: 75
End: 2020-02-12

## 2020-02-12 DIAGNOSIS — I65.29 STENOSIS OF CAROTID ARTERY, UNSPECIFIED LATERALITY: ICD-10-CM

## 2020-02-12 DIAGNOSIS — I10 ESSENTIAL HYPERTENSION: ICD-10-CM

## 2020-02-12 DIAGNOSIS — E78.2 MIXED HYPERLIPIDEMIA: Primary | ICD-10-CM

## 2020-02-12 PROBLEM — Z12.11 COLON CANCER SCREENING: Status: RESOLVED | Noted: 2020-01-08 | Resolved: 2020-02-12

## 2020-02-12 NOTE — TELEPHONE ENCOUNTER
----- Message from Enrrique Powell sent at 2/12/2020  8:35 AM CST -----  Contact: Patient  Type: Needs Medical Advice    Who Called:  Patient  Best Call Back Number: [phone]  Additional Information: Patient would like to reschedule cancelled labs. No visible orders in system. Please call to advise. Thanks!

## 2020-02-27 ENCOUNTER — LAB VISIT (OUTPATIENT)
Dept: LAB | Facility: HOSPITAL | Age: 75
End: 2020-02-27
Attending: INTERNAL MEDICINE
Payer: MEDICARE

## 2020-02-27 DIAGNOSIS — I10 ESSENTIAL HYPERTENSION: ICD-10-CM

## 2020-02-27 DIAGNOSIS — I65.29 STENOSIS OF CAROTID ARTERY, UNSPECIFIED LATERALITY: ICD-10-CM

## 2020-02-27 DIAGNOSIS — E78.2 MIXED HYPERLIPIDEMIA: ICD-10-CM

## 2020-02-27 LAB
ALBUMIN SERPL BCP-MCNC: 3.5 G/DL (ref 3.5–5.2)
ALP SERPL-CCNC: 70 U/L (ref 55–135)
ALT SERPL W/O P-5'-P-CCNC: <5 U/L (ref 10–44)
ANION GAP SERPL CALC-SCNC: 8 MMOL/L (ref 8–16)
AST SERPL-CCNC: 15 U/L (ref 10–40)
BILIRUB SERPL-MCNC: 0.5 MG/DL (ref 0.1–1)
BUN SERPL-MCNC: 13 MG/DL (ref 8–23)
CALCIUM SERPL-MCNC: 9.9 MG/DL (ref 8.7–10.5)
CHLORIDE SERPL-SCNC: 104 MMOL/L (ref 95–110)
CHOLEST SERPL-MCNC: 239 MG/DL (ref 120–199)
CHOLEST/HDLC SERPL: 4 {RATIO} (ref 2–5)
CK SERPL-CCNC: 19 U/L (ref 20–180)
CO2 SERPL-SCNC: 26 MMOL/L (ref 23–29)
CREAT SERPL-MCNC: 0.7 MG/DL (ref 0.5–1.4)
EST. GFR  (AFRICAN AMERICAN): >60 ML/MIN/1.73 M^2
EST. GFR  (NON AFRICAN AMERICAN): >60 ML/MIN/1.73 M^2
GLUCOSE SERPL-MCNC: 91 MG/DL (ref 70–110)
HDLC SERPL-MCNC: 60 MG/DL (ref 40–75)
HDLC SERPL: 25.1 % (ref 20–50)
LDLC SERPL CALC-MCNC: 152.4 MG/DL (ref 63–159)
NONHDLC SERPL-MCNC: 179 MG/DL
POTASSIUM SERPL-SCNC: 4.5 MMOL/L (ref 3.5–5.1)
PROT SERPL-MCNC: 7 G/DL (ref 6–8.4)
SODIUM SERPL-SCNC: 138 MMOL/L (ref 136–145)
TRIGL SERPL-MCNC: 133 MG/DL (ref 30–150)

## 2020-02-27 PROCEDURE — 82550 ASSAY OF CK (CPK): CPT | Mod: HCNC

## 2020-02-27 PROCEDURE — 80053 COMPREHEN METABOLIC PANEL: CPT | Mod: HCNC

## 2020-02-27 PROCEDURE — 80061 LIPID PANEL: CPT | Mod: HCNC

## 2020-02-27 PROCEDURE — 36415 COLL VENOUS BLD VENIPUNCTURE: CPT | Mod: HCNC,PO

## 2020-02-28 NOTE — DISCHARGE INSTRUCTIONS
"  Physical Therapy Daily Treatment Note     Name: Ari Santos  Clinic Number: [id number]    Therapy Diagnosis:   Encounter Diagnoses   Name Primary?    Chronic pain of right knee     Gait abnormality      Physician: Donte Andrade III, *    Visit Date: 2020    Physician Orders: PT Eval and Treat   Medical Diagnosis from Referral: M17.0 (ICD-10-CM) - Arthritis of both knees   Evaluation Date: 2020  Authorization Period Expiration: 2020  Plan of Care Expiration: 2020  Visit # / Visits authorized:     Time In: 3:00pm  Time Out: 4:00pm  Total Billable Time: 25 minutes    Precautions: Standard    Subjective     Pt reports: having increased L knee pain after the bike last visit. He has a bike at home that fits his stature better. Noticing a lot of weakness in his arms and low back pain with walking. Difficulty walking more than 1 block when going to 's appointment at [facility] yesterday.   She was compliant with home exercise program.  Response to previous treatment: quad soreness  Functional change: none    Pain: 4/10  Location: right knee      Objective     R knee AROM: 0-100 deg    SLR extension la deg    Ari received therapeutic exercises to develop strength, endurance, ROM, flexibility, posture and core stabilization for 40 minutes including:    Seated passive knee flexion over EOB 10" x 10   SAQ 2 x 10 (eccentrics with PT)  LAQ 2 x 10   QS 5 sec x 20  bridging x 20  Heel props x 3 min- nt  SLR x 5  HL hip adduction x 20 with ball  HL clams OTB x 20    Gait training SPC x 80 ft CGA  Weight shifting // bars- x 3 min    Sit to stands x 5 high mat    Recumbent bike seat 2 x 5 min- nt      Seated shoulder extensions OTB x 20   seated Row OTB x 20       Ari received the following manual therapy techniques: Joint mobilizations were applied to the: patella for 0 minutes, including:  Patella mobs all planes    Ari received cold pack for 10 minutes to R knee.      Home Exercises " Home care instructions  Apply ice pack to the injection site for 20 minutes periods for the first 24 hrs for soreness/discomfort at injection site DO NOT USE HEAT FOR 24 HOURS  Keep site clean and dry for 24 hours, remove bandaid when desired  Do not drive until tomorrow  Take care when walking after a lumbar injection  Avoid strenuous activities for 2 days  Make take 2 weeks to feel the full effects   Resume home medication as prescribed today  Resume Aspirin, Plavix, or Coumadin the day after the procedure unless otherwise instructed.    SEE IMMEDIATE MEDICAL HELP FOR:  Severe increase in your usual pain or appearance of new pain  Prolonged or increasing weakness or numbness in the legs or arms  Drainage, redness, active bleeding, or increased swelling at the injection site  Temperature over 100.0 degrees F.  Headache that increases when your head is upright and decreases when you lie flat    CALL 911 OR GO DIRECTLY TO EMERGENCY DEPARTMENT FOR:  Shortness of breath, chest pain, or problems breathing      Recovery After Procedural Sedation (Adult)  You have been given medicine by vein to make you sleep during your surgery. This may have included both a pain medicine and sleeping medicine. Most of the effects have worn off. But you may still have some drowsiness for the next 6 to 8 hours.  Home care  Follow these guidelines when you get home:  · For the next 8 hours, you should be watched by a responsible adult. This person should make sure your condition is not getting worse.  · Don't drink any alcohol for the next 24 hours.  · Don't drive, operate dangerous machinery, or make important business or personal decisions during the next 24 hours.  Note: Your healthcare provider may tell you not to take any medicine by mouth for pain or sleep in the next 4 hours. These medicines may react with the medicines you were given in the hospital. This could cause a much stronger response than usual.  Follow-up care  Follow up  "Provided and Patient Education Provided     Education provided:   - HEP / icing     Written Home Exercises Provided: yes.  Exercises were reviewed and Ari was able to demonstrate them prior to the end of the session.  Ari demonstrated good  understanding of the education provided.     See EMR under Patient Instructions for exercises provided 2/19/2020.    Assessment     Progressing with gait training SPC. Requiring SBA for intermittent LOB with turns  Ari is progressing well towards her goals.   Pt prognosis is Good.     Pt will continue to benefit from skilled outpatient physical therapy to address the deficits listed in the problem list box on initial evaluation, provide pt/family education and to maximize pt's level of independence in the home and community environment.     Pt's spiritual, cultural and educational needs considered and pt agreeable to plan of care and goals.     Anticipated barriers to physical therapy: transportation,     Goals:     Short term (4 weeks):  1. Pt's R knee AROM flexion to be 90 degrees or greater- met 2/21/2020  2. Pt to demonstrate improved quad control as noted by SLR with <30 deg extension lag- in progress, not met  3. Pt to have full R knee extension for improved gait quality- met 2/21/2020  4. Pt to be able to ambulate with SPC household distance- in progress, not met  5. Pt to have a TUG score of 13" or less for decreased risk for falls- in progress, not met  Long term (8 weeks)  1. Pt to be independent with home exercise program for improved self management of condition- in progress, not met  2. Pt to have decreased subjective report of disability as noted by <50% on FOTO knee questionnaire - in progress, not met  3. Pt to report 4/10 pain or less with ADL's- in progress, not met  4. Pt to be able to ascend/ descend 1 threshold step mod I with RW- in progress, not met  5. Pt to have 120 degrees of R knee flexion or greater for improved performance of ADL's such as donning " with your healthcare provider if you are not alert and back to your usual level of activity within 12 hours.  When to seek medical advice  Call your healthcare provider right away if any of these occur:  · Drowsiness gets worse  · Weakness or dizziness gets worse  · Repeated vomiting  · You can't be awakened   Date Last Reviewed: 10/18/2016  © 4762-8409 [employer]. [address] [place] [address], [place] [address]. All rights reserved. This information is not intended as a substitute for professional medical care. Always follow your healthcare professional's instructions.         shoes- in progress, not met  6. Pt's R knee strength to increased 1/2 muscle grade greater for improved performance of ADL's such as transfers- in progress, not met    Plan     Continue with emphasis on quad strength, ROM, and gait    Dayan Bruce, PT

## 2020-02-29 ENCOUNTER — PATIENT OUTREACH (OUTPATIENT)
Dept: ADMINISTRATIVE | Facility: OTHER | Age: 75
End: 2020-02-29

## 2020-03-03 ENCOUNTER — OFFICE VISIT (OUTPATIENT)
Dept: CARDIOLOGY | Facility: CLINIC | Age: 75
End: 2020-03-03
Payer: MEDICARE

## 2020-03-03 VITALS
HEIGHT: 63 IN | SYSTOLIC BLOOD PRESSURE: 152 MMHG | DIASTOLIC BLOOD PRESSURE: 71 MMHG | WEIGHT: 128.5 LBS | BODY MASS INDEX: 22.77 KG/M2 | HEART RATE: 83 BPM

## 2020-03-03 DIAGNOSIS — I73.9 PVD (PERIPHERAL VASCULAR DISEASE): ICD-10-CM

## 2020-03-03 DIAGNOSIS — I10 ESSENTIAL HYPERTENSION: ICD-10-CM

## 2020-03-03 DIAGNOSIS — I65.23 BILATERAL CAROTID ARTERY STENOSIS: Primary | ICD-10-CM

## 2020-03-03 DIAGNOSIS — Z87.19 HISTORY OF ISCHEMIC COLITIS: ICD-10-CM

## 2020-03-03 DIAGNOSIS — Z82.49 FAMILY HISTORY OF EARLY CAD: ICD-10-CM

## 2020-03-03 DIAGNOSIS — E78.2 MIXED HYPERLIPIDEMIA: ICD-10-CM

## 2020-03-03 PROCEDURE — 99499 RISK ADDL DX/OHS AUDIT: ICD-10-PCS | Mod: HCNC,S$GLB,, | Performed by: INTERNAL MEDICINE

## 2020-03-03 PROCEDURE — 1159F PR MEDICATION LIST DOCUMENTED IN MEDICAL RECORD: ICD-10-PCS | Mod: HCNC,S$GLB,, | Performed by: INTERNAL MEDICINE

## 2020-03-03 PROCEDURE — 99214 OFFICE O/P EST MOD 30 MIN: CPT | Mod: HCNC,S$GLB,, | Performed by: INTERNAL MEDICINE

## 2020-03-03 PROCEDURE — 1101F PT FALLS ASSESS-DOCD LE1/YR: CPT | Mod: HCNC,CPTII,S$GLB, | Performed by: INTERNAL MEDICINE

## 2020-03-03 PROCEDURE — 3078F PR MOST RECENT DIASTOLIC BLOOD PRESSURE < 80 MM HG: ICD-10-PCS | Mod: HCNC,CPTII,S$GLB, | Performed by: INTERNAL MEDICINE

## 2020-03-03 PROCEDURE — 99499 UNLISTED E&M SERVICE: CPT | Mod: HCNC,S$GLB,, | Performed by: INTERNAL MEDICINE

## 2020-03-03 PROCEDURE — 99999 PR PBB SHADOW E&M-EST. PATIENT-LVL III: ICD-10-PCS | Mod: PBBFAC,HCNC,, | Performed by: INTERNAL MEDICINE

## 2020-03-03 PROCEDURE — 3077F SYST BP >= 140 MM HG: CPT | Mod: HCNC,CPTII,S$GLB, | Performed by: INTERNAL MEDICINE

## 2020-03-03 PROCEDURE — 99214 PR OFFICE/OUTPT VISIT, EST, LEVL IV, 30-39 MIN: ICD-10-PCS | Mod: HCNC,S$GLB,, | Performed by: INTERNAL MEDICINE

## 2020-03-03 PROCEDURE — 1126F AMNT PAIN NOTED NONE PRSNT: CPT | Mod: HCNC,S$GLB,, | Performed by: INTERNAL MEDICINE

## 2020-03-03 PROCEDURE — 3078F DIAST BP <80 MM HG: CPT | Mod: HCNC,CPTII,S$GLB, | Performed by: INTERNAL MEDICINE

## 2020-03-03 PROCEDURE — 1126F PR PAIN SEVERITY QUANTIFIED, NO PAIN PRESENT: ICD-10-PCS | Mod: HCNC,S$GLB,, | Performed by: INTERNAL MEDICINE

## 2020-03-03 PROCEDURE — 1159F MED LIST DOCD IN RCRD: CPT | Mod: HCNC,S$GLB,, | Performed by: INTERNAL MEDICINE

## 2020-03-03 PROCEDURE — 1101F PR PT FALLS ASSESS DOC 0-1 FALLS W/OUT INJ PAST YR: ICD-10-PCS | Mod: HCNC,CPTII,S$GLB, | Performed by: INTERNAL MEDICINE

## 2020-03-03 PROCEDURE — 3077F PR MOST RECENT SYSTOLIC BLOOD PRESSURE >= 140 MM HG: ICD-10-PCS | Mod: HCNC,CPTII,S$GLB, | Performed by: INTERNAL MEDICINE

## 2020-03-03 PROCEDURE — 99999 PR PBB SHADOW E&M-EST. PATIENT-LVL III: CPT | Mod: PBBFAC,HCNC,, | Performed by: INTERNAL MEDICINE

## 2020-03-03 RX ORDER — AMLODIPINE BESYLATE 2.5 MG/1
2.5 TABLET ORAL NIGHTLY
Qty: 90 TABLET | Refills: 6 | Status: SHIPPED | OUTPATIENT
Start: 2020-03-03 | End: 2021-04-19

## 2020-03-03 RX ORDER — EZETIMIBE 10 MG/1
10 TABLET ORAL NIGHTLY
Qty: 90 TABLET | Refills: 4 | Status: SHIPPED | OUTPATIENT
Start: 2020-03-03 | End: 2021-03-22

## 2020-03-03 RX ORDER — ATORVASTATIN CALCIUM 40 MG/1
40 TABLET, FILM COATED ORAL DAILY
Qty: 90 TABLET | Refills: 3 | Status: SHIPPED | OUTPATIENT
Start: 2020-03-03 | End: 2021-06-03

## 2020-03-03 RX ORDER — CARVEDILOL 3.12 MG/1
3.12 TABLET ORAL 2 TIMES DAILY
Qty: 180 TABLET | Refills: 4 | Status: SHIPPED | OUTPATIENT
Start: 2020-03-03 | End: 2021-04-02

## 2020-03-03 RX ORDER — ASPIRIN 81 MG/1
81 TABLET ORAL DAILY
Qty: 30 TABLET | Refills: 11
Start: 2020-03-03 | End: 2021-04-08

## 2020-03-03 NOTE — PROGRESS NOTES
Subjective:    Patient ID:  Haley Castro is a 74 y.o. female who presents for follow-up of Hospital f/u      [facility]  Here for follow up of carotid dsx (7/18 Rt CEA, 60% LICA)/PVD/dlp/HTN/fam hx CAD. S/p colostomy reversal doing well. No angina.     Review of Systems   Constitution: Negative for malaise/fatigue.   Eyes: Negative for blurred vision.   Cardiovascular: Negative for chest pain, claudication, cyanosis, dyspnea on exertion, irregular heartbeat, leg swelling, near-syncope, orthopnea, palpitations, paroxysmal nocturnal dyspnea and syncope.   Respiratory: Negative for cough and shortness of breath.    Hematologic/Lymphatic: Does not bruise/bleed easily.   Musculoskeletal: Negative for back pain, falls, joint pain, muscle cramps, muscle weakness and myalgias.   Gastrointestinal: Negative for abdominal pain, change in bowel habit, nausea and vomiting.   Genitourinary: Negative for urgency.   Neurological: Negative for dizziness, focal weakness and light-headedness.       Past Medical History:   Diagnosis Date    Anticoagulant long-term use     Arthritis     Carotid artery occlusion     Depression 11/29/2012    Diverticulosis     Former smoker     GERD (gastroesophageal reflux disease)     HLD (hyperlipidemia) 11/29/2012    HTN (hypertension) 11/29/2012    Infectious gastroenteritis     Ischemic colitis      Patient Active Problem List   Diagnosis    Family history of early CAD    Hyperlipidemia    DDD (degenerative disc disease), lumbar    DJD (degenerative joint disease) of lumbar spine    Essential hypertension    Carotid artery stenosis    History of ischemic colitis    PVD (peripheral vascular disease)    GERD (gastroesophageal reflux disease)    Lumbar radiculopathy    Lumbar spondylosis    Atherosclerosis of native coronary artery of native heart with angina pectoris    Recurrent major depressive disorder, in full remission    Vaginal discharge    Right lumbar radiculopathy     Phlegmon    Diverticulitis    Post-splenectomy    Colostomy care        Objective:     Vitals:    03/03/20 1152   BP: (!) 152/71   Pulse: 83        Physical Exam   Constitutional: She is oriented to person, place, and time. She appears well-developed and well-nourished.   HENT:   Head: Normocephalic.   Eyes: Conjunctivae are normal.   Neck: Normal range of motion. Neck supple. No JVD present.   Rt CEA scar   Cardiovascular: Normal rate, regular rhythm, normal heart sounds and intact distal pulses.   Pulses:       Carotid pulses are 2+ on the right side, and 2+ on the left side.       Radial pulses are 2+ on the right side, and 2+ on the left side.        Dorsalis pedis pulses are 2+ on the right side, and 2+ on the left side.        Posterior tibial pulses are 2+ on the right side, and 2+ on the left side.   Pulmonary/Chest: Effort normal and breath sounds normal.   Abdominal: Soft. Bowel sounds are normal.   Musculoskeletal: She exhibits no edema or tenderness.   Neurological: She is alert and oriented to person, place, and time. Gait normal.   Skin: Skin is warm, dry and intact. No cyanosis. Nails show no clubbing.   Psychiatric: She has a normal mood and affect. Her speech is normal and behavior is normal. Thought content normal.   Nursing note and vitals reviewed.            ..    Chemistry        Component Value Date/Time     02/27/2020 0908    K 4.5 02/27/2020 0908     02/27/2020 0908    CO2 26 02/27/2020 0908    BUN 13 02/27/2020 0908    CREATININE 0.7 02/27/2020 0908    GLU 91 02/27/2020 0908        Component Value Date/Time    CALCIUM 9.9 02/27/2020 0908    ALKPHOS 70 02/27/2020 0908    AST 15 02/27/2020 0908    ALT <5 (L) 02/27/2020 0908    BILITOT 0.5 02/27/2020 0908    ESTGFRAFRICA >60.0 02/27/2020 0908    EGFRNONAA >60.0 02/27/2020 0908            ..  Lab Results   Component Value Date    CHOL 239 (H) 02/27/2020    CHOL 152 11/01/2019    CHOL 147 01/31/2019     Lab Results   Component  Value Date    HDL 60 02/27/2020    HDL 60 11/01/2019    HDL 62 01/31/2019     Lab Results   Component Value Date    LDLCALC 152.4 02/27/2020    LDLCALC 73.2 11/01/2019    LDLCALC 70.8 01/31/2019     Lab Results   Component Value Date    TRIG 133 02/27/2020    TRIG 94 11/01/2019    TRIG 71 01/31/2019     Lab Results   Component Value Date    CHOLHDL 25.1 02/27/2020    CHOLHDL 39.5 11/01/2019    CHOLHDL 42.2 01/31/2019     ..  Lab Results   Component Value Date    WBC 12.50 01/12/2020    HGB 9.7 (L) 01/12/2020    HCT 30.0 (L) 01/12/2020    MCV 85 01/12/2020     01/12/2020       Test(s) Reviewed  I have reviewed the following in detail:  [] Stress test   [] Angiography   [x] Echocardiogram   [x] Labs   [x] Other:       Assessment:         ICD-10-CM ICD-9-CM   1. Bilateral carotid artery stenosis I65.23 433.10     433.30   2. Essential hypertension I10 401.9   3. Mixed hyperlipidemia E78.2 272.2   4. PVD (peripheral vascular disease) I73.9 443.9   5. Family history of early CAD Z82.49 V17.3     Problem List Items Addressed This Visit     PVD (peripheral vascular disease)    Hyperlipidemia    Family history of early CAD    Essential hypertension    Carotid artery stenosis - Primary    Overview     7/18 right CEA    5/2018 CTA  High-grade stenosis of the right proximal ICA in the range of 80%.  Moderate stenosis of the left proximal ICA in the range of 60%.  Small lung nodules for which follow-up in 1 year with chest CT is recommended.                 Plan:           Return to clinic 8 months   Low level/low impact aerobic exercise 5x's/wk. Heart healthy diet and risk factor modification.    See labs and med orders.  Resume lipitor, half dose norvasc follow BP.     Portions of this note may have been created with voice recognition software.  Grammatical, syntax and spelling errors may be inevitable.

## 2020-03-19 ENCOUNTER — TELEPHONE (OUTPATIENT)
Dept: PAIN MEDICINE | Facility: CLINIC | Age: 75
End: 2020-03-19

## 2020-03-19 NOTE — TELEPHONE ENCOUNTER
Spoke with patient informing that her appointment will be via telephone call. Patient verbalized understanding.

## 2020-03-19 NOTE — TELEPHONE ENCOUNTER
Spoke with patient regarding virtual visit. She stated at the this time she is unable to do this because she does not have a smart phone or tablet. She stated she did want to discuss medication management with you because the gabapentin is not working. She also wanted thank you for locating the stomach pain, she needed surgery. Please advise.

## 2020-03-21 ENCOUNTER — PATIENT OUTREACH (OUTPATIENT)
Dept: ADMINISTRATIVE | Facility: OTHER | Age: 75
End: 2020-03-21

## 2020-03-21 DIAGNOSIS — F32.A DEPRESSION, UNSPECIFIED DEPRESSION TYPE: ICD-10-CM

## 2020-03-21 DIAGNOSIS — Z12.31 ENCOUNTER FOR SCREENING MAMMOGRAM FOR MALIGNANT NEOPLASM OF BREAST: Primary | ICD-10-CM

## 2020-03-23 RX ORDER — DULOXETIN HYDROCHLORIDE 60 MG/1
CAPSULE, DELAYED RELEASE ORAL
Qty: 90 CAPSULE | Refills: 3 | Status: SHIPPED | OUTPATIENT
Start: 2020-03-23 | End: 2021-02-17

## 2020-03-24 ENCOUNTER — TELEPHONE (OUTPATIENT)
Dept: PAIN MEDICINE | Facility: CLINIC | Age: 75
End: 2020-03-24

## 2020-03-24 RX ORDER — TRAMADOL HYDROCHLORIDE 50 MG/1
50 TABLET ORAL 3 TIMES DAILY PRN
Qty: 40 TABLET | Refills: 0 | Status: SHIPPED | OUTPATIENT
Start: 2020-03-24 | End: 2020-04-03

## 2020-03-24 RX ORDER — GABAPENTIN 300 MG/1
300 CAPSULE ORAL 2 TIMES DAILY
Qty: 60 CAPSULE | Refills: 5 | Status: SHIPPED | OUTPATIENT
Start: 2020-03-24 | End: 2020-03-30 | Stop reason: SDUPTHER

## 2020-03-24 RX ORDER — TRAMADOL HYDROCHLORIDE 50 MG/1
50 TABLET ORAL 3 TIMES DAILY PRN
Qty: 40 TABLET | Refills: 0 | Status: SHIPPED | OUTPATIENT
Start: 2020-03-24 | End: 2020-03-24 | Stop reason: SDUPTHER

## 2020-03-24 NOTE — TELEPHONE ENCOUNTER
----- Message from Ann-Marie Emmanuel sent at 3/24/2020  9:56 AM CDT -----  Contact: Haley lim  Type: Needs Medical Advice    Who Called:  Haley Sauceda Call Back Number: [phone]  Additional Information: Pt missed a phone call from Dr mejia. Pls have dr deepika williamson

## 2020-03-24 NOTE — TELEPHONE ENCOUNTER
Patient was called today in lieu of an office visit.  She reports that her back pain, right hip pain is returning.  Since I had last seen her, she had been doing fairly well in terms of her back and hip pain but on the day that I saw her she was having worsening constipation, abdominal pain.  It turned out that she developed severe diverticulitis, requiring colectomy with colostomy placement and recently had a reversal of the colostomy.  In terms of her abdominal pain, she is still having some discomfort but feels that this is improving greatly.  She is now noticing her back pain because her abdomen is feeling better.  She has lost a great deal of weight and is now down to about 124 pounds, she is trying to walk more.  She states that what hurts the most is if she is standing in 1 spot long, turning a certain way with increased pain.  She states that if she turns a certain way, she gets caught and cannot move for several seconds.  She denies any jose weakness, no bowel or bladder incontinence.  She has been taking gabapentin 200 milligrams twice daily with unclear relief, no side effects.  We are going to have her increase up to 300 milligrams twice daily and I am also going to call a prescription for tramadol to take as needed.  We are done going to have her come back in for a follow-up appointment once we are able to do in person visits again.

## 2020-03-30 RX ORDER — GABAPENTIN 300 MG/1
300 CAPSULE ORAL 2 TIMES DAILY
Qty: 60 CAPSULE | Refills: 5 | Status: SHIPPED | OUTPATIENT
Start: 2020-03-30 | End: 2020-06-26

## 2020-03-31 ENCOUNTER — TELEPHONE (OUTPATIENT)
Dept: FAMILY MEDICINE | Facility: CLINIC | Age: 75
End: 2020-03-31

## 2020-03-31 NOTE — TELEPHONE ENCOUNTER
----- Message from Tiny Carias sent at 3/31/2020 11:01 AM CDT -----  Contact: patient  Type: Needs Medical Advice    Who Called:  Patient  Best Call Back Number:   Additional Information: Per patient requesting a call back regarding upcoming appointment-please advise-thank you

## 2020-04-02 ENCOUNTER — OFFICE VISIT (OUTPATIENT)
Dept: FAMILY MEDICINE | Facility: CLINIC | Age: 75
End: 2020-04-02
Payer: MEDICARE

## 2020-04-02 DIAGNOSIS — I10 ESSENTIAL HYPERTENSION: Primary | ICD-10-CM

## 2020-04-02 DIAGNOSIS — F33.42 RECURRENT MAJOR DEPRESSIVE DISORDER, IN FULL REMISSION: ICD-10-CM

## 2020-04-02 PROCEDURE — 99441 PR PHYSICIAN TELEPHONE EVALUATION 5-10 MIN: CPT | Mod: 95,,, | Performed by: FAMILY MEDICINE

## 2020-04-02 PROCEDURE — 99441 PR PHYSICIAN TELEPHONE EVALUATION 5-10 MIN: ICD-10-PCS | Mod: 95,,, | Performed by: FAMILY MEDICINE

## 2020-04-02 NOTE — PROGRESS NOTES
Subjective:       Patient ID: Haley Castro is a 75 y.o. female.    Chief Complaint: No chief complaint on file.    Phone call for f/u chronic medical issues. Had colostomy reversal since last visit. Some pain at ostomy site but overall improved. Home bp 110-120's/70s    Review of Systems   Constitutional: Negative for chills and fever.   Respiratory: Negative for cough, chest tightness and shortness of breath.    Cardiovascular: Negative for chest pain, palpitations and leg swelling.   Gastrointestinal: Positive for abdominal pain.   Endocrine: Negative for cold intolerance and heat intolerance.   Psychiatric/Behavioral: Negative for decreased concentration. The patient is not nervous/anxious.        Objective:          Assessment:       1. Essential hypertension    2. Recurrent major depressive disorder, in full remission        Plan:       Essential hypertension    Recurrent major depressive disorder, in full remission      Monitor the area for now and f/u with surgeon as necessary  Mood good  htn controlled  Reviewed recent labs  Call length  8 minutes    Follow up in about 6 months (around 10/2/2020), or if symptoms worsen or fail to improve.

## 2020-05-05 ENCOUNTER — PATIENT MESSAGE (OUTPATIENT)
Dept: ADMINISTRATIVE | Facility: HOSPITAL | Age: 75
End: 2020-05-05

## 2020-05-22 ENCOUNTER — TELEPHONE (OUTPATIENT)
Dept: PAIN MEDICINE | Facility: CLINIC | Age: 75
End: 2020-05-22

## 2020-05-22 RX ORDER — TRAMADOL HYDROCHLORIDE 50 MG/1
50 TABLET ORAL 3 TIMES DAILY PRN
Qty: 40 TABLET | Refills: 0 | Status: SHIPPED | OUTPATIENT
Start: 2020-05-22 | End: 2020-07-07 | Stop reason: SDUPTHER

## 2020-05-22 NOTE — TELEPHONE ENCOUNTER
----- Message from Jessenia Byron sent at 5/22/2020  9:16 AM CDT -----  Contact: pt  Pt states that she needs a Rx filled ,please. Rx-Tramadol 50 mg,just ran out...[phone] (home)             .  [id number] [place] - [address] AT [place] [address] [address]  [place] [address]  Phone: [phone] Fax: [phone]

## 2020-06-12 ENCOUNTER — PES CALL (OUTPATIENT)
Dept: ADMINISTRATIVE | Facility: CLINIC | Age: 75
End: 2020-06-12

## 2020-07-07 ENCOUNTER — TELEPHONE (OUTPATIENT)
Dept: PAIN MEDICINE | Facility: CLINIC | Age: 75
End: 2020-07-07

## 2020-07-07 RX ORDER — TRAMADOL HYDROCHLORIDE 50 MG/1
50 TABLET ORAL 3 TIMES DAILY PRN
Qty: 40 TABLET | Refills: 0 | Status: SHIPPED | OUTPATIENT
Start: 2020-07-07 | End: 2020-09-11 | Stop reason: SDUPTHER

## 2020-07-07 NOTE — TELEPHONE ENCOUNTER
----- Message from Ynes Nielson sent at 7/7/2020  9:09 AM CDT -----  Regarding: refill  Type: Needs Medical Advice    Who Called:      Best Call Back Number:     Additional Information: Requesting a call back from Nurse, regarding a refill for Rx traMADoL (ULTRAM) 50 mg tablet 40 tablet be called in today per pt request

## 2020-07-22 ENCOUNTER — TELEPHONE (OUTPATIENT)
Dept: FAMILY MEDICINE | Facility: CLINIC | Age: 75
End: 2020-07-22

## 2020-07-22 NOTE — TELEPHONE ENCOUNTER
----- Message from Oneida Fierro sent at 7/22/2020 12:14 PM CDT -----  Contact: pt  Type:  RX Refill Request    Who Called:  Pt    Refill or New Rx:  refill  RX Name and Strength:  amLODIPine (NORVASC) 2.5 MG tablet  How is the patient currently taking it? (ex. 1XDay):  As Directed  Is this a 30 day or 90 day RX:  as Directed  Preferred Pharmacy with phone number:    [id number] - [place] - [facility] AT [address] [address]  Phone: [phone] Fax: [phone]  Best Call Back Number:  [phone]  Additional Information:  Please Advise ---Thank you

## 2020-09-11 RX ORDER — TRAMADOL HYDROCHLORIDE 50 MG/1
50 TABLET ORAL 3 TIMES DAILY PRN
Qty: 40 TABLET | Refills: 0 | Status: SHIPPED | OUTPATIENT
Start: 2020-09-11 | End: 2021-02-16

## 2020-09-11 NOTE — TELEPHONE ENCOUNTER
----- Message from Valerie Kaiser sent at 9/10/2020 10:52 AM CDT -----  Regarding: refill  Contact: patient  Type:  RX Refill Request    Who Called:  self  Refill or New Rx:  refill  RX Name and Strength:  traMADoL (ULTRAM) 50 mg tablet  How is the patient currently taking it? (ex. 1XDay):  3xday  Is this a 30 day or 90 day RX:  30  Preferred Pharmacy with phone number:    [facility]pharmacy #[id number] - [place] - [facility] AT [address] [address]  Phone: [phone] Fax: [phone]    Best Call Back Number:  [phone] (home)   Additional Information:  Patient states she is out of medication. Please call patient if any questions. Thanks!

## 2020-09-15 NOTE — TELEPHONE ENCOUNTER
Spoke with patient to let her know that her prescription has been refilled. Patient verbalized understanding.

## 2020-09-29 ENCOUNTER — PATIENT MESSAGE (OUTPATIENT)
Dept: OTHER | Facility: OTHER | Age: 75
End: 2020-09-29

## 2020-10-06 ENCOUNTER — TELEPHONE (OUTPATIENT)
Dept: CARDIOLOGY | Facility: CLINIC | Age: 75
End: 2020-10-06

## 2020-10-06 ENCOUNTER — HOSPITAL ENCOUNTER (OUTPATIENT)
Dept: RADIOLOGY | Facility: HOSPITAL | Age: 75
Discharge: HOME | End: 2020-10-06
Attending: FAMILY MEDICINE
Payer: MEDICARE

## 2020-10-06 DIAGNOSIS — Z12.31 ENCOUNTER FOR SCREENING MAMMOGRAM FOR MALIGNANT NEOPLASM OF BREAST: ICD-10-CM

## 2020-10-06 DIAGNOSIS — K21.9 GASTROESOPHAGEAL REFLUX DISEASE: ICD-10-CM

## 2020-10-06 PROCEDURE — 77067 SCR MAMMO BI INCL CAD: CPT | Mod: TC,HCNC,PO

## 2020-10-06 PROCEDURE — 77067 MAMMO DIGITAL SCREENING BILAT WITH TOMO: ICD-10-PCS | Mod: 26,HCNC,, | Performed by: RADIOLOGY

## 2020-10-06 PROCEDURE — 77067 SCR MAMMO BI INCL CAD: CPT | Mod: 26,HCNC,, | Performed by: RADIOLOGY

## 2020-10-06 PROCEDURE — 77063 MAMMO DIGITAL SCREENING BILAT WITH TOMO: ICD-10-PCS | Mod: 26,HCNC,, | Performed by: RADIOLOGY

## 2020-10-06 PROCEDURE — 77063 BREAST TOMOSYNTHESIS BI: CPT | Mod: 26,HCNC,, | Performed by: RADIOLOGY

## 2020-10-06 NOTE — TELEPHONE ENCOUNTER
No new care gaps identified.  Powered by [employer]. Reference number: [id number]. 10/06/2020 8:28:11 AM   JIAT

## 2020-10-06 NOTE — TELEPHONE ENCOUNTER
----- Message from Valerie Kaiser sent at 10/6/2020  2:13 PM CDT -----  Regarding: advice  Contact: Andreia with Dr Alamo  Type: Needs Medical Advice  Who Called:  Andreia with Dr Alamo  Symptoms (please be specific):    How long has patient had these symptoms:    Pharmacy name and phone #:    Best Call Back Number: [phone]  Additional Information: Andreia is calling regarding the status of the medical clearance. Please call Andreia to advise. Thanks!

## 2020-10-07 RX ORDER — PANTOPRAZOLE SODIUM 40 MG/1
TABLET, DELAYED RELEASE ORAL
Qty: 90 TABLET | Refills: 1 | Status: SHIPPED | OUTPATIENT
Start: 2020-10-07 | End: 2021-06-03

## 2020-10-07 NOTE — PROGRESS NOTES
Refill Authorization Note   Haley Castro is requesting a refill authorization.  Brief assessment and rationale for refill: Approve          Medication reconciliation completed: No   Comments:       Requested Prescriptions   Pending Prescriptions Disp Refills    pantoprazole (PROTONIX) 40 MG tablet [Pharmacy Med Name: PANTOPRAZOLE SOD DR 40 MG TAB] 90 tablet 1     Sig: TAKE 1 TABLET BY MOUTH BEFORE BREAKFAST       Gastroenterology: Proton Pump Inhibitors 2 Passed - 10/6/2020  8:27 AM        Passed - Patient is at least 18 years old        Passed - Osteoporosis is not on problem list        Passed - An appropriate indication is on the problem list        Passed - Office Visit within last 12 months or future 90 days.     Recent Outpatient Visits            2 weeks ago Incisional hernia, without obstruction or gangrene    [facility] Brandi Alamo MD    3 months ago Diverticulitis    [facility] Brandi Alamo MD    6 months ago Essential hypertension    [facility] SUDHA Sparks MD    7 months ago Bilateral carotid artery stenosis    [facility] Aquilino Cam MD    7 months ago Ostomy nurse consultation    [facility] Brandi Alamo MD          Future Appointments              In 3 weeks LAB, [place] [facility] [place]    In 3 weeks VASCULAR, [facility]    In 1 month Aquilino Cam MD [facility]    In 1 month SUDHA Sparks MD [facility]                    Appointments  past 12m or future 3m with PCP    Date Provider   Last Visit   4/2/2020 SUDHA Sparks MD   Next Visit   11/23/2020 SUDHA Sparks MD   ED visits in past 90 days: 0     Note composed:10:21 AM 10/07/2020

## 2020-10-09 ENCOUNTER — TELEPHONE (OUTPATIENT)
Dept: CARDIOLOGY | Facility: CLINIC | Age: 75
End: 2020-10-09

## 2020-10-13 PROBLEM — K43.2 INCISIONAL HERNIA, WITHOUT OBSTRUCTION OR GANGRENE: Status: ACTIVE | Noted: 2020-10-13

## 2020-11-03 ENCOUNTER — CLINICAL SUPPORT (OUTPATIENT)
Dept: CARDIOLOGY | Facility: CLINIC | Age: 75
End: 2020-11-03
Attending: INTERNAL MEDICINE
Payer: MEDICARE

## 2020-11-03 ENCOUNTER — LAB VISIT (OUTPATIENT)
Dept: LAB | Facility: HOSPITAL | Age: 75
End: 2020-11-03
Attending: INTERNAL MEDICINE
Payer: MEDICARE

## 2020-11-03 DIAGNOSIS — I10 ESSENTIAL HYPERTENSION: ICD-10-CM

## 2020-11-03 DIAGNOSIS — Z82.49 FAMILY HISTORY OF EARLY CAD: ICD-10-CM

## 2020-11-03 DIAGNOSIS — I65.23 BILATERAL CAROTID ARTERY STENOSIS: ICD-10-CM

## 2020-11-03 DIAGNOSIS — I73.9 PVD (PERIPHERAL VASCULAR DISEASE): ICD-10-CM

## 2020-11-03 DIAGNOSIS — E78.2 MIXED HYPERLIPIDEMIA: ICD-10-CM

## 2020-11-03 LAB
ALBUMIN SERPL BCP-MCNC: 3.9 G/DL (ref 3.5–5.2)
ALP SERPL-CCNC: 75 U/L (ref 55–135)
ALT SERPL W/O P-5'-P-CCNC: 8 U/L (ref 10–44)
ANION GAP SERPL CALC-SCNC: 10 MMOL/L (ref 8–16)
AST SERPL-CCNC: 16 U/L (ref 10–40)
BILIRUB SERPL-MCNC: 0.7 MG/DL (ref 0.1–1)
BUN SERPL-MCNC: 11 MG/DL (ref 8–23)
CALCIUM SERPL-MCNC: 9.8 MG/DL (ref 8.7–10.5)
CHLORIDE SERPL-SCNC: 101 MMOL/L (ref 95–110)
CHOLEST SERPL-MCNC: 154 MG/DL (ref 120–199)
CHOLEST/HDLC SERPL: 2.3 {RATIO} (ref 2–5)
CK SERPL-CCNC: 39 U/L (ref 20–180)
CO2 SERPL-SCNC: 29 MMOL/L (ref 23–29)
CREAT SERPL-MCNC: 0.8 MG/DL (ref 0.5–1.4)
EST. GFR  (AFRICAN AMERICAN): >60 ML/MIN/1.73 M^2
EST. GFR  (NON AFRICAN AMERICAN): >60 ML/MIN/1.73 M^2
GLUCOSE SERPL-MCNC: 103 MG/DL (ref 70–110)
HDLC SERPL-MCNC: 68 MG/DL (ref 40–75)
HDLC SERPL: 44.2 % (ref 20–50)
LDLC SERPL CALC-MCNC: 68 MG/DL (ref 63–159)
NONHDLC SERPL-MCNC: 86 MG/DL
POTASSIUM SERPL-SCNC: 4.3 MMOL/L (ref 3.5–5.1)
PROT SERPL-MCNC: 7.1 G/DL (ref 6–8.4)
SODIUM SERPL-SCNC: 140 MMOL/L (ref 136–145)
TRIGL SERPL-MCNC: 90 MG/DL (ref 30–150)

## 2020-11-03 PROCEDURE — 93880 CV US DOPPLER CAROTID (CUPID ONLY): ICD-10-PCS | Mod: HCNC,S$GLB,, | Performed by: INTERNAL MEDICINE

## 2020-11-03 PROCEDURE — 36415 COLL VENOUS BLD VENIPUNCTURE: CPT | Mod: HCNC,PO

## 2020-11-03 PROCEDURE — 80053 COMPREHEN METABOLIC PANEL: CPT | Mod: HCNC

## 2020-11-03 PROCEDURE — 82550 ASSAY OF CK (CPK): CPT | Mod: HCNC

## 2020-11-03 PROCEDURE — 80061 LIPID PANEL: CPT | Mod: HCNC

## 2020-11-03 PROCEDURE — 93880 EXTRACRANIAL BILAT STUDY: CPT | Mod: HCNC,S$GLB,, | Performed by: INTERNAL MEDICINE

## 2020-11-04 LAB
LEFT ARM DIASTOLIC BLOOD PRESSURE: 70 MMHG
LEFT ARM SYSTOLIC BLOOD PRESSURE: 148 MMHG
LEFT CBA DIAS: 16 CM/S
LEFT CBA SYS: 62 CM/S
LEFT CCA DIST DIAS: 18 CM/S
LEFT CCA DIST SYS: 75 CM/S
LEFT CCA MID DIAS: 20 CM/S
LEFT CCA MID SYS: 84 CM/S
LEFT CCA PROX DIAS: 21 CM/S
LEFT CCA PROX SYS: 103 CM/S
LEFT ECA DIAS: 16 CM/S
LEFT ECA SYS: 103 CM/S
LEFT ICA DIST DIAS: 27 CM/S
LEFT ICA DIST SYS: 119 CM/S
LEFT ICA MID DIAS: 44 CM/S
LEFT ICA MID SYS: 146 CM/S
LEFT ICA PROX DIAS: 21 CM/S
LEFT ICA PROX SYS: 93 CM/S
LEFT VERTEBRAL DIAS: 14 CM/S
LEFT VERTEBRAL SYS: 101 CM/S
OHS CV CAROTID RIGHT ICA EDV HIGHEST: 21
OHS CV CAROTID ULTRASOUND LEFT ICA/CCA RATIO: 1.95
OHS CV CAROTID ULTRASOUND RIGHT ICA/CCA RATIO: 0.97
OHS CV PV CAROTID LEFT HIGHEST CCA: 103
OHS CV PV CAROTID LEFT HIGHEST ICA: 146
OHS CV PV CAROTID RIGHT HIGHEST CCA: 108
OHS CV PV CAROTID RIGHT HIGHEST ICA: 73
OHS CV US CAROTID LEFT HIGHEST EDV: 44
RIGHT ARM DIASTOLIC BLOOD PRESSURE: 70 MMHG
RIGHT ARM SYSTOLIC BLOOD PRESSURE: 148 MMHG
RIGHT CBA DIAS: 10 CM/S
RIGHT CBA SYS: 57 CM/S
RIGHT CCA DIST DIAS: 16 CM/S
RIGHT CCA DIST SYS: 75 CM/S
RIGHT CCA MID DIAS: 15 CM/S
RIGHT CCA MID SYS: 75 CM/S
RIGHT CCA PROX DIAS: 16 CM/S
RIGHT CCA PROX SYS: 108 CM/S
RIGHT ECA DIAS: 17 CM/S
RIGHT ECA SYS: 139 CM/S
RIGHT ICA DIST DIAS: 20 CM/S
RIGHT ICA DIST SYS: 66 CM/S
RIGHT ICA MID DIAS: 21 CM/S
RIGHT ICA MID SYS: 73 CM/S
RIGHT ICA PROX DIAS: 13 CM/S
RIGHT ICA PROX SYS: 51 CM/S
RIGHT VERTEBRAL DIAS: 9 CM/S
RIGHT VERTEBRAL SYS: 66 CM/S

## 2020-11-08 ENCOUNTER — PATIENT OUTREACH (OUTPATIENT)
Dept: ADMINISTRATIVE | Facility: OTHER | Age: 75
End: 2020-11-08

## 2020-11-08 NOTE — PROGRESS NOTES
LINKS immunization registry updated  Care Everywhere updated  Health Maintenance updated  Chart reviewed for overdue Proactive [facility] Encoun[facility] (ZELDA) health maintenance testing (CRS, Breast Ca, Diabetic Eye Exam)   Orders entered:N/A

## 2020-11-10 ENCOUNTER — OFFICE VISIT (OUTPATIENT)
Dept: CARDIOLOGY | Facility: CLINIC | Age: 75
End: 2020-11-10
Payer: MEDICARE

## 2020-11-10 VITALS
WEIGHT: 133.81 LBS | HEART RATE: 84 BPM | SYSTOLIC BLOOD PRESSURE: 141 MMHG | BODY MASS INDEX: 23.71 KG/M2 | HEIGHT: 63 IN | DIASTOLIC BLOOD PRESSURE: 84 MMHG

## 2020-11-10 DIAGNOSIS — I65.23 BILATERAL CAROTID ARTERY STENOSIS: Primary | ICD-10-CM

## 2020-11-10 DIAGNOSIS — I25.119 ATHEROSCLEROSIS OF NATIVE CORONARY ARTERY OF NATIVE HEART WITH ANGINA PECTORIS: ICD-10-CM

## 2020-11-10 DIAGNOSIS — Z82.49 FAMILY HISTORY OF EARLY CAD: ICD-10-CM

## 2020-11-10 DIAGNOSIS — I73.9 PVD (PERIPHERAL VASCULAR DISEASE): ICD-10-CM

## 2020-11-10 DIAGNOSIS — I10 ESSENTIAL HYPERTENSION: ICD-10-CM

## 2020-11-10 DIAGNOSIS — E78.2 MIXED HYPERLIPIDEMIA: ICD-10-CM

## 2020-11-10 PROCEDURE — 1101F PT FALLS ASSESS-DOCD LE1/YR: CPT | Mod: HCNC,CPTII,S$GLB, | Performed by: INTERNAL MEDICINE

## 2020-11-10 PROCEDURE — 1101F PR PT FALLS ASSESS DOC 0-1 FALLS W/OUT INJ PAST YR: ICD-10-PCS | Mod: HCNC,CPTII,S$GLB, | Performed by: INTERNAL MEDICINE

## 2020-11-10 PROCEDURE — 99999 PR PBB SHADOW E&M-EST. PATIENT-LVL III: ICD-10-PCS | Mod: PBBFAC,HCNC,, | Performed by: INTERNAL MEDICINE

## 2020-11-10 PROCEDURE — 3079F PR MOST RECENT DIASTOLIC BLOOD PRESSURE 80-89 MM HG: ICD-10-PCS | Mod: HCNC,CPTII,S$GLB, | Performed by: INTERNAL MEDICINE

## 2020-11-10 PROCEDURE — 3008F PR BODY MASS INDEX (BMI) DOCUMENTED: ICD-10-PCS | Mod: HCNC,CPTII,S$GLB, | Performed by: INTERNAL MEDICINE

## 2020-11-10 PROCEDURE — 1126F PR PAIN SEVERITY QUANTIFIED, NO PAIN PRESENT: ICD-10-PCS | Mod: HCNC,S$GLB,, | Performed by: INTERNAL MEDICINE

## 2020-11-10 PROCEDURE — 99999 PR PBB SHADOW E&M-EST. PATIENT-LVL III: CPT | Mod: PBBFAC,HCNC,, | Performed by: INTERNAL MEDICINE

## 2020-11-10 PROCEDURE — 1126F AMNT PAIN NOTED NONE PRSNT: CPT | Mod: HCNC,S$GLB,, | Performed by: INTERNAL MEDICINE

## 2020-11-10 PROCEDURE — 99214 OFFICE O/P EST MOD 30 MIN: CPT | Mod: HCNC,S$GLB,, | Performed by: INTERNAL MEDICINE

## 2020-11-10 PROCEDURE — 3077F PR MOST RECENT SYSTOLIC BLOOD PRESSURE >= 140 MM HG: ICD-10-PCS | Mod: HCNC,CPTII,S$GLB, | Performed by: INTERNAL MEDICINE

## 2020-11-10 PROCEDURE — 99499 UNLISTED E&M SERVICE: CPT | Mod: S$GLB,,, | Performed by: INTERNAL MEDICINE

## 2020-11-10 PROCEDURE — 3079F DIAST BP 80-89 MM HG: CPT | Mod: HCNC,CPTII,S$GLB, | Performed by: INTERNAL MEDICINE

## 2020-11-10 PROCEDURE — 3077F SYST BP >= 140 MM HG: CPT | Mod: HCNC,CPTII,S$GLB, | Performed by: INTERNAL MEDICINE

## 2020-11-10 PROCEDURE — 99499 RISK ADDL DX/OHS AUDIT: ICD-10-PCS | Mod: S$GLB,,, | Performed by: INTERNAL MEDICINE

## 2020-11-10 PROCEDURE — 1159F MED LIST DOCD IN RCRD: CPT | Mod: HCNC,S$GLB,, | Performed by: INTERNAL MEDICINE

## 2020-11-10 PROCEDURE — 1159F PR MEDICATION LIST DOCUMENTED IN MEDICAL RECORD: ICD-10-PCS | Mod: HCNC,S$GLB,, | Performed by: INTERNAL MEDICINE

## 2020-11-10 PROCEDURE — 3008F BODY MASS INDEX DOCD: CPT | Mod: HCNC,CPTII,S$GLB, | Performed by: INTERNAL MEDICINE

## 2020-11-10 PROCEDURE — 99214 PR OFFICE/OUTPT VISIT, EST, LEVL IV, 30-39 MIN: ICD-10-PCS | Mod: HCNC,S$GLB,, | Performed by: INTERNAL MEDICINE

## 2020-11-10 RX ORDER — LOSARTAN POTASSIUM 100 MG/1
100 TABLET ORAL DAILY
COMMUNITY
End: 2020-11-10

## 2020-11-10 NOTE — PROGRESS NOTES
Subjective:    Patient ID:  Haley Castro is a 75 y.o. female who presents for follow-up of Hypertension f/u      HPI  Here for follow up of carotid dsx (7/18 Rt CEA, 60% LICA)/PVD/dlp/HTN/fam hx CAD. Very active w/o angina.    Review of Systems   Constitution: Negative for malaise/fatigue.   Eyes: Negative for blurred vision.   Cardiovascular: Negative for chest pain, claudication, cyanosis, dyspnea on exertion, irregular heartbeat, leg swelling, near-syncope, orthopnea, palpitations, paroxysmal nocturnal dyspnea and syncope.   Respiratory: Negative for cough and shortness of breath.    Hematologic/Lymphatic: Does not bruise/bleed easily.   Musculoskeletal: Negative for back pain, falls, joint pain, muscle cramps, muscle weakness and myalgias.   Gastrointestinal: Negative for abdominal pain, change in bowel habit, nausea and vomiting.   Genitourinary: Negative for urgency.   Neurological: Negative for dizziness, focal weakness and light-headedness.       Past Medical History:   Diagnosis Date    Anticoagulant long-term use     Arthritis     Carotid artery occlusion     Depression 11/29/2012    Diverticulosis     Former smoker     GERD (gastroesophageal reflux disease)     HLD (hyperlipidemia) 11/29/2012    HTN (hypertension) 11/29/2012    Infectious gastroenteritis     Ischemic colitis     Macular degeneration      There are no hospital problems to display for this patient.       Objective:     Vitals:    11/10/20 1441   BP: (!) 141/84   Pulse: 84        Physical Exam   Constitutional: She is oriented to person, place, and time. She appears well-developed and well-nourished.   HENT:   Head: Normocephalic.   Eyes: Conjunctivae are normal.   Neck: Normal range of motion. Neck supple. No JVD present.   Rt CEA scar   Cardiovascular: Normal rate, regular rhythm, normal heart sounds and intact distal pulses.   Pulses:       Carotid pulses are 2+ on the right side and 2+ on the left side.       Radial pulses  are 2+ on the right side and 2+ on the left side.        Dorsalis pedis pulses are 2+ on the right side and 2+ on the left side.        Posterior tibial pulses are 2+ on the right side and 2+ on the left side.   Pulmonary/Chest: Effort normal and breath sounds normal.   Abdominal: Soft. Bowel sounds are normal.   Musculoskeletal:         General: No tenderness or edema.   Neurological: She is alert and oriented to person, place, and time. Gait normal.   Skin: Skin is warm, dry and intact. No cyanosis. Nails show no clubbing.   Psychiatric: She has a normal mood and affect. Her speech is normal and behavior is normal. Thought content normal.   Nursing note and vitals reviewed.            ..    Chemistry        Component Value Date/Time     11/03/2020 0842    K 4.3 11/03/2020 0842     11/03/2020 0842    CO2 29 11/03/2020 0842    BUN 11 11/03/2020 0842    CREATININE 0.8 11/03/2020 0842     11/03/2020 0842        Component Value Date/Time    CALCIUM 9.8 11/03/2020 0842    ALKPHOS 75 11/03/2020 0842    AST 16 11/03/2020 0842    ALT 8 (L) 11/03/2020 0842    BILITOT 0.7 11/03/2020 0842    ESTGFRAFRICA >60.0 11/03/2020 0842    EGFRNONAA >60.0 11/03/2020 0842            ..  Lab Results   Component Value Date    CHOL 154 11/03/2020    CHOL 239 (H) 02/27/2020    CHOL 152 11/01/2019     Lab Results   Component Value Date    HDL 68 11/03/2020    HDL 60 02/27/2020    HDL 60 11/01/2019     Lab Results   Component Value Date    LDLCALC 68.0 11/03/2020    LDLCALC 152.4 02/27/2020    LDLCALC 73.2 11/01/2019     Lab Results   Component Value Date    TRIG 90 11/03/2020    TRIG 133 02/27/2020    TRIG 94 11/01/2019     Lab Results   Component Value Date    CHOLHDL 44.2 11/03/2020    CHOLHDL 25.1 02/27/2020    CHOLHDL 39.5 11/01/2019     ..  Lab Results   Component Value Date    WBC 12.50 01/12/2020    HGB 14.0 10/15/2020    HCT 30.0 (L) 01/12/2020    MCV 85 01/12/2020     01/12/2020       Test(s) Reviewed  I  have reviewed the following in detail:  [] Stress test   [] Angiography   [x] Echocardiogram   [x] Labs   [] Other:       Assessment:         ICD-10-CM ICD-9-CM   1. Bilateral carotid artery stenosis  I65.23 433.10     433.30   2. Family history of early CAD  Z82.49 V17.3   3. Essential hypertension  I10 401.9   4. PVD (peripheral vascular disease)  I73.9 443.9   5. Mixed hyperlipidemia  E78.2 272.2   6. Atherosclerosis of native coronary artery of native heart with angina pectoris  I25.119 414.01     413.9     Problem List Items Addressed This Visit     Family history of early CAD    Hyperlipidemia    Essential hypertension    Carotid artery stenosis - Primary    Overview     7/18 right CEA    5/2018 CTA  High-grade stenosis of the right proximal ICA in the range of 80%.  Moderate stenosis of the left proximal ICA in the range of 60%.  Small lung nodules for which follow-up in 1 year with chest CT is recommended.          PVD (peripheral vascular disease)    Atherosclerosis of native coronary artery of native heart with angina pectoris           Plan:           Return to clinic 1 year   Low level/low impact aerobic exercise 5x's/wk. Heart healthy diet and risk factor modification.    See labs and med orders.  -140 if SBP elevated increase either norvasc or coreg    Portions of this note may have been created with voice recognition software.  Grammatical, syntax and spelling errors may be inevitable.

## 2020-11-23 ENCOUNTER — OFFICE VISIT (OUTPATIENT)
Dept: FAMILY MEDICINE | Facility: CLINIC | Age: 75
End: 2020-11-23
Payer: MEDICARE

## 2020-11-23 VITALS
SYSTOLIC BLOOD PRESSURE: 132 MMHG | DIASTOLIC BLOOD PRESSURE: 82 MMHG | HEIGHT: 63 IN | TEMPERATURE: 97 F | OXYGEN SATURATION: 97 % | HEART RATE: 79 BPM | WEIGHT: 133.19 LBS | BODY MASS INDEX: 23.6 KG/M2

## 2020-11-23 DIAGNOSIS — Z78.0 POSTMENOPAUSAL: ICD-10-CM

## 2020-11-23 DIAGNOSIS — E78.5 HYPERLIPIDEMIA, UNSPECIFIED HYPERLIPIDEMIA TYPE: ICD-10-CM

## 2020-11-23 DIAGNOSIS — N32.81 OAB (OVERACTIVE BLADDER): ICD-10-CM

## 2020-11-23 DIAGNOSIS — Z13.820 SCREENING FOR OSTEOPOROSIS: ICD-10-CM

## 2020-11-23 DIAGNOSIS — I10 ESSENTIAL HYPERTENSION: ICD-10-CM

## 2020-11-23 DIAGNOSIS — Z00.00 WELLNESS EXAMINATION: Primary | ICD-10-CM

## 2020-11-23 PROBLEM — N89.8 VAGINAL DISCHARGE: Status: RESOLVED | Noted: 2019-03-12 | Resolved: 2020-11-23

## 2020-11-23 PROCEDURE — 3008F BODY MASS INDEX DOCD: CPT | Mod: HCNC,CPTII,S$GLB, | Performed by: FAMILY MEDICINE

## 2020-11-23 PROCEDURE — 3079F DIAST BP 80-89 MM HG: CPT | Mod: HCNC,CPTII,S$GLB, | Performed by: FAMILY MEDICINE

## 2020-11-23 PROCEDURE — G0008 FLU VACCINE - QUADRIVALENT - ADJUVANTED: ICD-10-PCS | Mod: HCNC,S$GLB,, | Performed by: FAMILY MEDICINE

## 2020-11-23 PROCEDURE — 99397 PR PREVENTIVE VISIT,EST,65 & OVER: ICD-10-PCS | Mod: HCNC,25,S$GLB, | Performed by: FAMILY MEDICINE

## 2020-11-23 PROCEDURE — 90694 FLU VACCINE - QUADRIVALENT - ADJUVANTED: ICD-10-PCS | Mod: HCNC,S$GLB,, | Performed by: FAMILY MEDICINE

## 2020-11-23 PROCEDURE — 99999 PR PBB SHADOW E&M-EST. PATIENT-LVL IV: CPT | Mod: PBBFAC,HCNC,, | Performed by: FAMILY MEDICINE

## 2020-11-23 PROCEDURE — 1101F PR PT FALLS ASSESS DOC 0-1 FALLS W/OUT INJ PAST YR: ICD-10-PCS | Mod: HCNC,CPTII,S$GLB, | Performed by: FAMILY MEDICINE

## 2020-11-23 PROCEDURE — 3075F PR MOST RECENT SYSTOLIC BLOOD PRESS GE 130-139MM HG: ICD-10-PCS | Mod: HCNC,CPTII,S$GLB, | Performed by: FAMILY MEDICINE

## 2020-11-23 PROCEDURE — 1101F PT FALLS ASSESS-DOCD LE1/YR: CPT | Mod: HCNC,CPTII,S$GLB, | Performed by: FAMILY MEDICINE

## 2020-11-23 PROCEDURE — 3288F FALL RISK ASSESSMENT DOCD: CPT | Mod: HCNC,CPTII,S$GLB, | Performed by: FAMILY MEDICINE

## 2020-11-23 PROCEDURE — G0008 ADMIN INFLUENZA VIRUS VAC: HCPCS | Mod: HCNC,S$GLB,, | Performed by: FAMILY MEDICINE

## 2020-11-23 PROCEDURE — 90694 VACC AIIV4 NO PRSRV 0.5ML IM: CPT | Mod: HCNC,S$GLB,, | Performed by: FAMILY MEDICINE

## 2020-11-23 PROCEDURE — 3079F PR MOST RECENT DIASTOLIC BLOOD PRESSURE 80-89 MM HG: ICD-10-PCS | Mod: HCNC,CPTII,S$GLB, | Performed by: FAMILY MEDICINE

## 2020-11-23 PROCEDURE — 3075F SYST BP GE 130 - 139MM HG: CPT | Mod: HCNC,CPTII,S$GLB, | Performed by: FAMILY MEDICINE

## 2020-11-23 PROCEDURE — 3008F PR BODY MASS INDEX (BMI) DOCUMENTED: ICD-10-PCS | Mod: HCNC,CPTII,S$GLB, | Performed by: FAMILY MEDICINE

## 2020-11-23 PROCEDURE — 3288F PR FALLS RISK ASSESSMENT DOCUMENTED: ICD-10-PCS | Mod: HCNC,CPTII,S$GLB, | Performed by: FAMILY MEDICINE

## 2020-11-23 PROCEDURE — 99397 PER PM REEVAL EST PAT 65+ YR: CPT | Mod: HCNC,25,S$GLB, | Performed by: FAMILY MEDICINE

## 2020-11-23 PROCEDURE — 1126F PR PAIN SEVERITY QUANTIFIED, NO PAIN PRESENT: ICD-10-PCS | Mod: HCNC,S$GLB,, | Performed by: FAMILY MEDICINE

## 2020-11-23 PROCEDURE — 1126F AMNT PAIN NOTED NONE PRSNT: CPT | Mod: HCNC,S$GLB,, | Performed by: FAMILY MEDICINE

## 2020-11-23 PROCEDURE — 99999 PR PBB SHADOW E&M-EST. PATIENT-LVL IV: ICD-10-PCS | Mod: PBBFAC,HCNC,, | Performed by: FAMILY MEDICINE

## 2020-11-23 RX ORDER — OXYBUTYNIN CHLORIDE 5 MG/1
5 TABLET, EXTENDED RELEASE ORAL DAILY
Qty: 90 TABLET | Refills: 1 | Status: SHIPPED | OUTPATIENT
Start: 2020-11-23 | End: 2021-05-25

## 2020-11-23 RX ORDER — MUPIROCIN 20 MG/G
OINTMENT TOPICAL
COMMUNITY
Start: 2020-10-13 | End: 2021-04-08 | Stop reason: CLARIF

## 2020-11-23 RX ORDER — HYDROCORTISONE 25 MG/G
OINTMENT TOPICAL
COMMUNITY
Start: 2020-10-12 | End: 2021-04-08 | Stop reason: CLARIF

## 2020-11-23 NOTE — PROGRESS NOTES
Subjective:       Patient ID: Haley Castro is a 75 y.o. female.    Chief Complaint: Follow-up    Here for wellness and f/u chronic issues. New issue with more urgent bladder after surgeries. Doing well overall.     Hyperlipidemia  This is a chronic problem. The current episode started more than 1 year ago. The problem is controlled. Pertinent negatives include no chest pain or shortness of breath.   Hypertension  This is a chronic problem. The current episode started more than 1 year ago. The problem is controlled. Pertinent negatives include no chest pain, palpitations or shortness of breath.     Review of Systems   Constitutional: Negative for chills and fever.   Respiratory: Negative for cough, chest tightness and shortness of breath.    Cardiovascular: Negative for chest pain, palpitations and leg swelling.   Endocrine: Negative for cold intolerance and heat intolerance.   Psychiatric/Behavioral: Negative for decreased concentration. The patient is not nervous/anxious.        Objective:      Physical Exam  Vitals signs and nursing note reviewed.   Constitutional:       Appearance: She is well-developed.   HENT:      Head: Normocephalic and atraumatic.   Cardiovascular:      Rate and Rhythm: Normal rate and regular rhythm.      Heart sounds: Normal heart sounds.   Pulmonary:      Effort: Pulmonary effort is normal.      Breath sounds: Normal breath sounds.   Psychiatric:         Mood and Affect: Mood normal.         Behavior: Behavior normal.         Assessment:       1. Wellness examination    2. Screening for osteoporosis    3. Postmenopausal    4. Hyperlipidemia, unspecified hyperlipidemia type    5. Essential hypertension    6. OAB (overactive bladder)        Plan:       Wellness examination    Screening for osteoporosis  -     DXA Bone Density Spine And Hip; Future; Expected date: 11/23/2020    Postmenopausal  -     DXA Bone Density Spine And Hip; Future; Expected date: 11/23/2020    Hyperlipidemia,  unspecified hyperlipidemia type    Essential hypertension    OAB (overactive bladder)  -     oxybutynin (DITROPAN-XL) 5 MG TR24; Take 1 tablet (5 mg total) by mouth once daily.  Dispense: 90 tablet; Refill: 1      htn stable ; home log  Lipid stable  Med for oab and monitor  Will monitor chronic medical issues and continue current plan of care.      Follow up in about 6 months (around 5/23/2021), or if symptoms worsen or fail to improve.

## 2020-12-10 ENCOUNTER — PES CALL (OUTPATIENT)
Dept: ADMINISTRATIVE | Facility: CLINIC | Age: 75
End: 2020-12-10

## 2020-12-11 ENCOUNTER — PATIENT MESSAGE (OUTPATIENT)
Dept: OTHER | Facility: OTHER | Age: 75
End: 2020-12-11

## 2020-12-18 DIAGNOSIS — I10 ESSENTIAL HYPERTENSION: ICD-10-CM

## 2020-12-18 NOTE — TELEPHONE ENCOUNTER
No new care gaps identified.  Powered by [employer]. Reference number: [id number]. 12/18/2020 1:06:57 AM   CST

## 2020-12-21 RX ORDER — LOSARTAN POTASSIUM 100 MG/1
TABLET ORAL
Qty: 90 TABLET | Refills: 3 | OUTPATIENT
Start: 2020-12-21

## 2020-12-21 NOTE — PROGRESS NOTES
Quick DC. Inappropriate Request    Refill Authorization Note   Haley Castro  is requesting a refill authorization.  Brief Assessment and Rationale for Refill:  Quick Discontinue  Medication Therapy Plan:  CDMR: Pt no longer taking    Medication Reconciliation Completed:  No      Comments:   Pended Medication(s)       Requested Prescriptions     Refused Prescriptions Disp Refills    losartan (COZAAR) 100 MG tablet [Pharmacy Med Name: LOSARTAN POTASSIUM 100 MG TAB] 90 tablet 3     Sig: TAKE 1 TABLET BY MOUTH EVERY DAY     Refused By: LAURA AARON     Reason for Refusal: Refill not appropriate        Duplicate Pended Encounter(s)/ Last Prescribed Details:     Office Visit on 3/3/2020       Note composed:10:28 AM 12/21/2020

## 2020-12-30 ENCOUNTER — TELEPHONE (OUTPATIENT)
Dept: FAMILY MEDICINE | Facility: CLINIC | Age: 75
End: 2020-12-30

## 2020-12-30 NOTE — TELEPHONE ENCOUNTER
----- Message from Flower Landry sent at 12/28/2020  8:06 AM CST -----  Regarding: Advice  Contact: patient        Type: Needs Medical Advice    Who Called:  pt  Symptoms (please be specific):  n/a  How long has patient had these symptoms:  n/a    Pharmacy name and phone #:    [id number] [place] - [address] [facility]. AT [facility]  [address] [place] [address]  [place] [address]  Phone: [phone] Fax: [phone]    Best Call Back Number: [phone] (home)     Additional Information: was diagnosed with covid yesterday, asking for a call to discuss any treatment that is possible. Asking about BAM(an infusion for covid). Please call to advise

## 2021-01-08 ENCOUNTER — TELEPHONE (OUTPATIENT)
Dept: FAMILY MEDICINE | Facility: CLINIC | Age: 76
End: 2021-01-08

## 2021-02-15 DIAGNOSIS — I10 ESSENTIAL HYPERTENSION: ICD-10-CM

## 2021-02-15 DIAGNOSIS — F32.A DEPRESSION, UNSPECIFIED DEPRESSION TYPE: ICD-10-CM

## 2021-02-17 RX ORDER — DULOXETIN HYDROCHLORIDE 60 MG/1
CAPSULE, DELAYED RELEASE ORAL
Qty: 90 CAPSULE | Refills: 3 | Status: SHIPPED | OUTPATIENT
Start: 2021-02-17 | End: 2021-08-10 | Stop reason: SDUPTHER

## 2021-02-17 RX ORDER — LOSARTAN POTASSIUM 100 MG/1
TABLET ORAL
Qty: 90 TABLET | Refills: 3 | OUTPATIENT
Start: 2021-02-17

## 2021-03-09 ENCOUNTER — IMMUNIZATION (OUTPATIENT)
Dept: FAMILY MEDICINE | Facility: CLINIC | Age: 76
End: 2021-03-09
Payer: MEDICARE

## 2021-03-09 DIAGNOSIS — Z23 NEED FOR VACCINATION: Primary | ICD-10-CM

## 2021-03-09 PROCEDURE — 91300 COVID-19, MRNA, LNP-S, PF, 30 MCG/0.3 ML DOSE VACCINE: CPT | Mod: PBBFAC | Performed by: INTERNAL MEDICINE

## 2021-03-18 ENCOUNTER — OFFICE VISIT (OUTPATIENT)
Dept: PAIN MEDICINE | Facility: CLINIC | Age: 76
End: 2021-03-18
Payer: MEDICARE

## 2021-03-18 VITALS
SYSTOLIC BLOOD PRESSURE: 150 MMHG | WEIGHT: 137.25 LBS | DIASTOLIC BLOOD PRESSURE: 66 MMHG | HEART RATE: 74 BPM | TEMPERATURE: 97 F | BODY MASS INDEX: 24.31 KG/M2 | RESPIRATION RATE: 18 BRPM | OXYGEN SATURATION: 99 %

## 2021-03-18 DIAGNOSIS — M25.551 RIGHT HIP PAIN: Primary | ICD-10-CM

## 2021-03-18 DIAGNOSIS — Z11.9 SCREENING EXAMINATION FOR INFECTIOUS DISEASE: ICD-10-CM

## 2021-03-18 DIAGNOSIS — M47.816 LUMBAR SPONDYLOSIS: ICD-10-CM

## 2021-03-18 PROCEDURE — 1101F PR PT FALLS ASSESS DOC 0-1 FALLS W/OUT INJ PAST YR: ICD-10-PCS | Mod: CPTII,S$GLB,, | Performed by: ANESTHESIOLOGY

## 2021-03-18 PROCEDURE — 99999 PR PBB SHADOW E&M-EST. PATIENT-LVL V: ICD-10-PCS | Mod: PBBFAC,,, | Performed by: ANESTHESIOLOGY

## 2021-03-18 PROCEDURE — 3288F FALL RISK ASSESSMENT DOCD: CPT | Mod: CPTII,S$GLB,, | Performed by: ANESTHESIOLOGY

## 2021-03-18 PROCEDURE — 1125F AMNT PAIN NOTED PAIN PRSNT: CPT | Mod: S$GLB,,, | Performed by: ANESTHESIOLOGY

## 2021-03-18 PROCEDURE — 3078F DIAST BP <80 MM HG: CPT | Mod: CPTII,S$GLB,, | Performed by: ANESTHESIOLOGY

## 2021-03-18 PROCEDURE — 3078F PR MOST RECENT DIASTOLIC BLOOD PRESSURE < 80 MM HG: ICD-10-PCS | Mod: CPTII,S$GLB,, | Performed by: ANESTHESIOLOGY

## 2021-03-18 PROCEDURE — 3288F PR FALLS RISK ASSESSMENT DOCUMENTED: ICD-10-PCS | Mod: CPTII,S$GLB,, | Performed by: ANESTHESIOLOGY

## 2021-03-18 PROCEDURE — 1101F PT FALLS ASSESS-DOCD LE1/YR: CPT | Mod: CPTII,S$GLB,, | Performed by: ANESTHESIOLOGY

## 2021-03-18 PROCEDURE — 99499 RISK ADDL DX/OHS AUDIT: ICD-10-PCS | Mod: S$GLB,,, | Performed by: ANESTHESIOLOGY

## 2021-03-18 PROCEDURE — 99214 OFFICE O/P EST MOD 30 MIN: CPT | Mod: S$GLB,,, | Performed by: ANESTHESIOLOGY

## 2021-03-18 PROCEDURE — 99499 UNLISTED E&M SERVICE: CPT | Mod: S$GLB,,, | Performed by: ANESTHESIOLOGY

## 2021-03-18 PROCEDURE — 1125F PR PAIN SEVERITY QUANTIFIED, PAIN PRESENT: ICD-10-PCS | Mod: S$GLB,,, | Performed by: ANESTHESIOLOGY

## 2021-03-18 PROCEDURE — 3077F PR MOST RECENT SYSTOLIC BLOOD PRESSURE >= 140 MM HG: ICD-10-PCS | Mod: CPTII,S$GLB,, | Performed by: ANESTHESIOLOGY

## 2021-03-18 PROCEDURE — 3008F BODY MASS INDEX DOCD: CPT | Mod: CPTII,S$GLB,, | Performed by: ANESTHESIOLOGY

## 2021-03-18 PROCEDURE — 3077F SYST BP >= 140 MM HG: CPT | Mod: CPTII,S$GLB,, | Performed by: ANESTHESIOLOGY

## 2021-03-18 PROCEDURE — 99214 PR OFFICE/OUTPT VISIT, EST, LEVL IV, 30-39 MIN: ICD-10-PCS | Mod: S$GLB,,, | Performed by: ANESTHESIOLOGY

## 2021-03-18 PROCEDURE — 1159F PR MEDICATION LIST DOCUMENTED IN MEDICAL RECORD: ICD-10-PCS | Mod: S$GLB,,, | Performed by: ANESTHESIOLOGY

## 2021-03-18 PROCEDURE — 1159F MED LIST DOCD IN RCRD: CPT | Mod: S$GLB,,, | Performed by: ANESTHESIOLOGY

## 2021-03-18 PROCEDURE — 99999 PR PBB SHADOW E&M-EST. PATIENT-LVL V: CPT | Mod: PBBFAC,,, | Performed by: ANESTHESIOLOGY

## 2021-03-18 PROCEDURE — 3008F PR BODY MASS INDEX (BMI) DOCUMENTED: ICD-10-PCS | Mod: CPTII,S$GLB,, | Performed by: ANESTHESIOLOGY

## 2021-03-18 RX ORDER — SODIUM CHLORIDE, SODIUM LACTATE, POTASSIUM CHLORIDE, CALCIUM CHLORIDE 600; 310; 30; 20 MG/100ML; MG/100ML; MG/100ML; MG/100ML
INJECTION, SOLUTION INTRAVENOUS CONTINUOUS
Status: CANCELLED | OUTPATIENT
Start: 2021-04-12

## 2021-03-19 ENCOUNTER — HOSPITAL ENCOUNTER (OUTPATIENT)
Dept: RADIOLOGY | Facility: HOSPITAL | Age: 76
Discharge: HOME | End: 2021-03-19
Attending: ANESTHESIOLOGY
Payer: MEDICARE

## 2021-03-19 DIAGNOSIS — M25.551 RIGHT HIP PAIN: ICD-10-CM

## 2021-03-19 PROCEDURE — 73502 X-RAY EXAM HIP UNI 2-3 VIEWS: CPT | Mod: 26,RT,, | Performed by: RADIOLOGY

## 2021-03-19 PROCEDURE — 73502 XR HIP 2 VIEW RIGHT: ICD-10-PCS | Mod: 26,RT,, | Performed by: RADIOLOGY

## 2021-03-19 PROCEDURE — 73502 X-RAY EXAM HIP UNI 2-3 VIEWS: CPT | Mod: TC,FY,PO,RT

## 2021-03-22 RX ORDER — EZETIMIBE 10 MG/1
TABLET ORAL
Qty: 90 TABLET | Refills: 4 | Status: SHIPPED | OUTPATIENT
Start: 2021-03-22 | End: 2021-08-06

## 2021-03-30 ENCOUNTER — IMMUNIZATION (OUTPATIENT)
Dept: FAMILY MEDICINE | Facility: CLINIC | Age: 76
End: 2021-03-30
Payer: MEDICARE

## 2021-03-30 DIAGNOSIS — Z23 NEED FOR VACCINATION: Primary | ICD-10-CM

## 2021-03-30 PROCEDURE — 91300 COVID-19, MRNA, LNP-S, PF, 30 MCG/0.3 ML DOSE VACCINE: CPT | Mod: S$GLB,,, | Performed by: FAMILY MEDICINE

## 2021-03-30 PROCEDURE — 0002A COVID-19, MRNA, LNP-S, PF, 30 MCG/0.3 ML DOSE VACCINE: ICD-10-PCS | Mod: CV19,S$GLB,, | Performed by: FAMILY MEDICINE

## 2021-03-30 PROCEDURE — 91300 COVID-19, MRNA, LNP-S, PF, 30 MCG/0.3 ML DOSE VACCINE: ICD-10-PCS | Mod: S$GLB,,, | Performed by: FAMILY MEDICINE

## 2021-03-30 PROCEDURE — 0002A COVID-19, MRNA, LNP-S, PF, 30 MCG/0.3 ML DOSE VACCINE: CPT | Mod: CV19,S$GLB,, | Performed by: FAMILY MEDICINE

## 2021-04-02 DIAGNOSIS — E78.2 MIXED HYPERLIPIDEMIA: ICD-10-CM

## 2021-04-02 DIAGNOSIS — Z82.49 FAMILY HISTORY OF EARLY CAD: ICD-10-CM

## 2021-04-02 DIAGNOSIS — I65.23 BILATERAL CAROTID ARTERY STENOSIS: ICD-10-CM

## 2021-04-02 DIAGNOSIS — Z87.19 HISTORY OF ISCHEMIC COLITIS: ICD-10-CM

## 2021-04-02 DIAGNOSIS — I10 ESSENTIAL HYPERTENSION: ICD-10-CM

## 2021-04-02 RX ORDER — CARVEDILOL 3.12 MG/1
3.12 TABLET ORAL 2 TIMES DAILY
Qty: 180 TABLET | Refills: 4 | Status: SHIPPED | OUTPATIENT
Start: 2021-04-02 | End: 2021-08-06

## 2021-04-06 ENCOUNTER — TELEPHONE (OUTPATIENT)
Dept: FAMILY MEDICINE | Facility: CLINIC | Age: 76
End: 2021-04-06

## 2021-04-09 ENCOUNTER — LAB VISIT (OUTPATIENT)
Dept: FAMILY MEDICINE | Facility: CLINIC | Age: 76
End: 2021-04-09
Payer: MEDICARE

## 2021-04-09 DIAGNOSIS — Z11.9 SCREENING EXAMINATION FOR INFECTIOUS DISEASE: ICD-10-CM

## 2021-04-09 PROCEDURE — U0003 INFECTIOUS AGENT DETECTION BY NUCLEIC ACID (DNA OR RNA); SEVERE ACUTE RESPIRATORY SYNDROME CORONAVIRUS 2 (SARS-COV-2) (CORONAVIRUS DISEASE [COVID-19]), AMPLIFIED PROBE TECHNIQUE, MAKING USE OF HIGH THROUGHPUT TECHNOLOGIES AS DESCRIBED BY CMS-2020-01-R: HCPCS | Performed by: ANESTHESIOLOGY

## 2021-04-09 PROCEDURE — U0005 INFEC AGEN DETEC AMPLI PROBE: HCPCS | Performed by: ANESTHESIOLOGY

## 2021-04-10 LAB — SARS-COV-2 RNA RESP QL NAA+PROBE: NOT DETECTED

## 2021-04-12 ENCOUNTER — HOSPITAL ENCOUNTER (OUTPATIENT)
Dept: RADIOLOGY | Facility: HOSPITAL | Age: 76
Discharge: HOME | End: 2021-04-12
Attending: ANESTHESIOLOGY
Payer: MEDICARE

## 2021-04-12 ENCOUNTER — HOSPITAL ENCOUNTER (OUTPATIENT)
Facility: HOSPITAL | Age: 76
Discharge: HOME | End: 2021-04-12
Attending: ANESTHESIOLOGY | Admitting: ANESTHESIOLOGY
Payer: MEDICARE

## 2021-04-12 DIAGNOSIS — M47.816 LUMBAR SPONDYLOSIS: Primary | ICD-10-CM

## 2021-04-12 DIAGNOSIS — M25.551 RIGHT HIP PAIN: ICD-10-CM

## 2021-04-12 PROCEDURE — 64636 DESTROY L/S FACET JNT ADDL: CPT | Mod: RT,,, | Performed by: ANESTHESIOLOGY

## 2021-04-12 PROCEDURE — 64635 DESTROY LUMB/SAC FACET JNT: CPT | Mod: PO | Performed by: ANESTHESIOLOGY

## 2021-04-12 PROCEDURE — 64635 DESTROY LUMB/SAC FACET JNT: CPT | Mod: RT,,, | Performed by: ANESTHESIOLOGY

## 2021-04-12 PROCEDURE — 64635 PR DESTROY LUMB/SAC FACET JNT: ICD-10-PCS | Mod: RT,,, | Performed by: ANESTHESIOLOGY

## 2021-04-12 PROCEDURE — A4216 STERILE WATER/SALINE, 10 ML: HCPCS | Mod: PO | Performed by: ANESTHESIOLOGY

## 2021-04-12 PROCEDURE — 64636 PR DESTROY L/S FACET JNT ADDL: ICD-10-PCS | Mod: RT,,, | Performed by: ANESTHESIOLOGY

## 2021-04-12 PROCEDURE — 99152 MOD SED SAME PHYS/QHP 5/>YRS: CPT | Mod: ,,, | Performed by: ANESTHESIOLOGY

## 2021-04-12 PROCEDURE — 63600175 PHARM REV CODE 636 W HCPCS: Mod: PO | Performed by: ANESTHESIOLOGY

## 2021-04-12 PROCEDURE — 76000 FLUOROSCOPY <1 HR PHYS/QHP: CPT | Mod: TC,PO,59

## 2021-04-12 PROCEDURE — 99152 PR MOD CONSCIOUS SEDATION, SAME PHYS, 5+ YRS, FIRST 15 MIN: ICD-10-PCS | Mod: ,,, | Performed by: ANESTHESIOLOGY

## 2021-04-12 PROCEDURE — 64636 DESTROY L/S FACET JNT ADDL: CPT | Mod: PO | Performed by: ANESTHESIOLOGY

## 2021-04-12 PROCEDURE — 25000003 PHARM REV CODE 250: Mod: PO | Performed by: ANESTHESIOLOGY

## 2021-04-12 RX ORDER — MIDAZOLAM HYDROCHLORIDE 1 MG/ML
INJECTION INTRAMUSCULAR; INTRAVENOUS
Status: DISCONTINUED | OUTPATIENT
Start: 2021-04-12 | End: 2021-04-12 | Stop reason: HOSPADM

## 2021-04-12 RX ORDER — SODIUM CHLORIDE 9 MG/ML
INJECTION, SOLUTION INTRAMUSCULAR; INTRAVENOUS; SUBCUTANEOUS
Status: DISCONTINUED | OUTPATIENT
Start: 2021-04-12 | End: 2021-04-12 | Stop reason: HOSPADM

## 2021-04-12 RX ORDER — METHYLPREDNISOLONE ACETATE 40 MG/ML
INJECTION, SUSPENSION INTRA-ARTICULAR; INTRALESIONAL; INTRAMUSCULAR; SOFT TISSUE
Status: DISCONTINUED | OUTPATIENT
Start: 2021-04-12 | End: 2021-04-12 | Stop reason: HOSPADM

## 2021-04-12 RX ORDER — LIDOCAINE HYDROCHLORIDE 20 MG/ML
INJECTION, SOLUTION EPIDURAL; INFILTRATION; INTRACAUDAL; PERINEURAL
Status: DISCONTINUED | OUTPATIENT
Start: 2021-04-12 | End: 2021-04-12 | Stop reason: HOSPADM

## 2021-04-12 RX ORDER — LIDOCAINE HYDROCHLORIDE 10 MG/ML
INJECTION, SOLUTION EPIDURAL; INFILTRATION; INTRACAUDAL; PERINEURAL
Status: DISCONTINUED | OUTPATIENT
Start: 2021-04-12 | End: 2021-04-12 | Stop reason: HOSPADM

## 2021-04-12 RX ORDER — FENTANYL CITRATE 50 UG/ML
INJECTION, SOLUTION INTRAMUSCULAR; INTRAVENOUS
Status: DISCONTINUED | OUTPATIENT
Start: 2021-04-12 | End: 2021-04-12 | Stop reason: HOSPADM

## 2021-04-13 VITALS
TEMPERATURE: 98 F | HEIGHT: 63 IN | DIASTOLIC BLOOD PRESSURE: 62 MMHG | HEART RATE: 67 BPM | BODY MASS INDEX: 23.74 KG/M2 | RESPIRATION RATE: 18 BRPM | OXYGEN SATURATION: 99 % | SYSTOLIC BLOOD PRESSURE: 157 MMHG | WEIGHT: 134 LBS

## 2021-04-19 RX ORDER — AMLODIPINE BESYLATE 2.5 MG/1
2.5 TABLET ORAL NIGHTLY
Qty: 90 TABLET | Refills: 4 | Status: SHIPPED | OUTPATIENT
Start: 2021-04-19 | End: 2021-08-06

## 2021-05-10 ENCOUNTER — OFFICE VISIT (OUTPATIENT)
Dept: PAIN MEDICINE | Facility: CLINIC | Age: 76
End: 2021-05-10
Payer: MEDICARE

## 2021-05-10 VITALS
WEIGHT: 135.94 LBS | OXYGEN SATURATION: 97 % | SYSTOLIC BLOOD PRESSURE: 165 MMHG | DIASTOLIC BLOOD PRESSURE: 76 MMHG | BODY MASS INDEX: 24.09 KG/M2 | HEART RATE: 74 BPM | HEIGHT: 63 IN

## 2021-05-10 DIAGNOSIS — M47.816 LUMBAR SPONDYLOSIS: Primary | ICD-10-CM

## 2021-05-10 DIAGNOSIS — M25.551 RIGHT HIP PAIN: ICD-10-CM

## 2021-05-10 PROCEDURE — 1101F PR PT FALLS ASSESS DOC 0-1 FALLS W/OUT INJ PAST YR: ICD-10-PCS | Mod: CPTII,S$GLB,, | Performed by: ANESTHESIOLOGY

## 2021-05-10 PROCEDURE — 1126F PR PAIN SEVERITY QUANTIFIED, NO PAIN PRESENT: ICD-10-PCS | Mod: S$GLB,,, | Performed by: ANESTHESIOLOGY

## 2021-05-10 PROCEDURE — 99999 PR PBB SHADOW E&M-EST. PATIENT-LVL III: CPT | Mod: PBBFAC,,, | Performed by: ANESTHESIOLOGY

## 2021-05-10 PROCEDURE — 1159F PR MEDICATION LIST DOCUMENTED IN MEDICAL RECORD: ICD-10-PCS | Mod: S$GLB,,, | Performed by: ANESTHESIOLOGY

## 2021-05-10 PROCEDURE — 3288F PR FALLS RISK ASSESSMENT DOCUMENTED: ICD-10-PCS | Mod: CPTII,S$GLB,, | Performed by: ANESTHESIOLOGY

## 2021-05-10 PROCEDURE — 99213 OFFICE O/P EST LOW 20 MIN: CPT | Mod: S$GLB,,, | Performed by: ANESTHESIOLOGY

## 2021-05-10 PROCEDURE — 99213 PR OFFICE/OUTPT VISIT, EST, LEVL III, 20-29 MIN: ICD-10-PCS | Mod: S$GLB,,, | Performed by: ANESTHESIOLOGY

## 2021-05-10 PROCEDURE — 99999 PR PBB SHADOW E&M-EST. PATIENT-LVL III: ICD-10-PCS | Mod: PBBFAC,,, | Performed by: ANESTHESIOLOGY

## 2021-05-10 PROCEDURE — 1101F PT FALLS ASSESS-DOCD LE1/YR: CPT | Mod: CPTII,S$GLB,, | Performed by: ANESTHESIOLOGY

## 2021-05-10 PROCEDURE — 3288F FALL RISK ASSESSMENT DOCD: CPT | Mod: CPTII,S$GLB,, | Performed by: ANESTHESIOLOGY

## 2021-05-10 PROCEDURE — 1159F MED LIST DOCD IN RCRD: CPT | Mod: S$GLB,,, | Performed by: ANESTHESIOLOGY

## 2021-05-10 PROCEDURE — 1126F AMNT PAIN NOTED NONE PRSNT: CPT | Mod: S$GLB,,, | Performed by: ANESTHESIOLOGY

## 2021-05-10 RX ORDER — GABAPENTIN 300 MG/1
600 CAPSULE ORAL 2 TIMES DAILY
Qty: 120 CAPSULE | Refills: 2 | Status: SHIPPED | OUTPATIENT
Start: 2021-05-10 | End: 2021-08-05 | Stop reason: SDUPTHER

## 2021-05-18 DIAGNOSIS — N32.81 OAB (OVERACTIVE BLADDER): ICD-10-CM

## 2021-05-24 ENCOUNTER — OFFICE VISIT (OUTPATIENT)
Dept: FAMILY MEDICINE | Facility: CLINIC | Age: 76
End: 2021-05-24
Payer: MEDICARE

## 2021-05-24 VITALS
DIASTOLIC BLOOD PRESSURE: 80 MMHG | HEART RATE: 70 BPM | RESPIRATION RATE: 20 BRPM | SYSTOLIC BLOOD PRESSURE: 138 MMHG | WEIGHT: 137.38 LBS | TEMPERATURE: 98 F | BODY MASS INDEX: 24.33 KG/M2 | OXYGEN SATURATION: 95 %

## 2021-05-24 DIAGNOSIS — I25.119 ATHEROSCLEROSIS OF NATIVE CORONARY ARTERY OF NATIVE HEART WITH ANGINA PECTORIS: ICD-10-CM

## 2021-05-24 DIAGNOSIS — I73.9 PVD (PERIPHERAL VASCULAR DISEASE): ICD-10-CM

## 2021-05-24 DIAGNOSIS — Z78.0 POST-MENOPAUSAL: ICD-10-CM

## 2021-05-24 DIAGNOSIS — Z13.820 SCREENING FOR OSTEOPOROSIS: ICD-10-CM

## 2021-05-24 DIAGNOSIS — I10 ESSENTIAL HYPERTENSION: Primary | ICD-10-CM

## 2021-05-24 DIAGNOSIS — E78.5 HYPERLIPIDEMIA, UNSPECIFIED HYPERLIPIDEMIA TYPE: ICD-10-CM

## 2021-05-24 DIAGNOSIS — F33.42 RECURRENT MAJOR DEPRESSIVE DISORDER, IN FULL REMISSION: ICD-10-CM

## 2021-05-24 PROCEDURE — 3288F PR FALLS RISK ASSESSMENT DOCUMENTED: ICD-10-PCS | Mod: CPTII,S$GLB,, | Performed by: FAMILY MEDICINE

## 2021-05-24 PROCEDURE — 1159F PR MEDICATION LIST DOCUMENTED IN MEDICAL RECORD: ICD-10-PCS | Mod: S$GLB,,, | Performed by: FAMILY MEDICINE

## 2021-05-24 PROCEDURE — 3288F FALL RISK ASSESSMENT DOCD: CPT | Mod: CPTII,S$GLB,, | Performed by: FAMILY MEDICINE

## 2021-05-24 PROCEDURE — 1126F AMNT PAIN NOTED NONE PRSNT: CPT | Mod: S$GLB,,, | Performed by: FAMILY MEDICINE

## 2021-05-24 PROCEDURE — 1101F PT FALLS ASSESS-DOCD LE1/YR: CPT | Mod: CPTII,S$GLB,, | Performed by: FAMILY MEDICINE

## 2021-05-24 PROCEDURE — 1126F PR PAIN SEVERITY QUANTIFIED, NO PAIN PRESENT: ICD-10-PCS | Mod: S$GLB,,, | Performed by: FAMILY MEDICINE

## 2021-05-24 PROCEDURE — 99999 PR PBB SHADOW E&M-EST. PATIENT-LVL IV: ICD-10-PCS | Mod: PBBFAC,,, | Performed by: FAMILY MEDICINE

## 2021-05-24 PROCEDURE — 99499 UNLISTED E&M SERVICE: CPT | Mod: S$GLB,,, | Performed by: FAMILY MEDICINE

## 2021-05-24 PROCEDURE — 1101F PR PT FALLS ASSESS DOC 0-1 FALLS W/OUT INJ PAST YR: ICD-10-PCS | Mod: CPTII,S$GLB,, | Performed by: FAMILY MEDICINE

## 2021-05-24 PROCEDURE — 99999 PR PBB SHADOW E&M-EST. PATIENT-LVL IV: CPT | Mod: PBBFAC,,, | Performed by: FAMILY MEDICINE

## 2021-05-24 PROCEDURE — 99214 OFFICE O/P EST MOD 30 MIN: CPT | Mod: S$GLB,,, | Performed by: FAMILY MEDICINE

## 2021-05-24 PROCEDURE — 1159F MED LIST DOCD IN RCRD: CPT | Mod: S$GLB,,, | Performed by: FAMILY MEDICINE

## 2021-05-24 PROCEDURE — 99214 PR OFFICE/OUTPT VISIT, EST, LEVL IV, 30-39 MIN: ICD-10-PCS | Mod: S$GLB,,, | Performed by: FAMILY MEDICINE

## 2021-05-24 PROCEDURE — 99499 RISK ADDL DX/OHS AUDIT: ICD-10-PCS | Mod: S$GLB,,, | Performed by: FAMILY MEDICINE

## 2021-05-24 RX ORDER — AMOXICILLIN AND CLAVULANATE POTASSIUM 875; 125 MG/1; MG/1
1 TABLET, FILM COATED ORAL 2 TIMES DAILY
Qty: 14 TABLET | Refills: 0 | Status: SHIPPED | OUTPATIENT
Start: 2021-05-24 | End: 2022-02-28

## 2021-05-24 RX ORDER — KETOROLAC TROMETHAMINE 5 MG/ML
SOLUTION OPHTHALMIC
COMMUNITY
End: 2021-05-24

## 2021-05-24 RX ORDER — PREDNISOLONE ACETATE 10 MG/ML
SUSPENSION/ DROPS OPHTHALMIC
COMMUNITY
Start: 2021-05-09 | End: 2022-12-09

## 2021-05-24 RX ORDER — MUPIROCIN 20 MG/G
OINTMENT TOPICAL 2 TIMES DAILY
Qty: 22 G | Refills: 1 | Status: SHIPPED | OUTPATIENT
Start: 2021-05-24 | End: 2021-11-11

## 2021-05-24 RX ORDER — NAPROXEN SODIUM 220 MG/1
TABLET, FILM COATED ORAL
COMMUNITY

## 2021-05-24 RX ORDER — KETOROLAC TROMETHAMINE 5 MG/ML
SOLUTION OPHTHALMIC
COMMUNITY
Start: 2021-03-26 | End: 2021-05-24

## 2021-05-24 RX ORDER — METHYLPREDNISOLONE 4 MG/1
TABLET ORAL
COMMUNITY
Start: 2021-05-09 | End: 2021-11-11

## 2021-05-24 RX ORDER — PREDNISOLONE ACETATE 10 MG/ML
SUSPENSION/ DROPS OPHTHALMIC
COMMUNITY
End: 2022-12-09

## 2021-05-25 RX ORDER — OXYBUTYNIN CHLORIDE 5 MG/1
TABLET, EXTENDED RELEASE ORAL
Qty: 90 TABLET | Refills: 1 | Status: SHIPPED | OUTPATIENT
Start: 2021-05-25 | End: 2021-08-10 | Stop reason: SDUPTHER

## 2021-05-30 DIAGNOSIS — K21.9 GASTROESOPHAGEAL REFLUX DISEASE: ICD-10-CM

## 2021-06-03 RX ORDER — PANTOPRAZOLE SODIUM 40 MG/1
40 TABLET, DELAYED RELEASE ORAL
Qty: 90 TABLET | Refills: 3 | Status: SHIPPED | OUTPATIENT
Start: 2021-06-03 | End: 2021-08-10 | Stop reason: SDUPTHER

## 2021-08-05 DIAGNOSIS — E78.2 MIXED HYPERLIPIDEMIA: ICD-10-CM

## 2021-08-05 DIAGNOSIS — I10 ESSENTIAL HYPERTENSION: ICD-10-CM

## 2021-08-05 DIAGNOSIS — I65.23 BILATERAL CAROTID ARTERY STENOSIS: ICD-10-CM

## 2021-08-05 DIAGNOSIS — Z87.19 HISTORY OF ISCHEMIC COLITIS: ICD-10-CM

## 2021-08-05 DIAGNOSIS — Z82.49 FAMILY HISTORY OF EARLY CAD: ICD-10-CM

## 2021-08-05 RX ORDER — GABAPENTIN 300 MG/1
600 CAPSULE ORAL 2 TIMES DAILY
Qty: 360 CAPSULE | Refills: 2 | Status: SHIPPED | OUTPATIENT
Start: 2021-08-05 | End: 2023-02-15 | Stop reason: SDUPTHER

## 2021-08-05 RX ORDER — TRAMADOL HYDROCHLORIDE 50 MG/1
50 TABLET ORAL 3 TIMES DAILY PRN
Qty: 40 TABLET | Refills: 0 | Status: CANCELLED | OUTPATIENT
Start: 2021-08-05

## 2021-08-05 RX ORDER — TRAMADOL HYDROCHLORIDE 50 MG/1
50 TABLET ORAL 3 TIMES DAILY PRN
Qty: 40 TABLET | Refills: 0 | Status: SHIPPED | OUTPATIENT
Start: 2021-08-05 | End: 2022-12-09

## 2021-08-06 RX ORDER — ATORVASTATIN CALCIUM 40 MG/1
40 TABLET, FILM COATED ORAL NIGHTLY
Qty: 90 TABLET | Refills: 4 | Status: SHIPPED | OUTPATIENT
Start: 2021-08-06 | End: 2022-06-30

## 2021-08-06 RX ORDER — EZETIMIBE 10 MG/1
10 TABLET ORAL NIGHTLY
Qty: 90 TABLET | Refills: 4 | Status: SHIPPED | OUTPATIENT
Start: 2021-08-06 | End: 2022-06-30

## 2021-08-06 RX ORDER — CARVEDILOL 3.12 MG/1
3.12 TABLET ORAL 2 TIMES DAILY
Qty: 180 TABLET | Refills: 4 | Status: SHIPPED | OUTPATIENT
Start: 2021-08-06 | End: 2022-06-30

## 2021-08-06 RX ORDER — AMLODIPINE BESYLATE 2.5 MG/1
2.5 TABLET ORAL DAILY
Qty: 90 TABLET | Refills: 4 | Status: SHIPPED | OUTPATIENT
Start: 2021-08-06 | End: 2022-02-28

## 2021-08-10 DIAGNOSIS — F32.A DEPRESSION, UNSPECIFIED DEPRESSION TYPE: ICD-10-CM

## 2021-08-10 DIAGNOSIS — K21.9 GASTROESOPHAGEAL REFLUX DISEASE: ICD-10-CM

## 2021-08-10 DIAGNOSIS — N32.81 OAB (OVERACTIVE BLADDER): ICD-10-CM

## 2021-08-13 DIAGNOSIS — F32.A DEPRESSION, UNSPECIFIED DEPRESSION TYPE: ICD-10-CM

## 2021-08-13 DIAGNOSIS — N32.81 OAB (OVERACTIVE BLADDER): ICD-10-CM

## 2021-08-13 DIAGNOSIS — K21.9 GASTROESOPHAGEAL REFLUX DISEASE: ICD-10-CM

## 2021-08-13 RX ORDER — OXYBUTYNIN CHLORIDE 5 MG/1
5 TABLET, EXTENDED RELEASE ORAL DAILY
Qty: 90 TABLET | Refills: 1 | Status: SHIPPED | OUTPATIENT
Start: 2021-08-13 | End: 2021-12-08

## 2021-08-13 RX ORDER — DULOXETIN HYDROCHLORIDE 60 MG/1
60 CAPSULE, DELAYED RELEASE ORAL DAILY
Qty: 90 CAPSULE | Refills: 0 | Status: SHIPPED | OUTPATIENT
Start: 2021-08-13 | End: 2021-11-02

## 2021-08-13 RX ORDER — PANTOPRAZOLE SODIUM 40 MG/1
40 TABLET, DELAYED RELEASE ORAL
Qty: 90 TABLET | Refills: 3 | OUTPATIENT
Start: 2021-08-13

## 2021-08-13 RX ORDER — DULOXETIN HYDROCHLORIDE 60 MG/1
60 CAPSULE, DELAYED RELEASE ORAL DAILY
Qty: 90 CAPSULE | Refills: 3 | OUTPATIENT
Start: 2021-08-13

## 2021-08-13 RX ORDER — OXYBUTYNIN CHLORIDE 5 MG/1
5 TABLET, EXTENDED RELEASE ORAL DAILY
Qty: 90 TABLET | Refills: 1 | OUTPATIENT
Start: 2021-08-13

## 2021-08-13 RX ORDER — PANTOPRAZOLE SODIUM 40 MG/1
40 TABLET, DELAYED RELEASE ORAL
Qty: 90 TABLET | Refills: 3 | Status: SHIPPED | OUTPATIENT
Start: 2021-08-13 | End: 2022-06-24

## 2021-08-25 NOTE — PROGRESS NOTES
This note was completed with dictation software and grammatical errors may exist.    CC:  Right back and leg pain    HPI:  The patient is a 74-year-old woman with a history of carotid artery stenosis, osteoarthritis who presents in referral from [facility] Uriostegui for back and right leg pain. She is status post L5/S1 DOROTHEA on 06/19/2019 with 70% relief of her right leg pain. She states that she still has some right low back pain and buttock, posterior thigh pain but it is fairly tolerable.  It is not stopping her from doing her activities of daily living, not causing any problems sleep.  She is not having to take any pain medication on a regular basis. Her main complaint today was constipation, she has been constipated and has not had a bowel movement in about 4-5 days.  She has been drinking fluids eating prunes and started taking Colace without any relief.  She denies any abdominal pain however, no fevers or chills, no nausea or vomiting..    Pain intervention history: She was given a prescription for gabapentin 100 mg 3 times daily but states that this was making her lightheaded.  She was prescribed physical therapy. She is status post right L4/5 transforaminal injection on 08/27/2018 with 100% relief of her right leg pain. She is status post right L2, 3, 4, 5 medial branch block on 10/15/2018 with 100% relief for 6 hr, status post radiofrequency ablation of right L2, 3, 4, 5 medial branch RFA on 10/25/2018. She returns in follow-up today, she is status post right L2/3 and L3/4 transforaminal injection on 12/14/18 with What she reports is 75% relief. She is status post L5/S1 DOROTHEA on 05/06/2019 with 100% relief for about two weeks but then she fell on her right side.    ROS:  She reports hypertension.  Balance of review of systems is negative.    Past Medical History:   Diagnosis Date    Anticoagulant long-term use     Arthritis     Carotid artery occlusion     Depression 11/29/2012    Diverticulosis     Former  Fx left hip  COPD/atelectasis smoker     GERD (gastroesophageal reflux disease)     HLD (hyperlipidemia) 2012    HTN (hypertension) 2012    Infectious gastroenteritis     Ischemic colitis        Past Surgical History:   Procedure Laterality Date    ANGIOGRAM-CAROTID Bilateral 2014    [facility]    APPENDECTOMY      Block-nerve-medial branch-lumbar L2,3,4,5 Right 10/15/2018    [facility] at [facility] OR    carotid angiogram       SECTION      x 3     COLONOSCOPY  2009    Dr. Reyes in [place], repeat in 5 years    COLONOSCOPY N/A 2016    Performed by Grzegorz Sousa Jr., [facility] at [facility] ENDO    Endarterectomy-Carotid - RIGHT Right 2018    [facility] Safia Thomas [facility] at [facility] OR    ESOPHAGOGASTRODUODENOSCOPY (EGD) N/A 2017    Performed by Grzeg[facility]z [facility]a Jr[facility] at [facility] ENDO    HAND SURGERY Right 2016    tarun noland/Dr. Abhinav Rolle    HEMORRHOID SURGERY      HYSTERECTOMY  age 35    TAHUSO benign reasons    hysteretomy      Injection,steroid,epidural,transforaminal approach l2/3, L3/4 Right 2018    Per[facility] at [facility] OR    Injection,steroid,epidural,transforaminal approach L4/5 Right 2018    Per[facility] at [facility] OR    Injection-steroid-epidural-lumbar Right 2019    Per[facility] at [facility] OR    Injection-steroid-epidural-lumbar N/A 2019    Per[facility] at [facility] OR    JOINT REPLACEMENT Right     1st interphalangeal joint of 3rd finger    OOPHORECTOMY      one remains    RADIOFREQUENCY ABLATION, NERVE, SPINAL, LUMBAR, MEDIAL BRANCH, THERMAL- L2,L3,L4,L5 Right 10/25/2018    Performed by Jakub Gerer [facility] at [facility] OR    TONSILLECTOMY         Social History     Socioeconomic History    Marital status:      Spouse name: Not on file    Number of children: 3    Years of education: Not on file     "Highest education level: Not on file   Occupational History    Occupation: retired     Employer: [employer]   Social Needs    Financial resource strain: Not on file    Food insecurity:     Worry: Not on file     Inability: Not on file    Transportation needs:     Medical: Not on file     Non-medical: Not on file   Tobacco Use    Smoking status: Former Smoker     Packs/day: 0.50     Years: 20.00     Pack years: 10.00     Last attempt to quit: 3/28/2013     Years since quittin.3    Smokeless tobacco: Never Used    Tobacco comment: quit 2 years ago after intermittent use for 40 years   Substance and Sexual Activity    Alcohol use: Yes     Alcohol/week: 0.0 oz     Comment: 0-2 ETOHic drinks per day    Drug use: No    Sexual activity: Yes     Partners: Male   Lifestyle    Physical activity:     Days per week: Not on file     Minutes per session: Not on file    Stress: Not on file   Relationships    Social connections:     Talks on phone: Not on file     Gets together: Not on file     Attends [employer] service: Not on file     Active member of club or organization: Not on file     Attends meetings of clubs or organizations: Not on file     Relationship status: Not on file   Other Topics Concern    Not on file   Social History Narrative    Not on file         Medications/Allergies: See med card    Vitals:    19 1205   BP: 137/75   Pulse: 77   Weight: 66.7 kg (147 lb 2.5 oz)   Height: 5' 3" (1.6 m)   PainSc:   5   PainLoc: Back         Physical exam:  Gen: A and O x3, pleasant, well-groomed  Skin: No rashes or obvious lesions  HEENT: PERRLA, no obvious deformities on ears or in canals. Trachea midline.  CVS: Regular rate and rhythm, normal palpable pulses.  Resp:No increased work of breathing, symmetrical chest rise.  Abdomen: Soft, NT/ND.  Musculoskeletal No antalgic gait.     Neuro:  Lower extremities: 5/5 strength bilaterally, 4/5 right hip flexion but may be limited by pain  Reflexes: Patellar 2+, " Achilles 2+ bilaterally.  Sensory:  Intact and symmetrical to light touch and pinprick in L2-S1 dermatomes bilaterally.    Lumbar spine:  Lumbar spine:   Range of motion is moderately reduced with flexion and extension with slight increased pain in the right buttock with extension.  Mika's test causes no increased pain on either side.    Supine straight leg raise is   Negative for any leg pain bilaterally.  Internal and external rotation of the hip causes no increased pain on either side.  Myofascial exam:  Mild tenderness to palpation to the right upper/lateral gluteal region.    Imagin18 MRI L-spine  Vertebral column: There is multilevel degenerative change.  There is no fracture.  Comparison plain films demonstrate a very gentle rotary levocurvature of the lumbar spine.  There is 2 mm retrolisthesis of L2 on L3.  This is exaggerated by endplate osteophyte formation.  There is moderate disc space narrowing at the L2-3 level where there is also associated degenerative endplate signal change.  All of the discs are mildly desiccated.  There is no other significant disc space narrowing.  There is mild disc space narrowing at the L3-4 level.  Baseline marrow signal intensity is within normal limits.  T12-L1: Unremarkable.  There is no spinal canal or significant foraminal stenosis.  L1-2: There is mild facet joint arthropathy.  There is no spinal canal or significant foraminal stenosis.  L2-3: There is disc space narrowing.  There is trace retrolisthesis of L2 on L3.  There is a diffuse disc bulge with osteophytic ridging.  There is mild-to-moderate facet joint arthropathy with ligamentum flavum thickening, right greater than left.  There is no spinal stenosis.  There is mild bilateral foraminal stenosis.  L3-4: There is mild disc space narrowing.  There is a mild-to-moderate diffuse disc bulge.  There is moderate-to-marked facet joint arthropathy with ligamentum flavum thickening.  There is mildly  Bradycardia Left Subtrochanteric femur Fx prominent dorsal epidural fat.  There is moderate central spinal stenosis.  AP measurement of the dural sac is 7 mm there is mild bilateral foraminal stenosis.  L4-5: There is a diffuse disc bulge with osteophytic ridging slightly eccentric to the right.  There is moderate facet joint arthropathy with ligamentum flavum thickening.  There is borderline spinal stenosis.  There is mild bilateral foraminal stenosis.  L5-S1: There is moderate facet joint arthropathy.  There is minimal bulging of the annulus.  There is no spinal canal or significant foraminal stenosis.  Soft tissues, other: The prevertebral soft tissues are normal.  The aorta is somewhat ectatic.    02/16/2019 MRI lumbar spine  Infrarenal aortic dilatation is noted to 2.7 cm prior to aortic bifurcation suggestive of aortic ectasia  Some mild adrenal thickening on the left is questioned that could be artifactual or relate to a process such as a adenoma or mass without convincing detrimental change since 06/11/2018.  Stable fluid signal intensities are noted within the left kidney 1 of 8 mm and 1 of 4 mm nonspecific on this examination but statistically favored relate to renal cysts.  Vertebral body heights appear well preserved.  Mild intervertebral disc height loss is noted at the L2-L3 L3-L4 levels in particular.  A mild retrolisthesis is noted of the L2 on L3 vertebral body of 4 mm  No STIR signal abnormality is noted to suggest an aggressive bone marrow replacement process or recent fracture.  The spinal cord appears to terminate at the L1 level.  At the T12-L1  level, no significant central canal stenosis, neural foraminal stenosis, or disc bulge is noted.  At the L1-L2 level, no significant central canal stenosis, neural foraminal stenosis, or disc bulge is noted. Mild ligamentous hypertrophy is noted  At the L2-L3 level, facet arthropathy and ligamentous hypertrophy appears to be present.  Posterior disc osteophyte spurring or bulge is noted of 3  mm.  Ligamentous hypertrophy is noted.  Broad-based bulge is noted towards the far lateral left and left neural foramen greater than far lateral right and towards the right neural foramen.  Possible contact of the exiting left nerve root is noted.  The broad-based bulge far laterally is of 4 mm.  Mild right neural foraminal narrowing is noted.  Mild to moderate left neural foraminal stenosis is noted.  Mild thecal sac narrowing is noted to 10 mm.  A similar appearance is noted on the prior.  At the L3-L4 level, bilateral facet arthropathy and ligamentous hypertrophy appears to be present.  Broad-based bulging is noted towards each neural foramen and far laterally bilaterally with a bulge far lateral to the left of 4 mm greater than bulging to the right.  Mild left neural foraminal stenosis greater than right-sided neural foraminal stenosis appears to be present.  Moderate thecal sac narrowing is noted to 8 mm a similar appearance is noted on the prior.  At the L4-L5 level, ligamentous hypertrophy and facet arthropathy appears to be present.  Broad-based bulge is noted far laterally bilaterally.  Moderate thecal sac narrowing is noted to 8 mm.  Mild to moderate right neural foraminal stenosis is noted with mild left neural foraminal stenosis.  Some increased T2 signal is noted within the disc far lateral to the left as can be seen with annular tear.  At the L5-S1 level, facet arthropathy and ligamentous hypertrophy is noted.  Broad-based bulge is noted towards the left neural foramen and far lateral left of 4 mm greater than towards the right.  Mild left greater than right neural foraminal narrowing is noted.  No significant central canal stenosis is noted.  Abnormal wall thickening is noted to the distal colon in its partially visualized portion.  This could relate to scarring, lack of distension, or an infiltrative or inflammatory process.  Correlation with the patient's history of colonoscopy is  ?uti suggested.      Assessment:  The patient is a 74-year-old woman with a history of carotid artery stenosis, osteoarthritis who presents in referral from Y[facility] Uriostegui for back and right leg pain.    1. Right lumbar radiculopathy     2. Lumbar spondylosis           Plan:  1.  Since she is doing well, she will follow up as needed in terms of repeating any epidural steroid injections.  We discussed the importance of exercise, core strengthening.  2.  I have told her start taking MiraLax and she may want to try taking Mag citrate tomorrow.  She does not want to do this today because she is going to a [employer].  I told her to present to the emergency department if having any severe abdominal pain.           Recommendations on rehabilitation, disposition

## 2021-10-31 DIAGNOSIS — F32.A DEPRESSION, UNSPECIFIED DEPRESSION TYPE: ICD-10-CM

## 2021-11-01 ENCOUNTER — CLINICAL SUPPORT (OUTPATIENT)
Dept: CARDIOLOGY | Facility: HOSPITAL | Age: 76
End: 2021-11-01
Attending: INTERNAL MEDICINE
Payer: MEDICARE

## 2021-11-01 DIAGNOSIS — I65.23 BILATERAL CAROTID ARTERY STENOSIS: ICD-10-CM

## 2021-11-01 DIAGNOSIS — E78.2 MIXED HYPERLIPIDEMIA: ICD-10-CM

## 2021-11-01 DIAGNOSIS — I10 ESSENTIAL HYPERTENSION: ICD-10-CM

## 2021-11-01 DIAGNOSIS — I73.9 PVD (PERIPHERAL VASCULAR DISEASE): ICD-10-CM

## 2021-11-01 PROCEDURE — 93880 CV US DOPPLER CAROTID (CUPID ONLY): ICD-10-PCS | Mod: 26,HCNC,, | Performed by: INTERNAL MEDICINE

## 2021-11-01 PROCEDURE — 93880 EXTRACRANIAL BILAT STUDY: CPT | Mod: 26,HCNC,, | Performed by: INTERNAL MEDICINE

## 2021-11-01 PROCEDURE — 93880 EXTRACRANIAL BILAT STUDY: CPT | Mod: HCNC,PO

## 2021-11-02 LAB
LEFT ARM DIASTOLIC BLOOD PRESSURE: 80 MMHG
LEFT ARM SYSTOLIC BLOOD PRESSURE: 138 MMHG
LEFT CBA DIAS: 14 CM/S
LEFT CBA SYS: 71 CM/S
LEFT CCA DIST DIAS: 14 CM/S
LEFT CCA DIST SYS: 67 CM/S
LEFT CCA MID DIAS: 16 CM/S
LEFT CCA MID SYS: 77 CM/S
LEFT CCA PROX DIAS: 14 CM/S
LEFT CCA PROX SYS: 92 CM/S
LEFT ECA DIAS: 16 CM/S
LEFT ECA SYS: 95 CM/S
LEFT ICA DIST DIAS: 25 CM/S
LEFT ICA DIST SYS: 117 CM/S
LEFT ICA MID DIAS: 41 CM/S
LEFT ICA MID SYS: 158 CM/S
LEFT ICA PROX DIAS: 48 CM/S
LEFT ICA PROX SYS: 199 CM/S
LEFT VERTEBRAL DIAS: 6 CM/S
LEFT VERTEBRAL SYS: 38 CM/S
OHS CV CAROTID RIGHT ICA EDV HIGHEST: 25
OHS CV CAROTID ULTRASOUND LEFT ICA/CCA RATIO: 2.97
OHS CV CAROTID ULTRASOUND RIGHT ICA/CCA RATIO: 1.38
OHS CV PV CAROTID LEFT HIGHEST CCA: 92
OHS CV PV CAROTID LEFT HIGHEST ICA: 199
OHS CV PV CAROTID RIGHT HIGHEST CCA: 95
OHS CV PV CAROTID RIGHT HIGHEST ICA: 87
OHS CV US CAROTID LEFT HIGHEST EDV: 48
RIGHT ARM DIASTOLIC BLOOD PRESSURE: 80 MMHG
RIGHT ARM SYSTOLIC BLOOD PRESSURE: 138 MMHG
RIGHT CBA DIAS: 3 CM/S
RIGHT CBA SYS: 49 CM/S
RIGHT CCA DIST DIAS: 11 CM/S
RIGHT CCA DIST SYS: 63 CM/S
RIGHT CCA MID DIAS: 10 CM/S
RIGHT CCA MID SYS: 66 CM/S
RIGHT CCA PROX DIAS: 13 CM/S
RIGHT CCA PROX SYS: 95 CM/S
RIGHT ECA DIAS: 12 CM/S
RIGHT ECA SYS: 132 CM/S
RIGHT ICA DIST DIAS: 25 CM/S
RIGHT ICA DIST SYS: 87 CM/S
RIGHT ICA MID DIAS: 21 CM/S
RIGHT ICA MID SYS: 76 CM/S
RIGHT ICA PROX DIAS: 17 CM/S
RIGHT ICA PROX SYS: 62 CM/S
RIGHT VERTEBRAL DIAS: 13 CM/S
RIGHT VERTEBRAL SYS: 66 CM/S

## 2021-11-02 RX ORDER — DULOXETIN HYDROCHLORIDE 60 MG/1
CAPSULE, DELAYED RELEASE ORAL
Qty: 90 CAPSULE | Refills: 2 | Status: SHIPPED | OUTPATIENT
Start: 2021-11-02 | End: 2022-06-24

## 2021-11-11 ENCOUNTER — OFFICE VISIT (OUTPATIENT)
Dept: CARDIOLOGY | Facility: CLINIC | Age: 76
End: 2021-11-11
Payer: MEDICARE

## 2021-11-11 VITALS
DIASTOLIC BLOOD PRESSURE: 77 MMHG | HEIGHT: 63 IN | WEIGHT: 133.81 LBS | SYSTOLIC BLOOD PRESSURE: 130 MMHG | BODY MASS INDEX: 23.71 KG/M2 | HEART RATE: 84 BPM

## 2021-11-11 DIAGNOSIS — I10 ESSENTIAL HYPERTENSION: ICD-10-CM

## 2021-11-11 DIAGNOSIS — E78.5 HYPERLIPIDEMIA, UNSPECIFIED HYPERLIPIDEMIA TYPE: ICD-10-CM

## 2021-11-11 DIAGNOSIS — I65.23 BILATERAL CAROTID ARTERY STENOSIS: Primary | ICD-10-CM

## 2021-11-11 DIAGNOSIS — I73.9 PVD (PERIPHERAL VASCULAR DISEASE): ICD-10-CM

## 2021-11-11 DIAGNOSIS — Z82.49 FAMILY HISTORY OF EARLY CAD: ICD-10-CM

## 2021-11-11 PROCEDURE — 99214 OFFICE O/P EST MOD 30 MIN: CPT | Mod: HCNC,S$GLB,, | Performed by: INTERNAL MEDICINE

## 2021-11-11 PROCEDURE — 1126F AMNT PAIN NOTED NONE PRSNT: CPT | Mod: HCNC,CPTII,S$GLB, | Performed by: INTERNAL MEDICINE

## 2021-11-11 PROCEDURE — 99999 PR PBB SHADOW E&M-EST. PATIENT-LVL IV: ICD-10-PCS | Mod: PBBFAC,HCNC,, | Performed by: INTERNAL MEDICINE

## 2021-11-11 PROCEDURE — 99999 PR PBB SHADOW E&M-EST. PATIENT-LVL IV: CPT | Mod: PBBFAC,HCNC,, | Performed by: INTERNAL MEDICINE

## 2021-11-11 PROCEDURE — 99214 PR OFFICE/OUTPT VISIT, EST, LEVL IV, 30-39 MIN: ICD-10-PCS | Mod: HCNC,S$GLB,, | Performed by: INTERNAL MEDICINE

## 2021-11-11 PROCEDURE — 99499 UNLISTED E&M SERVICE: CPT | Mod: HCNC,S$GLB,, | Performed by: INTERNAL MEDICINE

## 2021-11-11 PROCEDURE — 3078F DIAST BP <80 MM HG: CPT | Mod: HCNC,CPTII,S$GLB, | Performed by: INTERNAL MEDICINE

## 2021-11-11 PROCEDURE — 3075F PR MOST RECENT SYSTOLIC BLOOD PRESS GE 130-139MM HG: ICD-10-PCS | Mod: HCNC,CPTII,S$GLB, | Performed by: INTERNAL MEDICINE

## 2021-11-11 PROCEDURE — 1159F PR MEDICATION LIST DOCUMENTED IN MEDICAL RECORD: ICD-10-PCS | Mod: HCNC,CPTII,S$GLB, | Performed by: INTERNAL MEDICINE

## 2021-11-11 PROCEDURE — 99499 RISK ADDL DX/OHS AUDIT: ICD-10-PCS | Mod: HCNC,S$GLB,, | Performed by: INTERNAL MEDICINE

## 2021-11-11 PROCEDURE — 3075F SYST BP GE 130 - 139MM HG: CPT | Mod: HCNC,CPTII,S$GLB, | Performed by: INTERNAL MEDICINE

## 2021-11-11 PROCEDURE — 1160F RVW MEDS BY RX/DR IN RCRD: CPT | Mod: HCNC,CPTII,S$GLB, | Performed by: INTERNAL MEDICINE

## 2021-11-11 PROCEDURE — 1126F PR PAIN SEVERITY QUANTIFIED, NO PAIN PRESENT: ICD-10-PCS | Mod: HCNC,CPTII,S$GLB, | Performed by: INTERNAL MEDICINE

## 2021-11-11 PROCEDURE — 3078F PR MOST RECENT DIASTOLIC BLOOD PRESSURE < 80 MM HG: ICD-10-PCS | Mod: HCNC,CPTII,S$GLB, | Performed by: INTERNAL MEDICINE

## 2021-11-11 PROCEDURE — 1159F MED LIST DOCD IN RCRD: CPT | Mod: HCNC,CPTII,S$GLB, | Performed by: INTERNAL MEDICINE

## 2021-11-11 PROCEDURE — 1160F PR REVIEW ALL MEDS BY PRESCRIBER/CLIN PHARMACIST DOCUMENTED: ICD-10-PCS | Mod: HCNC,CPTII,S$GLB, | Performed by: INTERNAL MEDICINE

## 2021-11-11 RX ORDER — CLONIDINE HYDROCHLORIDE 0.1 MG/1
0.1 TABLET ORAL 3 TIMES DAILY PRN
Qty: 90 TABLET | Refills: 6 | Status: SHIPPED | OUTPATIENT
Start: 2021-11-11 | End: 2022-12-09

## 2021-12-03 DIAGNOSIS — N32.81 OAB (OVERACTIVE BLADDER): ICD-10-CM

## 2021-12-08 RX ORDER — OXYBUTYNIN CHLORIDE 5 MG/1
TABLET, EXTENDED RELEASE ORAL
Qty: 90 TABLET | Refills: 1 | Status: SHIPPED | OUTPATIENT
Start: 2021-12-08 | End: 2022-06-28

## 2021-12-21 ENCOUNTER — IMMUNIZATION (OUTPATIENT)
Dept: FAMILY MEDICINE | Facility: CLINIC | Age: 76
End: 2021-12-21
Payer: MEDICARE

## 2021-12-21 DIAGNOSIS — Z23 NEED FOR VACCINATION: Primary | ICD-10-CM

## 2021-12-21 PROCEDURE — 0004A COVID-19, MRNA, LNP-S, PF, 30 MCG/0.3 ML DOSE VACCINE: CPT | Mod: HCNC,PBBFAC | Performed by: FAMILY MEDICINE

## 2022-02-28 ENCOUNTER — OFFICE VISIT (OUTPATIENT)
Dept: FAMILY MEDICINE | Facility: CLINIC | Age: 77
End: 2022-02-28
Payer: MEDICARE

## 2022-02-28 VITALS
HEIGHT: 63 IN | WEIGHT: 130.94 LBS | BODY MASS INDEX: 23.2 KG/M2 | SYSTOLIC BLOOD PRESSURE: 172 MMHG | HEART RATE: 86 BPM | OXYGEN SATURATION: 97 % | DIASTOLIC BLOOD PRESSURE: 80 MMHG

## 2022-02-28 DIAGNOSIS — I25.119 ATHEROSCLEROSIS OF NATIVE CORONARY ARTERY OF NATIVE HEART WITH ANGINA PECTORIS: ICD-10-CM

## 2022-02-28 DIAGNOSIS — I73.9 PVD (PERIPHERAL VASCULAR DISEASE): ICD-10-CM

## 2022-02-28 DIAGNOSIS — Z13.820 SCREENING FOR OSTEOPOROSIS: ICD-10-CM

## 2022-02-28 DIAGNOSIS — Z12.31 ENCOUNTER FOR SCREENING MAMMOGRAM FOR MALIGNANT NEOPLASM OF BREAST: ICD-10-CM

## 2022-02-28 DIAGNOSIS — Z00.00 WELLNESS EXAMINATION: Primary | ICD-10-CM

## 2022-02-28 DIAGNOSIS — Z78.0 POST-MENOPAUSAL: ICD-10-CM

## 2022-02-28 DIAGNOSIS — F33.42 RECURRENT MAJOR DEPRESSIVE DISORDER, IN FULL REMISSION: ICD-10-CM

## 2022-02-28 DIAGNOSIS — E78.5 HYPERLIPIDEMIA, UNSPECIFIED HYPERLIPIDEMIA TYPE: ICD-10-CM

## 2022-02-28 DIAGNOSIS — Z12.39 SCREENING BREAST EXAMINATION: ICD-10-CM

## 2022-02-28 DIAGNOSIS — Z72.0 TOBACCO USE: ICD-10-CM

## 2022-02-28 DIAGNOSIS — I10 ESSENTIAL HYPERTENSION: ICD-10-CM

## 2022-02-28 PROCEDURE — 1101F PT FALLS ASSESS-DOCD LE1/YR: CPT | Mod: HCNC,CPTII,S$GLB, | Performed by: FAMILY MEDICINE

## 2022-02-28 PROCEDURE — 1159F PR MEDICATION LIST DOCUMENTED IN MEDICAL RECORD: ICD-10-PCS | Mod: HCNC,CPTII,S$GLB, | Performed by: FAMILY MEDICINE

## 2022-02-28 PROCEDURE — 99397 PR PREVENTIVE VISIT,EST,65 & OVER: ICD-10-PCS | Mod: HCNC,S$GLB,, | Performed by: FAMILY MEDICINE

## 2022-02-28 PROCEDURE — 3288F PR FALLS RISK ASSESSMENT DOCUMENTED: ICD-10-PCS | Mod: HCNC,CPTII,S$GLB, | Performed by: FAMILY MEDICINE

## 2022-02-28 PROCEDURE — 1159F MED LIST DOCD IN RCRD: CPT | Mod: HCNC,CPTII,S$GLB, | Performed by: FAMILY MEDICINE

## 2022-02-28 PROCEDURE — 3079F DIAST BP 80-89 MM HG: CPT | Mod: HCNC,CPTII,S$GLB, | Performed by: FAMILY MEDICINE

## 2022-02-28 PROCEDURE — 99999 PR PBB SHADOW E&M-EST. PATIENT-LVL IV: CPT | Mod: PBBFAC,HCNC,, | Performed by: FAMILY MEDICINE

## 2022-02-28 PROCEDURE — 3077F PR MOST RECENT SYSTOLIC BLOOD PRESSURE >= 140 MM HG: ICD-10-PCS | Mod: HCNC,CPTII,S$GLB, | Performed by: FAMILY MEDICINE

## 2022-02-28 PROCEDURE — 1126F AMNT PAIN NOTED NONE PRSNT: CPT | Mod: HCNC,CPTII,S$GLB, | Performed by: FAMILY MEDICINE

## 2022-02-28 PROCEDURE — 3077F SYST BP >= 140 MM HG: CPT | Mod: HCNC,CPTII,S$GLB, | Performed by: FAMILY MEDICINE

## 2022-02-28 PROCEDURE — 3288F FALL RISK ASSESSMENT DOCD: CPT | Mod: HCNC,CPTII,S$GLB, | Performed by: FAMILY MEDICINE

## 2022-02-28 PROCEDURE — 3079F PR MOST RECENT DIASTOLIC BLOOD PRESSURE 80-89 MM HG: ICD-10-PCS | Mod: HCNC,CPTII,S$GLB, | Performed by: FAMILY MEDICINE

## 2022-02-28 PROCEDURE — 99999 PR PBB SHADOW E&M-EST. PATIENT-LVL IV: ICD-10-PCS | Mod: PBBFAC,HCNC,, | Performed by: FAMILY MEDICINE

## 2022-02-28 PROCEDURE — 99499 UNLISTED E&M SERVICE: CPT | Mod: HCNC,S$GLB,, | Performed by: FAMILY MEDICINE

## 2022-02-28 PROCEDURE — 99499 RISK ADDL DX/OHS AUDIT: ICD-10-PCS | Mod: HCNC,S$GLB,, | Performed by: FAMILY MEDICINE

## 2022-02-28 PROCEDURE — 1101F PR PT FALLS ASSESS DOC 0-1 FALLS W/OUT INJ PAST YR: ICD-10-PCS | Mod: HCNC,CPTII,S$GLB, | Performed by: FAMILY MEDICINE

## 2022-02-28 PROCEDURE — 99397 PER PM REEVAL EST PAT 65+ YR: CPT | Mod: HCNC,S$GLB,, | Performed by: FAMILY MEDICINE

## 2022-02-28 PROCEDURE — 1126F PR PAIN SEVERITY QUANTIFIED, NO PAIN PRESENT: ICD-10-PCS | Mod: HCNC,CPTII,S$GLB, | Performed by: FAMILY MEDICINE

## 2022-02-28 RX ORDER — AMLODIPINE BESYLATE 5 MG/1
5 TABLET ORAL DAILY
Qty: 90 TABLET | Refills: 1 | Status: SHIPPED | OUTPATIENT
Start: 2022-02-28 | End: 2022-06-24

## 2022-02-28 NOTE — PROGRESS NOTES
Subjective:       Patient ID: Haley Castro is a 76 y.o. female.    Chief Complaint: Follow-up    Here for wellness and f/u htn. Thinks bp not controlled for last several months.     Hypertension  This is a chronic problem. The current episode started more than 1 year ago. The problem is controlled. Pertinent negatives include no chest pain, palpitations or shortness of breath.   Hyperlipidemia  This is a chronic problem. The current episode started more than 1 year ago. The problem is controlled. Pertinent negatives include no chest pain or shortness of breath.     Review of Systems   Constitutional: Negative for chills and fever.   Respiratory: Negative for cough, chest tightness and shortness of breath.    Cardiovascular: Negative for chest pain, palpitations and leg swelling.   Endocrine: Negative for cold intolerance and heat intolerance.   Psychiatric/Behavioral: Negative for decreased concentration. The patient is not nervous/anxious.        Objective:      Physical Exam  Vitals and nursing note reviewed.   Constitutional:       Appearance: She is well-developed.   HENT:      Head: Normocephalic and atraumatic.   Cardiovascular:      Rate and Rhythm: Normal rate and regular rhythm.      Heart sounds: Normal heart sounds.   Pulmonary:      Effort: Pulmonary effort is normal.      Breath sounds: Normal breath sounds.   Psychiatric:         Mood and Affect: Mood normal.         Behavior: Behavior normal.         Assessment:       1. Wellness examination    2. Hyperlipidemia, unspecified hyperlipidemia type    3. Essential hypertension    4. Recurrent major depressive disorder, in full remission    5. Atherosclerosis of native coronary artery of native heart with angina pectoris    6. PVD (peripheral vascular disease)    7. Screening breast examination    8. Screening for osteoporosis    9. Post-menopausal    10. Encounter for screening mammogram for malignant neoplasm of breast     11. Tobacco use        Plan:        Wellness examination    Hyperlipidemia, unspecified hyperlipidemia type  -     Comprehensive Metabolic Panel; Future; Expected date: 02/28/2022  -     TSH; Future; Expected date: 02/28/2022    Essential hypertension  -     Comprehensive Metabolic Panel; Future; Expected date: 02/28/2022  -     TSH; Future; Expected date: 02/28/2022    Recurrent major depressive disorder, in full remission    Atherosclerosis of native coronary artery of native heart with angina pectoris    PVD (peripheral vascular disease)    Screening breast examination  -     Mammo Digital Screening Bilat; Future; Expected date: 02/28/2022    Screening for osteoporosis  -     DXA Bone Density Spine And Hip; Future; Expected date: 02/28/2022    Post-menopausal  -     DXA Bone Density Spine And Hip; Future; Expected date: 02/28/2022    Encounter for screening mammogram for malignant neoplasm of breast   -     Mammo Digital Screening Bilat; Future; Expected date: 02/28/2022    Tobacco use  -     Ambulatory referral/consult to Smoking Cessation Program; Future; Expected date: 03/07/2022    Other orders  -     amLODIPine (NORVASC) 5 MG tablet; Take 1 tablet (5 mg total) by mouth once daily.  Dispense: 90 tablet; Refill: 1    nurse bp check in 2-4 wks and labs after  Mood stable  pvd stable  Increase amlodipine from 2.5 mg to 5 mg    Follow up in about 6 months (around 8/28/2022), or if symptoms worsen or fail to improve.

## 2022-03-28 ENCOUNTER — HOSPITAL ENCOUNTER (OUTPATIENT)
Dept: RADIOLOGY | Facility: HOSPITAL | Age: 77
Discharge: HOME | End: 2022-03-28
Attending: FAMILY MEDICINE
Payer: MEDICARE

## 2022-03-28 VITALS — WEIGHT: 130.94 LBS | BODY MASS INDEX: 23.2 KG/M2 | HEIGHT: 63 IN

## 2022-03-28 DIAGNOSIS — Z12.39 SCREENING BREAST EXAMINATION: ICD-10-CM

## 2022-03-28 DIAGNOSIS — Z12.31 ENCOUNTER FOR SCREENING MAMMOGRAM FOR MALIGNANT NEOPLASM OF BREAST: ICD-10-CM

## 2022-03-28 PROCEDURE — 77063 MAMMO DIGITAL SCREENING BILAT WITH TOMO: ICD-10-PCS | Mod: 26,,, | Performed by: RADIOLOGY

## 2022-03-28 PROCEDURE — 77067 MAMMO DIGITAL SCREENING BILAT WITH TOMO: ICD-10-PCS | Mod: 26,,, | Performed by: RADIOLOGY

## 2022-03-28 PROCEDURE — 77063 BREAST TOMOSYNTHESIS BI: CPT | Mod: TC,PO

## 2022-03-28 PROCEDURE — 77063 BREAST TOMOSYNTHESIS BI: CPT | Mod: 26,,, | Performed by: RADIOLOGY

## 2022-03-28 PROCEDURE — 77067 SCR MAMMO BI INCL CAD: CPT | Mod: TC,PO

## 2022-03-28 PROCEDURE — 77067 SCR MAMMO BI INCL CAD: CPT | Mod: 26,,, | Performed by: RADIOLOGY

## 2022-03-29 ENCOUNTER — PATIENT MESSAGE (OUTPATIENT)
Dept: FAMILY MEDICINE | Facility: CLINIC | Age: 77
End: 2022-03-29
Payer: MEDICARE

## 2022-03-29 ENCOUNTER — TELEPHONE (OUTPATIENT)
Dept: FAMILY MEDICINE | Facility: CLINIC | Age: 77
End: 2022-03-29
Payer: MEDICARE

## 2022-03-29 NOTE — TELEPHONE ENCOUNTER
----- Message from Kayce Duval sent at 3/29/2022 11:35 AM CDT -----  Contact: patient  Type: Needs Medical Advice  Who Called:  patient  Best Call Back Number: [phone] (home)   Additional Information: patient is requesting to have her nurse visit changed to Thursday morning- please advise.

## 2022-03-31 ENCOUNTER — CLINICAL SUPPORT (OUTPATIENT)
Dept: FAMILY MEDICINE | Facility: CLINIC | Age: 77
End: 2022-03-31
Payer: MEDICARE

## 2022-03-31 ENCOUNTER — TELEPHONE (OUTPATIENT)
Dept: FAMILY MEDICINE | Facility: CLINIC | Age: 77
End: 2022-03-31

## 2022-03-31 VITALS — SYSTOLIC BLOOD PRESSURE: 136 MMHG | OXYGEN SATURATION: 100 % | DIASTOLIC BLOOD PRESSURE: 72 MMHG | HEART RATE: 72 BPM

## 2022-03-31 DIAGNOSIS — I10 HYPERTENSION, UNSPECIFIED TYPE: Primary | ICD-10-CM

## 2022-03-31 PROCEDURE — 99999 PR PBB SHADOW E&M-EST. PATIENT-LVL III: ICD-10-PCS | Mod: PBBFAC,,,

## 2022-03-31 PROCEDURE — 99999 PR PBB SHADOW E&M-EST. PATIENT-LVL III: CPT | Mod: PBBFAC,,,

## 2022-03-31 PROCEDURE — 99499 UNLISTED E&M SERVICE: CPT | Mod: S$GLB,,, | Performed by: FAMILY MEDICINE

## 2022-03-31 PROCEDURE — 99499 NO LOS: ICD-10-PCS | Mod: S$GLB,,, | Performed by: FAMILY MEDICINE

## 2022-03-31 NOTE — TELEPHONE ENCOUNTER
Pt here for nurse visit       Readings have been averaging 160/90.   Last dose of blood pressure medication was taken at 8:30 am carvedilol 3.125 mg,( pt presently on carvedilol 3.125 mg twice daily and amlodipine 5 mg daily)  Patient is asymptomatic.   Complains of no c/o.    138/82 Pulse: 67 .    Blood pressure reading after 15 minutes was   136/72  Pulse 72.  Dr. Viera  notified.     Chart shared

## 2022-03-31 NOTE — PROGRESS NOTES
..  Haley Castro 76 y.o. female is here today for Blood Pressure check.   History of HTN yes.    Review of patient's allergies indicates:   Allergen Reactions    No known drug allergies      Creatinine   Date Value Ref Range Status   11/01/2021 0.7 0.5 - 1.4 mg/dL Final     Sodium   Date Value Ref Range Status   11/01/2021 139 136 - 145 mmol/L Final     Potassium   Date Value Ref Range Status   11/01/2021 4.5 3.5 - 5.1 mmol/L Final   ]  Patient verifies taking blood pressure medications on a regular basis at the same time of the day.     Current Outpatient Medications:     amLODIPine (NORVASC) 5 MG tablet, Take 1 tablet (5 mg total) by mouth once daily., Disp: 90 tablet, Rfl: 1    aspirin 81 MG Chew, Baby Aspirin 81 mg chewable tablet  Chew 1 tablet every day by oral route., Disp: , Rfl:     atorvastatin (LIPITOR) 40 MG tablet, Take 1 tablet (40 mg total) by mouth every evening., Disp: 90 tablet, Rfl: 4    carvediloL (COREG) 3.125 MG tablet, Take 1 tablet (3.125 mg total) by mouth 2 (two) times daily., Disp: 180 tablet, Rfl: 4    cloNIDine (CATAPRES) 0.1 MG tablet, Take 1 tablet (0.1 mg total) by mouth 3 (three) times daily as needed (PRN SBP > 165 mmHg)., Disp: 90 tablet, Rfl: 6    DULoxetine (CYMBALTA) 60 MG capsule, TAKE 1 CAPSULE EVERY DAY, Disp: 90 capsule, Rfl: 2    ezetimibe (ZETIA) 10 mg tablet, Take 1 tablet (10 mg total) by mouth every evening., Disp: 90 tablet, Rfl: 4    gabapentin (NEURONTIN) 300 MG capsule, Take 2 capsules (600 mg total) by mouth 2 (two) times daily., Disp: 360 capsule, Rfl: 2    oxybutynin (DITROPAN-XL) 5 MG TR24, TAKE 1 TABLET EVERY DAY, Disp: 90 tablet, Rfl: 1    pantoprazole (PROTONIX) 40 MG tablet, Take 1 tablet (40 mg total) by mouth before breakfast., Disp: 90 tablet, Rfl: 3    prednisoLONE acetate (PRED FORTE) 1 % DrpS, prednisolone acetate 1 % eye drops,suspension  INSTILL 1 DROP INTO AFFECTED EYE 4 TIMES A DAY, Disp: , Rfl:     prednisoLONE acetate (PRED  NAOMI) 1 % DrТатьяна, , Disp: , Rfl:     traMADoL (ULTRAM) 50 mg tablet, Take 1 tablet (50 mg total) by mouth 3 (three) times daily as needed for Pain., Disp: 40 tablet, Rfl: 0    traZODone (DESYREL) 50 MG tablet, Take 1 tablet (50 mg total) by mouth every evening., Disp: 30 tablet, Rfl: 11  No current facility-administered medications for this visit.    Facility-Administered Medications Ordered in Other Visits:     diphenhydrAMINE injection 25 mg, 25 mg, Intravenous, Q6H PRN, José Nuno MD    HYDROmorphone injection 0.5 mg, 0.5 mg, Intravenous, Q5 Min PRN, José Nuno MD, 0.5 mg at 10/15/20 1314    lactated ringers infusion, , Intravenous, Continuous, Chantelle Carvalho MD, New Bag at 10/15/20 1101    lorazepam injection 0.25 mg, 0.25 mg, Intravenous, Once PRN, José Nuon MD    sodium chloride 0.9% bolus 250 mL, 250 mL, Intravenous, Once, José Nuno MD  Does patient have record of home blood pressure readings no. Readings have been averaging 160/90.   Last dose of blood pressure medication was taken at 8:30 am carvedilol 3.125 mg,( pt presently on carvedilol 3.125 mg twice daily and amlodipine 5 mg daily)  Patient is asymptomatic.   Complains of no c/o.    138/82 Pulse: 67 .    Blood pressure reading after 15 minutes was   136/72  Pulse 72.  Dr. Viera  notified.

## 2022-03-31 NOTE — Clinical Note
Readings have been averaging 160/90.  Last dose of blood pressure medication was taken at 8:30 am carvedilol 3.125 mg,( pt presently on carvedilol 3.125 mg twice daily and amlodipine 5 mg daily) Patient is asymptomatic.  Complains of no c/o.  138/82 Pulse: 67 .  Blood pressure reading after 15 minutes was   136/72  Pulse 72. Dr. Viera  notified.

## 2022-03-31 NOTE — PATIENT INSTRUCTIONS
.    Therapeutic Lifestyle Changes   LIFESTYLE CHANGE RECOMMENDATION APPROXIMATE REDUCTION IN SBP   Weight reduction Maintain a normal body weight                      (BMI 19-25) 5-20 mm Hg per 10 kg lost   Dash diet Consume a diet rich in fruits, vegetables, and low-fat dairy products with a reduced content of saturated fat and total fat 8-14 mg Hg   Low-sodium diet Consume <2400 mg of sodium per day 2-8 mg Hg   Increase physical activity Regular aerobic physical activity (i.e. brisk walking at least 40 minutes/session 3-4 days a week) 4-9 mm Hg   Limit alcohol consumption Less than 2 drinks/day in most men or less than 1 drink/day in women and lighter weight persons (1 drink = 12 oz. Beer, 5 oz. Wine, 1.5 oz. hard alcohol) 2-4 mm Hg   Smoking cessation Quit Smoking (Not reported) - known to reduce risk of developing cardiovascular disease.

## 2022-04-20 ENCOUNTER — TELEPHONE (OUTPATIENT)
Dept: SMOKING CESSATION | Facility: CLINIC | Age: 77
End: 2022-04-20
Payer: MEDICARE

## 2022-04-20 NOTE — TELEPHONE ENCOUNTER
I called patient to reschedule her tobacco cessation appointment but patient will call me when ready to quit. Patient is out of town at this time.

## 2022-05-09 ENCOUNTER — PATIENT MESSAGE (OUTPATIENT)
Dept: SMOKING CESSATION | Facility: CLINIC | Age: 77
End: 2022-05-09
Payer: MEDICARE

## 2022-06-01 ENCOUNTER — TELEPHONE (OUTPATIENT)
Dept: FAMILY MEDICINE | Facility: CLINIC | Age: 77
End: 2022-06-01
Payer: MEDICARE

## 2022-06-30 DIAGNOSIS — I10 ESSENTIAL HYPERTENSION: ICD-10-CM

## 2022-06-30 DIAGNOSIS — E78.2 MIXED HYPERLIPIDEMIA: ICD-10-CM

## 2022-06-30 DIAGNOSIS — I65.23 BILATERAL CAROTID ARTERY STENOSIS: ICD-10-CM

## 2022-06-30 DIAGNOSIS — Z82.49 FAMILY HISTORY OF EARLY CAD: ICD-10-CM

## 2022-06-30 DIAGNOSIS — Z87.19 HISTORY OF ISCHEMIC COLITIS: ICD-10-CM

## 2022-06-30 RX ORDER — ATORVASTATIN CALCIUM 40 MG/1
40 TABLET, FILM COATED ORAL NIGHTLY
Qty: 90 TABLET | Refills: 4 | Status: SHIPPED | OUTPATIENT
Start: 2022-06-30 | End: 2023-07-27

## 2022-06-30 RX ORDER — EZETIMIBE 10 MG/1
10 TABLET ORAL NIGHTLY
Qty: 90 TABLET | Refills: 4 | Status: SHIPPED | OUTPATIENT
Start: 2022-06-30 | End: 2023-07-27

## 2022-06-30 RX ORDER — CARVEDILOL 3.12 MG/1
TABLET ORAL
Qty: 180 TABLET | Refills: 4 | Status: SHIPPED | OUTPATIENT
Start: 2022-06-30 | End: 2022-12-09

## 2022-08-30 ENCOUNTER — OFFICE VISIT (OUTPATIENT)
Dept: FAMILY MEDICINE | Facility: CLINIC | Age: 77
End: 2022-08-30
Payer: MEDICARE

## 2022-08-30 VITALS
BODY MASS INDEX: 24.18 KG/M2 | HEART RATE: 84 BPM | DIASTOLIC BLOOD PRESSURE: 80 MMHG | WEIGHT: 136.44 LBS | SYSTOLIC BLOOD PRESSURE: 120 MMHG | OXYGEN SATURATION: 96 % | HEIGHT: 63 IN

## 2022-08-30 DIAGNOSIS — I10 ESSENTIAL HYPERTENSION: Primary | ICD-10-CM

## 2022-08-30 DIAGNOSIS — Z13.820 SCREENING FOR OSTEOPOROSIS: ICD-10-CM

## 2022-08-30 DIAGNOSIS — E78.5 HYPERLIPIDEMIA, UNSPECIFIED HYPERLIPIDEMIA TYPE: ICD-10-CM

## 2022-08-30 DIAGNOSIS — Z78.0 POSTMENOPAUSAL: ICD-10-CM

## 2022-08-30 PROBLEM — K57.92 DIVERTICULITIS: Status: RESOLVED | Noted: 2019-08-10 | Resolved: 2022-08-30

## 2022-08-30 PROCEDURE — 3074F PR MOST RECENT SYSTOLIC BLOOD PRESSURE < 130 MM HG: ICD-10-PCS | Mod: CPTII,S$GLB,, | Performed by: FAMILY MEDICINE

## 2022-08-30 PROCEDURE — 1125F AMNT PAIN NOTED PAIN PRSNT: CPT | Mod: CPTII,S$GLB,, | Performed by: FAMILY MEDICINE

## 2022-08-30 PROCEDURE — 3288F FALL RISK ASSESSMENT DOCD: CPT | Mod: CPTII,S$GLB,, | Performed by: FAMILY MEDICINE

## 2022-08-30 PROCEDURE — 99214 PR OFFICE/OUTPT VISIT, EST, LEVL IV, 30-39 MIN: ICD-10-PCS | Mod: S$GLB,,, | Performed by: FAMILY MEDICINE

## 2022-08-30 PROCEDURE — 1125F PR PAIN SEVERITY QUANTIFIED, PAIN PRESENT: ICD-10-PCS | Mod: CPTII,S$GLB,, | Performed by: FAMILY MEDICINE

## 2022-08-30 PROCEDURE — 1159F MED LIST DOCD IN RCRD: CPT | Mod: CPTII,S$GLB,, | Performed by: FAMILY MEDICINE

## 2022-08-30 PROCEDURE — 3074F SYST BP LT 130 MM HG: CPT | Mod: CPTII,S$GLB,, | Performed by: FAMILY MEDICINE

## 2022-08-30 PROCEDURE — 1101F PR PT FALLS ASSESS DOC 0-1 FALLS W/OUT INJ PAST YR: ICD-10-PCS | Mod: CPTII,S$GLB,, | Performed by: FAMILY MEDICINE

## 2022-08-30 PROCEDURE — 99999 PR PBB SHADOW E&M-EST. PATIENT-LVL III: CPT | Mod: PBBFAC,,, | Performed by: FAMILY MEDICINE

## 2022-08-30 PROCEDURE — 3079F PR MOST RECENT DIASTOLIC BLOOD PRESSURE 80-89 MM HG: ICD-10-PCS | Mod: CPTII,S$GLB,, | Performed by: FAMILY MEDICINE

## 2022-08-30 PROCEDURE — 1101F PT FALLS ASSESS-DOCD LE1/YR: CPT | Mod: CPTII,S$GLB,, | Performed by: FAMILY MEDICINE

## 2022-08-30 PROCEDURE — 99999 PR PBB SHADOW E&M-EST. PATIENT-LVL III: ICD-10-PCS | Mod: PBBFAC,,, | Performed by: FAMILY MEDICINE

## 2022-08-30 PROCEDURE — 99214 OFFICE O/P EST MOD 30 MIN: CPT | Mod: S$GLB,,, | Performed by: FAMILY MEDICINE

## 2022-08-30 PROCEDURE — 1159F PR MEDICATION LIST DOCUMENTED IN MEDICAL RECORD: ICD-10-PCS | Mod: CPTII,S$GLB,, | Performed by: FAMILY MEDICINE

## 2022-08-30 PROCEDURE — 3288F PR FALLS RISK ASSESSMENT DOCUMENTED: ICD-10-PCS | Mod: CPTII,S$GLB,, | Performed by: FAMILY MEDICINE

## 2022-08-30 PROCEDURE — 3079F DIAST BP 80-89 MM HG: CPT | Mod: CPTII,S$GLB,, | Performed by: FAMILY MEDICINE

## 2022-08-30 RX ORDER — CYCLOBENZAPRINE HCL 10 MG
10 TABLET ORAL 3 TIMES DAILY PRN
Qty: 30 TABLET | Refills: 0 | Status: SHIPPED | OUTPATIENT
Start: 2022-08-30 | End: 2022-09-19 | Stop reason: SDUPTHER

## 2022-08-30 RX ORDER — DICLOFENAC SODIUM 50 MG/1
50 TABLET, DELAYED RELEASE ORAL 2 TIMES DAILY PRN
Qty: 30 TABLET | Refills: 0 | Status: SHIPPED | OUTPATIENT
Start: 2022-08-30 | End: 2022-09-15 | Stop reason: SDUPTHER

## 2022-08-30 NOTE — PROGRESS NOTES
Subjective:       Patient ID: Haley Castro is a 77 y.o. female.    Chief Complaint: Follow-up and Fall (Patient think she broke her nose.  Pain from shoulder to shoulder and now radiating down left arm happened two weeks ago.  Patient did not seek medical attention)    Here for f/u multiple chronic medical issues. Had fall 2 wks ago while getting heels off.   Upper back pain started about a week later. Otc nsaids with little benefit.      Fall  The accident occurred More than 1 week ago.   Review of Systems   Respiratory:  Negative for chest tightness and shortness of breath.    Cardiovascular:  Negative for palpitations and leg swelling.   Endocrine: Negative for cold intolerance and heat intolerance.   Musculoskeletal:  Positive for back pain.   Psychiatric/Behavioral:  Negative for decreased concentration. The patient is not nervous/anxious.      Objective:      Physical Exam  Vitals and nursing note reviewed.   Constitutional:       Appearance: She is well-developed.   HENT:      Head: Normocephalic and atraumatic.     Cardiovascular:      Rate and Rhythm: Normal rate and regular rhythm.      Heart sounds: Normal heart sounds.   Pulmonary:      Effort: Pulmonary effort is normal.      Breath sounds: Normal breath sounds.   Psychiatric:         Mood and Affect: Mood normal.         Behavior: Behavior normal.       Assessment:       1. Essential hypertension    2. Hyperlipidemia, unspecified hyperlipidemia type    3. Screening for osteoporosis    4. Postmenopausal        Plan:       Essential hypertension  -     Comprehensive Metabolic Panel; Future; Expected date: 08/30/2022  -     Lipid Panel; Future; Expected date: 08/30/2022  -     CBC Without Differential; Future; Expected date: 08/30/2022  -     TSH; Future; Expected date: 08/30/2022    Hyperlipidemia, unspecified hyperlipidemia type  -     Comprehensive Metabolic Panel; Future; Expected date: 08/30/2022  -     Lipid Panel; Future; Expected date:  08/30/2022  -     CBC Without Differential; Future; Expected date: 08/30/2022  -     TSH; Future; Expected date: 08/30/2022    Screening for osteoporosis  -     DXA Bone Density Spine And Hip; Future; Expected date: 08/30/2022    Postmenopausal  -     DXA Bone Density Spine And Hip; Future; Expected date: 08/30/2022    Other orders  -     diclofenac (VOLTAREN) 50 MG EC tablet; Take 1 tablet (50 mg total) by mouth 2 (two) times daily as needed (neck pain).  Dispense: 30 tablet; Refill: 0  -     cyclobenzaprine (FLEXERIL) 10 MG tablet; Take 1 tablet (10 mg total) by mouth 3 (three) times daily as needed for Muscle spasms (may cause sedation).  Dispense: 30 tablet; Refill: 0      Prn med and monitor for msk pain; update labs and health maintenance  Will monitor chronic medical issues and continue current plan of care.  Shingles vaccine at pharmacy [facility]    Follow up in about 6 months (around 2/28/2023), or if symptoms worsen or fail to improve.

## 2022-09-08 ENCOUNTER — TELEPHONE (OUTPATIENT)
Dept: FAMILY MEDICINE | Facility: CLINIC | Age: 77
End: 2022-09-08
Payer: MEDICARE

## 2022-09-08 DIAGNOSIS — M54.2 NECK PAIN: Primary | ICD-10-CM

## 2022-09-09 ENCOUNTER — TELEPHONE (OUTPATIENT)
Dept: PAIN MEDICINE | Facility: CLINIC | Age: 77
End: 2022-09-09
Payer: MEDICARE

## 2022-09-09 NOTE — TELEPHONE ENCOUNTER
Call returned to Pt requesting sooner appt.Appt scheduled for 9-30-22 at 3pm. Pt verbalized understanding.

## 2022-09-09 NOTE — TELEPHONE ENCOUNTER
----- Message from Fatimah Hwangry sent at 9/8/2022  9:37 AM CDT -----  .Type:  Patient Call Back    Who Called: PT       Does the patient know what this is regarding?: PT WANTS TO BE SEEN SOONER THAN 10/19/2022 BACK PAIN     Would the patient rather a call back YES     Best Call Back Number: [phone]    Additional Information: Thank You

## 2022-09-15 ENCOUNTER — CLINICAL SUPPORT (OUTPATIENT)
Dept: REHABILITATION | Facility: HOSPITAL | Age: 77
End: 2022-09-15
Attending: FAMILY MEDICINE
Payer: MEDICARE

## 2022-09-15 DIAGNOSIS — M54.2 NECK PAIN: ICD-10-CM

## 2022-09-15 DIAGNOSIS — N32.81 OAB (OVERACTIVE BLADDER): ICD-10-CM

## 2022-09-15 PROCEDURE — 97161 PT EVAL LOW COMPLEX 20 MIN: CPT | Mod: PO

## 2022-09-15 PROCEDURE — 97110 THERAPEUTIC EXERCISES: CPT | Mod: PO

## 2022-09-15 PROCEDURE — 97140 MANUAL THERAPY 1/> REGIONS: CPT | Mod: PO

## 2022-09-15 NOTE — TELEPHONE ENCOUNTER
Refill Routing Note   Medication(s) are not appropriate for processing by [facility] for the following reason(s):      - Outside of protocol    [facility] action(s):  Route          Medication reconciliation completed: No     Appointments  past 12m or future 3m with PCP    Date Provider   Last Visit   8/30/2022 SUDHA Sparks MD   Next Visit   3/3/2023 SUDHA Sparks MD   ED visits in past 90 days: 0        Note composed:4:20 PM 09/15/2022

## 2022-09-15 NOTE — PLAN OF CARE
[facility]   Physical Therapy Initial Evaluation     Date: 9/15/2022   Name: Haley Castro  Clinic Number: [id number]    Therapy Diagnosis:   Encounter Diagnosis   Name Primary?    Neck pain      Physician: SUDHA Sparks MD    Physician Orders: PT Eval and Treat Neck  Medical Diagnosis from Referral: Neck pain  Evaluation Date: 9/15/2022  Authorization Period Expiration: 12/31/2022  Plan of Care Expiration: 10/31/2022  Progress Note Due: 10/15/2022  Visit # / Visits authorized: 1/ 1   FOTO: Completed 09/15/2022    Precautions: Standard    Time In: 1000  Time Out: 1100  Total Appointment Time (timed & untimed codes): 55 minutes      SUBJECTIVE   Date of onset: 08/15/2022    History of current condition - Haley reports:   Mechanical fall while trying to take her heels off. Fell forward onto her face and initially though she broke her nose. Initially it was her face that was painful, but then her neck and upper back became more and more painful and burning. Was seen by Family Medicine on 08/30/2022 and prescribed muscle relaxant and pain medication with minimal relief.    Current complaint is of constant burning pain between the shoulder blades. She can provoke it with neck motions and relive it a little bit with shoulder shrugs. Can also get some radiating pain into her left>right elbow.    She denies new or changing headaches.    The pain does not prevent her from doing her normal household activity, but it takes longer and is mentally draining.     Patient reports her sleep is generally OK. She takes trazodone and gabapentin due to chronic low back symptoms and this assists with sleep.    Prior Therapy: None  Social History: Lives with Spouse  Occupation: Retired  Prior Level of Function: No functional limitations  Current Level of Function: Limited driving, ADLs, housework, community access. Avoiding social functions    Pain:  Current 9/10, worst 9/10, best 5/10   Location:  bilateral back  and neck   Description: Burning and Tight  Aggravating Factors: Neck motions, scapular movements. Painful at rest.  Easing Factors: pain medication    Red Flag Screening:   Cough  Sneeze  Strain: (--)  Bladder/ bowel: (--)  Falls: (+)  Night pain: (--)  Unexplained weight loss: (--)  General health: Good    Patients goals: Decrease pain, avoid surgery     Imaging, none in Epic    Medical History:   Past Medical History:   Diagnosis Date    Anticoagulant long-term use     Arthritis     Carotid artery occlusion     Depression 2012    Diverticulosis     Former smoker     GERD (gastroesophageal reflux disease)     HLD (hyperlipidemia) 2012    HTN (hypertension) 2012    Infectious gastroenteritis     Ischemic colitis     Macular degeneration        Surgical History:   Haley Castro  has a past surgical history that includes  section; Hemorrhoid surgery; Oophorectomy; Hysterectomy (age 35); Appendectomy; Tonsillectomy; carotid angiogram; Hand surgery (Right, 2016); Colonoscopy (N/A, 2016); Colonoscopy (2009); Carotid endarterectomy (Right, 2018); Injection of anesthetic agent around medial branch nerves innervating lumbar facet joint (Right, 10/15/2018); Radiofrequency ablation of lumbar medial branch nerve at single level (Right, 10/25/2018); Epidural steroid injection into lumbar spine (N/A, 2019); Epidural steroid injection into lumbar spine (Right, 2019); Upper gastrointestinal endoscopy (2017); Colostomy (N/A, 2019); Splenectomy (N/A, 2019); Cystoscopy (Left, 2019); Colon surgery; Joint replacement (Right); Lysis of adhesions (N/A, 2020); Mobilization of splenic flexure (N/A, 2020); Colonoscopy (N/A, 2020); Robot-assisted repair of incisional hernia using da Kimberly Xi (N/A, 10/15/2020); Eye surgery (Bilateral); and Radiofrequency ablation of lumbar medial branch nerve at single level (Right,  4/12/2021).    Medications:   Haley has a current medication list which includes the following prescription(s): amlodipine, aspirin, atorvastatin, carvedilol, clonidine, diclofenac, duloxetine, ezetimibe, gabapentin, oxybutynin, pantoprazole, prednisolone acetate, prednisolone acetate, tramadol, trazodone, and varicella-zoster [id number] (pf).    Allergies:   Review of patient's allergies indicates:   Allergen Reactions    No known drug allergies           OBJECTIVE     Ambulating without AD, Transfers easily without use of upper extremity. Avoids turning neck, instead turns trunk.    Functional Screen:     SFMA FN: functional, nonpainful. FP: functional, painful. DP: dysfunctional, painful. DN: dysfunctional, nonpainful.   AROM cervical flexion Dysfunctional, painful 50%   AROM cervical extension Dysfunctional, painful 50%   AROM cervical rotation R: Dysfunctional, painful 50%  L: Dysfunctional, painful 50%   AROM Shoulder ER R: Dysfunctional, non-painful  L: Dysfunctional, non-painful    AROM Shoulder IR R: Dysfunctional, non-painful  L: Dysfunctional, non-painful   multi-segmental flexion  Dysfunctional, non-painful   multi-segmental extension Dysfunctional, non-painful   multi-segmental rotation  R: Dysfunctional, non-painful  L: Dysfunctional, non-painful   SLS R: Functional, non-painful  L: Dysfunctional, non-painful   Arms Down Deep Squat Dysfunctional, non-painful     Left shou;er flexion limtied verus right ~145    Myotome Peripheral Right Left   C4 (Shoulder Elevation) Dorsal Scapular negative Positive - painful     C5 (Shoulder ABduction) Axillary positive   positive     C5 (Shoulder ER) Suprascapular  positive   positive     C6 (Shoulder IR) Subscapularis  negative   negative     C6 (Wrist extension) Radial negative   negative     C7 (Wrist flexion) Ulnar negative   negative     C7 (Elbow extension) Radial negative   negative     C8 (Thumb ABduction) Radial negative   negative     C8 (Opposition) Median  negative   negative     T1 (1st/2nd MTP ADduction Median negative   negative     T1 (4th/5th MTP ADduction, Ulnar nerve) Ulnar negative   negative        Seated Thoracic Range of Motion:    % Pain   Right Rotation 75 (-)   Left Rotation 75 (-)     Upper Extremity Range of Motion (deg)  (R) UE AROM/PROM (L) UE AROM/PROM   Shoulder flexion: 160/170 Shoulder flexion: 150/165   Shoulder extension: 40/45 Shoulder extension: 40/45   Shoulder Abduction: 160/170 Shoulder abduction: 150/165   Shoulder ER T4/90 Shoulder ER T4/90   Shoulder IR T6/35/ Shoulder IR T6/35     Grossly 4+/5 upper extremity MMT, except:  3+/5 scapular retraction, extension, posterior/medial deltoid   - easily fatigued and with muscle fasciculations    Special Tests (cervical):  Compression (+) for increased referral to mid thoracic   Distraction No change   Spurlings (+) for referral to mid thoracic   Lateral Flexion Alar Ligament (-)   DNF test <5 sec     Upper Limb Neurodynamic testing:  Radial: (-)  Median: (-)  Ulnar: (-)    Joint Mobility: *  Cervical: HYPOmobile, Focal pain, and Referred pain into mid-thoracic  Thoracic: HYPOmobile and Focal pain  (very sensitive with reproduction of symptoms with T4/T5 PA)  Scapular: HYPOmobile     Palpation: Tender to upper cervical spine palpation. Tender to lower cervical spine palpation. Thoracic paravertebral muscles, upper trapezius scapular insertion    Sensation: Intact to light touch    Limitation/Restriction for FOTO Neck Survey    Therapist reviewed FOTO scores for Haley Castro on 9/15/2022.   FOTO documents entered into [employer] - see Media section.    Limitation Score: 75%         TREATMENT     Total Treatment time (time-based codes) separate from Evaluation: 30 minutes     Haley received the treatments listed below:      Haley received therapeutic exercises to develop ROM, posture, and core stabilization for 15 minutes including:  Supine chin tuck x20  Prone scapular retraction x20  Seated  scapular retraction x20  Lower trunk rotation x20  Open book x10 bilaterally   Seated shoulder shrugs x10    Haley received the following manual therapy techniques: Joint mobilizations, Manual traction, Myofacial release, and Soft tissue Mobilization were applied to the: cervical/thoracic spine for 15 minutes, including:  Cervical traction  Grade II/III cervical/thoracic/rib PA mobilization  Soft tissue mobilization, cross friction massage cervical/thoracic paravertebral muscles     PATIENT EDUCATION AND HOME EXERCISES     Education provided:   - Presentation, prognosis, plan of care, HEP  - normal healing process; importance of regular movement and exercise    Written Home Exercises Provided: yes. Exercises were reviewed and Haley was able to demonstrate them prior to the end of the session.  Haley demonstrated good  understanding of the education provided. See EMR under Patient Instructions for exercises provided during therapy sessions.    Images copyright of [email or web address] or [email or web address]    ASSESSMENT     Haley is a 77 y.o. female referred to outpatient Physical Therapy with a medical diagnosis of Neck pain. Patient presents with signs and symptoms of cervical/thoracic strain and/or facet arthropathy. Will continue to monitor for potential vertebral fracture based on nature of symptoms and VIJAY. Patient demonstrates decreased cervical/thoracic joint mobility, decreased motor control, decreased strength, decreased muscle length. These impairments are limiting her neck motions, driving, ADLs. As a result, she has increased difficulty performing her normal home and recreational activities.    Patient prognosis is Good.     Patient will benefit from skilled outpatient Physical Therapy to address the deficits stated above and in the chart below, provide patient /family education, and to maximize patient's level of independence.     Plan of care discussed with patient: Yes  Patient's spiritual, cultural and  educational needs considered and patient is agreeable to the plan of care and goals as stated below:     Anticipated Barriers for therapy: None    Medical Necessity is demonstrated by the following  History  Co-morbidities and personal factors that may impact the plan of care Co-morbidities:   depression and HTN    Personal Factors:   no deficits     low   Examination  Body Structures and Functions, activity limitations and participation restrictions that may impact the plan of care Body Regions:   neck  upper extremities    Body Systems:    ROM  strength  motor control    Participation Restrictions:   None    Activity limitations:   Learning and applying knowledge  no deficits    General Tasks and Commands  no deficits    Communication  no deficits    Mobility  lifting and carrying objects  driving (bike, car, motorcycle)    Self care  no deficits    Domestic Life  shopping  cooking  doing house work (cleaning house, washing dishes, laundry)  assisting others    Interactions/Relationships  no deficits    Life Areas  no deficits    Community and Social Life  community life  recreation and leisure         moderate   Clinical Presentation stable and uncomplicated low   Decision Making/ Complexity Score: low     Goals:  Short Term Goals: 3 weeks   Patient will be independent with HEP for symptom management  Patient will increase range of motion >5 deg in cervical spine in all affected planes to improve functional mobility  Patient will increase strength of BUE by at least 1/2 grade via MMT testing to allow for improved performance of ADLs and daily functional tasks  Patient will report >25% decrease in max pain levels with daily activity    Long Term Goals: 6 weeks   Patient will be independent with progressive HEP for continued strengthening to support return to previous level of function  Patient will have grossly symmetrical, pain-free range of motion for improved functional mobility  Patient will increase strength  of BUE by at least 1 grade via MMT testing to allow for improved performance of ADLs and daily functional tasks  Patient will achieve <40% limitation as measured by the FOTO to demonstrate decreased functional disability    PLAN   Plan of care Certification: 9/15/2022 to 10/31/2022.    Outpatient Physical Therapy 2 times weekly for 6 weeks to include the following interventions: Electrical Stimulation Dry needling, Manual Therapy, Moist Heat/ Ice, Neuromuscular Re-ed, Patient Education, Self Care, Therapeutic Activities, and Therapeutic Exercise.     Jason Mondragon, PT      I CERTIFY THE NEED FOR THESE SERVICES FURNISHED UNDER THIS PLAN OF TREATMENT AND WHILE UNDER MY CARE   Physician's comments:     Physician's Signature: ___________________________________________________

## 2022-09-15 NOTE — PATIENT INSTRUCTIONS
Access Code: [email or web address]  URL: [email or web address]  Date: 09/15/2022  Prepared by: Jason Mondragon    Exercises  Supine Chin Tuck - 1 x daily - 4 x weekly - 2-3 sets - 10-15 reps - 5 hold - 5/10 intensity  Seated Scapular Retraction - 1 x daily - 4 x weekly - 2-3 sets - 10-15 reps - 5 hold - 5/10 intensity  Seated Shoulder Shrug - 1 x daily - 4 x weekly - 2-3 sets - 10-15 reps - 5 hold - 5/10 intensity  Sidelying Open Book - 1 x daily - 4 x weekly - 2-3 sets - 10-15 reps - 5 hold - 5/10 intensity

## 2022-09-16 RX ORDER — CYCLOBENZAPRINE HCL 10 MG
TABLET ORAL
Qty: 30 TABLET | Refills: 0 | OUTPATIENT
Start: 2022-09-16

## 2022-09-16 RX ORDER — DICLOFENAC SODIUM 50 MG/1
50 TABLET, DELAYED RELEASE ORAL 2 TIMES DAILY PRN
Qty: 30 TABLET | Refills: 0 | OUTPATIENT
Start: 2022-09-16

## 2022-09-16 NOTE — TELEPHONE ENCOUNTER
----- Message from Olman Lo sent at 9/16/2022  2:44 PM CDT -----  Type:  RX Refill Request    Who Called:  patient  Refill or New Rx:  refill  RX Name and Strength:  diclofenac (VOLTAREN) 50 MG EC tablet & another but she dont know the name  How is the patient currently taking it? (ex. 1XDay):    Is this a 30 day or 90 day RX:    Preferred Pharmacy with phone number:      [facility]/pharmacy #[id number] - [place] - [facility] AT [address] [address]  Phone: [phone] Fax: [phone]     Local or Mail Order:  local  Ordering Provider:  Bridger Sauceda Call Back Number:  [phone]   Additional Information:         19

## 2022-09-16 NOTE — TELEPHONE ENCOUNTER
No new care gaps identified.  [facility] Embedded Care Gaps. Reference number: [id number]. 9/16/2022   11:25:27 AM CDT

## 2022-09-19 DIAGNOSIS — N32.81 OAB (OVERACTIVE BLADDER): ICD-10-CM

## 2022-09-19 RX ORDER — OXYBUTYNIN CHLORIDE 5 MG/1
5 TABLET, EXTENDED RELEASE ORAL DAILY
Qty: 90 TABLET | Refills: 1 | Status: SHIPPED | OUTPATIENT
Start: 2022-09-19 | End: 2022-12-09 | Stop reason: SDUPTHER

## 2022-09-19 RX ORDER — CYCLOBENZAPRINE HCL 10 MG
10 TABLET ORAL 3 TIMES DAILY PRN
Qty: 90 TABLET | Refills: 1 | Status: SHIPPED | OUTPATIENT
Start: 2022-09-19 | End: 2022-09-29

## 2022-09-19 RX ORDER — DICLOFENAC SODIUM 50 MG/1
50 TABLET, DELAYED RELEASE ORAL 2 TIMES DAILY PRN
Qty: 30 TABLET | Refills: 0 | Status: SHIPPED | OUTPATIENT
Start: 2022-09-19 | End: 2022-12-09

## 2022-09-19 NOTE — TELEPHONE ENCOUNTER
No new care gaps identified.  [facility] Embedded Care Gaps. Reference number: [id number]. 9/19/2022   9:06:42 AM JIAT

## 2022-09-20 RX ORDER — OXYBUTYNIN CHLORIDE 5 MG/1
5 TABLET, EXTENDED RELEASE ORAL DAILY
Qty: 90 TABLET | Refills: 1 | Status: SHIPPED | OUTPATIENT
Start: 2022-09-20 | End: 2023-03-03 | Stop reason: SDUPTHER

## 2022-09-20 RX ORDER — DICLOFENAC SODIUM 50 MG/1
50 TABLET, DELAYED RELEASE ORAL 2 TIMES DAILY PRN
Qty: 30 TABLET | Refills: 0 | Status: SHIPPED | OUTPATIENT
Start: 2022-09-20 | End: 2022-10-19

## 2022-09-20 RX ORDER — CYCLOBENZAPRINE HCL 10 MG
10 TABLET ORAL 3 TIMES DAILY PRN
Qty: 30 TABLET | Refills: 0 | Status: SHIPPED | OUTPATIENT
Start: 2022-09-20 | End: 2022-09-30

## 2022-09-30 ENCOUNTER — HOSPITAL ENCOUNTER (OUTPATIENT)
Dept: RADIOLOGY | Facility: HOSPITAL | Age: 77
Discharge: HOME | End: 2022-09-30
Attending: PHYSICIAN ASSISTANT
Payer: MEDICARE

## 2022-09-30 ENCOUNTER — OFFICE VISIT (OUTPATIENT)
Dept: PAIN MEDICINE | Facility: CLINIC | Age: 77
End: 2022-09-30
Payer: MEDICARE

## 2022-09-30 VITALS
OXYGEN SATURATION: 99 % | SYSTOLIC BLOOD PRESSURE: 131 MMHG | HEIGHT: 63 IN | DIASTOLIC BLOOD PRESSURE: 63 MMHG | WEIGHT: 139.44 LBS | HEART RATE: 85 BPM | BODY MASS INDEX: 24.71 KG/M2

## 2022-09-30 DIAGNOSIS — M50.30 DDD (DEGENERATIVE DISC DISEASE), CERVICAL: Primary | ICD-10-CM

## 2022-09-30 DIAGNOSIS — M50.30 DDD (DEGENERATIVE DISC DISEASE), CERVICAL: ICD-10-CM

## 2022-09-30 PROCEDURE — 99213 PR OFFICE/OUTPT VISIT, EST, LEVL III, 20-29 MIN: ICD-10-PCS | Mod: S$GLB,,, | Performed by: PHYSICIAN ASSISTANT

## 2022-09-30 PROCEDURE — 72052 XR CERVICAL SPINE 5 VIEW WITH FLEX AND EXT: ICD-10-PCS | Mod: 26,,, | Performed by: RADIOLOGY

## 2022-09-30 PROCEDURE — 1159F PR MEDICATION LIST DOCUMENTED IN MEDICAL RECORD: ICD-10-PCS | Mod: CPTII,S$GLB,, | Performed by: PHYSICIAN ASSISTANT

## 2022-09-30 PROCEDURE — 1125F AMNT PAIN NOTED PAIN PRSNT: CPT | Mod: CPTII,S$GLB,, | Performed by: PHYSICIAN ASSISTANT

## 2022-09-30 PROCEDURE — 1160F PR REVIEW ALL MEDS BY PRESCRIBER/CLIN PHARMACIST DOCUMENTED: ICD-10-PCS | Mod: CPTII,S$GLB,, | Performed by: PHYSICIAN ASSISTANT

## 2022-09-30 PROCEDURE — 1159F MED LIST DOCD IN RCRD: CPT | Mod: CPTII,S$GLB,, | Performed by: PHYSICIAN ASSISTANT

## 2022-09-30 PROCEDURE — 72052 X-RAY EXAM NECK SPINE 6/>VWS: CPT | Mod: TC,PO

## 2022-09-30 PROCEDURE — 3078F PR MOST RECENT DIASTOLIC BLOOD PRESSURE < 80 MM HG: ICD-10-PCS | Mod: CPTII,S$GLB,, | Performed by: PHYSICIAN ASSISTANT

## 2022-09-30 PROCEDURE — 3075F SYST BP GE 130 - 139MM HG: CPT | Mod: CPTII,S$GLB,, | Performed by: PHYSICIAN ASSISTANT

## 2022-09-30 PROCEDURE — 72052 X-RAY EXAM NECK SPINE 6/>VWS: CPT | Mod: 26,,, | Performed by: RADIOLOGY

## 2022-09-30 PROCEDURE — 1101F PR PT FALLS ASSESS DOC 0-1 FALLS W/OUT INJ PAST YR: ICD-10-PCS | Mod: CPTII,S$GLB,, | Performed by: PHYSICIAN ASSISTANT

## 2022-09-30 PROCEDURE — 1101F PT FALLS ASSESS-DOCD LE1/YR: CPT | Mod: CPTII,S$GLB,, | Performed by: PHYSICIAN ASSISTANT

## 2022-09-30 PROCEDURE — 3075F PR MOST RECENT SYSTOLIC BLOOD PRESS GE 130-139MM HG: ICD-10-PCS | Mod: CPTII,S$GLB,, | Performed by: PHYSICIAN ASSISTANT

## 2022-09-30 PROCEDURE — 99213 OFFICE O/P EST LOW 20 MIN: CPT | Mod: S$GLB,,, | Performed by: PHYSICIAN ASSISTANT

## 2022-09-30 PROCEDURE — 99999 PR PBB SHADOW E&M-EST. PATIENT-LVL IV: CPT | Mod: PBBFAC,,, | Performed by: PHYSICIAN ASSISTANT

## 2022-09-30 PROCEDURE — 3078F DIAST BP <80 MM HG: CPT | Mod: CPTII,S$GLB,, | Performed by: PHYSICIAN ASSISTANT

## 2022-09-30 PROCEDURE — 1160F RVW MEDS BY RX/DR IN RCRD: CPT | Mod: CPTII,S$GLB,, | Performed by: PHYSICIAN ASSISTANT

## 2022-09-30 PROCEDURE — 99999 PR PBB SHADOW E&M-EST. PATIENT-LVL IV: ICD-10-PCS | Mod: PBBFAC,,, | Performed by: PHYSICIAN ASSISTANT

## 2022-09-30 PROCEDURE — 3288F PR FALLS RISK ASSESSMENT DOCUMENTED: ICD-10-PCS | Mod: CPTII,S$GLB,, | Performed by: PHYSICIAN ASSISTANT

## 2022-09-30 PROCEDURE — 3288F FALL RISK ASSESSMENT DOCD: CPT | Mod: CPTII,S$GLB,, | Performed by: PHYSICIAN ASSISTANT

## 2022-09-30 PROCEDURE — 1125F PR PAIN SEVERITY QUANTIFIED, PAIN PRESENT: ICD-10-PCS | Mod: CPTII,S$GLB,, | Performed by: PHYSICIAN ASSISTANT

## 2022-09-30 RX ORDER — HYDROCORTISONE 25 MG/G
OINTMENT TOPICAL
COMMUNITY
Start: 2022-04-27 | End: 2023-12-20 | Stop reason: CLARIF

## 2022-09-30 RX ORDER — CLOBETASOL PROPIONATE 0.5 MG/G
CREAM TOPICAL
COMMUNITY
Start: 2022-04-27 | End: 2023-12-20 | Stop reason: CLARIF

## 2022-10-03 ENCOUNTER — TELEPHONE (OUTPATIENT)
Dept: PAIN MEDICINE | Facility: CLINIC | Age: 77
End: 2022-10-03
Payer: MEDICARE

## 2022-10-05 NOTE — PROGRESS NOTES
This note was completed with dictation software and grammatical errors may exist.    CC:  Right back and leg pain    HPI:  The patient is a 77-year-old woman with a history of carotid artery stenosis, osteoarthritis who presents in referral from [facility] Uriostegui for back and right leg pain.  She returns in follow-up today with neck pain.  We had last seen her over a year ago for her low back which is not currently bothering her significantly.  She describes that she has had pain for the past 2 months after she fell onto her face.  She describes burning pain in the left greater than right lower cervical region radiating to the left greater than right trapezius muscles and down her left arm to her elbow.  Her pain is worse when she is reading and worse with leaning forward to put on her makeup.  She does have some improvement with Voltaren and Flexeril.  She denies weakness or numbness, denies bladder or bowel incontinence.    Pain intervention history: She was given a prescription for gabapentin 100 mg 3 times daily but states that this was making her lightheaded.  She was prescribed physical therapy. She is status post right L4/5 transforaminal injection on 08/27/2018 with 100% relief of her right leg pain. She is status post right L2, 3, 4, 5 medial branch block on 10/15/2018 with 100% relief for 6 hr, status post radiofrequency ablation of right L2, 3, 4, 5 medial branch RFA on 10/25/2018. She returns in follow-up today, she is status post right L2/3 and L3/4 transforaminal injection on 12/14/18 with What she reports is 75% relief. She is status post L5/S1 DOROTHEA on 05/06/2019 with 100% relief for about two weeks but then she fell on her right side.She is status post L5/S1 DOROTHEA on 06/19/2019 with 70% relief of her right leg pain.  She is status post right L2, 3, 4, 5 RFA on 04/12/2021 with what she describes as 75% relief.     Pain intervention history:    Spine surgeries:    Antineuropathics:  Gabapentin 600 mg twice  daily  NSAIDs:  Voltaren 50 mg twice daily as needed  Physical therapy:  In past for lumbar spine  Antidepressants:  Duloxetine 60 mg daily  Muscle relaxers:  Flexeril 10 mg 3 times daily as needed  Opioids:  Tramadol 50 mg 3 times daily as needed  Antiplatelets/Anticoagulants:  ASA 81 mg    ROS:  She reports hypertension.  Balance of review of systems is negative.    Past Medical History:   Diagnosis Date    Anticoagulant long-term use     Arthritis     Carotid artery occlusion     Depression 2012    Diverticulosis     Former smoker     GERD (gastroesophageal reflux disease)     HLD (hyperlipidemia) 2012    HTN (hypertension) 2012    Infectious gastroenteritis     Ischemic colitis     Macular degeneration        Past Surgical History:   Procedure Laterality Date    APPENDECTOMY      carotid angiogram      CAROTID ENDARTERECTOMY Right 2018    Procedure: Endarterectomy-Carotid - RIGHT;  Surgeon: Safia Thomas MD;  Location: [facility];  Service: Cardiovascular;  Laterality: Right;  with patch  angioplasty     SECTION      x 3     COLON SURGERY      COLONOSCOPY N/A 2016    Procedure: COLONOSCOPY;  Surgeon: Grzegorz Sousa Jr., MD;  Location: [facility];  Service: Endoscopy;  Laterality: N/A;    COLONOSCOPY  2009    Dr. Reyes in [facility], repeat in 5 years    COLONOSCOPY N/A 2020    Procedure: COLONOSCOPY;  Surgeon: Grzegorz Sousa Jr., MD;  Location: [facility];  Service: Endoscopy;  Laterality: N/A;    COLOSTOMY N/A 2019    Procedure: CREATION, COLOSTOMY;  Surgeon: Brandi Alamo MD;  Location: [facility];  Service: General;  Laterality: N/A;    CYSTOSCOPY Left 2019    Procedure: CYSTOSCOPY, Stent placement;  Surgeon: Pablito Roblero MD;  Location: [facility];  Service: Urology;  Laterality: Left;    EPIDURAL STEROID INJECTION INTO LUMBAR SPINE N/A 2019    Procedure: Injection-steroid-epidural-lumbar;  Surgeon: Jakub Greer MD;  Location: [facility];   Service: Pain Management;  Laterality: N/A;  L5/S1 INTERLAMINAR TO RIGHT    EPIDURAL STEROID INJECTION INTO LUMBAR SPINE Right 6/19/2019    Procedure: Injection-steroid-epidural-lumbar;  Surgeon: Jakub Greer MD;  Location: [facility] OR;  Service: Pain Management;  Laterality: Right;  L5/S1 to right    EYE SURGERY Bilateral     cataract    HAND SURGERY Right 05/2016    tarun noland/Dr. Abhinav Rolle    HEMORRHOID SURGERY      HYSTERECTOMY  age 35    TAHUSO benign reasons    INJECTION OF ANESTHETIC AGENT AROUND MEDIAL BRANCH NERVES INNERVATING LUMBAR FACET JOINT Right 10/15/2018    Procedure: Block-nerve-medial branch-lumbar L2,3,4,5;  Surgeon: Jakub Greer MD;  Location: [facility] OR;  Service: Pain Management;  Laterality: Right;    JOINT REPLACEMENT Right     1st interphalangeal joint of 3rd finger    LYSIS OF ADHESIONS N/A 1/9/2020    Procedure: LYSIS, ADHESIONS EXTENSIVE;  Surgeon: Brandi Alamo MD;  Location: [facility] OR;  Service: Colon and Rectal;  Laterality: N/A;    MOBILIZATION OF SPLENIC FLEXURE N/A 1/9/2020    Procedure: MOBILIZATION, SPLENIC FLEXURE-TAKEDOWN;  Surgeon: Brandi Alamo MD;  [facility];  Service: Colon and Rectal;  Laterality: N/A;    OOPHORECTOMY      one remains    RADIOFREQUENCY ABLATION OF LUMBAR MEDIAL BRANCH NERVE AT SINGLE LEVEL Right 10/25/2018    Procedure: RADIOFREQUENCY ABLATION, NERVE, SPINAL, LUMBAR, MEDIAL BRANCH, THERMAL- L2,L3,L4,L5;  Surgeon: Jakub Greer MD;  Location: [facility] OR;  Service: Pain Management;  Laterality: Right;    RADIOFREQUENCY ABLATION OF LUMBAR MEDIAL BRANCH NERVE AT SINGLE LEVEL Right 4/12/2021    Procedure: Radiofrequency Ablation, Nerve, Spinal, Lumbar, Medial Branch, L2, 3, 4, 5;  Surgeon: Jakub Greer MD;  Location: [facility] OR;  Service: Pain Management;  Laterality: Right;    ROBOT-ASSISTED REPAIR OF INCISIONAL HERNIA USING DA JACOB XI N/A 10/15/2020    Procedure: XI ROBOTIC REPAIR, HERNIA, INCISIONAL;  Surgeon: Brandi BOSWELL  "MD Jasmeet;  Location: [facility] OR;  Service: General;  Laterality: N/A;  ATTEMPTED - CONVERTED TO OPEN PROCEDURE.    SPLENECTOMY N/A 2019    Procedure: SPLENECTOMY;  Surgeon: Brandi Alamo MD;  Location: [facility] OR;  Service: General;  Laterality: N/A;    TONSILLECTOMY      UPPER GASTROINTESTINAL ENDOSCOPY  2017    Dr. Sousa: Tiny gastric islands of salmon-colored mucosa in distal esophagus, gastritis; biopsy: esophagus- reflux esophagitis, negative for lisa's, stomach- reactive gastropathy, negative for h pylori       Social History     Socioeconomic History    Marital status:     Number of children: 3   Occupational History    Occupation: retired     Employer: [employer]   Tobacco Use    Smoking status: Former     Packs/day: 0.50     Years: 40.00     Pack years: 20.00     Types: Cigarettes     Start date: 1964     Quit date: 3/28/2013     Years since quittin.5    Smokeless tobacco: Never   Substance and Sexual Activity    Alcohol use: Yes     Alcohol/week: 0.0 standard drinks     Comment: 0-2 ETOHic drinks per day; wine and gin    Drug use: No    Sexual activity: Yes     Partners: Male         Medications/Allergies: See med card    Vitals:    22 1515   BP: 131/63   Pulse: 85   SpO2: 99%   Weight: 63.3 kg (139 lb 7.1 oz)   Height: 5' 3" (1.6 m)   PainSc:   6   PainLoc: Neck         Physical exam:  Gen: A and O x3, pleasant, well-groomed  Skin: No rashes or obvious lesions  HEENT: PERRLA, no obvious deformities on ears or in canals.Trachea midline.  CVS: Regular rate and rhythm, normal palpable pulses.  Resp: Clear to auscultation bilaterally, no wheezes or rales.  Abdomen: Soft, NT/ND.  Musculoskeletal: Able to heel walk, toe walk. No antalgic gait.   Coordination   Romberg: negative  Heel to shin coordination: normal  Tandem walking coordination: normal    Neuro:  Motor:    Right Left   C4 Shoulder Abduction  5  5   C5 Elbow Flexion    5  5   C6 Wrist Extension  5  5   C7 Elbow " Extension   5  5   C8/T1 Hand Intrinsics   5  5   C8 First Dorsal Interosseus  5  5   C8 Abductor Pollicus Brevis  5  5      Left  Right    Triceps DTR 2+ 2+   Biceps DTR 2+ 2+   Brachioradialis DTR 2+ 2+   Patellar DTR 2+ 2+   Achilles DTR 2+ 2+   Calderon Absent  Absent   Clonus Absent Absent     Babinski Absent Absent     Sensory: Intact and symmetrical to light touch and pinprick in C2-T1 dermatomes bilaterally.  Cervical spine: ROM is full in flexion, extension and lateral rotation with increased pain left greater than right trapezius muscle pain during each maneuver.  Spurling's maneuver causes no neck pain to either side.  Myofascial exam: No Tenderness to palpation across cervical paraspinous region bilaterally.  Mild tenderness to palpation to the left greater than right trapezius muscles.       Imagin18 MRI L-spine  Vertebral column: There is multilevel degenerative change.  There is no fracture.  Comparison plain films demonstrate a very gentle rotary levocurvature of the lumbar spine.  There is 2 mm retrolisthesis of L2 on L3.  This is exaggerated by endplate osteophyte formation.  There is moderate disc space narrowing at the L2-3 level where there is also associated degenerative endplate signal change.  All of the discs are mildly desiccated.  There is no other significant disc space narrowing.  There is mild disc space narrowing at the L3-4 level.  Baseline marrow signal intensity is within normal limits.  T12-L1: Unremarkable.  There is no spinal canal or significant foraminal stenosis.  L1-2: There is mild facet joint arthropathy.  There is no spinal canal or significant foraminal stenosis.  L2-3: There is disc space narrowing.  There is trace retrolisthesis of L2 on L3.  There is a diffuse disc bulge with osteophytic ridging.  There is mild-to-moderate facet joint arthropathy with ligamentum flavum thickening, right greater than left.  There is no spinal stenosis.  There is mild bilateral  foraminal stenosis.  L3-4: There is mild disc space narrowing.  There is a mild-to-moderate diffuse disc bulge.  There is moderate-to-marked facet joint arthropathy with ligamentum flavum thickening.  There is mildly prominent dorsal epidural fat.  There is moderate central spinal stenosis.  AP measurement of the dural sac is 7 mm there is mild bilateral foraminal stenosis.  L4-5: There is a diffuse disc bulge with osteophytic ridging slightly eccentric to the right.  There is moderate facet joint arthropathy with ligamentum flavum thickening.  There is borderline spinal stenosis.  There is mild bilateral foraminal stenosis.  L5-S1: There is moderate facet joint arthropathy.  There is minimal bulging of the annulus.  There is no spinal canal or significant foraminal stenosis.  Soft tissues, other: The prevertebral soft tissues are normal.  The aorta is somewhat ectatic.    02/16/2019 MRI lumbar spine  Infrarenal aortic dilatation is noted to 2.7 cm prior to aortic bifurcation suggestive of aortic ectasia  Some mild adrenal thickening on the left is questioned that could be artifactual or relate to a process such as a adenoma or mass without convincing detrimental change since 06/11/2018.  Stable fluid signal intensities are noted within the left kidney 1 of 8 mm and 1 of 4 mm nonspecific on this examination but statistically favored relate to renal cysts.  Vertebral body heights appear well preserved.  Mild intervertebral disc height loss is noted at the L2-L3 L3-L4 levels in particular.  A mild retrolisthesis is noted of the L2 on L3 vertebral body of 4 mm  No STIR signal abnormality is noted to suggest an aggressive bone marrow replacement process or recent fracture.  The spinal cord appears to terminate at the L1 level.  At the T12-L1  level, no significant central canal stenosis, neural foraminal stenosis, or disc bulge is noted.  At the L1-L2 level, no significant central canal stenosis, neural foraminal  stenosis, or disc bulge is noted. Mild ligamentous hypertrophy is noted  At the L2-L3 level, facet arthropathy and ligamentous hypertrophy appears to be present.  Posterior disc osteophyte spurring or bulge is noted of 3 mm.  Ligamentous hypertrophy is noted.  Broad-based bulge is noted towards the far lateral left and left neural foramen greater than far lateral right and towards the right neural foramen.  Possible contact of the exiting left nerve root is noted.  The broad-based bulge far laterally is of 4 mm.  Mild right neural foraminal narrowing is noted.  Mild to moderate left neural foraminal stenosis is noted.  Mild thecal sac narrowing is noted to 10 mm.  A similar appearance is noted on the prior.  At the L3-L4 level, bilateral facet arthropathy and ligamentous hypertrophy appears to be present.  Broad-based bulging is noted towards each neural foramen and far laterally bilaterally with a bulge far lateral to the left of 4 mm greater than bulging to the right.  Mild left neural foraminal stenosis greater than right-sided neural foraminal stenosis appears to be present.  Moderate thecal sac narrowing is noted to 8 mm a similar appearance is noted on the prior.  At the L4-L5 level, ligamentous hypertrophy and facet arthropathy appears to be present.  Broad-based bulge is noted far laterally bilaterally.  Moderate thecal sac narrowing is noted to 8 mm.  Mild to moderate right neural foraminal stenosis is noted with mild left neural foraminal stenosis.  Some increased T2 signal is noted within the disc far lateral to the left as can be seen with annular tear.  At the L5-S1 level, facet arthropathy and ligamentous hypertrophy is noted.  Broad-based bulge is noted towards the left neural foramen and far lateral left of 4 mm greater than towards the right.  Mild left greater than right neural foraminal narrowing is noted.  No significant central canal stenosis is noted.  Abnormal wall thickening is noted to the  distal colon in its partially visualized portion.  This could relate to scarring, lack of distension, or an infiltrative or inflammatory process.  Correlation with the patient's history of colonoscopy is suggested.      Assessment:  The patient is a 77-year-old woman with a history of carotid artery stenosis, osteoarthritis who presents in referral from [facility] Uriostegui for back and right leg pain.    1. DDD (degenerative disc disease), cervical  X-Ray Cervical Spine 5 View With Flex And Ext          Plan:  I have ordered xray of the cervical spine to further evaluate and we will call her with results.  She has done 2 weeks of PT and I encouraged her to do another 4 weeks.  I will have her increase gabapentin to 600/300/600 mg if tolerated.   Follow up in 4 weeks or sooner as needed.  If still having pain I will order a cervical spine MRI.

## 2022-10-18 ENCOUNTER — TELEPHONE (OUTPATIENT)
Dept: FAMILY MEDICINE | Facility: CLINIC | Age: 77
End: 2022-10-18
Payer: MEDICARE

## 2022-10-18 NOTE — TELEPHONE ENCOUNTER
----- Message from Janelle Divernon sent at 10/18/2022  1:30 PM CDT -----  Contact: Patient  Type: Patient Call Back         Who called: Patient         What is the request in detail: requesting to be seen tmrw by pcp rather than NP for 9:40am; please advise         Can the clinic reply by [facility]? call         Would the patient rather a call back or a response via [facility]? call         Best call back number: [phone]         Additional Information:            Thank You

## 2022-10-19 ENCOUNTER — OFFICE VISIT (OUTPATIENT)
Dept: FAMILY MEDICINE | Facility: CLINIC | Age: 77
End: 2022-10-19
Payer: MEDICARE

## 2022-10-19 VITALS
OXYGEN SATURATION: 97 % | WEIGHT: 137.56 LBS | DIASTOLIC BLOOD PRESSURE: 80 MMHG | BODY MASS INDEX: 24.38 KG/M2 | HEART RATE: 86 BPM | TEMPERATURE: 98 F | HEIGHT: 63 IN | SYSTOLIC BLOOD PRESSURE: 130 MMHG

## 2022-10-19 DIAGNOSIS — I49.9 IRREGULAR HEART RATE: ICD-10-CM

## 2022-10-19 DIAGNOSIS — B34.9 ACUTE VIRAL SYNDROME: Primary | ICD-10-CM

## 2022-10-19 PROCEDURE — 93010 EKG 12-LEAD: ICD-10-PCS | Mod: S$GLB,,, | Performed by: INTERNAL MEDICINE

## 2022-10-19 PROCEDURE — 3075F SYST BP GE 130 - 139MM HG: CPT | Mod: CPTII,S$GLB,, | Performed by: NURSE PRACTITIONER

## 2022-10-19 PROCEDURE — 93005 ELECTROCARDIOGRAM TRACING: CPT | Mod: S$GLB,,, | Performed by: NURSE PRACTITIONER

## 2022-10-19 PROCEDURE — 1101F PR PT FALLS ASSESS DOC 0-1 FALLS W/OUT INJ PAST YR: ICD-10-PCS | Mod: CPTII,S$GLB,, | Performed by: NURSE PRACTITIONER

## 2022-10-19 PROCEDURE — 93010 ELECTROCARDIOGRAM REPORT: CPT | Mod: S$GLB,,, | Performed by: INTERNAL MEDICINE

## 2022-10-19 PROCEDURE — 1160F RVW MEDS BY RX/DR IN RCRD: CPT | Mod: CPTII,S$GLB,, | Performed by: NURSE PRACTITIONER

## 2022-10-19 PROCEDURE — 3075F PR MOST RECENT SYSTOLIC BLOOD PRESS GE 130-139MM HG: ICD-10-PCS | Mod: CPTII,S$GLB,, | Performed by: NURSE PRACTITIONER

## 2022-10-19 PROCEDURE — 1101F PT FALLS ASSESS-DOCD LE1/YR: CPT | Mod: CPTII,S$GLB,, | Performed by: NURSE PRACTITIONER

## 2022-10-19 PROCEDURE — 99999 PR PBB SHADOW E&M-EST. PATIENT-LVL V: CPT | Mod: PBBFAC,,, | Performed by: NURSE PRACTITIONER

## 2022-10-19 PROCEDURE — 3288F FALL RISK ASSESSMENT DOCD: CPT | Mod: CPTII,S$GLB,, | Performed by: NURSE PRACTITIONER

## 2022-10-19 PROCEDURE — 93005 EKG 12-LEAD: ICD-10-PCS | Mod: S$GLB,,, | Performed by: NURSE PRACTITIONER

## 2022-10-19 PROCEDURE — 1159F MED LIST DOCD IN RCRD: CPT | Mod: CPTII,S$GLB,, | Performed by: NURSE PRACTITIONER

## 2022-10-19 PROCEDURE — 99213 OFFICE O/P EST LOW 20 MIN: CPT | Mod: S$GLB,,, | Performed by: NURSE PRACTITIONER

## 2022-10-19 PROCEDURE — 99213 PR OFFICE/OUTPT VISIT, EST, LEVL III, 20-29 MIN: ICD-10-PCS | Mod: S$GLB,,, | Performed by: NURSE PRACTITIONER

## 2022-10-19 PROCEDURE — 1159F PR MEDICATION LIST DOCUMENTED IN MEDICAL RECORD: ICD-10-PCS | Mod: CPTII,S$GLB,, | Performed by: NURSE PRACTITIONER

## 2022-10-19 PROCEDURE — 99999 PR PBB SHADOW E&M-EST. PATIENT-LVL V: ICD-10-PCS | Mod: PBBFAC,,, | Performed by: NURSE PRACTITIONER

## 2022-10-19 PROCEDURE — 1126F PR PAIN SEVERITY QUANTIFIED, NO PAIN PRESENT: ICD-10-PCS | Mod: CPTII,S$GLB,, | Performed by: NURSE PRACTITIONER

## 2022-10-19 PROCEDURE — 3079F DIAST BP 80-89 MM HG: CPT | Mod: CPTII,S$GLB,, | Performed by: NURSE PRACTITIONER

## 2022-10-19 PROCEDURE — 1160F PR REVIEW ALL MEDS BY PRESCRIBER/CLIN PHARMACIST DOCUMENTED: ICD-10-PCS | Mod: CPTII,S$GLB,, | Performed by: NURSE PRACTITIONER

## 2022-10-19 PROCEDURE — 3079F PR MOST RECENT DIASTOLIC BLOOD PRESSURE 80-89 MM HG: ICD-10-PCS | Mod: CPTII,S$GLB,, | Performed by: NURSE PRACTITIONER

## 2022-10-19 PROCEDURE — 1126F AMNT PAIN NOTED NONE PRSNT: CPT | Mod: CPTII,S$GLB,, | Performed by: NURSE PRACTITIONER

## 2022-10-19 PROCEDURE — 3288F PR FALLS RISK ASSESSMENT DOCUMENTED: ICD-10-PCS | Mod: CPTII,S$GLB,, | Performed by: NURSE PRACTITIONER

## 2022-10-19 NOTE — PROGRESS NOTES
Subjective:       Patient ID: Haley Castro is a 77 y.o. female.    Chief Complaint: Follow-up (Upper respiratory )  Last seen by Bridger on 08/30/2022  HPI  Last Thursday hoarse and some congestion and upper chest and throat symptoms.  She has taken a Azithromycin pack that she completed yesterday which she states was prescribe by a family member.  Vitals:    10/19/22 1006   BP: 130/80   Pulse: 86   Temp: 98.3 °F (36.8 °C)     Review of Systems   HENT:  Positive for congestion.    Respiratory:  Negative for cough and shortness of breath.    Cardiovascular:  Negative for chest pain and leg swelling.     Objective:      Physical Exam  Vitals and nursing note reviewed.   Constitutional:       General: She is awake.      Appearance: Normal appearance. She is well-developed and well-groomed.   HENT:      Head: Normocephalic and atraumatic.      Right Ear: Hearing and external ear normal.      Left Ear: Hearing and external ear normal.   Eyes:      General: Lids are normal.   Cardiovascular:      Rate and Rhythm: Normal rate. Rhythm regularly irregular.      Heart sounds: Normal heart sounds.      Comments: She denies any history of ever being told of an irregular heart rate  Pulmonary:      Effort: Pulmonary effort is normal.      Breath sounds: Normal breath sounds.   Musculoskeletal:         General: Normal range of motion.      Cervical back: Full passive range of motion without pain, normal range of motion and neck supple.      Right lower leg: No edema.      Left lower leg: No edema.   Lymphadenopathy:      Head:      Right side of head: No submental, submandibular, tonsillar, preauricular, posterior auricular or occipital adenopathy.      Left side of head: No submental, submandibular, tonsillar, preauricular, posterior auricular or occipital adenopathy.   Skin:     General: Skin is warm and dry.   Neurological:      General: No focal deficit present.      Mental Status: She is alert and oriented to person,  place, and time.   Psychiatric:         Attention and Perception: Attention and perception normal.         Mood and Affect: Mood and affect normal.         Speech: Speech normal.         Behavior: Behavior normal. Behavior is cooperative.         Thought Content: Thought content normal.         Cognition and Memory: Cognition and memory normal.         Judgment: Judgment normal.       Assessment & Plan:       Acute viral syndrome- I have explained that since she has not improved with the azithromycin it is highly likely that this is a viral illness and we just try to treat her symptoms.  Over the counter delsym for cough  No other OTC decongestants  Saline nasal spray to nose    Irregular heart rate  -     IN OFFICE EKG 12-LEAD (to Muse)        Over the counter delsym for cough  No other OTC decongestants  Saline nasal spray to nose  I have given her a coy or her EKG and advised to contact her cardiologist for a follow up regarding this irregular heart rate. She verbalized that she would.     Medication List with Changes/Refills   Current Medications    AMLODIPINE (NORVASC) 5 MG TABLET    TAKE 1 TABLET EVERY DAY    ASPIRIN 81 MG CHEW    Baby Aspirin 81 mg chewable tablet   Chew 1 tablet every day by oral route.    ATORVASTATIN (LIPITOR) 40 MG TABLET    TAKE 1 TABLET (40 MG TOTAL) BY MOUTH EVERY EVENING.    CARVEDILOL (COREG) 3.125 MG TABLET    TAKE 1 TABLET TWICE DAILY    CLOBETASOL (TEMOVATE) 0.05 % CREAM    APPLY TWICE A DAY TO AFFECTED AREA X2 WEEKS THEN AS NEEDED FOR FLARE UPS.    CLONIDINE (CATAPRES) 0.1 MG TABLET    Take 1 tablet (0.1 mg total) by mouth 3 (three) times daily as needed (PRN SBP > 165 mmHg).    DICLOFENAC (VOLTAREN) 50 MG EC TABLET    Take 1 tablet (50 mg total) by mouth 2 (two) times daily as needed (neck pain).    DICLOFENAC (VOLTAREN) 50 MG EC TABLET    Take 1 tablet (50 mg total) by mouth 2 (two) times daily as needed (neck pain).    DULOXETINE (CYMBALTA) 60 MG CAPSULE    TAKE 1 CAPSULE  EVERY DAY    EZETIMIBE (ZETIA) 10 MG TABLET    TAKE 1 TABLET (10 MG TOTAL) BY MOUTH EVERY EVENING.    GABAPENTIN (NEURONTIN) 300 MG CAPSULE    Take 2 capsules (600 mg total) by mouth 2 (two) times daily.    HYDROCORTISONE 2.5 % OINTMENT    APPLY TO AFFECTED AREA TWICE DAILY X 2 WEEKS THEN AS NEEDED X FLARE UPS. MAY MIX AQUAPHOR    OXYBUTYNIN (DITROPAN-XL) 5 MG TR24    Take 1 tablet (5 mg total) by mouth once daily.    OXYBUTYNIN (DITROPAN-XL) 5 MG TR24    Take 1 tablet (5 mg total) by mouth once daily.    PANTOPRAZOLE (PROTONIX) 40 MG TABLET    TAKE 1 TABLET BEFORE BREAKFAST    PREDNISOLONE ACETATE (PRED FORTE) 1 % DRPS    prednisolone acetate 1 % eye drops,suspension   INSTILL 1 DROP INTO AFFECTED EYE 4 TIMES A DAY    PREDNISOLONE ACETATE (PRED FORTE) 1 % DRPS        TRAMADOL (ULTRAM) 50 MG TABLET    Take 1 tablet (50 mg total) by mouth 3 (three) times daily as needed for Pain.    TRAZODONE (DESYREL) 50 MG TABLET    TAKE 1 TABLET EVERY EVENING    VARICELLA-ZOSTER GE-AS01B, PF, (SHINGRIX) 50 MCG/0.5 ML INJECTION    Inject into the muscle.      No follow-ups on file.

## 2022-12-12 ENCOUNTER — LAB VISIT (OUTPATIENT)
Dept: LAB | Facility: HOSPITAL | Age: 77
End: 2022-12-12
Payer: MEDICARE

## 2022-12-12 DIAGNOSIS — I10 ESSENTIAL HYPERTENSION: ICD-10-CM

## 2022-12-12 DIAGNOSIS — I65.23 BILATERAL CAROTID ARTERY STENOSIS: ICD-10-CM

## 2022-12-12 DIAGNOSIS — E78.5 HYPERLIPIDEMIA, UNSPECIFIED HYPERLIPIDEMIA TYPE: ICD-10-CM

## 2022-12-12 DIAGNOSIS — E78.2 MIXED HYPERLIPIDEMIA: ICD-10-CM

## 2022-12-12 DIAGNOSIS — Z82.49 FAMILY HISTORY OF EARLY CAD: ICD-10-CM

## 2022-12-12 DIAGNOSIS — I25.119 ATHEROSCLEROSIS OF NATIVE CORONARY ARTERY OF NATIVE HEART WITH ANGINA PECTORIS: ICD-10-CM

## 2022-12-12 DIAGNOSIS — Z87.19 HISTORY OF ISCHEMIC COLITIS: ICD-10-CM

## 2022-12-12 DIAGNOSIS — I73.9 PVD (PERIPHERAL VASCULAR DISEASE): ICD-10-CM

## 2022-12-12 LAB
ALBUMIN SERPL BCP-MCNC: 4.1 G/DL (ref 3.5–5.2)
ALP SERPL-CCNC: 65 U/L (ref 55–135)
ALT SERPL W/O P-5'-P-CCNC: 14 U/L (ref 10–44)
ANION GAP SERPL CALC-SCNC: 9 MMOL/L (ref 8–16)
AST SERPL-CCNC: 23 U/L (ref 10–40)
BASOPHILS # BLD AUTO: 0.11 K/UL (ref 0–0.2)
BASOPHILS NFR BLD: 1.7 % (ref 0–1.9)
BILIRUB SERPL-MCNC: 0.8 MG/DL (ref 0.1–1)
BUN SERPL-MCNC: 16 MG/DL (ref 8–23)
CALCIUM SERPL-MCNC: 10.5 MG/DL (ref 8.7–10.5)
CHLORIDE SERPL-SCNC: 104 MMOL/L (ref 95–110)
CHOLEST SERPL-MCNC: 181 MG/DL (ref 120–199)
CHOLEST/HDLC SERPL: 2.8 {RATIO} (ref 2–5)
CO2 SERPL-SCNC: 27 MMOL/L (ref 23–29)
CREAT SERPL-MCNC: 0.7 MG/DL (ref 0.5–1.4)
DIFFERENTIAL METHOD: ABNORMAL
EOSINOPHIL # BLD AUTO: 0.2 K/UL (ref 0–0.5)
EOSINOPHIL NFR BLD: 3.7 % (ref 0–8)
ERYTHROCYTE [DISTWIDTH] IN BLOOD BY AUTOMATED COUNT: 15 % (ref 11.5–14.5)
EST. GFR  (NO RACE VARIABLE): >60 ML/MIN/1.73 M^2
GLUCOSE SERPL-MCNC: 100 MG/DL (ref 70–110)
HCT VFR BLD AUTO: 40.1 % (ref 37–48.5)
HDLC SERPL-MCNC: 65 MG/DL (ref 40–75)
HDLC SERPL: 35.9 % (ref 20–50)
HGB BLD-MCNC: 13.3 G/DL (ref 12–16)
IMM GRANULOCYTES # BLD AUTO: 0.01 K/UL (ref 0–0.04)
IMM GRANULOCYTES NFR BLD AUTO: 0.2 % (ref 0–0.5)
LDLC SERPL CALC-MCNC: 101.6 MG/DL (ref 63–159)
LYMPHOCYTES # BLD AUTO: 2.4 K/UL (ref 1–4.8)
LYMPHOCYTES NFR BLD: 37.9 % (ref 18–48)
MCH RBC QN AUTO: 31.8 PG (ref 27–31)
MCHC RBC AUTO-ENTMCNC: 33.2 G/DL (ref 32–36)
MCV RBC AUTO: 96 FL (ref 82–98)
MONOCYTES # BLD AUTO: 0.6 K/UL (ref 0.3–1)
MONOCYTES NFR BLD: 9.5 % (ref 4–15)
NEUTROPHILS # BLD AUTO: 3 K/UL (ref 1.8–7.7)
NEUTROPHILS NFR BLD: 47 % (ref 38–73)
NONHDLC SERPL-MCNC: 116 MG/DL
NRBC BLD-RTO: 0 /100 WBC
PLATELET # BLD AUTO: 326 K/UL (ref 150–450)
PMV BLD AUTO: 11.9 FL (ref 9.2–12.9)
POTASSIUM SERPL-SCNC: 4.8 MMOL/L (ref 3.5–5.1)
PROT SERPL-MCNC: 7 G/DL (ref 6–8.4)
RBC # BLD AUTO: 4.18 M/UL (ref 4–5.4)
SODIUM SERPL-SCNC: 140 MMOL/L (ref 136–145)
T4 FREE SERPL-MCNC: 0.77 NG/DL (ref 0.71–1.51)
TRIGL SERPL-MCNC: 72 MG/DL (ref 30–150)
TSH SERPL DL<=0.005 MIU/L-ACNC: 1.81 UIU/ML (ref 0.4–4)
WBC # BLD AUTO: 6.42 K/UL (ref 3.9–12.7)

## 2022-12-12 PROCEDURE — 85025 COMPLETE CBC W/AUTO DIFF WBC: CPT | Performed by: INTERNAL MEDICINE

## 2022-12-12 PROCEDURE — 36415 COLL VENOUS BLD VENIPUNCTURE: CPT | Mod: PO | Performed by: INTERNAL MEDICINE

## 2022-12-12 PROCEDURE — 84443 ASSAY THYROID STIM HORMONE: CPT | Performed by: INTERNAL MEDICINE

## 2022-12-12 PROCEDURE — 80061 LIPID PANEL: CPT | Performed by: INTERNAL MEDICINE

## 2022-12-12 PROCEDURE — 84439 ASSAY OF FREE THYROXINE: CPT | Performed by: INTERNAL MEDICINE

## 2022-12-12 PROCEDURE — 80053 COMPREHEN METABOLIC PANEL: CPT | Performed by: INTERNAL MEDICINE

## 2023-02-07 DIAGNOSIS — Z00.00 ENCOUNTER FOR MEDICARE ANNUAL WELLNESS EXAM: ICD-10-CM

## 2023-02-09 DIAGNOSIS — Z00.00 ENCOUNTER FOR MEDICARE ANNUAL WELLNESS EXAM: ICD-10-CM

## 2023-02-15 RX ORDER — GABAPENTIN 300 MG/1
600 CAPSULE ORAL 2 TIMES DAILY
Qty: 360 CAPSULE | Refills: 2 | Status: SHIPPED | OUTPATIENT
Start: 2023-02-15

## 2023-02-15 NOTE — TELEPHONE ENCOUNTER
----- Message from Janelle Queen sent at 2/15/2023  2:47 PM CST -----  Contact: Patient  Type:  RX Refill Request    Who Called: Patient    Refill or New Rx: refill    RX Name and Strength: gabapentin (NEURONTIN) 300 MG capsule    How is the patient currently taking it? (ex. 1XDay)    Is this a 30 day or 90 day RX:    Preferred Pharmacy with phone number:   [facility] - [place], [place] - [address]  [address] [address]  Phone: [phone] Fax: [phone]      Would the patient rather a call back or a response via [facility]?    Best Call Back Number: [phone]    Additional Information:

## 2023-02-28 ENCOUNTER — LAB VISIT (OUTPATIENT)
Dept: LAB | Facility: HOSPITAL | Age: 78
End: 2023-02-28
Attending: FAMILY MEDICINE
Payer: MEDICARE

## 2023-02-28 DIAGNOSIS — I10 ESSENTIAL HYPERTENSION: ICD-10-CM

## 2023-02-28 DIAGNOSIS — E78.5 HYPERLIPIDEMIA, UNSPECIFIED HYPERLIPIDEMIA TYPE: ICD-10-CM

## 2023-02-28 LAB
ERYTHROCYTE [DISTWIDTH] IN BLOOD BY AUTOMATED COUNT: 13.9 % (ref 11.5–14.5)
HCT VFR BLD AUTO: 40.8 % (ref 37–48.5)
HGB BLD-MCNC: 13.4 G/DL (ref 12–16)
MCH RBC QN AUTO: 31.6 PG (ref 27–31)
MCHC RBC AUTO-ENTMCNC: 32.8 G/DL (ref 32–36)
MCV RBC AUTO: 96 FL (ref 82–98)
PLATELET # BLD AUTO: 331 K/UL (ref 150–450)
PMV BLD AUTO: 11.7 FL (ref 9.2–12.9)
RBC # BLD AUTO: 4.24 M/UL (ref 4–5.4)
WBC # BLD AUTO: 5.53 K/UL (ref 3.9–12.7)

## 2023-02-28 PROCEDURE — 85027 COMPLETE CBC AUTOMATED: CPT | Mod: HCNC | Performed by: FAMILY MEDICINE

## 2023-02-28 PROCEDURE — 80061 LIPID PANEL: CPT | Mod: HCNC | Performed by: FAMILY MEDICINE

## 2023-02-28 PROCEDURE — 84443 ASSAY THYROID STIM HORMONE: CPT | Mod: HCNC | Performed by: FAMILY MEDICINE

## 2023-02-28 PROCEDURE — 36415 COLL VENOUS BLD VENIPUNCTURE: CPT | Mod: HCNC,PO | Performed by: FAMILY MEDICINE

## 2023-02-28 PROCEDURE — 80053 COMPREHEN METABOLIC PANEL: CPT | Mod: HCNC | Performed by: FAMILY MEDICINE

## 2023-03-01 LAB
ALBUMIN SERPL BCP-MCNC: 4 G/DL (ref 3.5–5.2)
ALP SERPL-CCNC: 67 U/L (ref 55–135)
ALT SERPL W/O P-5'-P-CCNC: 13 U/L (ref 10–44)
ANION GAP SERPL CALC-SCNC: 9 MMOL/L (ref 8–16)
AST SERPL-CCNC: 22 U/L (ref 10–40)
BILIRUB SERPL-MCNC: 0.8 MG/DL (ref 0.1–1)
BUN SERPL-MCNC: 11 MG/DL (ref 8–23)
CALCIUM SERPL-MCNC: 10.2 MG/DL (ref 8.7–10.5)
CHLORIDE SERPL-SCNC: 105 MMOL/L (ref 95–110)
CHOLEST SERPL-MCNC: 163 MG/DL (ref 120–199)
CHOLEST/HDLC SERPL: 2.8 {RATIO} (ref 2–5)
CO2 SERPL-SCNC: 29 MMOL/L (ref 23–29)
CREAT SERPL-MCNC: 0.8 MG/DL (ref 0.5–1.4)
EST. GFR  (NO RACE VARIABLE): >60 ML/MIN/1.73 M^2
GLUCOSE SERPL-MCNC: 96 MG/DL (ref 70–110)
HDLC SERPL-MCNC: 59 MG/DL (ref 40–75)
HDLC SERPL: 36.2 % (ref 20–50)
LDLC SERPL CALC-MCNC: 88 MG/DL (ref 63–159)
NONHDLC SERPL-MCNC: 104 MG/DL
POTASSIUM SERPL-SCNC: 4.8 MMOL/L (ref 3.5–5.1)
PROT SERPL-MCNC: 6.9 G/DL (ref 6–8.4)
SODIUM SERPL-SCNC: 143 MMOL/L (ref 136–145)
TRIGL SERPL-MCNC: 80 MG/DL (ref 30–150)
TSH SERPL DL<=0.005 MIU/L-ACNC: 2.35 UIU/ML (ref 0.4–4)

## 2023-03-03 ENCOUNTER — OFFICE VISIT (OUTPATIENT)
Dept: FAMILY MEDICINE | Facility: CLINIC | Age: 78
End: 2023-03-03
Payer: MEDICARE

## 2023-03-03 VITALS
BODY MASS INDEX: 24.83 KG/M2 | OXYGEN SATURATION: 100 % | HEART RATE: 75 BPM | DIASTOLIC BLOOD PRESSURE: 70 MMHG | HEIGHT: 63 IN | SYSTOLIC BLOOD PRESSURE: 120 MMHG | WEIGHT: 140.13 LBS

## 2023-03-03 DIAGNOSIS — Z78.0 POSTMENOPAUSAL: ICD-10-CM

## 2023-03-03 DIAGNOSIS — E78.5 HYPERLIPIDEMIA, UNSPECIFIED HYPERLIPIDEMIA TYPE: ICD-10-CM

## 2023-03-03 DIAGNOSIS — F33.42 RECURRENT MAJOR DEPRESSIVE DISORDER, IN FULL REMISSION: ICD-10-CM

## 2023-03-03 DIAGNOSIS — Z12.39 ENCOUNTER FOR SCREENING FOR MALIGNANT NEOPLASM OF BREAST, UNSPECIFIED SCREENING MODALITY: ICD-10-CM

## 2023-03-03 DIAGNOSIS — I73.9 PVD (PERIPHERAL VASCULAR DISEASE): ICD-10-CM

## 2023-03-03 DIAGNOSIS — Z72.0 TOBACCO USE: ICD-10-CM

## 2023-03-03 DIAGNOSIS — I10 ESSENTIAL HYPERTENSION: ICD-10-CM

## 2023-03-03 DIAGNOSIS — Z12.31 ENCOUNTER FOR SCREENING MAMMOGRAM FOR MALIGNANT NEOPLASM OF BREAST: ICD-10-CM

## 2023-03-03 DIAGNOSIS — Z13.820 SCREENING FOR OSTEOPOROSIS: ICD-10-CM

## 2023-03-03 DIAGNOSIS — N32.81 OAB (OVERACTIVE BLADDER): ICD-10-CM

## 2023-03-03 DIAGNOSIS — Z87.891 PERSONAL HISTORY OF NICOTINE DEPENDENCE: ICD-10-CM

## 2023-03-03 DIAGNOSIS — I25.119 ATHEROSCLEROSIS OF NATIVE CORONARY ARTERY OF NATIVE HEART WITH ANGINA PECTORIS: ICD-10-CM

## 2023-03-03 DIAGNOSIS — Z00.00 WELLNESS EXAMINATION: Primary | ICD-10-CM

## 2023-03-03 PROCEDURE — 1126F AMNT PAIN NOTED NONE PRSNT: CPT | Mod: HCNC,CPTII,S$GLB, | Performed by: FAMILY MEDICINE

## 2023-03-03 PROCEDURE — 99397 PER PM REEVAL EST PAT 65+ YR: CPT | Mod: HCNC,S$GLB,, | Performed by: FAMILY MEDICINE

## 2023-03-03 PROCEDURE — 1101F PR PT FALLS ASSESS DOC 0-1 FALLS W/OUT INJ PAST YR: ICD-10-PCS | Mod: HCNC,CPTII,S$GLB, | Performed by: FAMILY MEDICINE

## 2023-03-03 PROCEDURE — 1159F PR MEDICATION LIST DOCUMENTED IN MEDICAL RECORD: ICD-10-PCS | Mod: HCNC,CPTII,S$GLB, | Performed by: FAMILY MEDICINE

## 2023-03-03 PROCEDURE — 3078F PR MOST RECENT DIASTOLIC BLOOD PRESSURE < 80 MM HG: ICD-10-PCS | Mod: HCNC,CPTII,S$GLB, | Performed by: FAMILY MEDICINE

## 2023-03-03 PROCEDURE — 99397 PR PREVENTIVE VISIT,EST,65 & OVER: ICD-10-PCS | Mod: HCNC,S$GLB,, | Performed by: FAMILY MEDICINE

## 2023-03-03 PROCEDURE — 3288F FALL RISK ASSESSMENT DOCD: CPT | Mod: HCNC,CPTII,S$GLB, | Performed by: FAMILY MEDICINE

## 2023-03-03 PROCEDURE — 99999 PR PBB SHADOW E&M-EST. PATIENT-LVL IV: ICD-10-PCS | Mod: PBBFAC,HCNC,, | Performed by: FAMILY MEDICINE

## 2023-03-03 PROCEDURE — 1101F PT FALLS ASSESS-DOCD LE1/YR: CPT | Mod: HCNC,CPTII,S$GLB, | Performed by: FAMILY MEDICINE

## 2023-03-03 PROCEDURE — 3288F PR FALLS RISK ASSESSMENT DOCUMENTED: ICD-10-PCS | Mod: HCNC,CPTII,S$GLB, | Performed by: FAMILY MEDICINE

## 2023-03-03 PROCEDURE — 3074F SYST BP LT 130 MM HG: CPT | Mod: HCNC,CPTII,S$GLB, | Performed by: FAMILY MEDICINE

## 2023-03-03 PROCEDURE — 99999 PR PBB SHADOW E&M-EST. PATIENT-LVL IV: CPT | Mod: PBBFAC,HCNC,, | Performed by: FAMILY MEDICINE

## 2023-03-03 PROCEDURE — 1159F MED LIST DOCD IN RCRD: CPT | Mod: HCNC,CPTII,S$GLB, | Performed by: FAMILY MEDICINE

## 2023-03-03 PROCEDURE — 1126F PR PAIN SEVERITY QUANTIFIED, NO PAIN PRESENT: ICD-10-PCS | Mod: HCNC,CPTII,S$GLB, | Performed by: FAMILY MEDICINE

## 2023-03-03 PROCEDURE — 3074F PR MOST RECENT SYSTOLIC BLOOD PRESSURE < 130 MM HG: ICD-10-PCS | Mod: HCNC,CPTII,S$GLB, | Performed by: FAMILY MEDICINE

## 2023-03-03 PROCEDURE — 3078F DIAST BP <80 MM HG: CPT | Mod: HCNC,CPTII,S$GLB, | Performed by: FAMILY MEDICINE

## 2023-03-03 RX ORDER — ALPRAZOLAM 0.25 MG/1
0.25 TABLET ORAL DAILY PRN
Qty: 30 TABLET | Refills: 1 | Status: SHIPPED | OUTPATIENT
Start: 2023-03-03 | End: 2024-01-12

## 2023-03-03 RX ORDER — OXYBUTYNIN CHLORIDE 5 MG/1
5 TABLET, EXTENDED RELEASE ORAL DAILY
Qty: 90 TABLET | Refills: 1 | Status: SHIPPED | OUTPATIENT
Start: 2023-03-03 | End: 2023-07-26

## 2023-03-03 NOTE — PROGRESS NOTES
Subjective:       Patient ID: Haley Castro is a 77 y.o. female.    Chief Complaint: Results (Had blood work done recently )    Here for wellness and follow up multiple chronic medical issues. Doing well overall and in normal state of health.  Would like prn benzo.        Review of Systems   Constitutional:  Negative for chills and fever.   Respiratory:  Negative for cough, chest tightness and shortness of breath.    Cardiovascular:  Negative for chest pain, palpitations and leg swelling.   Endocrine: Negative for cold intolerance and heat intolerance.   Psychiatric/Behavioral:  Negative for decreased concentration. The patient is not nervous/anxious.      Objective:      Physical Exam  Vitals and nursing note reviewed.   Constitutional:       Appearance: She is well-developed.   HENT:      Head: Normocephalic and atraumatic.   Cardiovascular:      Rate and Rhythm: Normal rate and regular rhythm.      Heart sounds: Normal heart sounds.   Pulmonary:      Effort: Pulmonary effort is normal.      Breath sounds: Normal breath sounds.   Psychiatric:         Mood and Affect: Mood normal.         Behavior: Behavior normal.       Assessment:       1. Wellness examination    2. Hyperlipidemia, unspecified hyperlipidemia type    3. Essential hypertension    4. Encounter for screening for malignant neoplasm of breast, unspecified screening modality    5. Postmenopausal    6. Screening for osteoporosis    7. Encounter for screening mammogram for malignant neoplasm of breast    8. OAB (overactive bladder)    9. Tobacco use    10. Personal history of nicotine dependence    11. Recurrent major depressive disorder, in full remission    12. Atherosclerosis of native coronary artery of native heart with angina pectoris    13. PVD (peripheral vascular disease)        Plan:       Wellness examination    Hyperlipidemia, unspecified hyperlipidemia type    Essential hypertension  -     Comprehensive Metabolic Panel; Future; Expected  date: 03/03/2023  -     CBC Without Differential; Future; Expected date: 03/03/2023  -     Lipid Panel; Future; Expected date: 03/03/2023  -     TSH; Future; Expected date: 03/03/2023    Encounter for screening for malignant neoplasm of breast, unspecified screening modality  -     Mammo Digital Screening Bilat; Future; Expected date: 03/03/2023    Postmenopausal  -     DXA Bone Density Axial Skeleton 1 or more sites; Future; Expected date: 03/03/2023    Screening for osteoporosis  -     DXA Bone Density Axial Skeleton 1 or more sites; Future; Expected date: 03/03/2023    Encounter for screening mammogram for malignant neoplasm of breast  -     Mammo Digital Screening Bilat; Future; Expected date: 03/03/2023    OAB (overactive bladder)  -     oxybutynin (DITROPAN-XL) 5 MG TR24; Take 1 tablet (5 mg total) by mouth once daily.  Dispense: 90 tablet; Refill: 1    Tobacco use  -     CT Chest Lung Screening Low Dose; Future; Expected date: 03/03/2023    Personal history of nicotine dependence  -     CT Chest Lung Screening Low Dose; Future; Expected date: 03/03/2023    Recurrent major depressive disorder, in full remission    Atherosclerosis of native coronary artery of native heart with angina pectoris    PVD (peripheral vascular disease)    Other orders  -     ALPRAZolam (XANAX) 0.25 MG tablet; Take 1 tablet (0.25 mg total) by mouth daily as needed for Anxiety.  Dispense: 30 tablet; Refill: 1      Short term prn benzo to use sparingly  Has trip to [place]  Reviewed labs  Will monitor chronic medical issues and continue current plan of care.  Pvd stable  On statin  Mood good  Follow up in about 6 months (around 9/3/2023), or if symptoms worsen or fail to improve.

## 2023-04-10 ENCOUNTER — TELEPHONE (OUTPATIENT)
Dept: FAMILY MEDICINE | Facility: CLINIC | Age: 78
End: 2023-04-10
Payer: MEDICARE

## 2023-04-10 NOTE — TELEPHONE ENCOUNTER
Spoke with patient she will be in the clinic at other appointments tomorrow.  She will talk to someone in the pharmacy to see what they suggest.  If necessary patient states she will got to the urgent care..

## 2023-04-10 NOTE — TELEPHONE ENCOUNTER
----- Message from Chelsie Luciano sent at 4/10/2023  7:19 AM CDT -----  Contact: Patient  Type:  Sooner Appointment Request    Caller is requesting a sooner appointment.  Caller declined first available appointment listed below.  Caller will not accept being placed on the waitlist and is requesting a message be sent to doctor.    Name of Caller:  Patient  When is the first available appointment?  4/12, would like an appt tomorrow if possible   Symptoms:  pink eye concerns, would like an Rx  Best Call Back Number:  [phone]  [id number] - [place] - [address]. AT [address] [address]  Phone: [phone] Fax: [phone]  Additional Information:  Please call the patient back to schedule/advise. Thank you!

## 2023-04-11 ENCOUNTER — HOSPITAL ENCOUNTER (OUTPATIENT)
Dept: RADIOLOGY | Facility: HOSPITAL | Age: 78
Discharge: HOME | End: 2023-04-11
Attending: FAMILY MEDICINE
Payer: MEDICARE

## 2023-04-11 DIAGNOSIS — Z87.891 PERSONAL HISTORY OF NICOTINE DEPENDENCE: ICD-10-CM

## 2023-04-11 DIAGNOSIS — Z12.39 ENCOUNTER FOR SCREENING FOR MALIGNANT NEOPLASM OF BREAST, UNSPECIFIED SCREENING MODALITY: ICD-10-CM

## 2023-04-11 DIAGNOSIS — Z72.0 TOBACCO USE: ICD-10-CM

## 2023-04-11 DIAGNOSIS — Z78.0 POSTMENOPAUSAL: ICD-10-CM

## 2023-04-11 DIAGNOSIS — Z13.820 SCREENING FOR OSTEOPOROSIS: ICD-10-CM

## 2023-04-11 DIAGNOSIS — Z12.31 ENCOUNTER FOR SCREENING MAMMOGRAM FOR MALIGNANT NEOPLASM OF BREAST: ICD-10-CM

## 2023-04-11 PROCEDURE — 77063 MAMMO DIGITAL SCREENING BILAT WITH TOMO: ICD-10-PCS | Mod: 26,HCNC,, | Performed by: RADIOLOGY

## 2023-04-11 PROCEDURE — 77080 DXA BONE DENSITY AXIAL: CPT | Mod: TC,HCNC,PO

## 2023-04-11 PROCEDURE — 77063 BREAST TOMOSYNTHESIS BI: CPT | Mod: 26,HCNC,, | Performed by: RADIOLOGY

## 2023-04-11 PROCEDURE — 71271 CT THORAX LUNG CANCER SCR C-: CPT | Mod: TC,HCNC,PO

## 2023-04-11 PROCEDURE — 71271 CT CHEST LUNG SCREENING LOW DOSE: ICD-10-PCS | Mod: 26,HCNC,, | Performed by: RADIOLOGY

## 2023-04-11 PROCEDURE — 77067 SCR MAMMO BI INCL CAD: CPT | Mod: TC,HCNC,PO

## 2023-04-11 PROCEDURE — 71271 CT THORAX LUNG CANCER SCR C-: CPT | Mod: 26,HCNC,, | Performed by: RADIOLOGY

## 2023-04-11 PROCEDURE — 77080 DXA BONE DENSITY AXIAL SKELETON 1 OR MORE SITES: ICD-10-PCS | Mod: 26,HCNC,, | Performed by: RADIOLOGY

## 2023-04-11 PROCEDURE — 77080 DXA BONE DENSITY AXIAL: CPT | Mod: 26,HCNC,, | Performed by: RADIOLOGY

## 2023-04-11 PROCEDURE — 77067 MAMMO DIGITAL SCREENING BILAT WITH TOMO: ICD-10-PCS | Mod: 26,HCNC,, | Performed by: RADIOLOGY

## 2023-04-11 PROCEDURE — 77067 SCR MAMMO BI INCL CAD: CPT | Mod: 26,HCNC,, | Performed by: RADIOLOGY

## 2023-04-20 ENCOUNTER — PATIENT MESSAGE (OUTPATIENT)
Dept: FAMILY MEDICINE | Facility: CLINIC | Age: 78
End: 2023-04-20
Payer: MEDICARE

## 2023-04-26 ENCOUNTER — DOCUMENTATION ONLY (OUTPATIENT)
Dept: REHABILITATION | Facility: HOSPITAL | Age: 78
End: 2023-04-26
Payer: MEDICARE

## 2023-04-26 NOTE — PROGRESS NOTES
[facility]  Physical Therapy Discharge Note    Name: Haley Castro  [facility] Number: [id number]  Therapy Diagnosis:        Encounter Diagnosis   Name Primary?    Neck pain        Physician: SUDHA Sparks MD     Physician Orders: PT Eval and Treat Neck  Medical Diagnosis from Referral: Neck pain  Evaluation Date: 9/15/2022  Authorization Period Expiration: 12/31/2022  Plan of Care Expiration: 10/31/2022  Progress Note Due: 10/15/2022  Visit # / Visits authorized: 1/ 1   FOTO: Completed 09/15/2022    ASSESSMENT      Discharge reason: Patient has not attended therapy since 09152022    Goals: n/a    PLAN   This patient is discharged from physical therapy      Jason Mondragon, PT

## 2023-05-02 ENCOUNTER — TELEPHONE (OUTPATIENT)
Dept: FAMILY MEDICINE | Facility: CLINIC | Age: 78
End: 2023-05-02
Payer: MEDICARE

## 2023-05-02 NOTE — TELEPHONE ENCOUNTER
----- Message from Lashonda Sabillon MA sent at 4/28/2023  1:41 PM CDT -----  Regarding: FW: CT Chest Lung Screen    ----- Message -----  From: Arlet Addison RN  Sent: 4/13/2023   3:07 PM CDT  To: Bridger Kwon, #  Subject: FW: CT Chest Lung Screen                         Good Afternoon,   I received patient's lung rad score from radiology. This score qualifies patient to be seen by pulmonary in our lung clinic. If you are interested in patient being seen, please review imaging and lung clinic with patient and place referral to pulmonary.     Thanks in advance and please don't hesitate to ask if you have any questions,   Arlet MILLERN, RN, OCN   Oncology Nurse Navigator   [facility], [facility] [place] [facility]   (P) [phone] (F) [phone]     ----- Message -----  From: Anna Brice RT  Sent: 4/12/2023   9:14 AM CDT  To: [facility]  Subject: CT Chest Lung Screen                             Good morning!     Just wanted to let you know this lung screen came back as a 4A.     Thank you!

## 2023-05-13 DIAGNOSIS — K21.9 GASTROESOPHAGEAL REFLUX DISEASE: ICD-10-CM

## 2023-05-13 RX ORDER — PANTOPRAZOLE SODIUM 40 MG/1
TABLET, DELAYED RELEASE ORAL
Qty: 90 TABLET | Refills: 3 | Status: SHIPPED | OUTPATIENT
Start: 2023-05-13

## 2023-05-13 NOTE — TELEPHONE ENCOUNTER
No care due was identified.  [facility] Embedded Care Due Messages. Reference number: [id number].   5/13/2023 12:25:57 AM CDT

## 2023-05-13 NOTE — TELEPHONE ENCOUNTER
Refill Decision Note   Haley Castro  is requesting a refill authorization.  Brief Assessment and Rationale for Refill:  Approve     Medication Therapy Plan:       Medication Reconciliation Completed: No   Comments:     No Care Gaps recommended.     Note composed:9:02 AM 05/13/2023

## 2023-05-17 DIAGNOSIS — R91.8 ABNORMAL CT LUNG SCREENING: Primary | ICD-10-CM

## 2023-05-22 DIAGNOSIS — R91.8 ABNORMAL CT LUNG SCREENING: Primary | ICD-10-CM

## 2023-05-29 ENCOUNTER — TELEPHONE (OUTPATIENT)
Dept: FAMILY MEDICINE | Facility: CLINIC | Age: 78
End: 2023-05-29
Payer: MEDICARE

## 2023-05-29 NOTE — TELEPHONE ENCOUNTER
----- Message from SUDHA Sparks MD sent at 5/22/2023 11:35 AM CDT -----  Schedule CT chest mid July

## 2023-05-30 ENCOUNTER — DOCUMENTATION ONLY (OUTPATIENT)
Dept: HEMATOLOGY/ONCOLOGY | Facility: CLINIC | Age: 78
End: 2023-05-30
Payer: MEDICARE

## 2023-05-30 NOTE — PROGRESS NOTES
Received referral for patient to be seen in Diagnosis Clinic d/t Lung-RADS Category 4A on CT scan done 4/11. Dr. Joseph reviewed patient's CT, stated a pulmonary referral would be better suited for patient. Reached out to provider and provider's nurse to see if patient was aware of abnormal CT scan and stated we can get patient scheduled in either the diagnosis clinic or with a pulmonologist, but patient needed to be made aware we would be reaching out. Dr. Sparks ordered a repeat scan in July. Reached back out to Dr. Sparks, stating we will cancel the referral for now, but can be reinstated in July if findings indicate further evaluation.

## 2023-06-20 NOTE — TELEPHONE ENCOUNTER
No new care gaps identified.  [facility] Embedded Care Gaps. Reference number: [id number]. 9/15/2022   4:55:12 PM CDT   Will discuss at today appt

## 2023-07-05 ENCOUNTER — PES CALL (OUTPATIENT)
Dept: ADMINISTRATIVE | Facility: CLINIC | Age: 78
End: 2023-07-05
Payer: MEDICARE

## 2023-07-10 ENCOUNTER — HOSPITAL ENCOUNTER (OUTPATIENT)
Dept: RADIOLOGY | Facility: HOSPITAL | Age: 78
Discharge: HOME | End: 2023-07-10
Attending: FAMILY MEDICINE
Payer: MEDICARE

## 2023-07-10 DIAGNOSIS — R91.8 ABNORMAL CT LUNG SCREENING: ICD-10-CM

## 2023-07-10 PROCEDURE — 71250 CT THORAX DX C-: CPT | Mod: TC,PO

## 2023-07-10 PROCEDURE — 71250 CT CHEST WITHOUT CONTRAST: ICD-10-PCS | Mod: 26,,, | Performed by: RADIOLOGY

## 2023-07-10 PROCEDURE — 71250 CT THORAX DX C-: CPT | Mod: 26,,, | Performed by: RADIOLOGY

## 2023-07-26 DIAGNOSIS — N32.81 OAB (OVERACTIVE BLADDER): ICD-10-CM

## 2023-07-26 RX ORDER — OXYBUTYNIN CHLORIDE 5 MG/1
TABLET, EXTENDED RELEASE ORAL
Qty: 90 TABLET | Refills: 2 | Status: SHIPPED | OUTPATIENT
Start: 2023-07-26

## 2023-07-26 NOTE — TELEPHONE ENCOUNTER
Refill Decision Note   Haley Castro  is requesting a refill authorization.  Brief Assessment and Rationale for Refill:  Approve     Medication Therapy Plan:       Medication Reconciliation Completed: No   Comments:     No Care Gaps recommended.     Note composed:8:47 AM 07/26/2023

## 2023-07-26 NOTE — TELEPHONE ENCOUNTER
No care due was identified.  [facility] Embedded Care Due Messages. Reference number: [id number].   7/26/2023 2:16:56 AM CDT

## 2023-08-24 ENCOUNTER — TELEPHONE (OUTPATIENT)
Dept: FAMILY MEDICINE | Facility: CLINIC | Age: 78
End: 2023-08-24
Payer: MEDICARE

## 2023-08-30 ENCOUNTER — LAB VISIT (OUTPATIENT)
Dept: LAB | Facility: HOSPITAL | Age: 78
End: 2023-08-30
Attending: FAMILY MEDICINE
Payer: MEDICARE

## 2023-08-30 DIAGNOSIS — I10 ESSENTIAL HYPERTENSION: ICD-10-CM

## 2023-08-30 LAB
ALBUMIN SERPL BCP-MCNC: 3.8 G/DL (ref 3.5–5.2)
ALP SERPL-CCNC: 74 U/L (ref 55–135)
ALT SERPL W/O P-5'-P-CCNC: 9 U/L (ref 10–44)
ANION GAP SERPL CALC-SCNC: 9 MMOL/L (ref 8–16)
AST SERPL-CCNC: 19 U/L (ref 10–40)
BILIRUB SERPL-MCNC: 0.8 MG/DL (ref 0.1–1)
BUN SERPL-MCNC: 11 MG/DL (ref 8–23)
CALCIUM SERPL-MCNC: 9.9 MG/DL (ref 8.7–10.5)
CHLORIDE SERPL-SCNC: 103 MMOL/L (ref 95–110)
CHOLEST SERPL-MCNC: 154 MG/DL (ref 120–199)
CHOLEST/HDLC SERPL: 3.1 {RATIO} (ref 2–5)
CO2 SERPL-SCNC: 26 MMOL/L (ref 23–29)
CREAT SERPL-MCNC: 0.8 MG/DL (ref 0.5–1.4)
ERYTHROCYTE [DISTWIDTH] IN BLOOD BY AUTOMATED COUNT: 16.4 % (ref 11.5–14.5)
EST. GFR  (NO RACE VARIABLE): >60 ML/MIN/1.73 M^2
GLUCOSE SERPL-MCNC: 100 MG/DL (ref 70–110)
HCT VFR BLD AUTO: 40.6 % (ref 37–48.5)
HDLC SERPL-MCNC: 50 MG/DL (ref 40–75)
HDLC SERPL: 32.5 % (ref 20–50)
HGB BLD-MCNC: 13.1 G/DL (ref 12–16)
LDLC SERPL CALC-MCNC: 82.8 MG/DL (ref 63–159)
MCH RBC QN AUTO: 29.9 PG (ref 27–31)
MCHC RBC AUTO-ENTMCNC: 32.3 G/DL (ref 32–36)
MCV RBC AUTO: 93 FL (ref 82–98)
NONHDLC SERPL-MCNC: 104 MG/DL
PLATELET # BLD AUTO: 326 K/UL (ref 150–450)
PMV BLD AUTO: 11.8 FL (ref 9.2–12.9)
POTASSIUM SERPL-SCNC: 4 MMOL/L (ref 3.5–5.1)
PROT SERPL-MCNC: 6.7 G/DL (ref 6–8.4)
RBC # BLD AUTO: 4.38 M/UL (ref 4–5.4)
SODIUM SERPL-SCNC: 138 MMOL/L (ref 136–145)
TRIGL SERPL-MCNC: 106 MG/DL (ref 30–150)
TSH SERPL DL<=0.005 MIU/L-ACNC: 1.85 UIU/ML (ref 0.4–4)
WBC # BLD AUTO: 5.37 K/UL (ref 3.9–12.7)

## 2023-08-30 PROCEDURE — 84443 ASSAY THYROID STIM HORMONE: CPT | Mod: HCNC | Performed by: FAMILY MEDICINE

## 2023-08-30 PROCEDURE — 36415 COLL VENOUS BLD VENIPUNCTURE: CPT | Mod: HCNC,PO | Performed by: FAMILY MEDICINE

## 2023-08-30 PROCEDURE — 85027 COMPLETE CBC AUTOMATED: CPT | Mod: HCNC | Performed by: FAMILY MEDICINE

## 2023-08-30 PROCEDURE — 80061 LIPID PANEL: CPT | Mod: HCNC | Performed by: FAMILY MEDICINE

## 2023-08-30 PROCEDURE — 80053 COMPREHEN METABOLIC PANEL: CPT | Mod: HCNC | Performed by: FAMILY MEDICINE

## 2023-10-06 ENCOUNTER — PATIENT MESSAGE (OUTPATIENT)
Dept: PAIN MEDICINE | Facility: CLINIC | Age: 78
End: 2023-10-06
Payer: MEDICARE

## 2023-10-06 ENCOUNTER — TELEPHONE (OUTPATIENT)
Dept: PAIN MEDICINE | Facility: CLINIC | Age: 78
End: 2023-10-06
Payer: MEDICARE

## 2023-10-09 DIAGNOSIS — F32.A DEPRESSION, UNSPECIFIED DEPRESSION TYPE: ICD-10-CM

## 2023-10-09 NOTE — TELEPHONE ENCOUNTER
No care due was identified.  [facility] Embedded Care Due Messages. Reference number: [id number].   10/09/2023 5:50:18 AM CDT

## 2023-10-10 RX ORDER — DULOXETIN HYDROCHLORIDE 60 MG/1
60 CAPSULE, DELAYED RELEASE ORAL
Qty: 90 CAPSULE | Refills: 10 | Status: SHIPPED | OUTPATIENT
Start: 2023-10-10

## 2023-10-11 ENCOUNTER — OFFICE VISIT (OUTPATIENT)
Dept: FAMILY MEDICINE | Facility: CLINIC | Age: 78
End: 2023-10-11
Payer: MEDICARE

## 2023-10-11 VITALS
WEIGHT: 145.31 LBS | OXYGEN SATURATION: 97 % | SYSTOLIC BLOOD PRESSURE: 132 MMHG | HEIGHT: 63 IN | BODY MASS INDEX: 25.75 KG/M2 | HEART RATE: 75 BPM | DIASTOLIC BLOOD PRESSURE: 72 MMHG

## 2023-10-11 DIAGNOSIS — I10 ESSENTIAL HYPERTENSION: Primary | ICD-10-CM

## 2023-10-11 DIAGNOSIS — E78.2 MIXED HYPERLIPIDEMIA: ICD-10-CM

## 2023-10-11 PROCEDURE — 99999 PR PBB SHADOW E&M-EST. PATIENT-LVL III: ICD-10-PCS | Mod: PBBFAC,HCNC,, | Performed by: FAMILY MEDICINE

## 2023-10-11 PROCEDURE — 90694 VACC AIIV4 NO PRSRV 0.5ML IM: CPT | Mod: HCNC,S$GLB,, | Performed by: FAMILY MEDICINE

## 2023-10-11 PROCEDURE — G0008 ADMIN INFLUENZA VIRUS VAC: HCPCS | Mod: HCNC,S$GLB,, | Performed by: FAMILY MEDICINE

## 2023-10-11 PROCEDURE — 1159F PR MEDICATION LIST DOCUMENTED IN MEDICAL RECORD: ICD-10-PCS | Mod: HCNC,CPTII,S$GLB, | Performed by: FAMILY MEDICINE

## 2023-10-11 PROCEDURE — 1126F PR PAIN SEVERITY QUANTIFIED, NO PAIN PRESENT: ICD-10-PCS | Mod: HCNC,CPTII,S$GLB, | Performed by: FAMILY MEDICINE

## 2023-10-11 PROCEDURE — 3078F PR MOST RECENT DIASTOLIC BLOOD PRESSURE < 80 MM HG: ICD-10-PCS | Mod: HCNC,CPTII,S$GLB, | Performed by: FAMILY MEDICINE

## 2023-10-11 PROCEDURE — 1126F AMNT PAIN NOTED NONE PRSNT: CPT | Mod: HCNC,CPTII,S$GLB, | Performed by: FAMILY MEDICINE

## 2023-10-11 PROCEDURE — 3075F PR MOST RECENT SYSTOLIC BLOOD PRESS GE 130-139MM HG: ICD-10-PCS | Mod: HCNC,CPTII,S$GLB, | Performed by: FAMILY MEDICINE

## 2023-10-11 PROCEDURE — 99214 PR OFFICE/OUTPT VISIT, EST, LEVL IV, 30-39 MIN: ICD-10-PCS | Mod: HCNC,25,S$GLB, | Performed by: FAMILY MEDICINE

## 2023-10-11 PROCEDURE — G0008 FLU VACCINE - QUADRIVALENT - ADJUVANTED: ICD-10-PCS | Mod: HCNC,S$GLB,, | Performed by: FAMILY MEDICINE

## 2023-10-11 PROCEDURE — 90694 FLU VACCINE - QUADRIVALENT - ADJUVANTED: ICD-10-PCS | Mod: HCNC,S$GLB,, | Performed by: FAMILY MEDICINE

## 2023-10-11 PROCEDURE — 3075F SYST BP GE 130 - 139MM HG: CPT | Mod: HCNC,CPTII,S$GLB, | Performed by: FAMILY MEDICINE

## 2023-10-11 PROCEDURE — 3078F DIAST BP <80 MM HG: CPT | Mod: HCNC,CPTII,S$GLB, | Performed by: FAMILY MEDICINE

## 2023-10-11 PROCEDURE — 99214 OFFICE O/P EST MOD 30 MIN: CPT | Mod: HCNC,25,S$GLB, | Performed by: FAMILY MEDICINE

## 2023-10-11 PROCEDURE — 99999 PR PBB SHADOW E&M-EST. PATIENT-LVL III: CPT | Mod: PBBFAC,HCNC,, | Performed by: FAMILY MEDICINE

## 2023-10-11 PROCEDURE — 1159F MED LIST DOCD IN RCRD: CPT | Mod: HCNC,CPTII,S$GLB, | Performed by: FAMILY MEDICINE

## 2023-10-11 NOTE — PROGRESS NOTES
Subjective:       Patient ID: Haley Castro is a 78 y.o. female.    Chief Complaint: Follow-up    Here for follow up multiple chronic medical issues. Doing well overall and in normal state of health.  Concerned with not losing weight when she tries.  More eye watering and crusting at times.      Follow-up  Pertinent negatives include no chest pain, chills, coughing or fever.     Review of Systems   Constitutional:  Negative for chills and fever.   Respiratory:  Negative for cough, chest tightness and shortness of breath.    Cardiovascular:  Negative for chest pain, palpitations and leg swelling.   Endocrine: Negative for cold intolerance and heat intolerance.   Psychiatric/Behavioral:  Negative for decreased concentration. The patient is not nervous/anxious.        Objective:      Physical Exam  Vitals and nursing note reviewed.   Constitutional:       Appearance: She is well-developed.   HENT:      Head: Normocephalic and atraumatic.   Cardiovascular:      Rate and Rhythm: Normal rate and regular rhythm.      Heart sounds: Normal heart sounds.   Pulmonary:      Effort: Pulmonary effort is normal.      Breath sounds: Normal breath sounds.   Psychiatric:         Mood and Affect: Mood normal.         Behavior: Behavior normal.         Assessment:       1. Essential hypertension    2. Mixed hyperlipidemia        Plan:       Essential hypertension  -     Comprehensive Metabolic Panel; Future; Expected date: 10/11/2023  -     CBC Without Differential; Future; Expected date: 10/11/2023  -     Lipid Panel; Future; Expected date: 10/11/2023  -     TSH; Future; Expected date: 10/11/2023    Mixed hyperlipidemia  -     Comprehensive Metabolic Panel; Future; Expected date: 10/11/2023  -     CBC Without Differential; Future; Expected date: 10/11/2023  -     Lipid Panel; Future; Expected date: 10/11/2023  -     TSH; Future; Expected date: 10/11/2023      Lipid at goal  Htn stable  Reviewed labs  Will monitor chronic medical  issues and continue current plan of care.  Otc allergy med and f/u with her optho as planned    Follow up in about 6 months (around 4/11/2024), or if symptoms worsen or fail to improve.

## 2023-10-13 ENCOUNTER — TELEPHONE (OUTPATIENT)
Dept: PAIN MEDICINE | Facility: CLINIC | Age: 78
End: 2023-10-13

## 2023-10-13 ENCOUNTER — OFFICE VISIT (OUTPATIENT)
Dept: PAIN MEDICINE | Facility: CLINIC | Age: 78
End: 2023-10-13
Payer: MEDICARE

## 2023-10-13 ENCOUNTER — HOSPITAL ENCOUNTER (OUTPATIENT)
Dept: RADIOLOGY | Facility: HOSPITAL | Age: 78
Discharge: HOME | End: 2023-10-13
Attending: ANESTHESIOLOGY
Payer: MEDICARE

## 2023-10-13 VITALS
HEART RATE: 70 BPM | BODY MASS INDEX: 25.68 KG/M2 | HEIGHT: 63 IN | SYSTOLIC BLOOD PRESSURE: 197 MMHG | WEIGHT: 144.94 LBS | DIASTOLIC BLOOD PRESSURE: 88 MMHG

## 2023-10-13 DIAGNOSIS — M25.552 LEFT HIP PAIN: Primary | ICD-10-CM

## 2023-10-13 DIAGNOSIS — M47.816 LUMBAR SPONDYLOSIS: ICD-10-CM

## 2023-10-13 DIAGNOSIS — M25.552 LEFT HIP PAIN: ICD-10-CM

## 2023-10-13 DIAGNOSIS — M51.36 DDD (DEGENERATIVE DISC DISEASE), LUMBAR: ICD-10-CM

## 2023-10-13 DIAGNOSIS — M54.16 LEFT LUMBAR RADICULOPATHY: ICD-10-CM

## 2023-10-13 PROCEDURE — 73502 X-RAY EXAM HIP UNI 2-3 VIEWS: CPT | Mod: 26,HCNC,LT, | Performed by: RADIOLOGY

## 2023-10-13 PROCEDURE — 1159F PR MEDICATION LIST DOCUMENTED IN MEDICAL RECORD: ICD-10-PCS | Mod: HCNC,CPTII,S$GLB, | Performed by: ANESTHESIOLOGY

## 2023-10-13 PROCEDURE — 73502 XR HIP WITH PELVIS WHEN PERFORMED, 2 OR 3 VIEWS LEFT: ICD-10-PCS | Mod: 26,HCNC,LT, | Performed by: RADIOLOGY

## 2023-10-13 PROCEDURE — 3288F PR FALLS RISK ASSESSMENT DOCUMENTED: ICD-10-PCS | Mod: HCNC,CPTII,S$GLB, | Performed by: ANESTHESIOLOGY

## 2023-10-13 PROCEDURE — 1101F PR PT FALLS ASSESS DOC 0-1 FALLS W/OUT INJ PAST YR: ICD-10-PCS | Mod: HCNC,CPTII,S$GLB, | Performed by: ANESTHESIOLOGY

## 2023-10-13 PROCEDURE — 1101F PT FALLS ASSESS-DOCD LE1/YR: CPT | Mod: HCNC,CPTII,S$GLB, | Performed by: ANESTHESIOLOGY

## 2023-10-13 PROCEDURE — 3079F PR MOST RECENT DIASTOLIC BLOOD PRESSURE 80-89 MM HG: ICD-10-PCS | Mod: HCNC,CPTII,S$GLB, | Performed by: ANESTHESIOLOGY

## 2023-10-13 PROCEDURE — 73502 X-RAY EXAM HIP UNI 2-3 VIEWS: CPT | Mod: TC,HCNC,PO,LT

## 2023-10-13 PROCEDURE — 99214 PR OFFICE/OUTPT VISIT, EST, LEVL IV, 30-39 MIN: ICD-10-PCS | Mod: HCNC,S$GLB,, | Performed by: ANESTHESIOLOGY

## 2023-10-13 PROCEDURE — 3079F DIAST BP 80-89 MM HG: CPT | Mod: HCNC,CPTII,S$GLB, | Performed by: ANESTHESIOLOGY

## 2023-10-13 PROCEDURE — 99999 PR PBB SHADOW E&M-EST. PATIENT-LVL III: CPT | Mod: PBBFAC,HCNC,, | Performed by: ANESTHESIOLOGY

## 2023-10-13 PROCEDURE — 99214 OFFICE O/P EST MOD 30 MIN: CPT | Mod: HCNC,S$GLB,, | Performed by: ANESTHESIOLOGY

## 2023-10-13 PROCEDURE — 1126F PR PAIN SEVERITY QUANTIFIED, NO PAIN PRESENT: ICD-10-PCS | Mod: HCNC,CPTII,S$GLB, | Performed by: ANESTHESIOLOGY

## 2023-10-13 PROCEDURE — 1126F AMNT PAIN NOTED NONE PRSNT: CPT | Mod: HCNC,CPTII,S$GLB, | Performed by: ANESTHESIOLOGY

## 2023-10-13 PROCEDURE — 99999 PR PBB SHADOW E&M-EST. PATIENT-LVL III: ICD-10-PCS | Mod: PBBFAC,HCNC,, | Performed by: ANESTHESIOLOGY

## 2023-10-13 PROCEDURE — 1159F MED LIST DOCD IN RCRD: CPT | Mod: HCNC,CPTII,S$GLB, | Performed by: ANESTHESIOLOGY

## 2023-10-13 PROCEDURE — 3288F FALL RISK ASSESSMENT DOCD: CPT | Mod: HCNC,CPTII,S$GLB, | Performed by: ANESTHESIOLOGY

## 2023-10-13 PROCEDURE — 3077F SYST BP >= 140 MM HG: CPT | Mod: HCNC,CPTII,S$GLB, | Performed by: ANESTHESIOLOGY

## 2023-10-13 PROCEDURE — 3077F PR MOST RECENT SYSTOLIC BLOOD PRESSURE >= 140 MM HG: ICD-10-PCS | Mod: HCNC,CPTII,S$GLB, | Performed by: ANESTHESIOLOGY

## 2023-10-13 NOTE — TELEPHONE ENCOUNTER
Please let the patient know that I reviewed her hip x-rays, she does have arthritis, this is not severe.  I think it would be worth trying hip joint injection 1st just to see if that eliminates her groin and hip pain.  Please let her know that we often do this through Radiology so that she does not need to have a  or come to the surgery center.  Please have her follow-up 2 weeks after this injection.  I will fill out the radiology orders

## 2023-10-13 NOTE — TELEPHONE ENCOUNTER
Spoke with patient, reviewed imaging results and provider recommendations. Patient verbalized understanding and will call back to make a follow up appointment when she has a date with radiology for her hip joint injection.

## 2023-10-13 NOTE — PROGRESS NOTES
This note was completed with dictation software and grammatical errors may exist.    CC:  Back pain, left leg pain    HPI:  The patient is a 78-year-old woman with a history of carotid artery stenosis, osteoarthritis who presents in referral from Ynes Uriostegui for back and right leg pain.  She returns in follow-up today for pain across the back and throughout the left leg.  Previously she was having a great deal of neck pain, this is doing much better.  She also previously had more right leg pain but now she has pain across her back, worse on the left radiating into the left lateral thigh, medial thigh, left groin.  She feels that she is limping, worse with standing and walking, sometimes with rolling over in bed.  She denies any pain radiating further down the leg, nothing in the lower leg or foot.  She denies any numbness or tingling.            She returns in follow-up today with neck pain.  We had last seen her over a year ago for her low back which is not currently bothering her significantly.  She describes that she has had pain for the past 2 months after she fell onto her face.  She describes burning pain in the left greater than right lower cervical region radiating to the left greater than right trapezius muscles and down her left arm to her elbow.  Her pain is worse when she is reading and worse with leaning forward to put on her makeup.  She does have some improvement with Voltaren and Flexeril.  She denies weakness or numbness, denies bladder or bowel incontinence.    Pain intervention history:  She is status post right L4/5 transforaminal injection on 08/27/2018 with 100% relief of her right leg pain. She is status post right L2, 3, 4, 5 medial branch block on 10/15/2018 with 100% relief for 6 hr, status post radiofrequency ablation of right L2, 3, 4, 5 medial branch RFA on 10/25/2018. She returns in follow-up today, she is status post right L2/3 and L3/4 transforaminal injection on 12/14/18 with What  she reports is 75% relief. She is status post L5/S1 DOROTHEA on 2019 with 100% relief for about two weeks but then she fell on her right side.She is status post L5/S1 DOROTHEA on 2019 with 70% relief of her right leg pain.  She is status post right L2, 3, 4, 5 RFA on 2021 with what she describes as 75% relief.       Spine surgeries:    Antineuropathics:  Gabapentin 600 mg twice daily  NSAIDs:  Voltaren 50 mg twice daily as needed  Physical therapy:  In past for lumbar spine  Antidepressants:  Duloxetine 60 mg daily  Muscle relaxers:  Flexeril 10 mg 3 times daily as needed  Opioids:  Tramadol 50 mg 3 times daily as needed  Antiplatelets/Anticoagulants:  ASA 81 mg    ROS:  She reports hypertension.  Balance of review of systems is negative.    Past Medical History:   Diagnosis Date    Anticoagulant long-term use     Arthritis     Carotid artery occlusion     Depression 2012    Diverticulosis     Former smoker     GERD (gastroesophageal reflux disease)     HLD (hyperlipidemia) 2012    HTN (hypertension) 2012    Infectious gastroenteritis     Ischemic colitis     Macular degeneration        Past Surgical History:   Procedure Laterality Date    APPENDECTOMY      carotid angiogram      CAROTID ENDARTERECTOMY Right 2018    Procedure: Endarterectomy-Carotid - RIGHT;  Surgeon: Safia Thomas MD;  Location: [facility] OR;  Service: Cardiovascular;  Laterality: Right;  with patch  angioplasty     SECTION      x 3     COLON SURGERY      COLONOSCOPY N/A 2016    Procedure: COLONOSCOPY;  Surgeon: Grzegorz Sousa Jr., MD;  Location: [facility] ENDO;  Service: Endoscopy;  Laterality: N/A;    COLONOSCOPY  2009    Dr. Reyes in legacy, repeat in 5 years    COLONOSCOPY N/A 2020    Procedure: COLONOSCOPY;  Surgeon: Grzegorz Sousa Jr., MD;  Location: [facility] ENDO;  Service: Endoscopy;  Laterality: N/A;    COLOSTOMY N/A 2019    Procedure: CREATION, COLOSTOMY;  Surgeon: Brandi Alamo MD;   Location: [facility] OR;  Service: General;  Laterality: N/A;    CYSTOSCOPY Left 8/12/2019    Procedure: CYSTOSCOPY, Stent placement;  Surgeon: Pablito Roblero MD;  Location: [facility] OR;  Service: Urology;  Laterality: Left;    EPIDURAL STEROID INJECTION INTO LUMBAR SPINE N/A 5/6/2019    Procedure: Injection-steroid-epidural-lumbar;  Surgeon: Jakub Greer MD;  Location: [facility] OR;  Service: Pain Management;  Laterality: N/A;  L5/S1 INTERLAMINAR TO RIGHT    EPIDURAL STEROID INJECTION INTO LUMBAR SPINE Right 6/19/2019    Procedure: Injection-steroid-epidural-lumbar;  Surgeon: Jakub Greer MD;  Location: [facility] OR;  Service: Pain Management;  Laterality: Right;  L5/S1 to right    EYE SURGERY Bilateral     cataract    [facility]    HEMORRHOID SURGERY      HYSTERECTOMY  age 35    TAHUSO benign reasons    INJECTION OF ANESTHETIC AGENT AROUND MEDIAL BRANCH NERVES INNERVATING LUMBAR FACET JOINT Right 10/15/2018    Procedure: Block-nerve-medial branch-lumbar L2,3,4,5;  Surgeon: Jakub Greer MD;  Location: [facility] OR;  Service: Pain Management;  Laterality: Right;    JOINT REPLACEMENT Right     1st interphalangeal joint of 3rd finger    LYSIS OF ADHESIONS N/A 1/9/2020    Procedure: LYSIS, ADHESIONS EXTENSIVE;  Surgeon: Brandi Alamo MD;  Location: [facility] OR;  Service: Colon and Rectal;  Laterality: N/A;    MOBILIZATION OF SPLENIC FLEXURE N/A 1/9/2020    Procedure: MOBILIZATION, SPLENIC FLEXURE-TAKEDOWN;  Surgeon: Brandi Alamo MD;  Location: [facility] OR;  Service: Colon and Rectal;  Laterality: N/A;    OOPHORECTOMY      one remains    RADIOFREQUENCY ABLATION OF LUMBAR MEDIAL BRANCH NERVE AT SINGLE LEVEL Right 10/25/2018    Procedure: RADIOFREQUENCY ABLATION, NERVE, SPINAL, LUMBAR, MEDIAL BRANCH, THERMAL- L2,L3,L4,L5;  Surgeon: Jakub Greer MD;  Location: [facility] OR;  Service: Pain Management;  Laterality: Right;    RADIOFREQUENCY ABLATION OF LUMBAR MEDIAL  "BRANCH NERVE AT SINGLE LEVEL Right 4/12/2021    Procedure: Radiofrequency Ablation, Nerve, Spinal, Lumbar, Medial Branch, L2, 3, 4, 5;  Surgeon: Jakub Greer MD;  Location: [facility] OR;  Service: Pain Management;  Laterality: Right;    ROBOT-ASSISTED REPAIR OF INCISIONAL HERNIA USING DA JACOB XI N/A 10/15/2020    Procedure: XI ROBOTIC REPAIR, HERNIA, INCISIONAL;  Surgeon: Brandi Alamo MD;  Location: [facility] OR;  Service: General;  Laterality: N/A;  ATTEMPTED - CONVERTED TO OPEN PROCEDURE.    SPLENECTOMY N/A 8/12/2019    Procedure: SPLENECTOMY;  Surgeon: Brandi Alamo MD;  Location: [facility] OR;  Service: General;  Laterality: N/A;    TONSILLECTOMY      UPPER GASTROINTESTINAL ENDOSCOPY  12/14/2017    Dr. Sousa: Tiny gastric islands of salmon-colored mucosa in distal esophagus, gastritis; biopsy: esophagus- reflux esophagitis, negative for lisa's, stomach- reactive gastropathy, negative for h pylori       Social History     Socioeconomic History    Marital status:     Number of children: 3   Occupational History    Occupation: retired     Employer: [employer]   Tobacco Use    Smoking status: Former     Current packs/day: 0.00     Average packs/day: 0.5 packs/day for 48.7 years (24.3 ttl pk-yrs)     Types: Cigarettes     Start date: 8/2/1964     Quit date: 3/28/2013     Years since quitting: 10.5    Smokeless tobacco: Never   Substance and Sexual Activity    Alcohol use: Yes     Alcohol/week: 0.0 standard drinks of alcohol     Comment: 0-2 ETOHic drinks per day; wine and gin    Drug use: No    Sexual activity: Yes     Partners: Male         Medications/Allergies: See med card    Vitals:    10/13/23 1026   Weight: 65.8 kg (144 lb 15.2 oz)   Height: 5' 3" (1.6 m)   PainSc: 0-No pain   PainLoc: Back         10/13/2023    10:27 AM 3/18/2021    11:19 AM 2/18/2019     3:31 PM   Last 3 PDI Scores   Pain Disability Index (PDI) 27 0 0           Physical exam:    Gen: A and O x3, pleasant, well-groomed  Skin: No " rashes or obvious lesions  HEENT: PERRLA, no obvious deformities on ears or in canals. Trachea midline.  CVS: Regular rate and rhythm, normal palpable pulses.  Resp:No increased work of breathing, symmetrical chest rise.  Abdomen: Soft, NT/ND.  Musculoskeletal: Able to heel walk, toe walk. No antalgic gait.   Coordination   Romberg: negative  Finger to nose coordination: normal  Heel to shin coordination: normal  Tandem walking coordination: normal        Neuro:    Iliopsoas Quadriceps Knee  Flexion Tibialis  anterior Gastro- cnemius EHL   Lower: R 5/5 5/5 5/5 5/5 5/5 5/5    L 5/5 5/5 5/5 5/5 5/5 5/5      Left  Right    Patellar DTR 2+ 2+   Achilles DTR 2+ 2+                    Intact and symmetrical to light touch and pinprick in L1-S1 dermatomes bilaterally.    Lumbar spine:  Lumbar spine: ROM is full with flexion extension and oblique extension with increased pain on extension, especially oblique extension   Mika's test causes no increased pain on either side.    Supine straight leg raise is negative bilaterally.    Internal and external rotation of the hip causes increased pain in the left groin, left buttock and back  Myofascial exam: No tenderness to palpation across lumbar paraspinous muscles.     Imagin18 MRI L-spine  Vertebral column: There is multilevel degenerative change.  There is no fracture.  Comparison plain films demonstrate a very gentle rotary levocurvature of the lumbar spine.  There is 2 mm retrolisthesis of L2 on L3.  This is exaggerated by endplate osteophyte formation.  There is moderate disc space narrowing at the L2-3 level where there is also associated degenerative endplate signal change.  All of the discs are mildly desiccated.  There is no other significant disc space narrowing.  There is mild disc space narrowing at the L3-4 level.  Baseline marrow signal intensity is within normal limits.  T12-L1: Unremarkable.  There is no spinal canal or significant foraminal  stenosis.  L1-2: There is mild facet joint arthropathy.  There is no spinal canal or significant foraminal stenosis.  L2-3: There is disc space narrowing.  There is trace retrolisthesis of L2 on L3.  There is a diffuse disc bulge with osteophytic ridging.  There is mild-to-moderate facet joint arthropathy with ligamentum flavum thickening, right greater than left.  There is no spinal stenosis.  There is mild bilateral foraminal stenosis.  L3-4: There is mild disc space narrowing.  There is a mild-to-moderate diffuse disc bulge.  There is moderate-to-marked facet joint arthropathy with ligamentum flavum thickening.  There is mildly prominent dorsal epidural fat.  There is moderate central spinal stenosis.  AP measurement of the dural sac is 7 mm there is mild bilateral foraminal stenosis.  L4-5: There is a diffuse disc bulge with osteophytic ridging slightly eccentric to the right.  There is moderate facet joint arthropathy with ligamentum flavum thickening.  There is borderline spinal stenosis.  There is mild bilateral foraminal stenosis.  L5-S1: There is moderate facet joint arthropathy.  There is minimal bulging of the annulus.  There is no spinal canal or significant foraminal stenosis.  Soft tissues, other: The prevertebral soft tissues are normal.  The aorta is somewhat ectatic.    02/16/2019 MRI lumbar spine  Infrarenal aortic dilatation is noted to 2.7 cm prior to aortic bifurcation suggestive of aortic ectasia  Some mild adrenal thickening on the left is questioned that could be artifactual or relate to a process such as a adenoma or mass without convincing detrimental change since 06/11/2018.  Stable fluid signal intensities are noted within the left kidney 1 of 8 mm and 1 of 4 mm nonspecific on this examination but statistically favored relate to renal cysts.  Vertebral body heights appear well preserved.  Mild intervertebral disc height loss is noted at the L2-L3 L3-L4 levels in particular.  A mild  retrolisthesis is noted of the L2 on L3 vertebral body of 4 mm  No STIR signal abnormality is noted to suggest an aggressive bone marrow replacement process or recent fracture.  The spinal cord appears to terminate at the L1 level.  At the T12-L1  level, no significant central canal stenosis, neural foraminal stenosis, or disc bulge is noted.  At the L1-L2 level, no significant central canal stenosis, neural foraminal stenosis, or disc bulge is noted. Mild ligamentous hypertrophy is noted  At the L2-L3 level, facet arthropathy and ligamentous hypertrophy appears to be present.  Posterior disc osteophyte spurring or bulge is noted of 3 mm.  Ligamentous hypertrophy is noted.  Broad-based bulge is noted towards the far lateral left and left neural foramen greater than far lateral right and towards the right neural foramen.  Possible contact of the exiting left nerve root is noted.  The broad-based bulge far laterally is of 4 mm.  Mild right neural foraminal narrowing is noted.  Mild to moderate left neural foraminal stenosis is noted.  Mild thecal sac narrowing is noted to 10 mm.  A similar appearance is noted on the prior.  At the L3-L4 level, bilateral facet arthropathy and ligamentous hypertrophy appears to be present.  Broad-based bulging is noted towards each neural foramen and far laterally bilaterally with a bulge far lateral to the left of 4 mm greater than bulging to the right.  Mild left neural foraminal stenosis greater than right-sided neural foraminal stenosis appears to be present.  Moderate thecal sac narrowing is noted to 8 mm a similar appearance is noted on the prior.  At the L4-L5 level, ligamentous hypertrophy and facet arthropathy appears to be present.  Broad-based bulge is noted far laterally bilaterally.  Moderate thecal sac narrowing is noted to 8 mm.  Mild to moderate right neural foraminal stenosis is noted with mild left neural foraminal stenosis.  Some increased T2 signal is noted within  the disc far lateral to the left as can be seen with annular tear.  At the L5-S1 level, facet arthropathy and ligamentous hypertrophy is noted.  Broad-based bulge is noted towards the left neural foramen and far lateral left of 4 mm greater than towards the right.  Mild left greater than right neural foraminal narrowing is noted.  No significant central canal stenosis is noted.  Abnormal wall thickening is noted to the distal colon in its partially visualized portion.  This could relate to scarring, lack of distension, or an infiltrative or inflammatory process.  Correlation with the patient's history of colonoscopy is suggested.    9/30/22 xray C-spine:  Surgical clips are noted within the right neck.  Atherosclerotic calcifications are noted in the expected location of the carotids on left in particular. Ultrasound may be of use for better evaluation. Atherosclerotic changes are noted at the aortic arch. Osseous demineralization is noted diffusely.   Intervertebral disc height loss is noted at the C5-C6 level and to a lesser degree C3-C4 and C4-C5 levels.  Vertebral body heights appear well preserved.  No malalignment is noted upon flexion and extension.  A minimal stable retrolisthesis may be noted the C5 on C6 vertebral body of 3 mm without change upon flexion and extension.  The atlas and odontoid appear in good relationship to each other.  Osseous neural foraminal narrowing is noted at the C3-C4 C4-C5 C5-C6 levels on the left and right    Assessment:  The patient is a 78-year-old woman with a history of carotid artery stenosis, osteoarthritis who presents in referral from Ynes Uriostegui for back and right leg pain.    1. Left hip pain  X-Ray Hip 2 or 3 views Left (with Pelvis when performed)      2. Left lumbar radiculopathy        3. DDD (degenerative disc disease), lumbar        4. Lumbar spondylosis              Plan:  1. We reviewed her exam, reviewed her symptoms and lumbar spine MRI.  She does have back  pain and while this is mostly axial back pain she does have pain in the left groin and leg.  I am not sure if this is actually coming from her hip joint or if this could be coming from her back.  She does have some foraminal narrowing from disc bulging at the left side at L2/3.  I am going to get an x-ray of her left hip joint, if it looks like she has more just arthritis, we may start with hip joint injection.  Otherwise if there is no hip pathology, we may consider an epidural steroid at L2/3 and she seems to have some axial back pain, likely facet mediated pain as well.  We will call her with the results of the x-ray.

## 2023-10-24 ENCOUNTER — PATIENT MESSAGE (OUTPATIENT)
Dept: PAIN MEDICINE | Facility: CLINIC | Age: 78
End: 2023-10-24
Payer: MEDICARE

## 2023-11-13 ENCOUNTER — HOSPITAL ENCOUNTER (OUTPATIENT)
Dept: RADIOLOGY | Facility: HOSPITAL | Age: 78
Discharge: HOME | End: 2023-11-13
Attending: ANESTHESIOLOGY
Payer: MEDICARE

## 2023-11-13 DIAGNOSIS — M25.552 LEFT HIP PAIN: ICD-10-CM

## 2023-11-13 PROCEDURE — 20610 DRAIN/INJ JOINT/BURSA W/O US: CPT | Mod: HCNC,PO,LT

## 2023-11-13 PROCEDURE — 77002 IR ASPIRATION INJECTION LARGE JOINT W FLUORO: ICD-10-PCS | Mod: 26,HCNC,, | Performed by: RADIOLOGY

## 2023-11-13 PROCEDURE — 20610 IR ASPIRATION INJECTION LARGE JOINT W FLUORO: ICD-10-PCS | Mod: HCNC,LT,, | Performed by: RADIOLOGY

## 2023-11-13 PROCEDURE — 20610 DRAIN/INJ JOINT/BURSA W/O US: CPT | Mod: HCNC,LT,, | Performed by: RADIOLOGY

## 2023-11-13 PROCEDURE — 77002 NEEDLE LOCALIZATION BY XRAY: CPT | Mod: 26,HCNC,, | Performed by: RADIOLOGY

## 2023-11-13 PROCEDURE — 25500020 PHARM REV CODE 255: Mod: HCNC,PO | Performed by: ANESTHESIOLOGY

## 2023-11-13 PROCEDURE — 63600175 PHARM REV CODE 636 W HCPCS: Mod: HCNC,PO | Performed by: ANESTHESIOLOGY

## 2023-11-13 RX ORDER — TRIAMCINOLONE ACETONIDE 40 MG/ML
40 INJECTION, SUSPENSION INTRA-ARTICULAR; INTRAMUSCULAR ONCE
Status: COMPLETED | OUTPATIENT
Start: 2023-11-13 | End: 2023-11-13

## 2023-11-13 RX ORDER — BUPIVACAINE HYDROCHLORIDE 2.5 MG/ML
75 INJECTION, SOLUTION EPIDURAL; INFILTRATION; INTRACAUDAL ONCE
Status: COMPLETED | OUTPATIENT
Start: 2023-11-13 | End: 2023-11-13

## 2023-11-13 RX ADMIN — IOHEXOL 240 ML: 240 INJECTION, SOLUTION INTRATHECAL; INTRAVASCULAR; INTRAVENOUS; ORAL at 10:11

## 2023-11-13 RX ADMIN — BUPIVACAINE HYDROCHLORIDE 75 MG: 2.5 INJECTION, SOLUTION EPIDURAL; INFILTRATION; INTRACAUDAL at 10:11

## 2023-11-13 RX ADMIN — TRIAMCINOLONE ACETONIDE 40 MG: 40 INJECTION, SUSPENSION INTRA-ARTICULAR; INTRAMUSCULAR at 10:11

## 2023-12-15 PROBLEM — M67.922 BICEPS TENDINOPATHY, LEFT: Status: ACTIVE | Noted: 2023-12-15

## 2023-12-15 PROBLEM — S46.012A TRAUMATIC COMPLETE TEAR OF LEFT ROTATOR CUFF: Status: ACTIVE | Noted: 2023-12-15

## 2024-01-17 NOTE — TELEPHONE ENCOUNTER
----- Message from Jing Brown sent at 1/17/2024  4:31 PM CST -----  Regarding: Refill request  Contact: pt  Type:  RX Refill Request    Who Called: pt    Refill or New Rx: new    RX Name and Strength:Trazodone  50mg    How is the patient currently taking it? (ex. 1XDay): 1/day  Is this a 30 day or 90 day RX:90    Preferred Pharmacy with phone number:    [facility]/pharmacy #[id number] - [place] - [address] AT [facility]  [address] [place] [address]  Phone: [phone] Fax: [phone]    Local or Mail Order:local  Ordering Provider:Bridger    Would the patient rather a call back or a response via [facility]? Call back    Best Call Back Number:[phone]    Additional Information: pt is requesting a new rx for Trazodone.  She is having trouble sleeping and finds this is the only rx that helps.  Please advise.  Thank you.

## 2024-01-17 NOTE — TELEPHONE ENCOUNTER
No care due was identified.  [facility] Embedded Care Due Messages. Reference number: [id number].   1/17/2024 4:42:31 PM CST

## 2024-01-20 RX ORDER — TRAZODONE HYDROCHLORIDE 50 MG/1
50 TABLET ORAL NIGHTLY
Qty: 90 TABLET | Refills: 3 | Status: SHIPPED | OUTPATIENT
Start: 2024-01-20

## 2024-02-14 ENCOUNTER — TELEPHONE (OUTPATIENT)
Dept: PAIN MEDICINE | Facility: CLINIC | Age: 79
End: 2024-02-14
Payer: MEDICARE

## 2024-02-14 RX ORDER — TRAMADOL HYDROCHLORIDE 50 MG/1
50 TABLET ORAL 3 TIMES DAILY PRN
Qty: 20 TABLET | Refills: 0 | Status: SHIPPED | OUTPATIENT
Start: 2024-02-14

## 2024-02-14 NOTE — TELEPHONE ENCOUNTER
----- Message from Cris Mack sent at 2/14/2024  9:03 AM CST -----  Contact: self  Type:  RX Refill Request    Who Called:  Patient  Refill or New Rx:  Refill  RX Name and Strength:  Tramadol 50 mg   How is the patient currently taking it? (ex. 1XDay):  as directed  Is this a 30 day or 90 day RX:  as directed  Preferred Pharmacy with phone number:      [id number] - [place] [address]  [place] [address]  Phone: [phone] Fax: [phone]    Local or Mail Order:  Local  Ordering Provider:  Percy Sauceda Call Back Number:  [phone]    Additional Information:  Pt states she need a refill.Please call back

## 2024-02-14 NOTE — TELEPHONE ENCOUNTER
----- Message from Cris Mack sent at 2/14/2024  9:03 AM CST -----  Contact: self  Type:  RX Refill Request    Who Called:  Patient  Refill or New Rx:  Refill  RX Name and Strength:  Tramadol 50 mg   How is the patient currently taking it? (ex. 1XDay):  as directed  Is this a 30 day or 90 day RX:  as directed  Preferred Pharmacy with phone number:      [id number] [place] [facility] [address]  [place] [address]  Phone: [phone] Fax: [phone]    Local or Mail Order:  Local  Ordering Provider:  Percy Sauceda Call Back Number:  [phone]    Additional Information:  Pt states she need a refill.Please call back

## 2024-04-09 ENCOUNTER — TELEPHONE (OUTPATIENT)
Dept: GASTROENTEROLOGY | Facility: CLINIC | Age: 79
End: 2024-04-09
Payer: MEDICARE

## 2024-04-09 DIAGNOSIS — N94.89 SIMPLE ADNEXAL CYST LESS THAN 5 CM IN DIAMETER IN PREMENOPAUSAL PATIENT: Primary | ICD-10-CM

## 2024-04-09 DIAGNOSIS — N95.8 SIMPLE ADNEXAL CYST LESS THAN 5 CM IN DIAMETER IN PREMENOPAUSAL PATIENT: Primary | ICD-10-CM

## 2024-04-09 NOTE — TELEPHONE ENCOUNTER
----- Message from Maryam Robbins sent at 4/8/2024  4:38 PM CDT -----  Type:  Needs Medical Advice    Who Called: pt   Best Call Back Number: [phone] (home)     Additional Information:  Requesting call back  pt has had bright red blood in stool. For the pass week dn wants to discussed     please advise thank you

## 2024-04-09 NOTE — TELEPHONE ENCOUNTER
Returned call to the patient, patient complains of bright red bleeding from her rectum, patient states that it is mixed with her stool and on the paper when she wipes, patient advised to go to the ER for evaluation as she also reports that this has been going on for a week, patient verbalized understanding and states that she will go to the ER.  Message sent to Dr. Sousa as well.

## 2024-04-10 ENCOUNTER — TELEPHONE (OUTPATIENT)
Dept: GASTROENTEROLOGY | Facility: CLINIC | Age: 79
End: 2024-04-10
Payer: MEDICARE

## 2024-04-10 ENCOUNTER — TELEPHONE (OUTPATIENT)
Dept: OBSTETRICS AND GYNECOLOGY | Facility: CLINIC | Age: 79
End: 2024-04-10
Payer: MEDICARE

## 2024-04-10 NOTE — TELEPHONE ENCOUNTER
----- Message from Oneida Rubio RRT sent at 4/9/2024  6:42 PM CDT -----  Regarding: f/u [facility] ED 4/9 - adnexal mass  Patient needs follow up after ED visit. Incidental finding on CT of abd/pelvis:  6.9 x 5.3 cm cystic right adnexal lesion. Patient does not currently follow with a gyn. [facility]      Thanks!    Oneida Rubio, BS, RRT  ED Navigator, Case Management  [phone]  [facility]

## 2024-04-10 NOTE — TELEPHONE ENCOUNTER
----- Message from Oneida Rubio, RRT sent at 4/9/2024  6:30 PM CDT -----  Regarding: f/u [facility] ED 4/9 - [facility]  Patient of Dr. Sousa. Needs follow up after ED visit for rectal bleeding. Occult blood negative. H & H WNL.      Thanks!    Oneida Rubio, BS, RRT  ED Navigator, Case Management  [phone]  [facility]

## 2024-04-10 NOTE — TELEPHONE ENCOUNTER
Other orders received from Dr. Sousa that were taken care of as outpatient without an office visit.

## 2024-04-10 NOTE — TELEPHONE ENCOUNTER
Called and spoke with the patient, patient scheduled for Pelvic ultrasound as well as Colonoscopy.  Prep instructions sent to patient portal, patient verbalized understanding of this.      Per Dr. Sousa-  Pt seen in ER.  See CT report.  Was D/D'd.  Need to set her up for colonoscopy.

## 2024-04-15 ENCOUNTER — HOSPITAL ENCOUNTER (OUTPATIENT)
Dept: RADIOLOGY | Facility: HOSPITAL | Age: 79
Discharge: HOME | End: 2024-04-15
Attending: INTERNAL MEDICINE
Payer: MEDICARE

## 2024-04-15 DIAGNOSIS — N95.8 SIMPLE ADNEXAL CYST LESS THAN 5 CM IN DIAMETER IN PREMENOPAUSAL PATIENT: ICD-10-CM

## 2024-04-15 DIAGNOSIS — N94.89 SIMPLE ADNEXAL CYST LESS THAN 5 CM IN DIAMETER IN PREMENOPAUSAL PATIENT: ICD-10-CM

## 2024-04-15 PROCEDURE — 76856 US EXAM PELVIC COMPLETE: CPT | Mod: 26,,, | Performed by: RADIOLOGY

## 2024-04-15 PROCEDURE — 76830 TRANSVAGINAL US NON-OB: CPT | Mod: 26,,, | Performed by: RADIOLOGY

## 2024-04-15 PROCEDURE — 76856 US EXAM PELVIC COMPLETE: CPT | Mod: TC,PO

## 2024-04-17 ENCOUNTER — LAB VISIT (OUTPATIENT)
Dept: LAB | Facility: HOSPITAL | Age: 79
End: 2024-04-17
Attending: OBSTETRICS & GYNECOLOGY
Payer: MEDICARE

## 2024-04-17 ENCOUNTER — OFFICE VISIT (OUTPATIENT)
Dept: OBSTETRICS AND GYNECOLOGY | Facility: CLINIC | Age: 79
End: 2024-04-17
Payer: MEDICARE

## 2024-04-17 VITALS
DIASTOLIC BLOOD PRESSURE: 64 MMHG | HEIGHT: 63 IN | SYSTOLIC BLOOD PRESSURE: 100 MMHG | BODY MASS INDEX: 21.17 KG/M2 | WEIGHT: 119.5 LBS

## 2024-04-17 DIAGNOSIS — Z90.49 H/O COLECTOMY: ICD-10-CM

## 2024-04-17 DIAGNOSIS — N83.8 OVARIAN MASS, RIGHT: ICD-10-CM

## 2024-04-17 DIAGNOSIS — D39.11 NEOPLASM OF UNCERTAIN BEHAVIOR OF RIGHT OVARY: Primary | ICD-10-CM

## 2024-04-17 DIAGNOSIS — Z93.3 S/P COLOSTOMY: ICD-10-CM

## 2024-04-17 DIAGNOSIS — N73.6 PELVIC ADHESIVE DISEASE: ICD-10-CM

## 2024-04-17 DIAGNOSIS — D39.11 NEOPLASM OF UNCERTAIN BEHAVIOR OF RIGHT OVARY: ICD-10-CM

## 2024-04-17 DIAGNOSIS — K92.1 HEMATOCHEZIA: ICD-10-CM

## 2024-04-17 DIAGNOSIS — N83.201 RIGHT OVARIAN CYST: ICD-10-CM

## 2024-04-17 LAB
CANCER AG125 SERPL-ACNC: 18 U/ML (ref 0–30)
ESTRADIOL SERPL-MCNC: <10 PG/ML
LDH SERPL L TO P-CCNC: 153 U/L (ref 110–260)

## 2024-04-17 PROCEDURE — 99999 PR PBB SHADOW E&M-EST. PATIENT-LVL IV: CPT | Mod: PBBFAC,,, | Performed by: OBSTETRICS & GYNECOLOGY

## 2024-04-17 PROCEDURE — 83520 IMMUNOASSAY QUANT NOS NONAB: CPT | Performed by: OBSTETRICS & GYNECOLOGY

## 2024-04-17 PROCEDURE — 82670 ASSAY OF TOTAL ESTRADIOL: CPT | Performed by: OBSTETRICS & GYNECOLOGY

## 2024-04-17 PROCEDURE — 84402 ASSAY OF FREE TESTOSTERONE: CPT | Performed by: OBSTETRICS & GYNECOLOGY

## 2024-04-17 PROCEDURE — 99203 OFFICE O/P NEW LOW 30 MIN: CPT | Mod: S$GLB,,, | Performed by: OBSTETRICS & GYNECOLOGY

## 2024-04-17 PROCEDURE — 3074F SYST BP LT 130 MM HG: CPT | Mod: CPTII,S$GLB,, | Performed by: OBSTETRICS & GYNECOLOGY

## 2024-04-17 PROCEDURE — 86304 IMMUNOASSAY TUMOR CA 125: CPT | Performed by: OBSTETRICS & GYNECOLOGY

## 2024-04-17 PROCEDURE — 1101F PT FALLS ASSESS-DOCD LE1/YR: CPT | Mod: CPTII,S$GLB,, | Performed by: OBSTETRICS & GYNECOLOGY

## 2024-04-17 PROCEDURE — 1126F AMNT PAIN NOTED NONE PRSNT: CPT | Mod: CPTII,S$GLB,, | Performed by: OBSTETRICS & GYNECOLOGY

## 2024-04-17 PROCEDURE — 1159F MED LIST DOCD IN RCRD: CPT | Mod: CPTII,S$GLB,, | Performed by: OBSTETRICS & GYNECOLOGY

## 2024-04-17 PROCEDURE — 3078F DIAST BP <80 MM HG: CPT | Mod: CPTII,S$GLB,, | Performed by: OBSTETRICS & GYNECOLOGY

## 2024-04-17 PROCEDURE — 83615 LACTATE (LD) (LDH) ENZYME: CPT | Performed by: OBSTETRICS & GYNECOLOGY

## 2024-04-17 PROCEDURE — 3288F FALL RISK ASSESSMENT DOCD: CPT | Mod: CPTII,S$GLB,, | Performed by: OBSTETRICS & GYNECOLOGY

## 2024-04-17 NOTE — PROGRESS NOTES
Chief Complaint   Patient presents with    Consult     Discuss CT & US Result       History of Present Illness: Haley Castro is a 79 y.o. female that presents today 4/17/2024 for   Chief Complaint   Patient presents with    Consult     Discuss CT & US Result   Reports history of colectomy with stoma and reversal 4 years ago.  She noticed blood in stool for last two weeks.  Went to ER and imaging found RIGHT ovarian mass.  She desires removal.   She says her surgeon told her her colon was like concrete with extensive adhesive disease. She denies any pelvic pain.       Past Medical History:   Diagnosis Date    Anticoagulant long-term use     aspirin    Arthritis     Bilateral carotid artery stenosis     Carotid artery occlusion     DDD (degenerative disc disease), lumbar     Depression 11/29/2012    Diverticulosis     Former smoker     GERD (gastroesophageal reflux disease)     HLD (hyperlipidemia) 11/29/2012    HTN (hypertension) 11/29/2012    Infectious gastroenteritis     Ischemic colitis     Macular degeneration     OAB (overactive bladder)     PVD (peripheral vascular disease)     Traumatic complete tear of left rotator cuff, subsequent encounter 12/2023       Past Surgical History:   Procedure Laterality Date    APPENDECTOMY      ARTHROSCOPIC REPAIR OF ROTATOR CUFF OF SHOULDER Left 1/3/2024    Procedure: REPAIR, ROTATOR CUFF, ARTHROSCOPIC;  Surgeon: Db Akers II, MD;  Location: [facility] OR;  Service: Orthopedics;  Laterality: Left;    ARTHROSCOPY OF SHOULDER WITH DECOMPRESSION OF SUBACROMIAL SPACE Left 1/3/2024    Procedure: ARTHROSCOPY, SHOULDER, WITH SUBACROMIAL SPACE DECOMPRESSION;  Surgeon: Db Akers II, MD;  Location: [facility] OR;  Service: Orthopedics;  Laterality: Left;    carotid angiogram      CAROTID ENDARTERECTOMY Right 07/30/2018    Procedure: Endarterectomy-Carotid - RIGHT;  Surgeon: Safia Thomas MD;  Location: [facility] OR;  Service: Cardiovascular;  Laterality: Right;  with patch   angioplasty     SECTION      x 3     COLON SURGERY      COLONOSCOPY N/A 2016    Procedure: COLONOSCOPY;  Surgeon: Grzegorz Sousa Jr., MD;  Location: [facility] ENDO;  Service: Endoscopy;  Laterality: N/A;    COLONOSCOPY  2009    Dr. Reyes in [facility], repeat in 5 years    COLONOSCOPY N/A 2020    Procedure: COLONOSCOPY;  Surgeon: Grzegorz Sousa Jr., MD;  Location: [facility] ENDO;  Service: Endoscopy;  Laterality: N/A;    COLOSTOMY N/A 2019    Procedure: CREATION, COLOSTOMY;  Surgeon: Brandi Alamo MD;  Location: [facility] OR;  Service: General;  Laterality: N/A;    COLOSTOMY CLOSURE      CYSTOSCOPY Left 2019    Procedure: CYSTOSCOPY, Stent placement;  Surgeon: Pablito Roblero MD;  Location: [facility] OR;  Service: Urology;  Laterality: Left;    EPIDURAL STEROID INJECTION INTO LUMBAR SPINE N/A 2019    Procedure: Injection-steroid-epidural-lumbar;  Surgeon: Jakub Greer MD;  Location: [facility];  Service: Pain Management;  Laterality: N/A;  L5/S1 INTERLAMINAR TO RIGHT    EPIDURAL STEROID INJECTION INTO LUMBAR SPINE Right 2019    Procedure: Injection-steroid-epidural-lumbar;  Surgeon: Jakub Greer MD;  Location: [facility];  Service: Pain Management;  Laterality: Right;  L5/S1 to right    EYE SURGERY Bilateral     cataract    FIXATION OF TENDON Left 1/3/2024    Procedure: FIXATION, TENDON-Open biceps tenodesis;  Surgeon: Db Akers II, MD;  Location: [facility] OR;  Service: Orthopedics;  Laterality: Left;    HAND SURGERY Right 2016    kntranle implant/Dr. Abhinav Rolle    HEMORRHOID SURGERY      HYSTERECTOMY  age 35    TAHUSO benign reasons    INJECTION OF ANESTHETIC AGENT AROUND MEDIAL BRANCH NERVES INNERVATING LUMBAR FACET JOINT Right 10/15/2018    Procedure: Block-nerve-medial branch-lumbar L2,3,4,5;  Surgeon: Jakub Greer MD;  Location: [facility] OR;  Service: Pain Management;  Laterality: Right;    JOINT REPLACEMENT Right     1st interphalangeal joint of 3rd  finger    LYSIS OF ADHESIONS N/A 01/09/2020    Procedure: LYSIS, ADHESIONS EXTENSIVE;  Surgeon: Brandi Alamo MD;  Location: [facility] OR;  Service: Colon and Rectal;  Laterality: N/A;    MOBILIZATION OF SPLENIC FLEXURE N/A 01/09/2020    Procedure: MOBILIZATION, SPLENIC FLEXURE-TAKEDOWN;  Surgeon: Brandi Alamo MD;  Location: [facility] OR;  Service: Colon and Rectal;  Laterality: N/A;    OOPHORECTOMY      one remains    RADIOFREQUENCY ABLATION OF LUMBAR MEDIAL BRANCH NERVE AT SINGLE LEVEL Right 10/25/2018    Procedure: RADIOFREQUENCY ABLATION, NERVE, SPINAL, LUMBAR, MEDIAL BRANCH, THERMAL- L2,L3,L4,L5;  Surgeon: Jakub Greer MD;  Location: [facility] OR;  Service: Pain Management;  Laterality: Right;    RADIOFREQUENCY ABLATION OF LUMBAR MEDIAL BRANCH NERVE AT SINGLE LEVEL Right 04/12/2021    Procedure: Radiofrequency Ablation, Nerve, Spinal, Lumbar, Medial Branch, L2, 3, 4, 5;  Surgeon: Jakub Greer MD;  Location: [facility] OR;  Service: Pain Management;  Laterality: Right;    ROBOT-ASSISTED REPAIR OF INCISIONAL HERNIA USING DA JACOB XI N/A 10/15/2020    Procedure: XI ROBOTIC REPAIR, HERNIA, INCISIONAL;  Surgeon: Brandi Alamo MD;  Location: [facility] OR;  Service: General;  Laterality: N/A;  ATTEMPTED - CONVERTED TO OPEN PROCEDURE.    SPLENECTOMY N/A 08/12/2019    Procedure: SPLENECTOMY;  Surgeon: Brandi Alamo MD;  Location: [facility] OR;  Service: General;  Laterality: N/A;    TONSILLECTOMY      UPPER GASTROINTESTINAL ENDOSCOPY  12/14/2017    Dr. Sousa: Tiny gastric islands of salmon-colored mucosa in distal esophagus, gastritis; biopsy: esophagus- reflux esophagitis, negative for lisa's, stomach- reactive gastropathy, negative for h pylori       Outpatient Medications Prior to Visit   Medication Sig Dispense Refill    aspirin 81 MG Chew Baby Aspirin 81 mg chewable tablet   Chew 1 tablet every day by oral route.      atorvastatin (LIPITOR) 40 MG tablet Take 1 tablet (40 mg total) by mouth every evening.  90 tablet 4    carvediloL (COREG) 6.25 MG tablet Take 1 tablet (6.25 mg total) by mouth 2 (two) times daily. 180 tablet 4    diltiaZEM (CARDIZEM CD) 180 MG 24 hr capsule Take 1 capsule (180 mg total) by mouth every evening. 90 capsule 4    docusate sodium (COLACE) 100 MG capsule Take 1 capsule (100 mg total) by mouth 2 (two) times daily as needed (Constipation and hard stools). 60 capsule 0    DULoxetine (CYMBALTA) 60 MG capsule TAKE 1 CAPSULE EVERY DAY 90 capsule 10    ezetimibe (ZETIA) 10 mg tablet Take 1 tablet (10 mg total) by mouth every evening. 90 tablet 4    gabapentin (NEURONTIN) 300 MG capsule Take 2 capsules (600 mg total) by mouth 2 (two) times daily. 360 capsule 2    oxybutynin (DITROPAN-XL) 5 MG TR24 TAKE 1 TABLET ONE TIME DAILY 90 tablet 2    pantoprazole (PROTONIX) 40 MG tablet TAKE 1 TABLET BEFORE BREAKFAST 90 tablet 3    traZODone (DESYREL) 50 MG tablet Take 1 tablet (50 mg total) by mouth every evening. 90 tablet 3    traZODone (DESYREL) 50 MG tablet Take 1 tablet (50 mg total) by mouth every evening. 30 tablet 0    varicella-zoster gE-AS01B, PF, (SHINGRIX) 50 mcg/0.5 mL injection Inject into the muscle. 1 each 1    ALPRAZolam (XANAX) 0.25 MG tablet Take 1 tablet (0.25 mg total) by mouth daily as needed for Anxiety. 30 tablet 1    traMADoL (ULTRAM) 50 mg tablet Take 1 tablet (50 mg total) by mouth 3 (three) times daily as needed for Pain. (Patient not taking: Reported on 4/17/2024) 20 tablet 0     No facility-administered medications prior to visit.       Review of patient's allergies indicates:   Allergen Reactions    No known drug allergies        Family History   Problem Relation Name Age of Onset    Heart disease Mother  85        MI    Heart disease Father  55        MI    Colon cancer Neg Hx      Colon polyps Neg Hx      Crohn's disease Neg Hx      Ulcerative colitis Neg Hx         Social History     Tobacco Use    Smoking status: Former     Current packs/day: 0.00     Average packs/day: 0.5  "packs/day for 48.7 years (24.3 ttl pk-yrs)     Types: Cigarettes     Start date: 1964     Quit date: 3/28/2013     Years since quittin.0    Smokeless tobacco: Never   Substance Use Topics    Alcohol use: Yes     Alcohol/week: 10.0 standard drinks of alcohol     Types: 5 Glasses of wine, 5 Shots of liquor per week     Comment: 0-2 ETOHic drinks per day; wine and gin    Drug use: No       OB History    Para Term  AB Living   3 3 3         SAB IAB Ectopic Multiple Live Births                  # Outcome Date GA Lbr Estuardo/2nd Weight Sex Type Anes PTL Lv   3 Term            2 Term            1 Term                    /64   Ht 5' 3" (1.6 m)   Wt 54.2 kg (119 lb 7.8 oz)   LMP 1980   Physical Exam:  APPEARANCE: Well nourished, well developed, in no acute distress.  SKIN: Normal skin turgor, no lesions.  NECK: Neck symmetric without masses   RESPIRATORY: Normal respiratory effort with no retractions or use of accessory muscles  CARDIOVASCULAR: Peripheral vascular system with no swelling no varicosities and palpation of pulses normal  LYMPHATIC: No enlargements of the lymph nodes noted in the neck, axillae, or groin  ABDOMEN: Soft. No tenderness or masses. No hepatosplenomegaly. No hernias.  PELVIC: Normal external female genitalia without lesions. Normal hair distribution. Adequate perineal body, normal urethral meatus. Urethra with no masses.  Bladder nontender. Vagina moist and well rugated without lesions or discharge.  No significant cystocele or rectocele. Adnexa with  +++ right fixed mass no tenderness. Urethra and bladder normal.  EXTREMITIES: No clubbing cyanosis or edema.    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  -     CANCER ANTIGEN 125; Future; Expected date: 2024  -     Inhibin B; Future; Expected date: 2024  -     ESTRADIOL; Future; Expected date: 2024  -     Testosterone, free; Future; Expected date: 2024  -     LACTATE " DEHYDROGENASE; Future; Expected date: 04/17/2024  -     Ambulatory referral/consult to Gynecologic Oncology; Future; Expected date: 04/24/2024    Ovarian mass, right  -     CANCER ANTIGEN 125; Future; Expected date: 04/17/2024  -     Inhibin B; Future; Expected date: 04/17/2024  -     ESTRADIOL; Future; Expected date: 04/17/2024  -     Testosterone, free; Future; Expected date: 04/17/2024  -     LACTATE DEHYDROGENASE; Future; Expected date: 04/17/2024  -     Ambulatory referral/consult to Gynecologic Oncology; Future; Expected date: 04/24/2024    Right ovarian cyst  -     CANCER ANTIGEN 125; Future; Expected date: 04/17/2024  -     Inhibin B; Future; Expected date: 04/17/2024  -     ESTRADIOL; Future; Expected date: 04/17/2024  -     Testosterone, free; Future; Expected date: 04/17/2024  -     LACTATE DEHYDROGENASE; Future; Expected date: 04/17/2024  -     Ambulatory referral/consult to Gynecology  -     Ambulatory referral/consult to Gynecologic Oncology; Future; Expected date: 04/24/2024    H/O colectomy  -     Ambulatory referral/consult to Gynecologic Oncology; Future; Expected date: 04/24/2024    S/P colostomy  -     Ambulatory referral/consult to Gynecologic Oncology; Future; Expected date: 04/24/2024    Hematochezia  -     Ambulatory referral/consult to Gynecologic Oncology; Future; Expected date: 04/24/2024    Pelvic adhesive disease  -     Ambulatory referral/consult to Gynecologic Oncology; Future; Expected date: 04/24/2024          30 minutes spent today spent preparing reviewing previous external notes, reviewing previous results, performing medical examination, ordering tests or medications, counseling and documenting.

## 2024-04-18 ENCOUNTER — TELEPHONE (OUTPATIENT)
Dept: OBSTETRICS AND GYNECOLOGY | Facility: CLINIC | Age: 79
End: 2024-04-18
Payer: MEDICARE

## 2024-04-18 ENCOUNTER — TELEPHONE (OUTPATIENT)
Dept: GYNECOLOGIC ONCOLOGY | Facility: CLINIC | Age: 79
End: 2024-04-18
Payer: MEDICARE

## 2024-04-18 NOTE — TELEPHONE ENCOUNTER
----- Message from Joyce Jiang sent at 4/18/2024 10:15 AM CDT -----  Contact: self  Type: Needs Medical Advice  Who Called:  the patient     Best Call Back Number: [phone]  Additional Information: pt has questions regarding the referral she received from yesterday. Please call.

## 2024-04-18 NOTE — NURSING
New referral received from Dr Lo for recent diagnosis of right ovarian pelvic mass. Pt called and name and  verified. Explained my role as nurse navigator and direct number provided. Options for GYN/ONC providers, all clinic locations and soonest availability discussed with pt. Pt scheduled to see Dr Brand in the next available appointment. Patient encouraged to call for any questions or concerns about her care or appointments. Pt verbalized understanding. Date time and location of appointment reviewed with pt.     Of note: prior clarity and permission provided by Dr Lo that pt can see any [facility] provider, despite referral being entered for Dr Vazquez.     Oncology Navigation   Intake  Cancer Type: Gynecologic (pelvic mass)  Type of Referral: Internal (Dr Lo)  Date of Referral: 24  Initial Nurse Navigator Contact: 24  Referral to Initial Contact Timeline (days): 1  Date Worked: 24     Treatment  Current Status: Staging work-up     Providers:  OBGYN: Dr Paulette Lo  Cardiologist: Dr Aquilino Cam  PCP: nAdriy Sparks  Dermatology: Dr Alyson Maxwell  Ophthalmology: Dr Renaldo Waters  Orthopedics: Dr Db Akers  Pain Medicine: Dr Jakub Greer    Medical History:  Long term aspirin use  Bilateral carotid artery stenosis  Degenerative disc disease  Ischemic colitis and infectious gastroenteritis  GERD  HLD  HTN  Overactive bladder  Pelvic adhesive disease  Colectomy  Peripheral vascular disease  osteoarthritis leg and neck pain, back, limping= Voltaren and Flexeril.     Surgery History:  c/s x3  appendectomy  tonsillectomy   (estimate)- SHORTY/USO- at age 35year old benign reasons (still has one ovary)  2016- hand surgery right knuckle implant Dr. Abhinav Rolle  2016- Colonoscopy Dr Grzegorz Sousa Jr  2017- upper gastrointestinal endoscopy- Dr Sousa, Tiny gastric islands of salmon-colored mucosa in distal esophagus, gastritis; biopsy: esophagus- reflux  esophagitis, negative for Nickerson's, stomach- reactive gastropathy, negative for h pylori  7/30/2018- Carotid endarterectomy- right with angioplasty Dr Safia Thomas  08/27/2018 right L4/5 transforaminal injection with 100% relief of her right leg pain.   10/15/2018 right L2, 3, 4, 5 medial branch block on with 100% relief for 6 hr,  10/25/2018 radiofrequency ablation of right L2, 3, 4, 5 medial branch RFA nerve [id number] Dr Jakub Greer   12/14/2018 right L2/3 and L3/4 transforaminal injection with 75% relief.   8/12/2019 Colostomy and splenectomy Dr Brandi Alamo and Cystoscopy left stent placement Dr Love Roblero  04/14/2009- colonoscopy Dr Reyes  5/06/2019 L5/S1 DOROTHEA with 100% relief for about two weeks but then she fell on her right side.  6/19/2019 L5/S1 DOROTHEA with 70% relief of her right leg pain.   1/8/2020- Colonoscopy Dr Grzegorz Sousa Jr  1/9/2020- splenic flexure takedown and Lysis of extensive adhesions Dr Brandi Alamo  10/15/2020- Robotic-assisted repair of incisional hernia  4/12/21- Ablation of lumbar medial branch nerve [id number] Dr Jakub Greer with 75% relief.   12/2023- left rotator cuff repair  01/03/2024 Presents today 12 weeks status post left shoulder arthroscopic rotator cuff repair with subacromial decompression, acromioplasty and open biceps tenodesis      Imaging:  10/13/2020- CT Abdomen Pelvis  4/15/24- US Pelvis with Trans Vag- 70 x 44 x 66 mm complicated, septated cystic right ovarian mass with a nodular calcification. This mass demonstrated pronounced interval growth between the CT examinations of 04/09/2024 and 10/13/2020.     Dr Lo visit 4/17/24:  Review 4/15/24 US  Reports history of colectomy with stoma and reversal 4 years ago.   RIGHT ovarian mass. She desires removal.   She says her surgeon told her that her colon was like concrete with extensive adhesive disease.   = 18  Estradiol= <10  Lactate Dehydrogenase= 153  Inhibin B= pending  Testosterone=  pending     Acuity      Follow Up  No follow-ups on file.

## 2024-04-19 LAB — INHIBIN B SERPL IA-MCNC: <10 PG/ML

## 2024-04-23 LAB — TESTOST FREE SERPL-MCNC: 0.5 PG/ML

## 2024-04-26 ENCOUNTER — OFFICE VISIT (OUTPATIENT)
Dept: FAMILY MEDICINE | Facility: CLINIC | Age: 79
End: 2024-04-26
Payer: MEDICARE

## 2024-04-26 VITALS
WEIGHT: 119.5 LBS | DIASTOLIC BLOOD PRESSURE: 70 MMHG | BODY MASS INDEX: 21.17 KG/M2 | SYSTOLIC BLOOD PRESSURE: 134 MMHG | HEIGHT: 63 IN

## 2024-04-26 DIAGNOSIS — I10 ESSENTIAL HYPERTENSION: Primary | ICD-10-CM

## 2024-04-26 DIAGNOSIS — I25.119 ATHEROSCLEROSIS OF NATIVE CORONARY ARTERY OF NATIVE HEART WITH ANGINA PECTORIS: ICD-10-CM

## 2024-04-26 DIAGNOSIS — F33.42 RECURRENT MAJOR DEPRESSIVE DISORDER, IN FULL REMISSION: ICD-10-CM

## 2024-04-26 DIAGNOSIS — E78.2 MIXED HYPERLIPIDEMIA: ICD-10-CM

## 2024-04-26 PROCEDURE — 99999 PR PBB SHADOW E&M-EST. PATIENT-LVL III: CPT | Mod: PBBFAC,HCNC,, | Performed by: FAMILY MEDICINE

## 2024-04-26 PROCEDURE — 3288F FALL RISK ASSESSMENT DOCD: CPT | Mod: HCNC,CPTII,S$GLB, | Performed by: FAMILY MEDICINE

## 2024-04-26 PROCEDURE — 1126F AMNT PAIN NOTED NONE PRSNT: CPT | Mod: HCNC,CPTII,S$GLB, | Performed by: FAMILY MEDICINE

## 2024-04-26 PROCEDURE — 3075F SYST BP GE 130 - 139MM HG: CPT | Mod: HCNC,CPTII,S$GLB, | Performed by: FAMILY MEDICINE

## 2024-04-26 PROCEDURE — G2211 COMPLEX E/M VISIT ADD ON: HCPCS | Mod: HCNC,S$GLB,, | Performed by: FAMILY MEDICINE

## 2024-04-26 PROCEDURE — 3078F DIAST BP <80 MM HG: CPT | Mod: HCNC,CPTII,S$GLB, | Performed by: FAMILY MEDICINE

## 2024-04-26 PROCEDURE — 99214 OFFICE O/P EST MOD 30 MIN: CPT | Mod: HCNC,S$GLB,, | Performed by: FAMILY MEDICINE

## 2024-04-26 PROCEDURE — 1101F PT FALLS ASSESS-DOCD LE1/YR: CPT | Mod: HCNC,CPTII,S$GLB, | Performed by: FAMILY MEDICINE

## 2024-04-26 NOTE — PROGRESS NOTES
Subjective:       Patient ID: Haley Castro is a 79 y.o. female.    Chief Complaint: Follow-up (6 mth )    Here for f/u ED visit as well as need for planned surgery.  Healing from shoulder surgery.  Plans for cscope soon with Dr. Sousa.  Plans to see gyn/onc for likely ovary cyst issue.      Follow-up  Pertinent negatives include no chest pain, chills, coughing or fever.     Review of Systems   Constitutional:  Negative for chills and fever.   Respiratory:  Negative for cough, chest tightness and shortness of breath.    Cardiovascular:  Negative for chest pain, palpitations and leg swelling.   Endocrine: Negative for cold intolerance and heat intolerance.   Psychiatric/Behavioral:  Negative for decreased concentration. The patient is not nervous/anxious.        Objective:      Physical Exam  Vitals and nursing note reviewed.   Constitutional:       Appearance: She is well-developed.   HENT:      Head: Normocephalic and atraumatic.   Cardiovascular:      Rate and Rhythm: Normal rate and regular rhythm.      Heart sounds: Normal heart sounds.   Pulmonary:      Effort: Pulmonary effort is normal.      Breath sounds: Normal breath sounds.   Psychiatric:         Mood and Affect: Mood normal.         Behavior: Behavior normal.         Assessment:       1. Essential hypertension    2. Mixed hyperlipidemia    3. Recurrent major depressive disorder, in full remission    4. Atherosclerosis of native coronary artery of native heart with angina pectoris        Plan:       Essential hypertension    Mixed hyperlipidemia    Recurrent major depressive disorder, in full remission    Atherosclerosis of native coronary artery of native heart with angina pectoris        Low risk for with standard precautions for anticipated gyn surgery as long as labs pre op stable on repeat but recent labs are ok  Will monitor chronic medical issues and continue current plan of care.  Visit today included increased complexity associated with the  care of the episodic problem htn addressed and managing the longitudinal care of the patient due to the serious and/or complex managed problem(s) as above.      Follow up in about 3 months (around 7/26/2024), or if symptoms worsen or fail to improve.

## 2024-04-29 NOTE — PROGRESS NOTES
SUBJECTIVE     Chief complaint: Adnexal cyst     History of present Illness:  Haley Castro is a 79 y.o.  female referred by Dr. Lo for evaluation and management of right cystic pelvic mass. She presented to the ED 24 for blood in her stool x2 weeks, and subsequently a 6.9 x 5.3 cm cystic right adnexal lesion was seen on imaging.  She denies pain, bloating or early satiety. She denies family history breast or ovarian cancer. She is having ongoing daily blood per rectum.     She has an extensive history of prior abdominal surgeries including  x3 (classical), appendectomy, SHORTY/LSO (age 35, benign path), splenectomy and colostomy with subsequent closure (), splenic flexure takedown and extensive KENNEDI, and robotic assisted converted to open repair of incisional hernia ().     She has a colonoscopy scheduled 24 with Dr. Sousa.     Data Reviewed:   4/15/2024 US Pelvis- 70 x 44 x 60 mm complicated cystic mass with a shadowing nodular septal calcification and thick irregular septations observed within the right ovary   This mass demonstrated pronounced interval growth between the CT examinations of 2024 and 10/13/2020.     2024 CTA A/P- 6.9 x 5.3 cm cystic right adnexal lesion   10/13/2020 CT A/P- There is some adnexal calcific, somewhat cystic appearance similar although slightly previously obscured measuring approximately 1.5 cm in diameter.     Recent Labs     24  1036    18      ESTRADIOL <10*   TESTOSTERONE 0.5      Review of Systems - Oncology  per HPI     Review of patient's allergies indicates:   Allergen Reactions    No known drug allergies      Current Outpatient Medications   Medication Instructions    ALPRAZolam (XANAX) 0.25 mg, Oral, Daily PRN    aspirin 81 MG Chew Baby Aspirin 81 mg chewable tablet   Chew 1 tablet every day by oral route.    atorvastatin (LIPITOR) 40 mg, Oral, Nightly    carvediloL (COREG) 6.25 mg, Oral, 2 times daily     diltiaZEM (CARDIZEM CD) 180 mg, Oral, Nightly    docusate sodium (COLACE) 100 mg, Oral, 2 times daily PRN    DULoxetine (CYMBALTA) 60 mg, Oral    ezetimibe (ZETIA) 10 mg, Oral, Nightly    gabapentin (NEURONTIN) 600 mg, Oral, 2 times daily    hydrocortisone (ANUSOL-HC) 25 mg, Rectal, 2 times daily    oxybutynin (DITROPAN-XL) 5 MG TR24 TAKE 1 TABLET ONE TIME DAILY    pantoprazole (PROTONIX) 40 MG tablet TAKE 1 TABLET BEFORE BREAKFAST    traMADoL (ULTRAM) 50 mg, Oral, 3 times daily PRN    traZODone (DESYREL) 50 mg, Oral, Nightly    traZODone (DESYREL) 50 mg, Oral, Nightly    varicella-zoster [id number], PF, (SHINGRIX) 50 mcg/0.5 mL injection Intramuscular     Past Medical History:   Diagnosis Date    Anticoagulant long-term use     aspirin    Arthritis     Bilateral carotid artery stenosis     Carotid artery occlusion     DDD (degenerative disc disease), lumbar     Depression 11/29/2012    Diverticulosis     Former smoker     GERD (gastroesophageal reflux disease)     HLD (hyperlipidemia) 11/29/2012    HTN (hypertension) 11/29/2012    Infectious gastroenteritis     Ischemic colitis     Macular degeneration     OAB (overactive bladder)     PVD (peripheral vascular disease)     Traumatic complete tear of left rotator cuff, subsequent encounter 12/2023      Past Surgical History:   Procedure Laterality Date    APPENDECTOMY      ARTHROSCOPIC REPAIR OF ROTATOR CUFF OF SHOULDER Left 1/3/2024    Procedure: REPAIR, ROTATOR CUFF, ARTHROSCOPIC;  Surgeon: Db Akers II, MD;  Location: [facility] OR;  Service: Orthopedics;  Laterality: Left;    ARTHROSCOPY OF SHOULDER WITH DECOMPRESSION OF SUBACROMIAL SPACE Left 1/3/2024    Procedure: ARTHROSCOPY, SHOULDER, WITH SUBACROMIAL SPACE DECOMPRESSION;  Surgeon: Db Akers II, MD;  Location: [facility] OR;  Service: Orthopedics;  Laterality: Left;    carotid angiogram      CAROTID ENDARTERECTOMY Right 07/30/2018    Procedure: Endarterectomy-Carotid - RIGHT;  Surgeon: Safia Thomas MD;   Location: [facility] OR;  Service: Cardiovascular;  Laterality: Right;  with patch  angioplasty     SECTION      x 3     COLON SURGERY      COLONOSCOPY N/A 2016    Procedure: COLONOSCOPY;  Surgeon: Grzegorz Sousa Jr., MD;  Location: [facility];  Service: Endoscopy;  Laterality: N/A;    COLONOSCOPY  2009    Dr. Reyes in [place], repeat in 5 years    COLONOSCOPY N/A 2020    Procedure: COLONOSCOPY;  Surgeon: Grzegorz Sousa Jr., MD;  Location: [facility] ENDO;  Service: Endoscopy;  Laterality: N/A;    COLOSTOMY N/A 2019    Procedure: CREATION, COLOSTOMY;  Surgeon: Brandi Alamo MD;  Location: [facility] OR;  Service: General;  Laterality: N/A;    COLOSTOMY CLOSURE      CYSTOSCOPY Left 2019    Procedure: CYSTOSCOPY, Stent placement;  Surgeon: Pablito Roblero MD;  Location: [facility] OR;  Service: Urology;  Laterality: Left;    EPIDURAL STEROID INJECTION INTO LUMBAR SPINE N/A 2019    Procedure: Injection-steroid-epidural-lumbar;  Surgeon: Jakub Greer MD;  Location: [facility] OR;  Service: Pain Management;  Laterality: N/A;  L5/S1 INTERLAMINAR TO RIGHT    EPIDURAL STEROID INJECTION INTO LUMBAR SPINE Right 2019    Procedure: Injection-steroid-epidural-lumbar;  Surgeon: Jakub Greer MD;  Location: [facility] OR;  Service: Pain Management;  Laterality: Right;  L5/S1 to right    EYE SURGERY Bilateral     cataract    FIXATION OF TENDON Left 1/3/2024    Procedure: FIXATION, TENDON-Open biceps tenodesis;  Surgeon: Db Akers II, MD;  Location: [facility] OR;  Service: Orthopedics;  Laterality: Left;    HAND SURGERY Right 2016    knuckle implant/Dr. Abhinav Rolle    HEMORRHOID SURGERY      HYSTERECTOMY  age 35    TAHUSO benign reasons    INJECTION OF ANESTHETIC AGENT AROUND MEDIAL BRANCH NERVES INNERVATING LUMBAR FACET JOINT Right 10/15/2018    Procedure: Block-nerve-medial branch-lumbar L2,3,4,5;  Surgeon: Jakub Greer MD;  Location: [facility] OR;  Service: Pain Management;   Laterality: Right;    JOINT REPLACEMENT Right     1st interphalangeal joint of 3rd finger    LYSIS OF ADHESIONS N/A 2020    Procedure: LYSIS, ADHESIONS EXTENSIVE;  Surgeon: Brandi Alamo MD;  Location: [facility] OR;  Service: Colon and Rectal;  Laterality: N/A;    MOBILIZATION OF SPLENIC FLEXURE N/A 2020    Procedure: MOBILIZATION, SPLENIC FLEXURE-TAKEDOWN;  Surgeon: Brandi Alamo MD;  Location: [facility] OR;  Service: Colon and Rectal;  Laterality: N/A;    OOPHORECTOMY      one remains    RADIOFREQUENCY ABLATION OF LUMBAR MEDIAL BRANCH NERVE AT SINGLE LEVEL Right 10/25/2018    Procedure: RADIOFREQUENCY ABLATION, NERVE, SPINAL, LUMBAR, MEDIAL BRANCH, THERMAL- L2,L3,L4,L5;  Surgeon: Jakub Greer MD;  Location: [facility] OR;  Service: Pain Management;  Laterality: Right;    RADIOFREQUENCY ABLATION OF LUMBAR MEDIAL BRANCH NERVE AT SINGLE LEVEL Right 2021    Procedure: Radiofrequency Ablation, Nerve, Spinal, Lumbar, Medial Branch, L2, 3, 4, 5;  Surgeon: Jakub Greer MD;  Location: [facility] OR;  Service: Pain Management;  Laterality: Right;    ROBOT-ASSISTED REPAIR OF INCISIONAL HERNIA USING DA JACOB XI N/A 10/15/2020    Procedure: XI ROBOTIC REPAIR, HERNIA, INCISIONAL;  Surgeon: Brandi Alamo MD;  Location: [facility] OR;  Service: General;  Laterality: N/A;  ATTEMPTED - CONVERTED TO OPEN PROCEDURE.    SPLENECTOMY N/A 2019    Procedure: SPLENECTOMY;  Surgeon: Brandi Alamo MD;  Location: [facility] OR;  Service: General;  Laterality: N/A;    TONSILLECTOMY      UPPER GASTROINTESTINAL ENDOSCOPY  2017    Dr. Sousa: Tiny gastric islands of salmon-colored mucosa in distal esophagus, gastritis; biopsy: esophagus- reflux esophagitis, negative for lisa's, stomach- reactive gastropathy, negative for h pylori      OB History    Para Term  AB Living   3 3 3         SAB IAB Ectopic Multiple Live Births                  # Outcome Date GA Lbr Estuardo/2nd Weight Sex Type Anes PTL Lv  "  3 Term            2 Term            1 Term              Social History     Tobacco Use    Smoking status: Former     Current packs/day: 0.00     Average packs/day: 0.5 packs/day for 48.7 years (24.3 ttl pk-yrs)     Types: Cigarettes     Start date: 1964     Quit date: 3/28/2013     Years since quittin.0    Smokeless tobacco: Never   Substance Use Topics    Alcohol use: Yes     Alcohol/week: 10.0 standard drinks of alcohol     Types: 5 Glasses of wine, 5 Shots of liquor per week     Comment: 0-2 ETOHic drinks per day; wine and gin    Drug use: No      Family History   Problem Relation Name Age of Onset    Heart disease Mother  85        MI    Heart disease Father  55        MI    Colon cancer Neg Hx      Colon polyps Neg Hx      Crohn's disease Neg Hx      Ulcerative colitis Neg Hx       Health Maintenance Topics with due status: Not Due       Topic Last Completion Date    DEXA Scan 2023    LDCT Lung Screen 07/10/2023    Lipid Panel 2023    Eye Exam 2023    Aspirin/Antiplatelet Therapy 2024     Health Maintenance Due   Topic Date Due    RSV Vaccine (Age 60+ and Pregnant patients) (1 - 1-dose 60+ series) Never done    Shingles Vaccine (2 of 2) 10/25/2022    TETANUS VACCINE  2023    COVID-19 Vaccine (2023- season) 2023    Mammogram  2024     OBJECTIVE     /77 (BP Location: Left arm, Patient Position: Sitting, BP Method: Medium (Automatic))   Pulse 76   Temp 97.3 °F (36.3 °C) (Temporal)   Resp 16   Ht 5' 3" (1.6 m)   Wt 54.1 kg (119 lb 4.3 oz)   LMP 1980   SpO2 97%   BMI 21.13 kg/m²     Physical Exam  Constitutional:       Appearance: Normal appearance.     Genitourinary:    Vulva and rectum normal.     Rectum:      No rectal mass or rectovaginal septum nodularity.      Vaginal exam comments: Vaginal adhesions superior vagina palpated .   There is an absent left adnexa. Right adnexum displays mass (cystic mass palpable, smooth and some " mobility noted.) and fullness.    Adnexa exam comments: There is no solid mass palpable. There is no nodularity. There is no rectovaginal nodularity. .   Cardiovascular:      Rate and Rhythm: Normal rate.   Pulmonary:      Effort: Pulmonary effort is normal.   Abdominal:      General: Abdomen is flat. A surgical scar is present.      Palpations: Abdomen is soft.      Comments: Vertical midline noted. Left upper quadrant ostomy site and hernia repair noted.    Neurological:      General: No focal deficit present.      Mental Status: She is alert and oriented to person, place, and time.   Psychiatric:         Mood and Affect: Mood normal.         Behavior: Behavior normal.   Vitals reviewed. Exam conducted with a chaperone present.          ASSESSMENT AND PLAN      1. 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 Right ovarian cyst  - Ambulatory referral/consult to Gynecologic Oncology    4. H/O colectomy  - Ambulatory referral/consult to Gynecologic Oncology    5. S/P colostomy  - Ambulatory referral/consult to Gynecologic Oncology    6. Hematochezia  - Ambulatory referral/consult to Gynecologic Oncology    7. Pelvic adhesive disease  - Ambulatory referral/consult to Gynecologic Oncology    8. Other abnormal tumor markers  - CANCER ANTIGEN 19-9; Future  - CEA; Future    I have reveiwed the referring providers documentation, imaging reports, and past operative notes. I have reviewed and independently interpreted tumor markers and images. I have discussed with Dr. Alamo her previous surgeon.      I discussed with the patient that ovarian cysts are fluid filled sacs that form in or on and ovary and that ovarian cyst can be simple or complex, depending on the substances that are inside them.  We  discussed that complex cysts may have interior walls (septa), blood, or solid components and that most cysts are not cancerous.  We discussed that cysts may be asymptomatic or at some point could cause pressure, bloating, pain, nausea, or sometimes urinary symptoms.  Ultrasounds or other imaging can be helpful in diagnosis or treatment planning and sometimes blood samples can be analyzed for proteins that might indicate an increased risk for cancer.  Surgery may be recommended if it is determined that a cyst is persistent, growing or more than minimal risk of malignancy.     At this point,  she has had right adnexal cystic structure with calcification since 2020. Cystic portion has increased in size, but nothing solid noted on imaging review. Additionally, no ascites, omental abnormalities, peritoneal implants or lymphadenopathy on imaging. Pelvic exam today without nodularity or solid mass. Differential diagnosis includes right adnexal complex cyst or peritoneal inclusion cyst given surgical history. I would recommend obtaining Pelvic MRI for better characterization. Additional tumor markers (CEA, ) and completing scheduled colonoscopy are also indicated before any surgical intervention given reported daily rectal bleeding. We discussed that her past surgical history puts her at increased risk of surgical morbidity, and thus this a more complex decision. The above additional information will aid in making the best plan of care for her.  She understands. She was given the opportunity to ask questions, and stated all had been answered.           Raisa Brand MD  Gynecologic Oncology

## 2024-04-30 ENCOUNTER — LAB VISIT (OUTPATIENT)
Dept: LAB | Facility: HOSPITAL | Age: 79
End: 2024-04-30
Attending: OBSTETRICS & GYNECOLOGY
Payer: MEDICARE

## 2024-04-30 ENCOUNTER — OFFICE VISIT (OUTPATIENT)
Dept: GYNECOLOGIC ONCOLOGY | Facility: CLINIC | Age: 79
End: 2024-04-30
Payer: MEDICARE

## 2024-04-30 VITALS
OXYGEN SATURATION: 97 % | BODY MASS INDEX: 21.13 KG/M2 | RESPIRATION RATE: 16 BRPM | SYSTOLIC BLOOD PRESSURE: 124 MMHG | TEMPERATURE: 97 F | WEIGHT: 119.25 LBS | HEIGHT: 63 IN | HEART RATE: 76 BPM | DIASTOLIC BLOOD PRESSURE: 77 MMHG

## 2024-04-30 DIAGNOSIS — N83.201 RIGHT OVARIAN CYST: ICD-10-CM

## 2024-04-30 DIAGNOSIS — R97.8 OTHER ABNORMAL TUMOR MARKERS: ICD-10-CM

## 2024-04-30 DIAGNOSIS — D39.11 NEOPLASM OF UNCERTAIN BEHAVIOR OF RIGHT OVARY: ICD-10-CM

## 2024-04-30 DIAGNOSIS — K92.1 HEMATOCHEZIA: ICD-10-CM

## 2024-04-30 DIAGNOSIS — N73.6 PELVIC ADHESIVE DISEASE: ICD-10-CM

## 2024-04-30 DIAGNOSIS — Z93.3 S/P COLOSTOMY: ICD-10-CM

## 2024-04-30 DIAGNOSIS — Z90.49 H/O COLECTOMY: ICD-10-CM

## 2024-04-30 DIAGNOSIS — N83.8 OVARIAN MASS, RIGHT: Primary | ICD-10-CM

## 2024-04-30 DIAGNOSIS — N83.8 OVARIAN MASS, RIGHT: ICD-10-CM

## 2024-04-30 LAB
CANCER AG19-9 SERPL-ACNC: 3.4 U/ML (ref 0–40)
CEA SERPL-MCNC: 2.6 NG/ML (ref 0–5)

## 2024-04-30 PROCEDURE — 1100F PTFALLS ASSESS-DOCD GE2>/YR: CPT | Mod: CPTII,S$GLB,, | Performed by: OBSTETRICS & GYNECOLOGY

## 2024-04-30 PROCEDURE — 82378 CARCINOEMBRYONIC ANTIGEN: CPT | Performed by: OBSTETRICS & GYNECOLOGY

## 2024-04-30 PROCEDURE — 1126F AMNT PAIN NOTED NONE PRSNT: CPT | Mod: CPTII,S$GLB,, | Performed by: OBSTETRICS & GYNECOLOGY

## 2024-04-30 PROCEDURE — 86301 IMMUNOASSAY TUMOR CA 19-9: CPT | Performed by: OBSTETRICS & GYNECOLOGY

## 2024-04-30 PROCEDURE — 36415 COLL VENOUS BLD VENIPUNCTURE: CPT | Mod: PN | Performed by: OBSTETRICS & GYNECOLOGY

## 2024-04-30 PROCEDURE — 3288F FALL RISK ASSESSMENT DOCD: CPT | Mod: CPTII,S$GLB,, | Performed by: OBSTETRICS & GYNECOLOGY

## 2024-04-30 PROCEDURE — 99459 PELVIC EXAMINATION: CPT | Mod: S$GLB,,, | Performed by: OBSTETRICS & GYNECOLOGY

## 2024-04-30 PROCEDURE — 99999 PR PBB SHADOW E&M-EST. PATIENT-LVL IV: CPT | Mod: PBBFAC,,, | Performed by: OBSTETRICS & GYNECOLOGY

## 2024-04-30 PROCEDURE — G2211 COMPLEX E/M VISIT ADD ON: HCPCS | Mod: S$GLB,,, | Performed by: OBSTETRICS & GYNECOLOGY

## 2024-04-30 PROCEDURE — 3078F DIAST BP <80 MM HG: CPT | Mod: CPTII,S$GLB,, | Performed by: OBSTETRICS & GYNECOLOGY

## 2024-04-30 PROCEDURE — 1159F MED LIST DOCD IN RCRD: CPT | Mod: CPTII,S$GLB,, | Performed by: OBSTETRICS & GYNECOLOGY

## 2024-04-30 PROCEDURE — 99205 OFFICE O/P NEW HI 60 MIN: CPT | Mod: S$GLB,,, | Performed by: OBSTETRICS & GYNECOLOGY

## 2024-04-30 PROCEDURE — 3074F SYST BP LT 130 MM HG: CPT | Mod: CPTII,S$GLB,, | Performed by: OBSTETRICS & GYNECOLOGY

## 2024-05-06 ENCOUNTER — TELEPHONE (OUTPATIENT)
Dept: GASTROENTEROLOGY | Facility: CLINIC | Age: 79
End: 2024-05-06
Payer: MEDICARE

## 2024-05-06 NOTE — TELEPHONE ENCOUNTER
Called and spoke with patient after receiving a message in spouse's my chart portal, see below,  Patient requesting a sooner availability for  her colonoscopy,  procedure schedule reviewed and no sooner availability found at this time, patient notified of this, patient advised if bleeding occurs again then she should go to the ER for evaluation, patient states that bleeding occurs after bowel movements, patient states that she has lots going on at the moment like prepping her home for sale, cleaning and such as that.  paitnet states that she will leave her procedure as scheduled and if she bleeds again, she will go to the ER at [facility].      Message from patient's spouse Mr Barger-  Nurse Poppy- Please contact Haley Castro - Need to do colonoscopy sooner than 5/22/24. Rectal bleeding has increased-5/15 scheduled for MRI in [place]. Please call Haley as soon as possible. Thank you. Could not get thru on Haley's portal communication.

## 2024-05-08 PROBLEM — Z98.890 STATUS POST LEFT ROTATOR CUFF REPAIR: Status: ACTIVE | Noted: 2024-05-08

## 2024-05-11 DIAGNOSIS — K21.9 GASTROESOPHAGEAL REFLUX DISEASE: ICD-10-CM

## 2024-05-11 DIAGNOSIS — N32.81 OAB (OVERACTIVE BLADDER): ICD-10-CM

## 2024-05-11 RX ORDER — PANTOPRAZOLE SODIUM 40 MG/1
TABLET, DELAYED RELEASE ORAL
Qty: 90 TABLET | Refills: 3 | Status: SHIPPED | OUTPATIENT
Start: 2024-05-11

## 2024-05-11 RX ORDER — OXYBUTYNIN CHLORIDE 5 MG/1
5 TABLET, EXTENDED RELEASE ORAL
Qty: 90 TABLET | Refills: 3 | Status: SHIPPED | OUTPATIENT
Start: 2024-05-11

## 2024-05-11 NOTE — TELEPHONE ENCOUNTER
No care due was identified.  [facility] Embedded Care Due Messages. Reference number: [id number].   5/11/2024 11:55:43 AM CDT

## 2024-05-12 NOTE — TELEPHONE ENCOUNTER
Refill Decision Note   Haley Castro  is requesting a refill authorization.  Brief Assessment and Rationale for Refill:  Approve     Medication Therapy Plan:         Comments:     Note composed:10:28 PM 05/11/2024

## 2024-05-15 ENCOUNTER — HOSPITAL ENCOUNTER (OUTPATIENT)
Dept: RADIOLOGY | Facility: HOSPITAL | Age: 79
Discharge: HOME | End: 2024-05-15
Attending: OBSTETRICS & GYNECOLOGY
Payer: MEDICARE

## 2024-05-15 DIAGNOSIS — N83.8 OVARIAN MASS, RIGHT: ICD-10-CM

## 2024-05-15 PROCEDURE — 72197 MRI PELVIS W/O & W/DYE: CPT | Mod: 26,,, | Performed by: RADIOLOGY

## 2024-05-15 PROCEDURE — 25500020 PHARM REV CODE 255: Mod: PO | Performed by: OBSTETRICS & GYNECOLOGY

## 2024-05-15 PROCEDURE — A9585 GADOBUTROL INJECTION: HCPCS | Mod: PO | Performed by: OBSTETRICS & GYNECOLOGY

## 2024-05-15 PROCEDURE — 72197 MRI PELVIS W/O & W/DYE: CPT | Mod: TC,PO

## 2024-05-15 RX ORDER — GADOBUTROL 604.72 MG/ML
5.5 INJECTION INTRAVENOUS
Status: COMPLETED | OUTPATIENT
Start: 2024-05-15 | End: 2024-05-15

## 2024-05-15 RX ADMIN — GADOBUTROL 5.5 ML: 604.72 INJECTION INTRAVENOUS at 11:05

## 2024-05-17 ENCOUNTER — TELEPHONE (OUTPATIENT)
Dept: GASTROENTEROLOGY | Facility: CLINIC | Age: 79
End: 2024-05-17
Payer: MEDICARE

## 2024-05-17 NOTE — TELEPHONE ENCOUNTER
----- Message from Batsheva Bustillos sent at 5/17/2024  3:20 PM CDT -----  Contact: Patient  Type:  Patient Returning Call    Who Called:  Patient  Who Left Message for Patient:  Poppy  Does the patient know what this is regarding?:  Rescheduling colonoscopy    Would the patient rather a call back or a response via [facility]?   Call back  Best Call Back Number:   [phone]    Additional Information:   States she is returning a missed call - please call back - thank you   Available

## 2024-05-17 NOTE — TELEPHONE ENCOUNTER
Attempted to reach the patient to notify her that Dr. Sousa is not able to perform her procedure next week as he is out, left message clinic number provided.

## 2024-05-17 NOTE — TELEPHONE ENCOUNTER
Called and spoke with the patient, procedure rescheduled with the patient, patient verbalized understanding of this.

## 2024-05-22 ENCOUNTER — ANESTHESIA EVENT (OUTPATIENT)
Dept: ENDOSCOPY | Facility: HOSPITAL | Age: 79
End: 2024-05-22
Payer: MEDICARE

## 2024-05-22 ENCOUNTER — HOSPITAL ENCOUNTER (OUTPATIENT)
Facility: HOSPITAL | Age: 79
Discharge: HOME | End: 2024-05-22
Attending: INTERNAL MEDICINE | Admitting: INTERNAL MEDICINE
Payer: MEDICARE

## 2024-05-22 ENCOUNTER — ANESTHESIA (OUTPATIENT)
Dept: ENDOSCOPY | Facility: HOSPITAL | Age: 79
End: 2024-05-22
Payer: MEDICARE

## 2024-05-22 VITALS
BODY MASS INDEX: 20.39 KG/M2 | WEIGHT: 115.06 LBS | RESPIRATION RATE: 18 BRPM | HEART RATE: 68 BPM | SYSTOLIC BLOOD PRESSURE: 118 MMHG | TEMPERATURE: 98 F | DIASTOLIC BLOOD PRESSURE: 70 MMHG | HEIGHT: 63 IN | OXYGEN SATURATION: 100 %

## 2024-05-22 DIAGNOSIS — K62.5 RECTAL BLEED: ICD-10-CM

## 2024-05-22 PROCEDURE — 25000003 PHARM REV CODE 250: Mod: HCNC,PO | Performed by: NURSE ANESTHETIST, CERTIFIED REGISTERED

## 2024-05-22 PROCEDURE — 63600175 PHARM REV CODE 636 W HCPCS: Mod: HCNC,PO | Performed by: NURSE ANESTHETIST, CERTIFIED REGISTERED

## 2024-05-22 PROCEDURE — 37000009 HC ANESTHESIA EA ADD 15 MINS: Mod: HCNC,PO | Performed by: INTERNAL MEDICINE

## 2024-05-22 PROCEDURE — 45378 DIAGNOSTIC COLONOSCOPY: CPT | Mod: HCNC,PO | Performed by: INTERNAL MEDICINE

## 2024-05-22 PROCEDURE — D9220A PRA ANESTHESIA: Mod: ANES,,, | Performed by: ANESTHESIOLOGY

## 2024-05-22 PROCEDURE — 45378 DIAGNOSTIC COLONOSCOPY: CPT | Mod: HCNC,,, | Performed by: INTERNAL MEDICINE

## 2024-05-22 PROCEDURE — D9220A PRA ANESTHESIA: Mod: CRNA,,, | Performed by: NURSE ANESTHETIST, CERTIFIED REGISTERED

## 2024-05-22 PROCEDURE — 37000008 HC ANESTHESIA 1ST 15 MINUTES: Mod: HCNC,PO | Performed by: INTERNAL MEDICINE

## 2024-05-22 PROCEDURE — 63600175 PHARM REV CODE 636 W HCPCS: Mod: HCNC,PO | Performed by: INTERNAL MEDICINE

## 2024-05-22 RX ORDER — LIDOCAINE HYDROCHLORIDE 20 MG/ML
INJECTION INTRAVENOUS
Status: DISCONTINUED | OUTPATIENT
Start: 2024-05-22 | End: 2024-05-22

## 2024-05-22 RX ORDER — SODIUM CHLORIDE 0.9 % (FLUSH) 0.9 %
10 SYRINGE (ML) INJECTION
Status: DISCONTINUED | OUTPATIENT
Start: 2024-05-22 | End: 2024-05-22 | Stop reason: HOSPADM

## 2024-05-22 RX ORDER — HYDROCORTISONE 25 MG/G
CREAM TOPICAL 2 TIMES DAILY PRN
Qty: 28 G | Refills: 1 | Status: SHIPPED | OUTPATIENT
Start: 2024-05-22

## 2024-05-22 RX ORDER — PROPOFOL 10 MG/ML
VIAL (ML) INTRAVENOUS
Status: DISCONTINUED | OUTPATIENT
Start: 2024-05-22 | End: 2024-05-22

## 2024-05-22 RX ORDER — SODIUM CHLORIDE, SODIUM LACTATE, POTASSIUM CHLORIDE, CALCIUM CHLORIDE 600; 310; 30; 20 MG/100ML; MG/100ML; MG/100ML; MG/100ML
INJECTION, SOLUTION INTRAVENOUS CONTINUOUS
Status: DISCONTINUED | OUTPATIENT
Start: 2024-05-22 | End: 2024-05-22 | Stop reason: HOSPADM

## 2024-05-22 RX ADMIN — PROPOFOL 50 MG: 10 INJECTION, EMULSION INTRAVENOUS at 11:05

## 2024-05-22 RX ADMIN — PROPOFOL 100 MG: 10 INJECTION, EMULSION INTRAVENOUS at 11:05

## 2024-05-22 RX ADMIN — SODIUM CHLORIDE, POTASSIUM CHLORIDE, SODIUM LACTATE AND CALCIUM CHLORIDE: 600; 310; 30; 20 INJECTION, SOLUTION INTRAVENOUS at 10:05

## 2024-05-22 RX ADMIN — LIDOCAINE HYDROCHLORIDE 100 MG: 20 INJECTION INTRAVENOUS at 11:05

## 2024-05-22 NOTE — ANESTHESIA POSTPROCEDURE EVALUATION
Anesthesia Post Evaluation    Patient: Haley Castro    Procedure(s) Performed: Procedure(s) (LRB):  COLONOSCOPY (N/A)    Final Anesthesia Type: general      Patient location during evaluation: PACU  Patient participation: Yes- Able to Participate  Level of consciousness: awake and alert  Post-procedure vital signs: reviewed and stable  Pain management: adequate  Airway patency: patent    PONV status at discharge: No PONV  Anesthetic complications: no      Cardiovascular status: blood pressure returned to baseline  Respiratory status: unassisted and room air  Hydration status: euvolemic  Follow-up not needed.              Vitals Value Taken Time   /70 05/22/24 1150   Temp  05/22/24 1416   Pulse 68 05/22/24 1150   Resp 18 05/22/24 1150   SpO2 100 % 05/22/24 1150         No case tracking events are documented in the log.      Pain/Olivia Score: Olivia Score: 10 (5/22/2024 11:50 AM)

## 2024-05-22 NOTE — TRANSFER OF CARE
"Anesthesia Transfer of Care Note    Patient: Haley Castro    Procedure(s) Performed: Procedure(s) (LRB):  COLONOSCOPY (N/A)    Patient location: PACU    Anesthesia Type: general    Transport from OR: Transported from OR on room air with adequate spontaneous ventilation    Post pain: adequate analgesia    Post assessment: no apparent anesthetic complications and tolerated procedure well    Post vital signs: stable    Level of consciousness: awake, alert and oriented    Nausea/Vomiting: no nausea/vomiting    Complications: none    Transfer of care protocol was followed      Last vitals: Visit Vitals  BP (!) 172/81 (BP Location: Right arm, Patient Position: Lying)   Pulse 66   Temp 36.4 °C (97.5 °F) (Skin)   Resp 18   Ht 5' 3" (1.6 m)   Wt 52.2 kg (115 lb 1.3 oz)   LMP 03/28/1980   SpO2 99%   Breastfeeding No   BMI 20.39 kg/m²     "

## 2024-05-22 NOTE — PROVATION PATIENT INSTRUCTIONS
Discharge Summary/Instructions after an Endoscopic Procedure  Patient Name: Haley Castro  Patient MRN: [id number]  Patient YOB: 1945  Wednesday, May 22, 2024  Guy Hurst MD  Dear patient,  As a result of recent federal legislation (The Federal Cures Act), you may   receive lab or pathology results from your procedure in your [facility]   account before your physician is able to contact you. Your physician or   their representative will relay the results to you with their   recommendations at their soonest availability.  Thank you,  RESTRICTIONS:  During your procedure today, you received medications for sedation.  These   medications may affect your judgment, balance and coordination.  Therefore,   for 24 hours, you have the following restrictions:   - DO NOT drive a car, operate machinery, make legal/financial decisions,   sign important papers or drink alcohol.    ACTIVITY:  Today: no heavy lifting, straining or running due to procedural   sedation/anesthesia.  The following day: return to full activity including work.  DIET:  Eat and drink normally unless instructed otherwise.     TREATMENT FOR COMMON SIDE EFFECTS:  - Mild abdominal pain, nausea, belching, bloating or excessive gas:  rest,   eat lightly and use a heating pad.  - Sore Throat: treat with throat lozenges and/or gargle with warm salt   water.  - Because air was used during the procedure, expelling large amounts of air   from your rectum or belching is normal.  - If a bowel prep was taken, you may not have a bowel movement for 1-3 days.    This is normal.  SYMPTOMS TO WATCH FOR AND REPORT TO YOUR PHYSICIAN:  1. Abdominal pain or bloating, other than gas cramps.  2. Chest pain.  3. Back pain.  4. Signs of infection such as: chills or fever occurring within 24 hours   after the procedure.  5. Rectal bleeding, which would show as bright red, maroon, or black stools.   (A tablespoon of blood from the rectum is not serious, especially  if   hemorrhoids are present.)  6. Vomiting.  7. Weakness or dizziness.  GO DIRECTLY TO THE NEAREST EMERGENCY ROOM IF YOU HAVE ANY OF THE FOLLOWING:      Difficulty breathing              Chills and/or fever over 101 F   Persistent vomiting and/or vomiting blood   Severe abdominal pain   Severe chest pain   Black, tarry stools   Bleeding- more than one tablespoon   Any other symptom or condition that you feel may need urgent attention  Your doctor recommends these additional instructions:  If any biopsies were taken, your doctors clinic will contact you in 1 to 2   weeks with any results.  Your physician has indicated that a repeat colonoscopy is not recommended   for screening purposes.   You are being discharged to home.  For questions, problems or results please call your physician - Guy Hurst MD at Work:  [phone].  EMERGENCY PHONE NUMBER: [phone], LAB RESULTS: [phone]  IF A COMPLICATION OR EMERGENCY SITUATION ARISES AND YOU ARE UNABLE TO REACH   YOUR PHYSICIAN - GO DIRECTLY TO THE EMERGENCY ROOM.  ___________________________________________  Nurse Signature  ___________________________________________  Patient/Designated Responsible Party Signature  Guy Hurst MD  5/22/2024 11:41:59 AM  This report has been verified and signed electronically.  Dear patient,  As a result of recent federal legislation (The Federal Cures Act), you may   receive lab or pathology results from your procedure in your [facility]   account before your physician is able to contact you. Your physician or   their representative will relay the results to you with their   recommendations at their soonest availability.  Thank you.  PROVATION

## 2024-05-22 NOTE — H&P
History & Physical - Short Stay  Gastroenterology      SUBJECTIVE:     Procedure: Colonoscopy    Chief Complaint/Indication for Procedure: Rectal Bleeding    PTA Medications   Medication Sig    ALPRAZolam (XANAX) 0.25 MG tablet Take 1 tablet (0.25 mg total) by mouth daily as needed for Anxiety.    aspirin 81 MG Chew     atorvastatin (LIPITOR) 40 MG tablet Take 1 tablet (40 mg total) by mouth every evening.    carvediloL (COREG) 6.25 MG tablet Take 1 tablet (6.25 mg total) by mouth 2 (two) times daily.    diltiaZEM (CARDIZEM CD) 180 MG 24 hr capsule Take 1 capsule (180 mg total) by mouth every evening. (Patient taking differently: Take 180 mg by mouth 2 (two) times a day.)    DULoxetine (CYMBALTA) 60 MG capsule TAKE 1 CAPSULE EVERY DAY    ezetimibe (ZETIA) 10 mg tablet Take 1 tablet (10 mg total) by mouth every evening.    gabapentin (NEURONTIN) 300 MG capsule Take 2 capsules (600 mg total) by mouth 2 (two) times daily.    oxybutynin (DITROPAN-XL) 5 MG TR24 TAKE 1 TABLET EVERY DAY    pantoprazole (PROTONIX) 40 MG tablet TAKE 1 TABLET BEFORE BREAKFAST    traMADoL (ULTRAM) 50 mg tablet Take 1 tablet (50 mg total) by mouth 3 (three) times daily as needed for Pain.    traZODone (DESYREL) 50 MG tablet Take 1 tablet (50 mg total) by mouth every evening.    varicella-zoster gE-AS01B, PF, (SHINGRIX) 50 mcg/0.5 mL injection Inject into the muscle. (Patient not taking: Reported on 5/20/2024)       Review of patient's allergies indicates:   Allergen Reactions    No known drug allergies         Past Medical History:   Diagnosis Date    Anticoagulant long-term use     aspirin    Arthritis     Bilateral carotid artery stenosis     Carotid artery occlusion     DDD (degenerative disc disease), lumbar     Depression 11/29/2012    Diverticulosis     Former smoker     GERD (gastroesophageal reflux disease)     HLD (hyperlipidemia) 11/29/2012    HTN (hypertension) 11/29/2012    Infectious gastroenteritis     Ischemic colitis     Macular  degeneration     OAB (overactive bladder)     PVD (peripheral vascular disease)     Traumatic complete tear of left rotator cuff, subsequent encounter 2023     Past Surgical History:   Procedure Laterality Date    APPENDECTOMY      ARTHROSCOPIC REPAIR OF ROTATOR CUFF OF SHOULDER Left 1/3/2024    Procedure: REPAIR, ROTATOR CUFF, ARTHROSCOPIC;  Surgeon: Db Akers II, MD;  Location: [facility];  Service: Orthopedics;  Laterality: Left;    ARTHROSCOPY OF SHOULDER WITH DECOMPRESSION OF SUBACROMIAL SPACE Left 1/3/2024    Procedure: ARTHROSCOPY, SHOULDER, WITH SUBACROMIAL SPACE DECOMPRESSION;  Surgeon: Db Akers II, MD;  Location: [facility] OR;  Service: Orthopedics;  Laterality: Left;    carotid angiogram      CAROTID ENDARTERECTOMY Right 2018    Procedure: Endarterectomy-Carotid - RIGHT;  Surgeon: Safia Thomas MD;  Location: [facility];  Service: Cardiovascular;  Laterality: Right;  with patch  angioplasty     SECTION      x 3     COLON SURGERY      COLONOSCOPY N/A 2016    Procedure: COLONOSCOPY;  Surgeon: Grzegorz Sousa Jr., MD;  Location: [facility];  Service: Endoscopy;  Laterality: N/A;    COLONOSCOPY  2009    Dr. Reyes in [place], repeat in 5 years    COLONOSCOPY N/A 2020    Procedure: COLONOSCOPY;  Surgeon: Grzegorz Sousa Jr., MD;  Location: [facility];  Service: Endoscopy;  Laterality: N/A;    COLOSTOMY N/A 2019    Procedure: CREATION, COLOSTOMY;  Surgeon: Brandi Alamo MD;  Location: [facility];  Service: General;  Laterality: N/A;    COLOSTOMY CLOSURE      CYSTOSCOPY Left 2019    Procedure: CYSTOSCOPY, Stent placement;  Surgeon: Pablito Roblero MD;  Location: [facility];  Service: Urology;  Laterality: Left;    EPIDURAL STEROID INJECTION INTO LUMBAR SPINE N/A 2019    Procedure: Injection-steroid-epidural-lumbar;  Surgeon: Jakub Greer MD;  Location: [facility];  Service: Pain Management;  Laterality: N/A;  L5/S1 INTERLAMINAR TO RIGHT    EPIDURAL  STEROID INJECTION INTO LUMBAR SPINE Right 06/19/2019    Procedure: Injection-steroid-epidural-lumbar;  Surgeon: Jakub Greer MD;  Location: [facility] OR;  Service: Pain Management;  Laterality: Right;  L5/S1 to right    EYE SURGERY Bilateral     cataract    FIXATION OF TENDON Left 1/3/2024    Procedure: FIXATION, TENDON-Open biceps tenodesis;  Surgeon: Db Akers II, MD;  Location: [facility] OR;  Service: Orthopedics;  Laterality: Left;    HAND SURGERY Right 05/2016    laura noland/Dr. Abhinav Rolle    HEMORRHOID SURGERY      HYSTERECTOMY  age 35    TAHUSO benign reasons    INJECTION OF ANESTHETIC AGENT AROUND MEDIAL BRANCH NERVES INNERVATING LUMBAR FACET JOINT Right 10/15/2018    Procedure: Block-nerve-medial branch-lumbar L2,3,4,5;  Surgeon: Jakub Greer MD;  Location: [facility] OR;  Service: Pain Management;  Laterality: Right;    JOINT REPLACEMENT Right     1st interphalangeal joint of 3rd finger    LYSIS OF ADHESIONS N/A 01/09/2020    Procedure: LYSIS, ADHESIONS EXTENSIVE;  Surgeon: Brandi Alamo MD;  Location: [facility] OR;  Service: Colon and Rectal;  Laterality: N/A;    MOBILIZATION OF SPLENIC FLEXURE N/A 01/09/2020    Procedure: MOBILIZATION, SPLENIC FLEXURE-TAKEDOWN;  Surgeon: Brandi Alamo MD;  Location: [facility] OR;  Service: Colon and Rectal;  Laterality: N/A;    OOPHORECTOMY      one remains    RADIOFREQUENCY ABLATION OF LUMBAR MEDIAL BRANCH NERVE AT SINGLE LEVEL Right 10/25/2018    Procedure: RADIOFREQUENCY ABLATION, NERVE, SPINAL, LUMBAR, MEDIAL BRANCH, THERMAL- L2,L3,L4,L5;  Surgeon: Jakub Greer MD;  Location: [facility] OR;  Service: Pain Management;  Laterality: Right;    RADIOFREQUENCY ABLATION OF LUMBAR MEDIAL BRANCH NERVE AT SINGLE LEVEL Right 04/12/2021    Procedure: Radiofrequency Ablation, Nerve, Spinal, Lumbar, Medial Branch, L2, 3, 4, 5;  Surgeon: Jakub Greer MD;  Location: [facility] OR;  Service: Pain Management;  Laterality: Right;    ROBOT-ASSISTED REPAIR OF INCISIONAL  HERNIA USING DA JACOB XI N/A 10/15/2020    Procedure: XI ROBOTIC REPAIR, HERNIA, INCISIONAL;  Surgeon: Brandi Alamo MD;  Location: [facility] OR;  Service: General;  Laterality: N/A;  ATTEMPTED - CONVERTED TO OPEN PROCEDURE.    SPLENECTOMY N/A 2019    Procedure: SPLENECTOMY;  Surgeon: Brandi Alamo MD;  Location: [facility] OR;  Service: General;  Laterality: N/A;    TONSILLECTOMY      UPPER GASTROINTESTINAL ENDOSCOPY  2017    Dr. Sousa: Tiny gastric islands of salmon-colored mucosa in distal esophagus, gastritis; biopsy: esophagus- reflux esophagitis, negative for lisa's, stomach- reactive gastropathy, negative for h pylori     Family History   Problem Relation Name Age of Onset    Heart disease Mother  85        MI    Heart disease Father  55        MI    Colon cancer Neg Hx      Colon polyps Neg Hx      Crohn's disease Neg Hx      Ulcerative colitis Neg Hx       Social History     Tobacco Use    Smoking status: Former     Current packs/day: 0.00     Average packs/day: 0.5 packs/day for 48.7 years (24.3 ttl pk-yrs)     Types: Cigarettes     Start date: 1964     Quit date: 3/28/2013     Years since quittin.1    Smokeless tobacco: Never   Substance Use Topics    Alcohol use: Yes     Alcohol/week: 10.0 standard drinks of alcohol     Types: 5 Glasses of wine, 5 Shots of liquor per week     Comment: 0-2 ETOHic drinks per day; wine and gin    Drug use: No         OBJECTIVE:     Vital Signs (Most Recent)  Temp: 97.5 °F (36.4 °C) (24 1021)  Pulse: 66 (24 1021)  Resp: 18 (24 1021)  BP: (!) 172/81 (24 1021)  SpO2: 99 % (24 1021)    Physical Exam:                                                       GENERAL:  Comfortable, in no acute distress.                                 HEENT EXAM:  Nonicteric.  No adenopathy.  Oropharynx is clear.               NECK:  Supple.                                                               LUNGS:  Clear.                                                                CARDIAC:  Regular rate and rhythm.  S1, S2.  No murmur.                      ABDOMEN:  Soft, positive bowel sounds, nontender.  No hepatosplenomegaly or masses.  No rebound or guarding.                                             EXTREMITIES:  No edema.     MENTAL STATUS:  Normal, alert and oriented.      ASSESSMENT/PLAN:     Assessment: Rectal Bleeding    Plan: Colonoscopy    Anesthesia Plan: General    ASA Grade: ASA 2 - Patient with mild systemic disease with no functional limitations    MALLAMPATI SCORE:  I (soft palate, uvula, fauces, and tonsillar pillars visible)

## 2024-05-22 NOTE — DISCHARGE SUMMARY
[place] - Endoscopy  Discharge Note  Short Stay  Discharge Note  Short Stay      SUMMARY     Admit Date: 5/22/2024    Attending Physician: Guy Hurst MD     Discharge Physician: Guy Hurst MD    Discharge Date: 5/22/2024 11:43 AM    Final Diagnosis: Rectal bleeding [K62.5]    Disposition: HOME OR SELF CARE    Patient Instructions:   Current Discharge Medication List        START taking these medications    Details   hydrocortisone (ANUSOL-HC) 2.5 % rectal cream Place rectally 2 (two) times daily as needed for Hemorrhoids.  Qty: 28 g, Refills: 1           CONTINUE these medications which have NOT CHANGED    Details   ALPRAZolam (XANAX) 0.25 MG tablet Take 1 tablet (0.25 mg total) by mouth daily as needed for Anxiety.  Qty: 30 tablet, Refills: 1      aspirin 81 MG Chew       atorvastatin (LIPITOR) 40 MG tablet Take 1 tablet (40 mg total) by mouth every evening.  Qty: 90 tablet, Refills: 4    Associated Diagnoses: Bilateral carotid artery stenosis; Essential hypertension; Mixed hyperlipidemia      carvediloL (COREG) 6.25 MG tablet Take 1 tablet (6.25 mg total) by mouth 2 (two) times daily.  Qty: 180 tablet, Refills: 4    Comments: .  Associated Diagnoses: Bilateral carotid artery stenosis; Essential hypertension; Mixed hyperlipidemia; Family history of early CAD; History of ischemic colitis      diltiaZEM (CARDIZEM CD) 180 MG 24 hr capsule Take 1 capsule (180 mg total) by mouth every evening.  Qty: 90 capsule, Refills: 4    Comments: .  Associated Diagnoses: Bilateral carotid artery stenosis; Essential hypertension; PVD (peripheral vascular disease); Family history of early CAD; History of ischemic colitis; Hyperlipidemia, unspecified hyperlipidemia type; Tobacco use      DULoxetine (CYMBALTA) 60 MG capsule TAKE 1 CAPSULE EVERY DAY  Qty: 90 capsule, Refills: 10    Associated Diagnoses: Depression, unspecified depression type      ezetimibe (ZETIA) 10 mg tablet Take 1 tablet (10 mg total) by mouth  every evening.  Qty: 90 tablet, Refills: 4      gabapentin (NEURONTIN) 300 MG capsule Take 2 capsules (600 mg total) by mouth 2 (two) times daily.  Qty: 360 capsule, Refills: 2      oxybutynin (DITROPAN-XL) 5 MG TR24 TAKE 1 TABLET EVERY DAY  Qty: 90 tablet, Refills: 3    Associated Diagnoses: OAB (overactive bladder)      pantoprazole (PROTONIX) 40 MG tablet TAKE 1 TABLET BEFORE BREAKFAST  Qty: 90 tablet, Refills: 3    Associated Diagnoses: Gastroesophageal reflux disease      traMADoL (ULTRAM) 50 mg tablet Take 1 tablet (50 mg total) by mouth 3 (three) times daily as needed for Pain.  Qty: 20 tablet, Refills: 0    Comments: Medically necessary for more than 7 days, ICD 10: G89.4      traZODone (DESYREL) 50 MG tablet Take 1 tablet (50 mg total) by mouth every evening.  Qty: 90 tablet, Refills: 3      varicella-zoster gE-AS01B, PF, (SHINGRIX) 50 mcg/0.5 mL injection Inject into the muscle.  Qty: 1 each, Refills: 1             Discharge Procedure Orders (must include Diet, Follow-up, Activity)    Follow Up:  Follow up with PCP as previously scheduled  Resume routine diet.  Activity as tolerated.    No driving day of procedure.

## 2024-05-29 ENCOUNTER — TUMOR BOARD CONFERENCE (OUTPATIENT)
Dept: GYNECOLOGIC ONCOLOGY | Facility: CLINIC | Age: 79
End: 2024-05-29
Payer: MEDICARE

## 2024-06-04 ENCOUNTER — OFFICE VISIT (OUTPATIENT)
Dept: GYNECOLOGIC ONCOLOGY | Facility: CLINIC | Age: 79
End: 2024-06-04
Payer: MEDICARE

## 2024-06-04 VITALS
TEMPERATURE: 98 F | OXYGEN SATURATION: 97 % | SYSTOLIC BLOOD PRESSURE: 160 MMHG | DIASTOLIC BLOOD PRESSURE: 84 MMHG | WEIGHT: 118.19 LBS | HEIGHT: 63 IN | RESPIRATION RATE: 16 BRPM | HEART RATE: 86 BPM | BODY MASS INDEX: 20.94 KG/M2

## 2024-06-04 DIAGNOSIS — R97.8 OTHER ABNORMAL TUMOR MARKERS: ICD-10-CM

## 2024-06-04 DIAGNOSIS — N83.201 CYST OF RIGHT OVARY: Primary | ICD-10-CM

## 2024-06-04 PROCEDURE — 1100F PTFALLS ASSESS-DOCD GE2>/YR: CPT | Mod: HCNC,CPTII,S$GLB, | Performed by: OBSTETRICS & GYNECOLOGY

## 2024-06-04 PROCEDURE — 99215 OFFICE O/P EST HI 40 MIN: CPT | Mod: HCNC,S$GLB,, | Performed by: OBSTETRICS & GYNECOLOGY

## 2024-06-04 PROCEDURE — 3079F DIAST BP 80-89 MM HG: CPT | Mod: HCNC,CPTII,S$GLB, | Performed by: OBSTETRICS & GYNECOLOGY

## 2024-06-04 PROCEDURE — 1126F AMNT PAIN NOTED NONE PRSNT: CPT | Mod: HCNC,CPTII,S$GLB, | Performed by: OBSTETRICS & GYNECOLOGY

## 2024-06-04 PROCEDURE — 3288F FALL RISK ASSESSMENT DOCD: CPT | Mod: HCNC,CPTII,S$GLB, | Performed by: OBSTETRICS & GYNECOLOGY

## 2024-06-04 PROCEDURE — 3077F SYST BP >= 140 MM HG: CPT | Mod: HCNC,CPTII,S$GLB, | Performed by: OBSTETRICS & GYNECOLOGY

## 2024-06-04 PROCEDURE — 99999 PR PBB SHADOW E&M-EST. PATIENT-LVL IV: CPT | Mod: PBBFAC,HCNC,, | Performed by: OBSTETRICS & GYNECOLOGY

## 2024-06-04 PROCEDURE — 1159F MED LIST DOCD IN RCRD: CPT | Mod: HCNC,CPTII,S$GLB, | Performed by: OBSTETRICS & GYNECOLOGY

## 2024-06-04 NOTE — PROGRESS NOTES
SUBJECTIVE     Chief complaint: Adnexal cyst     History of present Illness:  Haley Castro is a 79 y.o.  female with right cystic pelvic mass here today for follow-up after MRI and colonoscopy. She remains asymptomatic.     Her case was reviewed at [facility] and consensus recommendation was  for observation over surgery due to low suspicion for malignancy and risk of surgery given history of extensive abdominal surgeries.     5/15/24 MRI pelvis- There is a 6.9 x 5.9 x 5.4 cm (AP X CC X TR), smoothly marginated T2 hyperintense, T1 hypointense nonenhancing structure in the right ovary/adnexa, that shows a single indentation and linear internal septation (axial image 13). No measurable soft tissue or solid component is identified. This appears to project within the over although only a thin rind of remaining ovarian tissue may be present. This appears to have grown relative to the previous available CT of 10/13/2020 at which time it measured 1.7 cm in diameter.     24 Colonoscopy- internal hemorrhoids; otherwise normal.      LAST VISIT   Referred by Dr. Lo for evaluation and management of right cystic pelvic mass. She presented to the ED 24 for blood in her stool x2 weeks, and subsequently a 6.9 x 5.3 cm cystic right adnexal lesion was seen on imaging.  She denies pain, bloating or early satiety. She denies family history breast or ovarian cancer. She is having ongoing daily blood per rectum.     She has an extensive history of prior abdominal surgeries including  x3 (classical), appendectomy, SHORTY/LSO (age 35, benign path), splenectomy and colostomy with subsequent closure (), splenic flexure takedown and extensive KENNEDI, and robotic assisted converted to open repair of incisional hernia ().     She has a colonoscopy scheduled 24 with Dr. Sousa.     4/15/2024 US Pelvis- 70 x 44 x 60 mm complicated cystic mass with a shadowing nodular septal calcification and thick irregular  septations observed within the right ovary   This mass demonstrated pronounced interval growth between the CT examinations of 04/09/2024 and 10/13/2020.     4/9/2024 CTA A/P- 6.9 x 5.3 cm cystic right adnexal lesion   10/13/2020 CT A/P- There is some adnexal calcific, somewhat cystic appearance similar although slightly previously obscured measuring approximately 1.5 cm in diameter.        Review of Systems - Oncology  per HPI     Review of patient's allergies indicates:   Allergen Reactions    No known drug allergies      Current Outpatient Medications   Medication Instructions    ALPRAZolam (XANAX) 0.25 mg, Oral, Daily PRN    aspirin 81 MG Chew     atorvastatin (LIPITOR) 40 mg, Oral, Nightly    carvediloL (COREG) 6.25 mg, Oral, 2 times daily    diltiaZEM (CARDIZEM CD) 180 mg, Oral, Nightly    DULoxetine (CYMBALTA) 60 mg, Oral    ezetimibe (ZETIA) 10 mg, Oral, Nightly    gabapentin (NEURONTIN) 600 mg, Oral, 2 times daily    hydrocortisone (ANUSOL-HC) 2.5 % rectal cream Rectal, 2 times daily PRN    oxybutynin (DITROPAN-XL) 5 mg, Oral    pantoprazole (PROTONIX) 40 MG tablet TAKE 1 TABLET BEFORE BREAKFAST    traMADoL (ULTRAM) 50 mg, Oral, 3 times daily PRN    traZODone (DESYREL) 50 mg, Oral, Nightly    varicella-zoster [id number], PF, (SHINGRIX) 50 mcg/0.5 mL injection Intramuscular     Past Medical History:   Diagnosis Date    Anticoagulant long-term use     aspirin    Arthritis     Bilateral carotid artery stenosis     Carotid artery occlusion     DDD (degenerative disc disease), lumbar     Depression 11/29/2012    Diverticulosis     Former smoker     GERD (gastroesophageal reflux disease)     HLD (hyperlipidemia) 11/29/2012    HTN (hypertension) 11/29/2012    Infectious gastroenteritis     Ischemic colitis     Macular degeneration     OAB (overactive bladder)     PVD (peripheral vascular disease)     Traumatic complete tear of left rotator cuff, subsequent encounter 12/2023      Past Surgical History:   Procedure  Laterality Date    APPENDECTOMY      ARTHROSCOPIC REPAIR OF ROTATOR CUFF OF SHOULDER Left 1/3/2024    Procedure: REPAIR, ROTATOR CUFF, ARTHROSCOPIC;  Surgeon: Db Akers II, MD;  Location: [facility] OR;  Service: Orthopedics;  Laterality: Left;    ARTHROSCOPY OF SHOULDER WITH DECOMPRESSION OF SUBACROMIAL SPACE Left 1/3/2024    Procedure: ARTHROSCOPY, SHOULDER, WITH SUBACROMIAL SPACE DECOMPRESSION;  Surgeon: Db Akers II, MD;  Location: [facility] OR;  Service: Orthopedics;  Laterality: Left;    carotid angiogram      CAROTID ENDARTERECTOMY Right 2018    Procedure: Endarterectomy-Carotid - RIGHT;  Surgeon: Safia Thomas MD;  Location: [facility] OR;  Service: Cardiovascular;  Laterality: Right;  with patch  angioplasty     SECTION      x 3     COLON SURGERY      COLONOSCOPY N/A 2016    Procedure: COLONOSCOPY;  Surgeon: Grzegorz Sousa Jr., MD;  Location: [facility];  Service: Endoscopy;  Laterality: N/A;    COLONOSCOPY  2009    Dr. Reyes in [facility], repeat in 5 years    COLONOSCOPY N/A 2020    Procedure: COLONOSCOPY;  Surgeon: Grzegorz Sousa Jr., MD;  Location: [facility];  Service: Endoscopy;  Laterality: N/A;    COLONOSCOPY N/A 2024    Procedure: COLONOSCOPY;  Surgeon: Guy Hurst MD;  Location: [facility];  Service: Endoscopy;  Laterality: N/A;    COLOSTOMY N/A 2019    Procedure: CREATION, COLOSTOMY;  Surgeon: Brandi Alamo MD;  Location: [facility];  Service: General;  Laterality: N/A;    COLOSTOMY CLOSURE      CYSTOSCOPY Left 2019    Procedure: CYSTOSCOPY, Stent placement;  Surgeon: Pablito Roblero MD;  Location: [facility] OR;  Service: Urology;  Laterality: Left;    EPIDURAL STEROID INJECTION INTO LUMBAR SPINE N/A 2019    Procedure: Injection-steroid-epidural-lumbar;  Surgeon: Jakub Greer MD;  Location: [facility];  Service: Pain Management;  Laterality: N/A;  L5/S1 INTERLAMINAR TO RIGHT    EPIDURAL STEROID INJECTION INTO LUMBAR SPINE Right  06/19/2019    Procedure: Injection-steroid-epidural-lumbar;  Surgeon: Jakub Greer MD;  Location: [facility] OR;  Service: Pain Management;  Laterality: Right;  L5/S1 to right    EYE SURGERY Bilateral     cataract    FIXATION OF TENDON Left 1/3/2024    Procedure: FIXATION, TENDON-Open biceps tenodesis;  Surgeon: Db Akers II, MD;  Location: [facility] OR;  Service: Orthopedics;  Laterality: Left;    [facility]    HEMORRHOID SURGERY      HYSTERECTOMY  age 35    TAHUSO benign reasons    INJECTION OF ANESTHETIC AGENT AROUND MEDIAL BRANCH NERVES INNERVATING LUMBAR FACET JOINT Right 10/15/2018    Procedure: Block-nerve-medial branch-lumbar L2,3,4,5;  Surgeon: Jakub Greer MD;  Location: [facility] OR;  Service: Pain Management;  Laterality: Right;    JOINT REPLACEMENT Right     1st interphalangeal joint of 3rd finger    LYSIS OF ADHESIONS N/A 01/09/2020    Procedure: LYSIS, ADHESIONS EXTENSIVE;  Surgeon: Brandi Alamo MD;  Location: [facility] OR;  Service: Colon and Rectal;  Laterality: N/A;    MOBILIZATION OF SPLENIC FLEXURE N/A 01/09/2020    Procedure: MOBILIZATION, SPLENIC FLEXURE-TAKEDOWN;  Surgeon: Brandi Alamo MD;  Location: [facility] OR;  Service: Colon and Rectal;  Laterality: N/A;    OOPHORECTOMY      one remains    RADIOFREQUENCY ABLATION OF LUMBAR MEDIAL BRANCH NERVE AT SINGLE LEVEL Right 10/25/2018    Procedure: RADIOFREQUENCY ABLATION, NERVE, SPINAL, LUMBAR, MEDIAL BRANCH, THERMAL- L2,L3,L4,L5;  Surgeon: Jakub Greer MD;  Location: [facility] OR;  Service: Pain Management;  Laterality: Right;    RADIOFREQUENCY ABLATION OF LUMBAR MEDIAL BRANCH NERVE AT SINGLE LEVEL Right 04/12/2021    Procedure: Radiofrequency Ablation, Nerve, Spinal, Lumbar, Medial Branch, L2, 3, 4, 5;  Surgeon: Jakub Greer MD;  Location: [facility] OR;  Service: Pain Management;  Laterality: Right;    ROBOT-ASSISTED REPAIR OF INCISIONAL HERNIA USING DA JACOB XI N/A 10/15/2020     Procedure: XI ROBOTIC REPAIR, HERNIA, INCISIONAL;  Surgeon: Brandi Alamo MD;  Location: [facility] OR;  Service: General;  Laterality: N/A;  ATTEMPTED - CONVERTED TO OPEN PROCEDURE.    SPLENECTOMY N/A 2019    Procedure: SPLENECTOMY;  Surgeon: Brandi Alamo MD;  Location: [facility] OR;  Service: General;  Laterality: N/A;    TONSILLECTOMY      UPPER GASTROINTESTINAL ENDOSCOPY  2017    Dr. Sousa: Tiny gastric islands of salmon-colored mucosa in distal esophagus, gastritis; biopsy: esophagus- reflux esophagitis, negative for lisa's, stomach- reactive gastropathy, negative for h pylori      OB History    Para Term  AB Living   3 3 3         SAB IAB Ectopic Multiple Live Births                  # Outcome Date GA Lbr Estuardo/2nd Weight Sex Type Anes PTL Lv   3 Term            2 Term            1 Term              Social History     Tobacco Use    Smoking status: Former     Current packs/day: 0.00     Average packs/day: 0.5 packs/day for 48.7 years (24.3 ttl pk-yrs)     Types: Cigarettes     Start date: 1964     Quit date: 3/28/2013     Years since quittin.1    Smokeless tobacco: Never   Substance Use Topics    Alcohol use: Yes     Alcohol/week: 10.0 standard drinks of alcohol     Types: 5 Glasses of wine, 5 Shots of liquor per week     Comment: 0-2 ETOHic drinks per day; wine and gin    Drug use: No      Family History   Problem Relation Name Age of Onset    Heart disease Mother  85        MI    Heart disease Father  55        MI    Colon cancer Neg Hx      Colon polyps Neg Hx      Crohn's disease Neg Hx      Ulcerative colitis Neg Hx       Health Maintenance Topics with due status: Not Due       Topic Last Completion Date    DEXA Scan 2023    LDCT Lung Screen 07/10/2023    Lipid Panel 2023    Eye Exam 2023    Aspirin/Antiplatelet Therapy 2024     Health Maintenance Due   Topic Date Due    RSV Vaccine (Age 60+ and Pregnant patients) (1 - 1-dose 60+ series) Never  "done    Shingles Vaccine (2 of 2) 10/25/2022    TETANUS VACCINE  06/01/2023    COVID-19 Vaccine (4 - 2023-24 season) 09/01/2023    Mammogram  04/11/2024     OBJECTIVE     BP (!) 160/84 (BP Location: Left arm, Patient Position: Sitting, BP Method: Medium (Automatic))   Pulse 86   Temp 97.8 °F (36.6 °C) (Temporal)   Resp 16   Ht 5' 3" (1.6 m)   Wt 53.6 kg (118 lb 2.7 oz)   LMP 03/28/1980   SpO2 97%   BMI 20.93 kg/m²     Physical Exam  Constitutional:       Appearance: Normal appearance.   Cardiovascular:      Rate and Rhythm: Normal rate.   Pulmonary:      Effort: Pulmonary effort is normal.   Neurological:      General: No focal deficit present.      Mental Status: She is alert and oriented to person, place, and time.   Psychiatric:         Mood and Affect: Mood normal.         Behavior: Behavior normal.   Vitals reviewed. Exam conducted with a chaperone present.        ASSESSMENT AND PLAN      1. Cyst of right ovary  - US Pelvis Complete Non OB; Future  - CANCER ANTIGEN 125; Future    2. Other abnormal tumor markers  - CANCER ANTIGEN 125; Future        I discussed with the patient that ovarian cysts are fluid filled sacs that form in or on and ovary and that ovarian cyst can be simple or complex, depending on the substances that are inside them.  We discussed that complex cysts may have interior walls (septa), blood, or solid components and that most cysts are not cancerous.  We discussed that cysts may be asymptomatic or at some point could cause pressure, bloating, pain, nausea, or sometimes urinary symptoms.  Ultrasounds or other imaging can be helpful in diagnosis or treatment planning and sometimes blood samples can be analyzed for proteins that might indicate an increased risk for cancer.  Surgery may be recommended if it is determined that a cyst is persistent, growing or more than minimal risk of malignancy.     At this point, all tumor markers are normal. Her MRI reveals cystic neoplasm vs " peritoneal inclusion cyst without nodularity, solid component, ascites, lymphadenopathy, peritoneal implants. Tehre is no obvious sign of malignancy. She is asymptomatic.   We reviewed her case in tumor board and consensus was for surveillance given risk of surgery outweighs risk of malignancy at this time. We discussed this recommendation in detail as well as risk and benefits of surgery. Will plan for close surveillance with repeat  TVUS and  and follow up in 6 weeks (3 months from original US).         Raisa Brand MD  Gynecologic Oncology

## 2024-06-05 NOTE — PROGRESS NOTES
Multidisciplinary Gynecologic Oncology Cancer Conference - Evaluation and Recommendation Summary  Patient Name: Haley Castro  : 1945  MRN: [id number]     1. Evaluation  HPI:79 y.o.  referred for evaluation of right cystic pelvic mass. She presented to the ED 24 for blood in her stool x2 weeks, and subsequently a 6.9 x 5.3 cm cystic right adnexal lesion was seen on imaging. She denies pain, bloating or early satiety. Reports continued rectal bleeding.      Sx History: classical c/s x3, appendectomy, SHORTY/SLO, splenectomy, colostomy with subsequent closure (), splenic flexure takedown and extensive KENNEDI, and robotic assisted [converted to open] incisional hernia repair.      2024 Colonoscopy normal, end to end colo-colonic anastomosis characterized by healthy appearing mucosa.      5/15/2024 MRI Nonaggressive appearing right adnexal cystic lesion, while the differential is somewhat broad and includes peritoneal inclusion cyst, lymphovascular malformation and hydrosalpinx, this is suspected to be an epithelial neoplasm of the ovary (serous/mucinous cystadenoma, and less likely a cystadenocarcinoma), however given the size and interval growth relative to the previous CT consider surgical consultation as a more aggressive process is not excluded.     4/15/2024 US Pelvis- 70 x 44 x 60 mm complicated cystic mass with a shadowing nodular septal calcification and thick irregular septations observed within the right ovary    This mass demonstrated pronounced interval growth between the CT examinations of 2024 and 10/13/2020.      2024 CTA A/P- 6.9 x 5.3 cm cystic right adnexal lesion      10/13/2020 CT A/P- There is some adnexal calcific, somewhat cystic appearance similar although slightly previously obscured measuring approximately 1.5 cm in diameter.        Imaging:MRI 5/15, CTA a/p 2024, 10/20 CT          2. Treatment Recommendation:  Imaging and tumor markers were reviewed. This area is  most consistent with benign cystic neoplasm or peritoneal inclusion cyst. Given complex surgical history, consensus for continue surveillance

## 2024-07-15 ENCOUNTER — HOSPITAL ENCOUNTER (OUTPATIENT)
Dept: RADIOLOGY | Facility: HOSPITAL | Age: 79
Discharge: HOME | End: 2024-07-15
Attending: OBSTETRICS & GYNECOLOGY
Payer: MEDICARE

## 2024-07-15 DIAGNOSIS — N83.201 CYST OF RIGHT OVARY: ICD-10-CM

## 2024-07-15 PROCEDURE — 76856 US EXAM PELVIC COMPLETE: CPT | Mod: 26,HCNC,, | Performed by: RADIOLOGY

## 2024-07-15 PROCEDURE — 76830 TRANSVAGINAL US NON-OB: CPT | Mod: 26,HCNC,, | Performed by: RADIOLOGY

## 2024-07-15 PROCEDURE — 76830 TRANSVAGINAL US NON-OB: CPT | Mod: TC,HCNC,PO

## 2024-07-16 ENCOUNTER — TELEPHONE (OUTPATIENT)
Dept: HEMATOLOGY/ONCOLOGY | Facility: CLINIC | Age: 79
End: 2024-07-16
Payer: MEDICARE

## 2024-07-16 DIAGNOSIS — N94.9 ADNEXAL CYST: Primary | ICD-10-CM

## 2024-07-16 DIAGNOSIS — R97.8 OTHER ABNORMAL TUMOR MARKERS: ICD-10-CM

## 2024-07-16 NOTE — TELEPHONE ENCOUNTER
"Pt unable to come to her appointment earlier today because she was busy packing to move. Pt curious to talk to Dr Brand about her lab and US she did yesterday. Pt offered virtual call. Pt states "I just want to talk to her". Dr Brand called pt and asked navigator to arrange 6 month follow up with labs and US prior.     Pt contacted and 6 month follow up scheduled with labs and US scheduled the day prior. Appointment reviewed with pt. Pt verbalized understanding.   "

## 2024-07-16 NOTE — TELEPHONE ENCOUNTER
Notified of US revealing stable cyst and  that has actually decreased. We again reviewed our discussion last visit and her plan of care.  Recommend continued surveillance with repeat TVUS and  and visit with me in 6 months. All questions welcomed and answered.       Raisa Brand MD  Gynecologic Oncology

## 2024-08-01 ENCOUNTER — OFFICE VISIT (OUTPATIENT)
Dept: FAMILY MEDICINE | Facility: CLINIC | Age: 79
End: 2024-08-01
Payer: MEDICARE

## 2024-08-01 VITALS
SYSTOLIC BLOOD PRESSURE: 132 MMHG | DIASTOLIC BLOOD PRESSURE: 70 MMHG | OXYGEN SATURATION: 97 % | WEIGHT: 115.06 LBS | HEART RATE: 73 BPM | HEIGHT: 63 IN | BODY MASS INDEX: 20.39 KG/M2

## 2024-08-01 DIAGNOSIS — F41.9 ANXIETY: ICD-10-CM

## 2024-08-01 DIAGNOSIS — Z12.39 ENCOUNTER FOR SCREENING FOR MALIGNANT NEOPLASM OF BREAST, UNSPECIFIED SCREENING MODALITY: ICD-10-CM

## 2024-08-01 DIAGNOSIS — E78.2 MIXED HYPERLIPIDEMIA: ICD-10-CM

## 2024-08-01 DIAGNOSIS — I10 ESSENTIAL HYPERTENSION: ICD-10-CM

## 2024-08-01 DIAGNOSIS — Z12.31 ENCOUNTER FOR SCREENING MAMMOGRAM FOR MALIGNANT NEOPLASM OF BREAST: ICD-10-CM

## 2024-08-01 DIAGNOSIS — Z00.00 WELLNESS EXAMINATION: Primary | ICD-10-CM

## 2024-08-01 PROCEDURE — 99999 PR PBB SHADOW E&M-EST. PATIENT-LVL III: CPT | Mod: PBBFAC,HCNC,, | Performed by: FAMILY MEDICINE

## 2024-08-01 RX ORDER — BUPROPION HYDROCHLORIDE 75 MG/1
75 TABLET ORAL 2 TIMES DAILY
Qty: 60 TABLET | Refills: 2 | Status: SHIPPED | OUTPATIENT
Start: 2024-08-01 | End: 2024-08-01 | Stop reason: SDUPTHER

## 2024-08-01 RX ORDER — BUPROPION HYDROCHLORIDE 75 MG/1
75 TABLET ORAL 2 TIMES DAILY
Qty: 60 TABLET | Refills: 2 | Status: SHIPPED | OUTPATIENT
Start: 2024-08-01 | End: 2025-08-01

## 2024-08-01 NOTE — PROGRESS NOTES
Subjective:       Patient ID: Haley Castro is a 79 y.o. female.    Chief Complaint: Follow-up (6 months f/u/)    Here for wellness and follow up multiple chronic medical issues. Doing well overall and in normal state of health.  More stress due to move and fixing up a home.      Follow-up  Pertinent negatives include no chest pain, chills, coughing or fever.     Review of Systems   Constitutional:  Negative for chills and fever.   Respiratory:  Negative for cough, chest tightness and shortness of breath.    Cardiovascular:  Negative for chest pain, palpitations and leg swelling.   Endocrine: Negative for cold intolerance and heat intolerance.   Psychiatric/Behavioral:  Negative for decreased concentration. The patient is nervous/anxious.        Objective:      Physical Exam  Vitals and nursing note reviewed.   Constitutional:       Appearance: She is well-developed.   HENT:      Head: Normocephalic and atraumatic.   Cardiovascular:      Rate and Rhythm: Normal rate and regular rhythm.      Heart sounds: Normal heart sounds.   Pulmonary:      Effort: Pulmonary effort is normal.      Breath sounds: Normal breath sounds.   Psychiatric:         Mood and Affect: Mood normal.         Behavior: Behavior normal.         Assessment:       1. Wellness examination    2. Essential hypertension    3. Mixed hyperlipidemia    4. Anxiety    5. Encounter for screening for malignant neoplasm of breast, unspecified screening modality    6. Encounter for screening mammogram for malignant neoplasm of breast        Plan:       Wellness examination    Essential hypertension  -     CBC Without Differential; Future; Expected date: 08/01/2024  -     Comprehensive Metabolic Panel; Future; Expected date: 08/01/2024  -     Lipid Panel; Future; Expected date: 08/01/2024  -     TSH; Future; Expected date: 08/01/2024    Mixed hyperlipidemia  -     CBC Without Differential; Future; Expected date: 08/01/2024  -     Comprehensive Metabolic Panel;  Future; Expected date: 08/01/2024  -     Lipid Panel; Future; Expected date: 08/01/2024  -     TSH; Future; Expected date: 08/01/2024    Anxiety    Encounter for screening for malignant neoplasm of breast, unspecified screening modality  -     Mammo Digital Screening Bilat w/ Jose; Future; Expected date: 08/01/2024    Encounter for screening mammogram for malignant neoplasm of breast  -     Mammo Digital Screening Bilat w/ Jose; Future; Expected date: 08/01/2024    Other orders  -     buPROPion (WELLBUTRIN) 75 MG tablet; Take 1 tablet (75 mg total) by mouth 2 (two) times daily.  Dispense: 60 tablet; Refill: 2      Trial bupropion  Htn stable  Lipid stable  Will monitor chronic medical issues and continue current plan of care.      Follow up in about 1 month (around 9/1/2024), or if symptoms worsen or fail to improve, for Virtual Visit.

## 2024-10-09 ENCOUNTER — OFFICE VISIT (OUTPATIENT)
Dept: NEUROSURGERY | Facility: CLINIC | Age: 79
End: 2024-10-09
Payer: MEDICARE

## 2024-10-09 VITALS
BODY MASS INDEX: 20.39 KG/M2 | SYSTOLIC BLOOD PRESSURE: 143 MMHG | RESPIRATION RATE: 18 BRPM | HEIGHT: 63 IN | HEART RATE: 80 BPM | DIASTOLIC BLOOD PRESSURE: 78 MMHG | WEIGHT: 115.06 LBS

## 2024-10-09 DIAGNOSIS — M54.16 LUMBAR RADICULOPATHY, CHRONIC: ICD-10-CM

## 2024-10-09 DIAGNOSIS — G12.20 MOTOR NEURON DISEASE, UNSPECIFIED: Primary | ICD-10-CM

## 2024-10-09 DIAGNOSIS — G54.9 MYELORADICULOPATHY: ICD-10-CM

## 2024-10-09 DIAGNOSIS — M21.372 LEFT FOOT DROP: ICD-10-CM

## 2024-10-09 DIAGNOSIS — R29.2 HYPERREFLEXIA: ICD-10-CM

## 2024-10-09 DIAGNOSIS — M54.16 LEFT LUMBAR RADICULOPATHY: ICD-10-CM

## 2024-10-09 PROCEDURE — 3078F DIAST BP <80 MM HG: CPT | Mod: CPTII,S$GLB,, | Performed by: PHYSICIAN ASSISTANT

## 2024-10-09 PROCEDURE — 3077F SYST BP >= 140 MM HG: CPT | Mod: CPTII,S$GLB,, | Performed by: PHYSICIAN ASSISTANT

## 2024-10-09 PROCEDURE — 1160F RVW MEDS BY RX/DR IN RCRD: CPT | Mod: CPTII,S$GLB,, | Performed by: PHYSICIAN ASSISTANT

## 2024-10-09 PROCEDURE — 3288F FALL RISK ASSESSMENT DOCD: CPT | Mod: CPTII,S$GLB,, | Performed by: PHYSICIAN ASSISTANT

## 2024-10-09 PROCEDURE — 99214 OFFICE O/P EST MOD 30 MIN: CPT | Mod: S$GLB,,, | Performed by: PHYSICIAN ASSISTANT

## 2024-10-09 PROCEDURE — 1125F AMNT PAIN NOTED PAIN PRSNT: CPT | Mod: CPTII,S$GLB,, | Performed by: PHYSICIAN ASSISTANT

## 2024-10-09 PROCEDURE — 1159F MED LIST DOCD IN RCRD: CPT | Mod: CPTII,S$GLB,, | Performed by: PHYSICIAN ASSISTANT

## 2024-10-09 PROCEDURE — 1100F PTFALLS ASSESS-DOCD GE2>/YR: CPT | Mod: CPTII,S$GLB,, | Performed by: PHYSICIAN ASSISTANT

## 2024-10-09 NOTE — PROGRESS NOTES
[facility] Neurosurgery - [facility]  Clinic Consult     Consult Requested By: Jaydon Hoyos MD  PCP: SUDHA Sparks MD    SUBJECTIVE:     Chief Complaint:   Chief Complaint   Patient presents with    Lumbar Spine Pain (L-Spine)     Left lumbar radiculopathy        History of Present Illness:  Haley Castro is a 79 y.o. female with HTN, HLD who presents for evaluation of imbalance and left foot weakness. This has been ongoing for a few months. She reports due to her imbalance, she fell and injured her knee. She saw ortho who was concerned about spinal involvement. She denies neck pain. Denies UE numbness/tingling, weakness, pain. Denies dropping objects or difficulties with hand dexterity. Denies bowel/bladder dysfunction. She does have numbness in the lateral left thigh. She has weakness in the left ankle and toes     Pertinent and recent history, provider evaluations, imaging and data reviewed in EPIC        Past Medical History:   Diagnosis Date    Anticoagulant long-term use     aspirin    Arthritis     Bilateral carotid artery stenosis     Carotid artery occlusion     DDD (degenerative disc disease), lumbar     Depression 11/29/2012    Diverticulosis     Former smoker     GERD (gastroesophageal reflux disease)     HLD (hyperlipidemia) 11/29/2012    HTN (hypertension) 11/29/2012    Infectious gastroenteritis     Ischemic colitis     Macular degeneration     OAB (overactive bladder)     PVD (peripheral vascular disease)     Traumatic complete tear of left rotator cuff, subsequent encounter 12/2023     Past Surgical History:   Procedure Laterality Date    APPENDECTOMY      ARTHROSCOPIC REPAIR OF ROTATOR CUFF OF SHOULDER Left 1/3/2024    Procedure: REPAIR, ROTATOR CUFF, ARTHROSCOPIC;  Surgeon: Db Akers II, MD;  Location: [facility];  Service: Orthopedics;  Laterality: Left;    ARTHROSCOPY OF SHOULDER WITH DECOMPRESSION OF SUBACROMIAL SPACE Left 1/3/2024    Procedure:  ARTHROSCOPY, SHOULDER, WITH SUBACROMIAL SPACE DECOMPRESSION;  Surgeon: Db Akers II, MD;  Location: [facility] OR;  Service: Orthopedics;  Laterality: Left;    carotid angiogram      CAROTID ENDARTERECTOMY Right 2018    Procedure: Endarterectomy-Carotid - RIGHT;  Surgeon: Safia Thomas MD;  Location: [facility] OR;  Service: Cardiovascular;  Laterality: Right;  with patch  angioplasty     SECTION      x 3     COLON SURGERY      COLONOSCOPY N/A 2016    Procedure: COLONOSCOPY;  Surgeon: Grzegorz Sousa Jr., MD;  Location: [facility];  Service: Endoscopy;  Laterality: N/A;    COLONOSCOPY  2009    Dr. Reyes in [facility], repeat in 5 years    COLONOSCOPY N/A 2020    Procedure: COLONOSCOPY;  Surgeon: Grzegorz Sousa Jr., MD;  Location: [facility];  Service: Endoscopy;  Laterality: N/A;    COLONOSCOPY N/A 2024    Procedure: COLONOSCOPY;  Surgeon: Guy Hurst MD;  Location: [facility];  Service: Endoscopy;  Laterality: N/A;    COLOSTOMY N/A 2019    Procedure: CREATION, COLOSTOMY;  Surgeon: Brandi Alamo MD;  Location: [facility] OR;  Service: General;  Laterality: N/A;    COLOSTOMY CLOSURE      CYSTOSCOPY Left 2019    Procedure: CYSTOSCOPY, Stent placement;  Surgeon: Pabliot Roblero MD;  Location: [facility] OR;  Service: Urology;  Laterality: Left;    EPIDURAL STEROID INJECTION INTO LUMBAR SPINE N/A 2019    Procedure: Injection-steroid-epidural-lumbar;  Surgeon: Jakub Greer MD;  Location: [facility] OR;  Service: Pain Management;  Laterality: N/A;  L5/S1 INTERLAMINAR TO RIGHT    EPIDURAL STEROID INJECTION INTO LUMBAR SPINE Right 2019    Procedure: Injection-steroid-epidural-lumbar;  Surgeon: Jakub Greer MD;  Location: [facility];  Service: Pain Management;  Laterality: Right;  L5/S1 to right    EYE SURGERY Bilateral     cataract    FIXATION OF TENDON Left 1/3/2024    Procedure: FIXATION, TENDON-Open biceps tenodesis;  Surgeon: Db Akers II, MD;   Location: [facility] OR;  Service: Orthopedics;  Laterality: Left;    HAND SURGERY Right 05/2016    tarun noland/Dr. Abhinav Rolle    HEMORRHOID SURGERY      HYSTERECTOMY  age 35    TAHUSO benign reasons    INJECTION OF ANESTHETIC AGENT AROUND MEDIAL BRANCH NERVES INNERVATING LUMBAR FACET JOINT Right 10/15/2018    Procedure: Block-nerve-medial branch-lumbar L2,3,4,5;  Surgeon: Jakub Greer MD;  Location: [facility] OR;  Service: Pain Management;  Laterality: Right;    JOINT REPLACEMENT Right     1st interphalangeal joint of 3rd finger    LYSIS OF ADHESIONS N/A 01/09/2020    Procedure: LYSIS, ADHESIONS EXTENSIVE;  Surgeon: Brandi Alamo MD;  [facility];  Service: Colon and Rectal;  Laterality: N/A;    MOBILIZATION OF SPLENIC FLEXURE N/A 01/09/2020    Procedure: MOBILIZATION, SPLENIC FLEXURE-TAKEDOWN;  Surgeon: Brandi Alamo MD;  Location: [facility] OR;  Service: Colon and Rectal;  Laterality: N/A;    OOPHORECTOMY      one remains    RADIOFREQUENCY ABLATION OF LUMBAR MEDIAL BRANCH NERVE AT SINGLE LEVEL Right 10/25/2018    Procedure: RADIOFREQUENCY ABLATION, NERVE, SPINAL, LUMBAR, MEDIAL BRANCH, THERMAL- L2,L3,L4,L5;  Surgeon: Jakub Greer MD;  Location: [facility] OR;  Service: Pain Management;  Laterality: Right;    RADIOFREQUENCY ABLATION OF LUMBAR MEDIAL BRANCH NERVE AT SINGLE LEVEL Right 04/12/2021    Procedure: Radiofrequency Ablation, Nerve, Spinal, Lumbar, Medial Branch, L2, 3, 4, 5;  Surgeon: Jakub Greer MD;  Location: [facility] OR;  Service: Pain Management;  Laterality: Right;    ROBOT-ASSISTED REPAIR OF INCISIONAL HERNIA USING DA JACOB XI N/A 10/15/2020    Procedure: XI ROBOTIC REPAIR, HERNIA, INCISIONAL;  Surgeon: Brandi Alamo MD;  Location: [facility] OR;  Service: General;  Laterality: N/A;  ATTEMPTED - CONVERTED TO OPEN PROCEDURE.    SPLENECTOMY N/A 08/12/2019    Procedure: SPLENECTOMY;  Surgeon: Brandi Alamo MD;  Location: [facility] OR;  Service: General;  Laterality: N/A;     TONSILLECTOMY      UPPER GASTROINTESTINAL ENDOSCOPY  2017    Dr. Sousa: Tiny gastric islands of salmon-colored mucosa in distal esophagus, gastritis; biopsy: esophagus- reflux esophagitis, negative for lisa's, stomach- reactive gastropathy, negative for h pylori     Family History   Problem Relation Name Age of Onset    Heart disease Mother  85        MI    Heart disease Father  55        MI    Colon cancer Neg Hx      Colon polyps Neg Hx      Crohn's disease Neg Hx      Ulcerative colitis Neg Hx       Social History     Tobacco Use    Smoking status: Former     Current packs/day: 0.00     Average packs/day: 0.5 packs/day for 48.7 years (24.3 ttl pk-yrs)     Types: Cigarettes     Start date: 1964     Quit date: 3/28/2013     Years since quittin.5    Smokeless tobacco: Never   Substance Use Topics    Alcohol use: Yes     Alcohol/week: 10.0 standard drinks of alcohol     Types: 5 Glasses of wine, 5 Shots of liquor per week     Comment: 0-2 ETOHic drinks per day; wine and gin    Drug use: No      Review of patient's allergies indicates:   Allergen Reactions    No known drug allergies        Current Outpatient Medications:     aspirin 81 MG Chew, , Disp: , Rfl:     atorvastatin (LIPITOR) 40 MG tablet, Take 1 tablet (40 mg total) by mouth every evening., Disp: 90 tablet, Rfl: 4    buPROPion (WELLBUTRIN) 75 MG tablet, Take 1 tablet (75 mg total) by mouth 2 (two) times daily., Disp: 60 tablet, Rfl: 2    carvediloL (COREG) 6.25 MG tablet, Take 1 tablet (6.25 mg total) by mouth 2 (two) times daily., Disp: 180 tablet, Rfl: 4    diltiaZEM (CARDIZEM CD) 180 MG 24 hr capsule, Take 1 capsule (180 mg total) by mouth every evening. (Patient taking differently: Take 180 mg by mouth 2 (two) times a day.), Disp: 90 capsule, Rfl: 4    DULoxetine (CYMBALTA) 60 MG capsule, TAKE 1 CAPSULE EVERY DAY, Disp: 90 capsule, Rfl: 10    ezetimibe (ZETIA) 10 mg tablet, Take 1 tablet (10 mg total) by mouth every evening., Disp: 90  "tablet, Rfl: 4    gabapentin (NEURONTIN) 300 MG capsule, Take 2 capsules (600 mg total) by mouth 2 (two) times daily., Disp: 360 capsule, Rfl: 2    hydrocortisone (ANUSOL-HC) 2.5 % rectal cream, Place rectally 2 (two) times daily as needed for Hemorrhoids., Disp: 28 g, Rfl: 1    methylPREDNISolone (MEDROL DOSEPACK) 4 mg tablet, use as directed, Disp: 21 each, Rfl: 0    oxybutynin (DITROPAN-XL) 5 MG TR24, TAKE 1 TABLET EVERY DAY, Disp: 90 tablet, Rfl: 3    pantoprazole (PROTONIX) 40 MG tablet, TAKE 1 TABLET BEFORE BREAKFAST, Disp: 90 tablet, Rfl: 3    traMADoL (ULTRAM) 50 mg tablet, Take 1 tablet (50 mg total) by mouth 3 (three) times daily as needed for Pain., Disp: 20 tablet, Rfl: 0    traZODone (DESYREL) 50 MG tablet, Take 1 tablet (50 mg total) by mouth every evening., Disp: 90 tablet, Rfl: 3    varicella-zoster [id number], PF, (SHINGRIX) 50 mcg/0.5 mL injection, Inject into the muscle., Disp: 1 each, Rfl: 1    ALPRAZolam (XANAX) 0.25 MG tablet, Take 1 tablet (0.25 mg total) by mouth daily as needed for Anxiety., Disp: 30 tablet, Rfl: 1    Review of Systems:   Constitutional: no fever, chills or night sweats. No changes in weight   Eyes: no visual changes   ENT: no nasal congestion or sore throat   Respiratory: no cough or shortness of breath   Cardiovascular: no chest pain or palpitations   Gastrointestinal: no nausea or vomiting   Genitourinary: no hematuria or dysuria   Integument/Breast: no rash or pruritis   Hematologic/Lymphatic: no easy bruising or lymphadenopathy   Musculoskeletal: no arthralgias or myalgias  Neurological: no seizures or tremors +weakness   Behavioral/Psych: no auditory or visual hallucinations   Endocrine: no heat or cold intolerance         OBJECTIVE:     Vital Signs (Most Recent):  Pulse: 80 (10/09/24 1403)  Resp: 18 (10/09/24 1403)  BP: (!) 143/78 (10/09/24 1403)  Estimated body mass index is 20.39 kg/m² as calculated from the following:    Height as of this encounter: 5' 3" (1.6 m).    " Weight as of this encounter: 52.2 kg (115 lb 1.3 oz).    Physical Exam:   General: well developed, well nourished, no distress   Neurologic: Alert and oriented. Thought content appropriate. GCS 15.   Head: normocephalic, atraumatic  Eyes: EOMI  Neck: trachea midline, no JVD   Cardiovascular: no LE edema  Pulmonary: normal respirations, no signs of respiratory distress  Abdomen:  non-distended  Sensory: intact to light touch throughout  Skin: Skin is warm, dry and intact    Motor Strength: Moves all extremities spontaneously with good tone. No abnormal movements seen.       Deltoids Triceps Biceps Wrist Extension Wrist Flexion Hand  Interossei    Upper: R 5/5 5/5 5/5 5/5 5/5 5/5 5/5     L 5/5 5/5 5/5 5/5 5/5 5/5 5/5      Iliopsoas Quadriceps Knee  Flexion Tibialis  anterior Gastro- cnemius EHL Foot Inversion Foot Eversion   Lower: R 5/5 5/5 5/5 5/5 5/5 5/5 5/5 5/5    L 5/5 5/5 5/5 4/5 5/5 2/5 5/5 5/5     DTR's: 3+  Calderon: present   Clonus: absent  Gait: steppage             Diagnostic Results:  No current imaging    ASSESSMENT/PLAN:     Motor neuron disease, unspecified  -     MRI Cervical Spine Without Contrast; Future; Expected date: 10/09/2024    Myeloradiculopathy  -     Ambulatory referral/consult to Neurosurgery  -     MRI Cervical Spine Without Contrast; Future; Expected date: 10/09/2024    Left lumbar radiculopathy  -     Ambulatory referral/consult to Neurosurgery  -     MRI Lumbar Spine Without Contrast; Future; Expected date: 10/09/2024  -     X-Ray Lumbar Spine Ap Lateral w/Flex Ext; Future; Expected date: 10/09/2024    Lumbar radiculopathy, chronic  -     MRI Lumbar Spine Without Contrast; Future; Expected date: 10/09/2024    Left foot drop  -     MRI Lumbar Spine Without Contrast; Future; Expected date: 10/09/2024    Hyperreflexia  -     MRI Cervical Spine Without Contrast; Future; Expected date: 10/09/2024  -     X-Ray Cervical Spine AP Lat with Flexion  Extension; Future; Expected date:  10/09/2024        Haley Castro is a 79 y.o. female with complaints of leg weakness and imbalance. On exam, she does have TA and EHL weakness of the left leg. This is likely related to left L5 radiculopathy. I have ordered MRI lumbar spine to evaluate for neural compression. She also has hyperreflexia and positive butler's. Her imbalance could be related to her foot drop, but I am also concerned about spinal cord compression. Her abnormal reflexes are present in the upper and lower extremities' therefore, I have ordered MRI cervical spine to evaluate for compressive pathology. I will review once complete and call to discuss results and recommendations     Patient verbalized understanding of plan. Encouraged to call with any questions or concerns.     This note was partially dictated using voice recognition software, so please excuse any errors that were not corrected.

## 2024-10-15 DIAGNOSIS — F32.A DEPRESSION, UNSPECIFIED DEPRESSION TYPE: ICD-10-CM

## 2024-10-15 NOTE — TELEPHONE ENCOUNTER
Refill Routing Note   Medication(s) are not appropriate for processing by [facility] for the following reason(s):        Required vitals abnormal    [facility] action(s):  Defer             Appointments  past 12m or future 3m with PCP    Date Provider   Last Visit   8/1/2024 SUDHA Sparks MD   Next Visit   Visit date not found SDUHA Sparks MD   ED visits in past 90 days: 0        Note composed:11:33 AM 10/15/2024

## 2024-10-15 NOTE — TELEPHONE ENCOUNTER
No care due was identified.  [facility] Embedded Care Due Messages. Reference number: [id number].   10/15/2024 8:48:59 AM CDT

## 2024-10-16 RX ORDER — DULOXETIN HYDROCHLORIDE 60 MG/1
60 CAPSULE, DELAYED RELEASE ORAL
Qty: 90 CAPSULE | Refills: 1 | Status: SHIPPED | OUTPATIENT
Start: 2024-10-16

## 2024-10-18 ENCOUNTER — TELEPHONE (OUTPATIENT)
Dept: GASTROENTEROLOGY | Facility: CLINIC | Age: 79
End: 2024-10-18
Payer: MEDICARE

## 2024-10-18 NOTE — TELEPHONE ENCOUNTER
Returned call to the patient, patient states that she has not had a BM in 4 days and has taken miralax 3 doses yesterday and 1 dose all day today, patient asked if she can take a miralax prep like she would if she was having a colonoscopy, patient advised that he message would be sent to Dr. Sousa for review, patient verbalized understanding of this.  Patient advised that I could not tell her yes or no as she will need to make her own mind up regarding that, patient states that she is going to take the miralax and let Dr. Sousa know how she does.

## 2024-10-18 NOTE — TELEPHONE ENCOUNTER
----- Message from Mary sent at 10/18/2024  3:18 PM CDT -----  Regarding: Call back/ ASAP  Type:  Needs Medical Advice    Who Called:Pt    Would the patient rather a call back or a response via [facility]? Call back    Best Call Back Number: [phone]    Additional Information: Pt is requesting a call back. Thank you

## 2024-10-19 ENCOUNTER — HOSPITAL ENCOUNTER (OUTPATIENT)
Dept: RADIOLOGY | Facility: HOSPITAL | Age: 79
Discharge: HOME | End: 2024-10-19
Attending: PHYSICIAN ASSISTANT
Payer: MEDICARE

## 2024-10-19 DIAGNOSIS — G54.9 MYELORADICULOPATHY: ICD-10-CM

## 2024-10-19 DIAGNOSIS — M21.372 LEFT FOOT DROP: ICD-10-CM

## 2024-10-19 DIAGNOSIS — M54.16 LUMBAR RADICULOPATHY, CHRONIC: ICD-10-CM

## 2024-10-19 DIAGNOSIS — R29.2 HYPERREFLEXIA: ICD-10-CM

## 2024-10-19 DIAGNOSIS — G12.20 MOTOR NEURON DISEASE, UNSPECIFIED: ICD-10-CM

## 2024-10-19 DIAGNOSIS — M54.16 LEFT LUMBAR RADICULOPATHY: ICD-10-CM

## 2024-10-19 PROCEDURE — 72110 X-RAY EXAM L-2 SPINE 4/>VWS: CPT | Mod: TC,FY,PO

## 2024-10-19 PROCEDURE — 72050 X-RAY EXAM NECK SPINE 4/5VWS: CPT | Mod: TC,FY,PO

## 2024-10-19 PROCEDURE — 72148 MRI LUMBAR SPINE W/O DYE: CPT | Mod: TC,PO

## 2024-10-19 PROCEDURE — 72148 MRI LUMBAR SPINE W/O DYE: CPT | Mod: 26,,, | Performed by: RADIOLOGY

## 2024-10-19 PROCEDURE — 72050 X-RAY EXAM NECK SPINE 4/5VWS: CPT | Mod: 26,,, | Performed by: RADIOLOGY

## 2024-10-19 PROCEDURE — 72141 MRI NECK SPINE W/O DYE: CPT | Mod: TC,PO

## 2024-10-19 PROCEDURE — 72141 MRI NECK SPINE W/O DYE: CPT | Mod: 26,,, | Performed by: RADIOLOGY

## 2024-10-19 PROCEDURE — 72110 X-RAY EXAM L-2 SPINE 4/>VWS: CPT | Mod: 26,,, | Performed by: RADIOLOGY

## 2024-10-25 DIAGNOSIS — M21.372 LEFT FOOT DROP: Primary | ICD-10-CM

## 2024-10-28 ENCOUNTER — TELEPHONE (OUTPATIENT)
Dept: NEUROLOGY | Facility: CLINIC | Age: 79
End: 2024-10-28
Payer: MEDICARE

## 2024-11-01 ENCOUNTER — TELEPHONE (OUTPATIENT)
Dept: NEUROLOGY | Facility: CLINIC | Age: 79
End: 2024-11-01
Payer: MEDICARE

## 2024-11-11 ENCOUNTER — TELEPHONE (OUTPATIENT)
Dept: NEUROLOGY | Facility: CLINIC | Age: 79
End: 2024-11-11
Payer: MEDICARE

## 2024-11-11 NOTE — TELEPHONE ENCOUNTER
LVM returning patient's call and she is returning my call.  Unable to reach her to schedule EMG study.

## 2024-11-12 ENCOUNTER — PATIENT MESSAGE (OUTPATIENT)
Dept: ADMINISTRATIVE | Facility: HOSPITAL | Age: 79
End: 2024-11-12
Payer: MEDICARE

## 2024-11-15 ENCOUNTER — TELEPHONE (OUTPATIENT)
Dept: NEUROLOGY | Facility: CLINIC | Age: 79
End: 2024-11-15
Payer: MEDICARE

## 2024-11-15 NOTE — TELEPHONE ENCOUNTER
----- Message from Charbel sent at 11/15/2024  2:42 PM CST -----  Regarding: Returning call  Type:  Patient Returning Call    Who Called:PT    Who Left Message for Patient:Suly    Does the patient know what this is regarding?: test    Would the patient rather a call back or a response via [facility]? Call back    Best Call Back Number:[phone]      Additional Information:   Please advise -- Thank you

## 2024-12-06 NOTE — TELEPHONE ENCOUNTER
No care due was identified.  [facility] Embedded Care Due Messages. Reference number: [id number].   12/06/2024 10:26:58 AM CST

## 2024-12-07 RX ORDER — BUPROPION HYDROCHLORIDE 75 MG/1
TABLET ORAL
Qty: 180 TABLET | Refills: 0 | OUTPATIENT
Start: 2024-12-07

## 2024-12-07 NOTE — TELEPHONE ENCOUNTER
Refill Decision Note   Haley Castro  is requesting a refill authorization.  Brief Assessment and Rationale for Refill:  Quick Discontinue     Medication Therapy Plan:    Pharmacy is requesting new scripts for the following medications without required information, (sig/ frequency/qty/etc)      Medication Reconciliation Completed: No     Comments: Pharmacies have been requesting medications for patients without required information, (sig, frequency, qty, etc.). In addition, requests are sent for medication(s) pt. are currently not taking, and medications patients have never taken.    We have spoken to the pharmacies about these request types and advised their teams previously that we are unable to assess these New Script requests and require all details for these requests. This is a known issue and has been reported.     Note composed:12:00 PM 12/07/2024

## 2024-12-12 DIAGNOSIS — I10 ESSENTIAL HYPERTENSION: ICD-10-CM

## 2024-12-12 DIAGNOSIS — I65.23 BILATERAL CAROTID ARTERY STENOSIS: ICD-10-CM

## 2024-12-12 DIAGNOSIS — E78.2 MIXED HYPERLIPIDEMIA: ICD-10-CM

## 2024-12-12 RX ORDER — TRAZODONE HYDROCHLORIDE 50 MG/1
50 TABLET ORAL NIGHTLY
Qty: 90 TABLET | Refills: 3 | Status: SHIPPED | OUTPATIENT
Start: 2024-12-12

## 2024-12-12 RX ORDER — BUPROPION HYDROCHLORIDE 75 MG/1
75 TABLET ORAL 2 TIMES DAILY
Qty: 180 TABLET | Refills: 1 | Status: SHIPPED | OUTPATIENT
Start: 2024-12-12 | End: 2025-12-12

## 2024-12-12 RX ORDER — ATORVASTATIN CALCIUM 40 MG/1
40 TABLET, FILM COATED ORAL NIGHTLY
Qty: 90 TABLET | Refills: 4 | Status: SHIPPED | OUTPATIENT
Start: 2024-12-12

## 2024-12-12 NOTE — TELEPHONE ENCOUNTER
No care due was identified.  [facility] Embedded Care Due Messages. Reference number: [id number].   12/12/2024 11:43:59 AM CST

## 2024-12-12 NOTE — TELEPHONE ENCOUNTER
----- Message from Quiana sent at 12/12/2024 11:10 AM CST -----  Regarding: Refill Request  Type: RX Refill Request      Who Called: Self       Refill or New Rx: refill       RX Name and Strength:  Disp Refills Start End ANKIT  atorvastatin (LIPITOR) 40 MG tablet          Disp Refills Start End ANKIT  traZODone (DESYREL) 50 MG tablet             Preferred Pharmacy with phone number:  [facility] - [place], [place] - [address]    Or       [id number] - [place], [place] - [address] [facility] [facility]            Would the patient rather a call back or a response via [facility]? Call       Best Call Back Number: .[phone]

## 2025-01-06 ENCOUNTER — HOSPITAL ENCOUNTER (OUTPATIENT)
Dept: RADIOLOGY | Facility: HOSPITAL | Age: 80
Discharge: HOME | End: 2025-01-06
Attending: OBSTETRICS & GYNECOLOGY
Payer: MEDICARE

## 2025-01-06 DIAGNOSIS — N94.9 ADNEXAL CYST: ICD-10-CM

## 2025-01-06 PROCEDURE — 76856 US EXAM PELVIC COMPLETE: CPT | Mod: 26,HCNC,, | Performed by: RADIOLOGY

## 2025-01-06 PROCEDURE — 76856 US EXAM PELVIC COMPLETE: CPT | Mod: TC,HCNC,PO

## 2025-01-06 PROCEDURE — 76830 TRANSVAGINAL US NON-OB: CPT | Mod: 26,HCNC,, | Performed by: RADIOLOGY

## 2025-01-14 ENCOUNTER — OFFICE VISIT (OUTPATIENT)
Dept: GYNECOLOGIC ONCOLOGY | Facility: CLINIC | Age: 80
End: 2025-01-14
Payer: MEDICARE

## 2025-01-14 VITALS
OXYGEN SATURATION: 95 % | TEMPERATURE: 97 F | BODY MASS INDEX: 21.36 KG/M2 | SYSTOLIC BLOOD PRESSURE: 170 MMHG | HEART RATE: 82 BPM | WEIGHT: 120.56 LBS | RESPIRATION RATE: 16 BRPM | HEIGHT: 63 IN | DIASTOLIC BLOOD PRESSURE: 91 MMHG

## 2025-01-14 DIAGNOSIS — N83.201 CYST OF RIGHT OVARY: Primary | ICD-10-CM

## 2025-01-14 PROCEDURE — 1126F AMNT PAIN NOTED NONE PRSNT: CPT | Mod: CPTII,S$GLB,, | Performed by: OBSTETRICS & GYNECOLOGY

## 2025-01-14 PROCEDURE — 99215 OFFICE O/P EST HI 40 MIN: CPT | Mod: S$GLB,,, | Performed by: OBSTETRICS & GYNECOLOGY

## 2025-01-14 PROCEDURE — 3080F DIAST BP >= 90 MM HG: CPT | Mod: CPTII,S$GLB,, | Performed by: OBSTETRICS & GYNECOLOGY

## 2025-01-14 PROCEDURE — 99999 PR PBB SHADOW E&M-EST. PATIENT-LVL IV: CPT | Mod: PBBFAC,,, | Performed by: OBSTETRICS & GYNECOLOGY

## 2025-01-14 PROCEDURE — 3288F FALL RISK ASSESSMENT DOCD: CPT | Mod: CPTII,S$GLB,, | Performed by: OBSTETRICS & GYNECOLOGY

## 2025-01-14 PROCEDURE — 1101F PT FALLS ASSESS-DOCD LE1/YR: CPT | Mod: CPTII,S$GLB,, | Performed by: OBSTETRICS & GYNECOLOGY

## 2025-01-14 PROCEDURE — 1159F MED LIST DOCD IN RCRD: CPT | Mod: CPTII,S$GLB,, | Performed by: OBSTETRICS & GYNECOLOGY

## 2025-01-14 PROCEDURE — 3077F SYST BP >= 140 MM HG: CPT | Mod: CPTII,S$GLB,, | Performed by: OBSTETRICS & GYNECOLOGY

## 2025-01-15 NOTE — PROGRESS NOTES
SUBJECTIVE     Chief complaint: Adnexal cyst     History of present Illness:  Haley Castro is a 79 y.o.  female with right cystic pelvic mass here today for follow-up after repeating imaging. No new symptoms.     ONC HX:      Her case was reviewed at [facility] and consensus recommendation was  for observation over surgery due to low suspicion for malignancy and risk of surgery given history of extensive abdominal surgeries.     24 =15  24: 1. Cystic lesion presumably from the right ovary similar since priors, a small amount of growth is difficult to exclude.  Slow growing ovarian neoplastic processes are on the differential.  CT of the pelvis with comparison to 2024 may better accurately assess for growth given the concern raised on today's exam; while realizing that ultrasound is very reliant on technique and imaging position     5/15/24 MRI pelvis- There is a 6.9 x 5.9 x 5.4 cm (AP X CC X TR), smoothly marginated T2 hyperintense, T1 hypointense nonenhancing structure in the right ovary/adnexa, that shows a single indentation and linear internal septation (axial image 13). No measurable soft tissue or solid component is identified. This appears to project within the over although only a thin rind of remaining ovarian tissue may be present. This appears to have grown relative to the previous available CT of 10/13/2020 at which time it measured 1.7 cm in diameter.     24 Colonoscopy- internal hemorrhoids; otherwise normal.      Referral Hx:   Referred by Dr. Lo for evaluation and management of right cystic pelvic mass. She presented to the ED 24 for blood in her stool x2 weeks, and subsequently a 6.9 x 5.3 cm cystic right adnexal lesion was seen on imaging.  She denies pain, bloating or early satiety. She denies family history breast or ovarian cancer. She is having ongoing daily blood per rectum.     She has an extensive history of prior abdominal surgeries including   x3 (classical), appendectomy, SHORTY/LSO (age 35, benign path), splenectomy and colostomy with subsequent closure (), splenic flexure takedown and extensive KENNEDI, and robotic assisted converted to open repair of incisional hernia ().     She has a colonoscopy scheduled 24 with Dr. Sousa.     4/15/2024 US Pelvis- 70 x 44 x 60 mm complicated cystic mass with a shadowing nodular septal calcification and thick irregular septations observed within the right ovary   This mass demonstrated pronounced interval growth between the CT examinations of 2024 and 10/13/2020.     2024 CTA A/P- 6.9 x 5.3 cm cystic right adnexal lesion   10/13/2020 CT A/P- There is some adnexal calcific, somewhat cystic appearance similar although slightly previously obscured measuring approximately 1.5 cm in diameter.        Review of Systems - Oncology  per HPI     Review of patient's allergies indicates:   Allergen Reactions    No known drug allergies      Current Outpatient Medications   Medication Instructions    ALPRAZolam (XANAX) 0.25 mg, Oral, Daily PRN    aspirin 81 MG Chew     atorvastatin (LIPITOR) 40 mg, Oral, Nightly    buPROPion (WELLBUTRIN) 75 mg, Oral, 2 times daily    carvediloL (COREG) 6.25 mg, Oral, 2 times daily    diltiaZEM (CARDIZEM CD) 180 mg, Oral, Nightly    DULoxetine (CYMBALTA) 60 mg, Oral    ezetimibe (ZETIA) 10 mg, Oral, Nightly    gabapentin (NEURONTIN) 600 mg, Oral, 2 times daily    hydrocortisone (ANUSOL-HC) 2.5 % rectal cream Rectal, 2 times daily PRN    methylPREDNISolone (MEDROL DOSEPACK) 4 mg tablet use as directed    oxybutynin (DITROPAN-XL) 5 mg, Oral    pantoprazole (PROTONIX) 40 MG tablet TAKE 1 TABLET BEFORE BREAKFAST    traMADoL (ULTRAM) 50 mg, Oral, 3 times daily PRN    traZODone (DESYREL) 50 mg, Oral, Nightly    varicella-zoster [id number], PF, (SHINGRIX) 50 mcg/0.5 mL injection Intramuscular     Past Medical History:   Diagnosis Date    Anticoagulant long-term use     aspirin     Arthritis     Bilateral carotid artery stenosis     Carotid artery occlusion     DDD (degenerative disc disease), lumbar     Depression 2012    Diverticulosis     Former smoker     GERD (gastroesophageal reflux disease)     HLD (hyperlipidemia) 2012    HTN (hypertension) 2012    Infectious gastroenteritis     Ischemic colitis     Macular degeneration     OAB (overactive bladder)     PVD (peripheral vascular disease)     Traumatic complete tear of left rotator cuff, subsequent encounter 2023      Past Surgical History:   Procedure Laterality Date    APPENDECTOMY      ARTHROSCOPIC REPAIR OF ROTATOR CUFF OF SHOULDER Left 1/3/2024    Procedure: REPAIR, ROTATOR CUFF, ARTHROSCOPIC;  Surgeon: Db Akers II, MD;  Location: [facility];  Service: Orthopedics;  Laterality: Left;    ARTHROSCOPY OF SHOULDER WITH DECOMPRESSION OF SUBACROMIAL SPACE Left 1/3/2024    Procedure: ARTHROSCOPY, SHOULDER, WITH SUBACROMIAL SPACE DECOMPRESSION;  Surgeon: Db Akers II, MD;  Location: [facility];  Service: Orthopedics;  Laterality: Left;    carotid angiogram      CAROTID ENDARTERECTOMY Right 2018    Procedure: Endarterectomy-Carotid - RIGHT;  Surgeon: Safia Thomas MD;  Location: [facility];  Service: Cardiovascular;  Laterality: Right;  with patch  angioplasty     SECTION      x 3     COLON SURGERY      COLONOSCOPY N/A 2016    Procedure: COLONOSCOPY;  Surgeon: Grzegorz Sousa Jr., MD;  Location: [facility];  Service: Endoscopy;  Laterality: N/A;    COLONOSCOPY  2009    Dr. Reyes in [place], repeat in 5 years    COLONOSCOPY N/A 2020    Procedure: COLONOSCOPY;  Surgeon: Grzegorz Sousa Jr., MD;  Location: [facility];  Service: Endoscopy;  Laterality: N/A;    COLONOSCOPY N/A 2024    Procedure: COLONOSCOPY;  Surgeon: Guy Hurst MD;  Location: [facility];  Service: Endoscopy;  Laterality: N/A;    COLOSTOMY N/A 2019    Procedure: CREATION, COLOSTOMY;  Surgeon: Brandi BOSWELL  MD Jasmeet;  [email or web address] OR;  Service: General;  Laterality: N/A;    COLOSTOMY CLOSURE      CYSTOSCOPY Left 08/12/2019    Procedure: CYSTOSCOPY, Stent placement;  Surgeon: Pablito Roblero MD;  Location: [email or web address] OR;  Service: Urology;  Laterality: Left;    EPIDURAL STEROID INJECTION INTO LUMBAR SPINE N/A 05/06/2019    Procedure: Injection-steroid-epidural-lumbar;  Surgeon: Jakub Greer MD;  Location: [facility] OR;  Service: Pain Management;  Laterality: N/A;  L5/S1 INTERLAMINAR TO RIGHT    EPIDURAL STEROID INJECTION INTO LUMBAR SPINE Right 06/19/2019    Procedure: Injection-steroid-epidural-lumbar;  Surgeon: Jakub Greer MD;  Location: [facility] OR;  Service: Pain Management;  Laterality: Right;  L5/S1 to right    EYE SURGERY Bilateral     cataract    FIXATION OF TENDON Left 1/3/2024    Procedure: FIXATION, TENDON-Open biceps tenodesis;  Surgeon: Db Akers II, MD;  Location: [email or web address] OR;  Service: Orthopedics;  Laterality: Left;    [facility]    HEMORRHOID SURGERY      HYSTERECTOMY  age 35    TAHUSO benign reasons    INJECTION OF ANESTHETIC AGENT AROUND MEDIAL BRANCH NERVES INNERVATING LUMBAR FACET JOINT Right 10/15/2018    Procedure: Block-nerve-medial branch-lumbar L2,3,4,5;  Surgeon: Jakub Greer MD;  Location: [facility] OR;  Service: Pain Management;  Laterality: Right;    JOINT REPLACEMENT Right     1st interphalangeal joint of 3rd finger    LYSIS OF ADHESIONS N/A 01/09/2020    Procedure: LYSIS, ADHESIONS EXTENSIVE;  Surgeon: Brandi Alamo MD;  Location: [email or web address] OR;  Service: Colon and Rectal;  Laterality: N/A;    MOBILIZATION OF SPLENIC FLEXURE N/A 01/09/2020    Procedure: MOBILIZATION, SPLENIC FLEXURE-TAKEDOWN;  Surgeon: Brandi Alamo MD;  Location: [email or web address] OR;  Service: Colon and Rectal;  Laterality: N/A;    OOPHORECTOMY      one remains    RADIOFREQUENCY ABLATION OF LUMBAR MEDIAL BRANCH NERVE AT SINGLE LEVEL Right 10/25/2018     Procedure: RADIOFREQUENCY ABLATION, NERVE, SPINAL, LUMBAR, MEDIAL BRANCH, THERMAL- L2,L3,L4,L5;  Surgeon: Jakub Greer MD;  Location: [facility] OR;  Service: Pain Management;  Laterality: Right;    RADIOFREQUENCY ABLATION OF LUMBAR MEDIAL BRANCH NERVE AT SINGLE LEVEL Right 2021    Procedure: Radiofrequency Ablation, Nerve, Spinal, Lumbar, Medial Branch, L2, 3, 4, 5;  Surgeon: Jakub Greer MD;  Location: [facility] OR;  Service: Pain Management;  Laterality: Right;    ROBOT-ASSISTED REPAIR OF INCISIONAL HERNIA USING DA JACOB XI N/A 10/15/2020    Procedure: XI ROBOTIC REPAIR, HERNIA, INCISIONAL;  Surgeon: Brandi Alamo MD;  Location: [facility] OR;  Service: General;  Laterality: N/A;  ATTEMPTED - CONVERTED TO OPEN PROCEDURE.    SPLENECTOMY N/A 2019    Procedure: SPLENECTOMY;  Surgeon: Brandi Alamo MD;  Location: [facility] OR;  Service: General;  Laterality: N/A;    TONSILLECTOMY      UPPER GASTROINTESTINAL ENDOSCOPY  2017    Dr. Sousa: Tiny gastric islands of salmon-colored mucosa in distal esophagus, gastritis; biopsy: esophagus- reflux esophagitis, negative for lisa's, stomach- reactive gastropathy, negative for h pylori      OB History    Para Term  AB Living   3 3 3         SAB IAB Ectopic Multiple Live Births                  # Outcome Date GA Lbr Estuardo/2nd Weight Sex Type Anes PTL Lv   3 Term            2 Term            1 Term              Social History     Tobacco Use    Smoking status: Former     Current packs/day: 0.00     Average packs/day: 0.5 packs/day for 48.7 years (24.3 ttl pk-yrs)     Types: Cigarettes     Start date: 1964     Quit date: 3/28/2013     Years since quittin.8    Smokeless tobacco: Never   Substance Use Topics    Alcohol use: Yes     Alcohol/week: 10.0 standard drinks of alcohol     Types: 5 Glasses of wine, 5 Shots of liquor per week     Comment: 0-2 ETOHic drinks per day; wine and gin    Drug use: No      Family History   Problem Relation  "Name Age of Onset    Heart disease Mother  85        MI    Heart disease Father  55        MI    Colon cancer Neg Hx      Colon polyps Neg Hx      Crohn's disease Neg Hx      Ulcerative colitis Neg Hx       Health Maintenance Topics with due status: Not Due       Topic Last Completion Date    DEXA Scan 04/11/2023    Lipid Panel 08/30/2023    Aspirin/Antiplatelet Therapy 01/14/2025     Health Maintenance Due   Topic Date Due    RSV Vaccine (Age 60+ and Pregnant patients) (1 - 1-dose 75+ series) Never done    Shingles Vaccine (2 of 2) 10/25/2022    TETANUS VACCINE  06/01/2023    Mammogram  04/11/2024    LDCT Lung Screen  07/10/2024    Influenza Vaccine (1) 09/01/2024    COVID-19 Vaccine (4 - 2024-25 season) 09/01/2024    Non-Diabetic Eye Exam  11/16/2024     OBJECTIVE     BP (!) 170/91 (BP Location: Left arm, Patient Position: Sitting) Comment: pt did not take blood pressure meds  Pulse 82   Temp 97.4 °F (36.3 °C) (Temporal)   Resp 16   Ht 5' 3" (1.6 m)   Wt 54.7 kg (120 lb 9.5 oz)   LMP 03/28/1980   SpO2 95%   BMI 21.36 kg/m²     Physical Exam  Constitutional:       Appearance: Normal appearance.     Genitourinary: Right adnexum displays fullness (some fullness noted, no discreet mass, no nodularity). Right adnexum displays no mass and no tenderness. Left adnexum displays no mass and no tenderness.   Cardiovascular:      Rate and Rhythm: Normal rate.   Pulmonary:      Effort: Pulmonary effort is normal.   Neurological:      General: No focal deficit present.      Mental Status: She is alert and oriented to person, place, and time.   Psychiatric:         Mood and Affect: Mood normal.         Behavior: Behavior normal.   Vitals reviewed. Exam conducted with a chaperone present.        ASSESSMENT AND PLAN      1. Cyst of right ovary  - CT Chest Abdomen Pelvis W W/O Contrast (XPD); Future  - Creatinine, serum; Future        I discussed with the patient that ovarian cysts are fluid filled sacs that form in or on " and ovary and that ovarian cyst can be simple or complex, depending on the substances that are inside them.  We discussed that complex cysts may have interior walls (septa), blood, or solid components and that most cysts are not cancerous.  We discussed that cysts may be asymptomatic or at some point could cause pressure, bloating, pain, nausea, or sometimes urinary symptoms.  Ultrasounds or other imaging can be helpful in diagnosis or treatment planning and sometimes blood samples can be analyzed for proteins that might indicate an increased risk for cancer.  Surgery may be recommended if it is determined that a cyst is persistent, growing or more than minimal risk of malignancy.     At this point, all tumor markers are normal. Her MRI reveals cystic neoplasm vs peritoneal inclusion cyst without nodularity, solid component, ascites, lymphadenopathy, peritoneal implants. There is no obvious sign of malignancy. She is asymptomatic.  We reviewed her case in tumor board and consensus was for surveillance given risk of surgery outweighs risk of malignancy at this time. We again discussed this recommendation in detail as well as risk and benefits of surgery.     Repeat US with possible growth, although remains clinically non suspicious and tumor marker remains normal. Will obtain CT AP as suggested per radiology to compare to years prior and make ultimate plan based of care after. She was given the opportunity to ask questions, and stated all were answered.       Raisa Brand MD  Gynecologic Oncology

## 2025-01-23 ENCOUNTER — TELEPHONE (OUTPATIENT)
Dept: FAMILY MEDICINE | Facility: CLINIC | Age: 80
End: 2025-01-23
Payer: MEDICARE

## 2025-01-23 NOTE — TELEPHONE ENCOUNTER
----- Message from Kendal sent at 1/22/2025  8:16 AM CST -----  Contact: pt  Type: Needs Medical Advice         Who Called: pt  Best Call Back Number:[phone]  Additional Information: Requesting a call back regarding  pt rescheduled lab and ct for feb 5tg. Pt asking for office to call her to set f/u with Dr Sparks. Next available was March 20th. Pt declined   Please Advise- Thank you

## 2025-01-23 NOTE — TELEPHONE ENCOUNTER
----- Message from Kendal sent at 1/22/2025  8:16 AM CST -----  Contact: pt  Type: Needs Medical Advice         Who Called: pt  Best Call Back Number:[phone]  Additional Information: Requesting a call back regarding  pt rescheduled lab and ct for feb 5tg. Pt asking for office to call her to set f/u with Dr Sparks. Next available was March 20th. Pt declined   Please Advise- Thank you

## 2025-01-24 ENCOUNTER — OFFICE VISIT (OUTPATIENT)
Dept: FAMILY MEDICINE | Facility: CLINIC | Age: 80
End: 2025-01-24
Payer: MEDICARE

## 2025-01-24 VITALS
WEIGHT: 119.5 LBS | DIASTOLIC BLOOD PRESSURE: 70 MMHG | HEIGHT: 63 IN | OXYGEN SATURATION: 96 % | BODY MASS INDEX: 21.17 KG/M2 | SYSTOLIC BLOOD PRESSURE: 136 MMHG | HEART RATE: 79 BPM

## 2025-01-24 DIAGNOSIS — Z23 NEEDS FLU SHOT: ICD-10-CM

## 2025-01-24 DIAGNOSIS — Z93.3 S/P COLOSTOMY: ICD-10-CM

## 2025-01-24 DIAGNOSIS — I10 ESSENTIAL HYPERTENSION: Primary | ICD-10-CM

## 2025-01-24 DIAGNOSIS — E78.2 MIXED HYPERLIPIDEMIA: ICD-10-CM

## 2025-01-24 PROCEDURE — 1159F MED LIST DOCD IN RCRD: CPT | Mod: CPTII,S$GLB,, | Performed by: FAMILY MEDICINE

## 2025-01-24 PROCEDURE — 99999 PR PBB SHADOW E&M-EST. PATIENT-LVL III: CPT | Mod: PBBFAC,,, | Performed by: FAMILY MEDICINE

## 2025-01-24 PROCEDURE — G0008 ADMIN INFLUENZA VIRUS VAC: HCPCS | Mod: S$GLB,,, | Performed by: FAMILY MEDICINE

## 2025-01-24 PROCEDURE — 3075F SYST BP GE 130 - 139MM HG: CPT | Mod: CPTII,S$GLB,, | Performed by: FAMILY MEDICINE

## 2025-01-24 PROCEDURE — G2211 COMPLEX E/M VISIT ADD ON: HCPCS | Mod: S$GLB,,, | Performed by: FAMILY MEDICINE

## 2025-01-24 PROCEDURE — 1101F PT FALLS ASSESS-DOCD LE1/YR: CPT | Mod: CPTII,S$GLB,, | Performed by: FAMILY MEDICINE

## 2025-01-24 PROCEDURE — 90653 IIV ADJUVANT VACCINE IM: CPT | Mod: S$GLB,,, | Performed by: FAMILY MEDICINE

## 2025-01-24 PROCEDURE — 3288F FALL RISK ASSESSMENT DOCD: CPT | Mod: CPTII,S$GLB,, | Performed by: FAMILY MEDICINE

## 2025-01-24 PROCEDURE — 99214 OFFICE O/P EST MOD 30 MIN: CPT | Mod: S$GLB,,, | Performed by: FAMILY MEDICINE

## 2025-01-24 PROCEDURE — 1126F AMNT PAIN NOTED NONE PRSNT: CPT | Mod: CPTII,S$GLB,, | Performed by: FAMILY MEDICINE

## 2025-01-24 PROCEDURE — 3078F DIAST BP <80 MM HG: CPT | Mod: CPTII,S$GLB,, | Performed by: FAMILY MEDICINE

## 2025-01-24 NOTE — PROGRESS NOTES
Subjective:       Patient ID: Haley Castro is a 79 y.o. female.    Chief Complaint: Follow-up (6 month follow up)    Here for follow up multiple chronic medical issues. Doing well overall and in normal state of health.      Follow-up  Pertinent negatives include no chest pain, chills, coughing or fever.     Review of Systems   Constitutional:  Negative for chills and fever.   Respiratory:  Negative for cough, chest tightness and shortness of breath.    Cardiovascular:  Negative for chest pain, palpitations and leg swelling.   Endocrine: Negative for cold intolerance and heat intolerance.   Psychiatric/Behavioral:  Negative for decreased concentration. The patient is not nervous/anxious.        Objective:      Physical Exam  Vitals and nursing note reviewed.   Constitutional:       Appearance: She is well-developed.   HENT:      Head: Normocephalic and atraumatic.   Cardiovascular:      Rate and Rhythm: Normal rate and regular rhythm.      Heart sounds: Normal heart sounds.   Pulmonary:      Effort: Pulmonary effort is normal.      Breath sounds: Normal breath sounds.   Psychiatric:         Mood and Affect: Mood normal.         Behavior: Behavior normal.         Assessment:       1. Essential hypertension    2. Needs flu shot    3. Mixed hyperlipidemia    4. S/P colostomy        Plan:       Essential hypertension    Needs flu shot  -     influenza (adjuvanted) (Fluad) 45 mcg/0.5 mL IM vaccine (> or = 64 yo) 0.5 mL    Mixed hyperlipidemia    S/P colostomy        Long discussion regarding ovarian mass and agreed with CT  Message to team regardin nerve conduction testing as she hasn't heard anything  Will monitor chronic medical issues and continue current plan of care.  Visit today included increased complexity associated with the care of the episodic problem htn addressed and managing the longitudinal care of the patient due to the serious and/or complex managed problem(s) as above.    Update routine labs    Follow  up in about 6 months (around 7/24/2025), or if symptoms worsen or fail to improve.

## 2025-01-30 DIAGNOSIS — Z00.00 ENCOUNTER FOR MEDICARE ANNUAL WELLNESS EXAM: ICD-10-CM

## 2025-03-05 ENCOUNTER — LAB VISIT (OUTPATIENT)
Dept: LAB | Facility: HOSPITAL | Age: 80
End: 2025-03-05
Attending: OBSTETRICS & GYNECOLOGY
Payer: MEDICARE

## 2025-03-05 DIAGNOSIS — N83.201 CYST OF RIGHT OVARY: ICD-10-CM

## 2025-03-05 LAB
CREAT SERPL-MCNC: 0.7 MG/DL (ref 0.5–1.4)
EST. GFR  (NO RACE VARIABLE): >60 ML/MIN/1.73 M^2

## 2025-03-05 PROCEDURE — 82565 ASSAY OF CREATININE: CPT | Mod: HCNC,PO | Performed by: OBSTETRICS & GYNECOLOGY

## 2025-03-05 PROCEDURE — 36415 COLL VENOUS BLD VENIPUNCTURE: CPT | Mod: HCNC,PO | Performed by: OBSTETRICS & GYNECOLOGY

## 2025-03-11 ENCOUNTER — HOSPITAL ENCOUNTER (OUTPATIENT)
Dept: RADIOLOGY | Facility: HOSPITAL | Age: 80
Discharge: HOME | End: 2025-03-11
Attending: OBSTETRICS & GYNECOLOGY
Payer: MEDICARE

## 2025-03-11 DIAGNOSIS — N83.201 CYST OF RIGHT OVARY: ICD-10-CM

## 2025-03-11 PROCEDURE — A9698 NON-RAD CONTRAST MATERIALNOC: HCPCS | Mod: HCNC,PO | Performed by: OBSTETRICS & GYNECOLOGY

## 2025-03-11 PROCEDURE — 25500020 PHARM REV CODE 255: Mod: HCNC,PO | Performed by: OBSTETRICS & GYNECOLOGY

## 2025-03-11 PROCEDURE — 71260 CT THORAX DX C+: CPT | Mod: 26,HCNC,, | Performed by: RADIOLOGY

## 2025-03-11 PROCEDURE — 74177 CT ABD & PELVIS W/CONTRAST: CPT | Mod: 26,HCNC,, | Performed by: RADIOLOGY

## 2025-03-11 PROCEDURE — 71260 CT THORAX DX C+: CPT | Mod: TC,HCNC,PO

## 2025-03-11 RX ADMIN — IOHEXOL 1000 ML: 9 SOLUTION ORAL at 11:03

## 2025-03-11 RX ADMIN — IOHEXOL 75 ML: 350 INJECTION, SOLUTION INTRAVENOUS at 11:03

## 2025-03-12 ENCOUNTER — TELEPHONE (OUTPATIENT)
Dept: FAMILY MEDICINE | Facility: CLINIC | Age: 80
End: 2025-03-12
Payer: MEDICARE

## 2025-03-12 DIAGNOSIS — R97.8 OTHER ABNORMAL TUMOR MARKERS: ICD-10-CM

## 2025-03-12 DIAGNOSIS — N83.201 RIGHT OVARIAN CYST: Primary | ICD-10-CM

## 2025-03-12 NOTE — PROGRESS NOTES
Called patient to review imaging. Stable right adnexal lesion on CT. Recommend follow-up in 6 months with TVUS and CA-125 prior. Appointments scheduled.

## 2025-03-13 NOTE — TELEPHONE ENCOUNTER
----- Message from Velma sent at 3/12/2025 12:37 PM CDT -----  Regarding: Needs return call  Type: Needs Medical AdviceWho Called:  Mary name and phone #:  [facility] - [place], [place] - [id number] [facility] [address]Wlajb: [phone] Fax: [phone] Call Back Number: [phone] Information: Pt states that her pharmacy [facility] has been trying to get pt meidcations approved, pt wants the nurse to call her directly, regarding her BP medication

## 2025-04-17 ENCOUNTER — OFFICE VISIT (OUTPATIENT)
Dept: FAMILY MEDICINE | Facility: CLINIC | Age: 80
End: 2025-04-17
Payer: MEDICARE

## 2025-04-17 VITALS
BODY MASS INDEX: 21.56 KG/M2 | TEMPERATURE: 98 F | WEIGHT: 121.69 LBS | OXYGEN SATURATION: 98 % | HEIGHT: 63 IN | SYSTOLIC BLOOD PRESSURE: 124 MMHG | DIASTOLIC BLOOD PRESSURE: 60 MMHG | HEART RATE: 78 BPM

## 2025-04-17 DIAGNOSIS — R59.0 CERVICAL LYMPHADENOPATHY: ICD-10-CM

## 2025-04-17 DIAGNOSIS — H70.002 ACUTE MASTOIDITIS WITHOUT COMPLICATIONS, LEFT EAR: Primary | ICD-10-CM

## 2025-04-17 DIAGNOSIS — G47.00 INSOMNIA, UNSPECIFIED TYPE: ICD-10-CM

## 2025-04-17 PROCEDURE — 99999 PR PBB SHADOW E&M-EST. PATIENT-LVL IV: CPT | Mod: PBBFAC,HCNC,, | Performed by: NURSE PRACTITIONER

## 2025-04-17 RX ORDER — CEFDINIR 300 MG/1
300 CAPSULE ORAL 2 TIMES DAILY
Qty: 20 CAPSULE | Refills: 0 | Status: SHIPPED | OUTPATIENT
Start: 2025-04-17 | End: 2025-04-27

## 2025-04-17 RX ORDER — TRAZODONE HYDROCHLORIDE 50 MG/1
100 TABLET ORAL NIGHTLY
Qty: 180 TABLET | Refills: 2 | Status: SHIPPED | OUTPATIENT
Start: 2025-04-17

## 2025-04-17 NOTE — PROGRESS NOTES
Subjective:       Patient ID: Haley Castro is a 80 y.o. female.    Chief Complaint: glands (Patient stated for about 3 days the left and right side of her neck (glands)has been in pain, which resulted in the right side of her face being swollen and behind her ear. )    HPI  Swelling and tenderness to right side of face and neck 2 days ago; symptoms better today. Woke up this am with soreness tenderness behind left ear. No URI symptoms, sore throat, ear pain, or fever. History of right carotid endarterectomy in 2018.    Trazodone no longer helping insomnia. Takes 2 hours to fall asleep. Only taking wellbutrin in the am.  Past Medical History:   Diagnosis Date    Anticoagulant long-term use     aspirin    Arthritis     Bilateral carotid artery stenosis     Carotid artery occlusion     DDD (degenerative disc disease), lumbar     Depression 11/29/2012    Diverticulosis     Former smoker     GERD (gastroesophageal reflux disease)     HLD (hyperlipidemia) 11/29/2012    HTN (hypertension) 11/29/2012    Infectious gastroenteritis     Ischemic colitis     Macular degeneration     OAB (overactive bladder)     PVD (peripheral vascular disease)     Traumatic complete tear of left rotator cuff, subsequent encounter 12/2023       Past Surgical History:   Procedure Laterality Date    APPENDECTOMY      ARTHROSCOPIC REPAIR OF ROTATOR CUFF OF SHOULDER Left 1/3/2024    Procedure: REPAIR, ROTATOR CUFF, ARTHROSCOPIC;  Surgeon: Db Akers II, MD;  Location: [facility] OR;  Service: Orthopedics;  Laterality: Left;    ARTHROSCOPY OF SHOULDER WITH DECOMPRESSION OF SUBACROMIAL SPACE Left 1/3/2024    Procedure: ARTHROSCOPY, SHOULDER, WITH SUBACROMIAL SPACE DECOMPRESSION;  Surgeon: Db Akers II, MD;  Location: [facility] OR;  Service: Orthopedics;  Laterality: Left;    carotid angiogram      CAROTID ENDARTERECTOMY Right 07/30/2018    Procedure: Endarterectomy-Carotid - RIGHT;  Surgeon: Safia Thomas MD;  Location: [facility] OR;  Service:  Cardiovascular;  Laterality: Right;  with patch  angioplasty     SECTION      x 3     COLON SURGERY      COLONOSCOPY N/A 2016    Procedure: COLONOSCOPY;  Surgeon: Grzegorz Sousa Jr., MD;  Location: [facility] ENDO;  Service: Endoscopy;  Laterality: N/A;    COLONOSCOPY  2009    Dr. Reyes [place], repeat in 5 years    COLONOSCOPY N/A 2020    Procedure: COLONOSCOPY;  Surgeon: Grzegorz Sousa Jr., MD;  Location: [facility] ENDO;  Service: Endoscopy;  Laterality: N/A;    COLONOSCOPY N/A 2024    Procedure: COLONOSCOPY;  Surgeon: Guy Hurst MD;  Location: [facility] ENDO;  Service: Endoscopy;  Laterality: N/A;    COLOSTOMY N/A 2019    Procedure: CREATION, COLOSTOMY;  Surgeon: Brandi Alamo MD;  Location: [facility];  Service: General;  Laterality: N/A;    COLOSTOMY CLOSURE      CYSTOSCOPY Left 2019    Procedure: CYSTOSCOPY, Stent placement;  Surgeon: Pablito Roblero MD;  Location: [facility];  Service: Urology;  Laterality: Left;    EPIDURAL STEROID INJECTION INTO LUMBAR SPINE N/A 2019    Procedure: Injection-steroid-epidural-lumbar;  Surgeon: Jakub Greer MD;  Location: [facility];  Service: Pain Management;  Laterality: N/A;  L5/S1 INTERLAMINAR TO RIGHT    EPIDURAL STEROID INJECTION INTO LUMBAR SPINE Right 2019    Procedure: Injection-steroid-epidural-lumbar;  Surgeon: Jakub Greer MD;  Location: [facility];  Service: Pain Management;  Laterality: Right;  L5/S1 to right    EYE SURGERY Bilateral     cataract    FIXATION OF TENDON Left 1/3/2024    Procedure: FIXATION, TENDON-Open biceps tenodesis;  Surgeon: Db Akers II, MD;  Location: [facility] OR;  Service: Orthopedics;  Laterality: Left;    HAND SURGERY Right 2016    tarun noland/Dr. Abhinav Rolle    HEMORRHOID SURGERY      HYSTERECTOMY  age 35    TAHUSO benign reasons    INJECTION OF ANESTHETIC AGENT AROUND MEDIAL BRANCH NERVES INNERVATING LUMBAR FACET JOINT Right 10/15/2018    Procedure:  Block-nerve-medial branch-lumbar L2,3,4,5;  Surgeon: Jakub Greer MD;  Location: [facility] OR;  Service: Pain Management;  Laterality: Right;    JOINT REPLACEMENT Right     1st interphalangeal joint of 3rd finger    LYSIS OF ADHESIONS N/A 01/09/2020    Procedure: LYSIS, ADHESIONS EXTENSIVE;  Surgeon: Brandi Alamo MD;  Location: [facility] OR;  Service: Colon and Rectal;  Laterality: N/A;    MOBILIZATION OF SPLENIC FLEXURE N/A 01/09/2020    Procedure: MOBILIZATION, SPLENIC FLEXURE-TAKEDOWN;  Surgeon: Brandi Alamo MD;  [facility];  Service: Colon and Rectal;  Laterality: N/A;    OOPHORECTOMY      one remains    RADIOFREQUENCY ABLATION OF LUMBAR MEDIAL BRANCH NERVE AT SINGLE LEVEL Right 10/25/2018    Procedure: RADIOFREQUENCY ABLATION, NERVE, SPINAL, LUMBAR, MEDIAL BRANCH, THERMAL- L2,L3,L4,L5;  Surgeon: Jakub Greer MD;  Location: [facility] OR;  Service: Pain Management;  Laterality: Right;    RADIOFREQUENCY ABLATION OF LUMBAR MEDIAL BRANCH NERVE AT SINGLE LEVEL Right 04/12/2021    Procedure: Radiofrequency Ablation, Nerve, Spinal, Lumbar, Medial Branch, L2, 3, 4, 5;  Surgeon: Jakub Greer MD;  Location: [facility] OR;  Service: Pain Management;  Laterality: Right;    ROBOT-ASSISTED REPAIR OF INCISIONAL HERNIA USING DA JACOB XI N/A 10/15/2020    Procedure: XI ROBOTIC REPAIR, HERNIA, INCISIONAL;  Surgeon: Brandi Alamo MD;  Location: [facility] OR;  Service: General;  Laterality: N/A;  ATTEMPTED - CONVERTED TO OPEN PROCEDURE.    SPLENECTOMY N/A 08/12/2019    Procedure: SPLENECTOMY;  Surgeon: Brandi Alamo MD;  Location: [facility] OR;  Service: General;  Laterality: N/A;    TONSILLECTOMY      UPPER GASTROINTESTINAL ENDOSCOPY  12/14/2017    Dr. Sousa: Tiny gastric islands of salmon-colored mucosa in distal esophagus, gastritis; biopsy: esophagus- reflux esophagitis, negative for lisa's, stomach- reactive gastropathy, negative for h pylori       Review of patient's allergies indicates:   Allergen  "Reactions    No known drug allergies        Social History[1]    Medications Ordered Prior to Encounter[2]    Family History   Problem Relation Name Age of Onset    Heart disease Mother  85        MI    Heart disease Father  55        MI    Colon cancer Neg Hx      Colon polyps Neg Hx      Crohn's disease Neg Hx      Ulcerative colitis Neg Hx         Review of Systems    Objective:      /60 (Patient Position: Sitting)   Pulse 78   Temp 98.1 °F (36.7 °C) (Oral)   Ht 5' 3" (1.6 m)   Wt 55.2 kg (121 lb 11.1 oz)   LMP 03/28/1980   SpO2 98%   BMI 21.56 kg/m²   Physical Exam  Vitals and nursing note reviewed.   Constitutional:       General: She is not in acute distress.     Appearance: Normal appearance. She is well-groomed.   HENT:      Head: Normocephalic.      Right Ear: Tympanic membrane, ear canal and external ear normal.      Left Ear: Tympanic membrane and ear canal normal.      Ears:      Comments: Erythema and tenderness over left mastoid     Nose: Nose normal.      Mouth/Throat:      Lips: Pink.      Mouth: Mucous membranes are moist.      Pharynx: Oropharynx is clear.   Eyes:      Extraocular Movements: Extraocular movements intact.      Conjunctiva/sclera: Conjunctivae normal.      Pupils: Pupils are equal, round, and reactive to light.   Cardiovascular:      Rate and Rhythm: Normal rate and regular rhythm.      Heart sounds: Normal heart sounds. No murmur heard.  Pulmonary:      Effort: Pulmonary effort is normal.      Breath sounds: Normal breath sounds.   Chest:      Chest wall: No tenderness.   Abdominal:      General: Bowel sounds are normal.      Palpations: Abdomen is soft.      Tenderness: There is no abdominal tenderness.   Musculoskeletal:         General: No swelling or tenderness. Normal range of motion.      Cervical back: Normal range of motion and neck supple.      Right lower leg: No edema.      Left lower leg: No edema.   Lymphadenopathy:      Cervical: Cervical adenopathy (right " anterior cervical) present.   Skin:     General: Skin is warm and dry.   Neurological:      General: No focal deficit present.      Mental Status: She is alert and oriented to person, place, and time. Mental status is at baseline.      Gait: Gait is intact.   Psychiatric:         Mood and Affect: Mood normal.         Behavior: Behavior normal.         Assessment:       1. Acute mastoiditis without complications, left ear    2. Cervical lymphadenopathy    3. Insomnia, unspecified type        Plan:       Acute mastoiditis without complications, left ear    Cervical lymphadenopathy  -     cefdinir (OMNICEF) 300 MG capsule; Take 1 capsule (300 mg total) by mouth 2 (two) times daily. for 10 days  Dispense: 20 capsule; Refill: 0    Insomnia, unspecified type    Other orders  -     traZODone (DESYREL) 50 MG tablet; Take 2 tablets (100 mg total) by mouth every evening.  Dispense: 180 tablet; Refill: 2        Left mastoiditis: omnicef X 10 days. Go to ER for any worsening symptoms or fever. Will order US neck if lymphadenopathy not resolved. RTC 7 days for recheck    Insomnia: increase trazodone to 100mg             [1]   Social History  Socioeconomic History    Marital status:     Number of children: 3   Occupational History    Occupation: retired     Employer: [employer]   Tobacco Use    Smoking status: Former     Current packs/day: 0.00     Average packs/day: 0.5 packs/day for 48.7 years (24.3 ttl pk-yrs)     Types: Cigarettes     Start date: 1964     Quit date: 3/28/2013     Years since quittin.0    Smokeless tobacco: Never   Substance and Sexual Activity    Alcohol use: Yes     Alcohol/week: 10.0 standard drinks of alcohol     Types: 5 Glasses of wine, 5 Shots of liquor per week     Comment: 0-2 ETOHic drinks per day; wine and gin    Drug use: No    Sexual activity: Yes     Partners: Male   [2]   Current Outpatient Medications on File Prior to Visit   Medication Sig Dispense Refill    ALPRAZolam (XANAX) 0.25  MG tablet Take 1 tablet (0.25 mg total) by mouth daily as needed for Anxiety. 30 tablet 1    aspirin 81 MG Chew       atorvastatin (LIPITOR) 40 MG tablet Take 1 tablet (40 mg total) by mouth every evening. 90 tablet 4    buPROPion (WELLBUTRIN) 75 MG tablet Take 1 tablet (75 mg total) by mouth 2 (two) times daily. 180 tablet 1    carvediloL (COREG) 6.25 MG tablet Take 1 tablet (6.25 mg total) by mouth 2 (two) times daily. 180 tablet 4    DULoxetine (CYMBALTA) 60 MG capsule TAKE 1 CAPSULE EVERY DAY 90 capsule 0    ezetimibe (ZETIA) 10 mg tablet Take 1 tablet (10 mg total) by mouth every evening. 90 tablet 4    oxybutynin (DITROPAN-XL) 5 MG TR24 TAKE 1 TABLET EVERY DAY 90 tablet 3    pantoprazole (PROTONIX) 40 MG tablet TAKE 1 TABLET BEFORE BREAKFAST 90 tablet 3    [DISCONTINUED] traZODone (DESYREL) 50 MG tablet Take 1 tablet (50 mg total) by mouth every evening. 90 tablet 3    diltiaZEM (CARDIZEM CD) 180 MG 24 hr capsule Take 1 capsule (180 mg total) by mouth every evening. (Patient taking differently: Take 180 mg by mouth 2 (two) times a day.) 90 capsule 4    hydrocortisone (ANUSOL-HC) 2.5 % rectal cream Place rectally 2 (two) times daily as needed for Hemorrhoids. (Patient not taking: Reported on 4/17/2025) 28 g 1    [DISCONTINUED] gabapentin (NEURONTIN) 300 MG capsule Take 2 capsules (600 mg total) by mouth 2 (two) times daily. (Patient not taking: Reported on 4/17/2025) 360 capsule 2    [DISCONTINUED] traMADoL (ULTRAM) 50 mg tablet Take 1 tablet (50 mg total) by mouth 3 (three) times daily as needed for Pain. 20 tablet 0     No current facility-administered medications on file prior to visit.

## 2025-04-24 ENCOUNTER — OFFICE VISIT (OUTPATIENT)
Dept: FAMILY MEDICINE | Facility: CLINIC | Age: 80
End: 2025-04-24
Payer: MEDICARE

## 2025-04-24 ENCOUNTER — LAB VISIT (OUTPATIENT)
Dept: LAB | Facility: HOSPITAL | Age: 80
End: 2025-04-24
Attending: NURSE PRACTITIONER
Payer: MEDICARE

## 2025-04-24 VITALS
TEMPERATURE: 98 F | OXYGEN SATURATION: 98 % | BODY MASS INDEX: 21.36 KG/M2 | HEIGHT: 63 IN | DIASTOLIC BLOOD PRESSURE: 74 MMHG | WEIGHT: 120.56 LBS | SYSTOLIC BLOOD PRESSURE: 120 MMHG | HEART RATE: 85 BPM

## 2025-04-24 DIAGNOSIS — L21.9 SEBORRHEIC DERMATITIS: ICD-10-CM

## 2025-04-24 DIAGNOSIS — I78.1 TELANGIECTASIAS: ICD-10-CM

## 2025-04-24 DIAGNOSIS — R22.1 NECK MASS: Primary | ICD-10-CM

## 2025-04-24 DIAGNOSIS — M25.50 ARTHRALGIA, UNSPECIFIED JOINT: ICD-10-CM

## 2025-04-24 PROCEDURE — 36415 COLL VENOUS BLD VENIPUNCTURE: CPT | Mod: HCNC,PO

## 2025-04-24 PROCEDURE — 99999 PR PBB SHADOW E&M-EST. PATIENT-LVL IV: CPT | Mod: PBBFAC,HCNC,, | Performed by: NURSE PRACTITIONER

## 2025-04-24 PROCEDURE — 86038 ANTINUCLEAR ANTIBODIES: CPT

## 2025-04-24 RX ORDER — TRIAMCINOLONE ACETONIDE 0.25 MG/G
CREAM TOPICAL 2 TIMES DAILY
Qty: 15 G | Refills: 0 | Status: SHIPPED | OUTPATIENT
Start: 2025-04-24

## 2025-04-24 NOTE — PROGRESS NOTES
Subjective:       Patient ID: Haley Castro is a 80 y.o. female.    Chief Complaint: Follow-up (Swollen glands)    Follow-up     1 week follow up for possible mastoiditis to left ear and neck mass. Started 10 days of omnicef 7 days ago. Denies any ear pain or mastoid tenderness. Redness behind left ear has turned scaly. No itching. Mass to right neck is still the same size.        Past Medical History:   Diagnosis Date    Anticoagulant long-term use     aspirin    Arthritis     Bilateral carotid artery stenosis     Carotid artery occlusion     DDD (degenerative disc disease), lumbar     Depression 2012    Diverticulosis     Former smoker     GERD (gastroesophageal reflux disease)     HLD (hyperlipidemia) 2012    HTN (hypertension) 2012    Infectious gastroenteritis     Ischemic colitis     Macular degeneration     OAB (overactive bladder)     PVD (peripheral vascular disease)     Traumatic complete tear of left rotator cuff, subsequent encounter 2023       Past Surgical History:   Procedure Laterality Date    APPENDECTOMY      ARTHROSCOPIC REPAIR OF ROTATOR CUFF OF SHOULDER Left 1/3/2024    Procedure: REPAIR, ROTATOR CUFF, ARTHROSCOPIC;  Surgeon: Db Akers II, MD;  Location: [facility] OR;  Service: Orthopedics;  Laterality: Left;    ARTHROSCOPY OF SHOULDER WITH DECOMPRESSION OF SUBACROMIAL SPACE Left 1/3/2024    Procedure: ARTHROSCOPY, SHOULDER, WITH SUBACROMIAL SPACE DECOMPRESSION;  Surgeon: Db Akers II, MD;  Location: [facility] OR;  Service: Orthopedics;  Laterality: Left;    carotid angiogram      CAROTID ENDARTERECTOMY Right 2018    Procedure: Endarterectomy-Carotid - RIGHT;  Surgeon: Safia Thomas MD;  Location: [facility] OR;  Service: Cardiovascular;  Laterality: Right;  with patch  angioplasty     SECTION      x 3     COLON SURGERY      COLONOSCOPY N/A 2016    Procedure: COLONOSCOPY;  Surgeon: Grzegorz Sousa Jr., MD;  Location: [facility] ENDO;  Service: Endoscopy;   Laterality: N/A;    COLONOSCOPY  04/14/2009    Dr. Reyes [place], repeat in 5 years    COLONOSCOPY N/A 01/08/2020    Procedure: COLONOSCOPY;  Surgeon: Grzegorz Sousa Jr., MD;  Location: [facility] ENDO;  Service: Endoscopy;  Laterality: N/A;    COLONOSCOPY N/A 5/22/2024    Procedure: COLONOSCOPY;  Surgeon: Guy Hurst MD;  Location: [facility] ENDO;  Service: Endoscopy;  Laterality: N/A;    COLOSTOMY N/A 08/12/2019    Procedure: CREATION, COLOSTOMY;  Surgeon: Brandi Alamo MD;  Location: [facility] OR;  Service: General;  Laterality: N/A;    COLOSTOMY CLOSURE      CYSTOSCOPY Left 08/12/2019    Procedure: CYSTOSCOPY, Stent placement;  Surgeon: Pablito Roblero MD;  Location: [facility] OR;  Service: Urology;  Laterality: Left;    EPIDURAL STEROID INJECTION INTO LUMBAR SPINE N/A 05/06/2019    Procedure: Injection-steroid-epidural-lumbar;  Surgeon: Jakub Greer MD;  Location: [facility] OR;  Service: Pain Management;  Laterality: N/A;  L5/S1 INTERLAMINAR TO RIGHT    EPIDURAL STEROID INJECTION INTO LUMBAR SPINE Right 06/19/2019    Procedure: Injection-steroid-epidural-lumbar;  Surgeon: Jakub Greer MD;  Location: [facility];  Service: Pain Management;  Laterality: Right;  L5/S1 to right    EYE SURGERY Bilateral     cataract    FIXATION OF TENDON Left 1/3/2024    Procedure: FIXATION, TENDON-Open biceps tenodesis;  Surgeon: Db Akers II, MD;  Location: [facility] OR;  Service: Orthopedics;  Laterality: Left;    HAND SURGERY Right 05/2016    knuckle implant/Dr. Abhinav Rolle    HEMORRHOID SURGERY      HYSTERECTOMY  age 35    TAHUSO benign reasons    INJECTION OF ANESTHETIC AGENT AROUND MEDIAL BRANCH NERVES INNERVATING LUMBAR FACET JOINT Right 10/15/2018    Procedure: Block-nerve-medial branch-lumbar L2,3,4,5;  Surgeon: Jakub Greer MD;  Location: [facility];  Service: Pain Management;  Laterality: Right;    JOINT REPLACEMENT Right     1st interphalangeal joint of 3rd finger    LYSIS OF ADHESIONS N/A 01/09/2020     Procedure: LYSIS, ADHESIONS EXTENSIVE;  Surgeon: Brandi Alamo MD;  Location: [facility] OR;  Service: Colon and Rectal;  Laterality: N/A;    MOBILIZATION OF SPLENIC FLEXURE N/A 01/09/2020    Procedure: MOBILIZATION, SPLENIC FLEXURE-TAKEDOWN;  Surgeon: Brandi Alamo MD;  Location: [facility] OR;  Service: Colon and Rectal;  Laterality: N/A;    OOPHORECTOMY      one remains    RADIOFREQUENCY ABLATION OF LUMBAR MEDIAL BRANCH NERVE AT SINGLE LEVEL Right 10/25/2018    Procedure: RADIOFREQUENCY ABLATION, NERVE, SPINAL, LUMBAR, MEDIAL BRANCH, THERMAL- L2,L3,L4,L5;  Surgeon: Jakub Greer MD;  Location: [facility] OR;  Service: Pain Management;  Laterality: Right;    RADIOFREQUENCY ABLATION OF LUMBAR MEDIAL BRANCH NERVE AT SINGLE LEVEL Right 04/12/2021    Procedure: Radiofrequency Ablation, Nerve, Spinal, Lumbar, Medial Branch, L2, 3, 4, 5;  Surgeon: Jakub Greer MD;  Location: [facility] OR;  Service: Pain Management;  Laterality: Right;    ROBOT-ASSISTED REPAIR OF INCISIONAL HERNIA USING DA JACOB XI N/A 10/15/2020    Procedure: XI ROBOTIC REPAIR, HERNIA, INCISIONAL;  Surgeon: Brandi Alamo MD;  Location: [facility] OR;  Service: General;  Laterality: N/A;  ATTEMPTED - CONVERTED TO OPEN PROCEDURE.    SPLENECTOMY N/A 08/12/2019    Procedure: SPLENECTOMY;  Surgeon: Brandi Alamo MD;  Location: [facility] OR;  Service: General;  Laterality: N/A;    TONSILLECTOMY      UPPER GASTROINTESTINAL ENDOSCOPY  12/14/2017    Dr. Sousa: Tiny gastric islands of salmon-colored mucosa in distal esophagus, gastritis; biopsy: esophagus- reflux esophagitis, negative for lisa's, stomach- reactive gastropathy, negative for h pylori       Review of patient's allergies indicates:   Allergen Reactions    No known drug allergies        Social History[1]    Medications Ordered Prior to Encounter[2]    Family History   Problem Relation Name Age of Onset    Heart disease Mother  85        MI    Heart disease Father  55        MI    Colon cancer  "Neg Hx      Colon polyps Neg Hx      Crohn's disease Neg Hx      Ulcerative colitis Neg Hx         Review of Systems   Hematological:  Positive for adenopathy.       Objective:      /74 (Patient Position: Sitting)   Pulse 85   Temp 97.5 °F (36.4 °C) (Oral)   Ht 5' 3" (1.6 m)   Wt 54.7 kg (120 lb 9.5 oz)   LMP 03/28/1980   SpO2 98%   BMI 21.36 kg/m²   Physical Exam  Vitals and nursing note reviewed.   Constitutional:       General: She is not in acute distress.     Appearance: Normal appearance. She is well-groomed.   HENT:      Head: Normocephalic.      Right Ear: Tympanic membrane, ear canal and external ear normal.      Left Ear: Tympanic membrane, ear canal and external ear normal.      Nose: Nose normal.      Mouth/Throat:      Lips: Pink.      Mouth: Mucous membranes are moist.      Pharynx: Oropharynx is clear. No oropharyngeal exudate or posterior oropharyngeal erythema.   Eyes:      Extraocular Movements: Extraocular movements intact.      Conjunctiva/sclera: Conjunctivae normal.      Pupils: Pupils are equal, round, and reactive to light.   Cardiovascular:      Rate and Rhythm: Normal rate and regular rhythm.      Heart sounds: Normal heart sounds. No murmur heard.  Pulmonary:      Effort: Pulmonary effort is normal.      Breath sounds: Normal breath sounds.   Chest:      Chest wall: No tenderness.   Abdominal:      General: Bowel sounds are normal.      Palpations: Abdomen is soft.      Tenderness: There is no abdominal tenderness.   Musculoskeletal:         General: No swelling or tenderness. Normal range of motion.      Cervical back: Normal range of motion and neck supple.      Right lower leg: No edema.      Left lower leg: No edema.   Lymphadenopathy:      Cervical: Cervical adenopathy: right anterior.   Skin:     General: Skin is warm and dry.      Findings: Rash (erythematous scaly patch behind left ear. multiple telangiectasias to chest and neck) present.   Neurological:      General: " No focal deficit present.      Mental Status: She is alert and oriented to person, place, and time. Mental status is at baseline.      Gait: Gait is intact.   Psychiatric:         Mood and Affect: Mood normal.         Behavior: Behavior normal.         Assessment:       1. Neck mass    2. Telangiectasias    3. Arthralgia, unspecified joint    4. Seborrheic dermatitis        Plan:       Neck mass  -     US Soft Tissue Head Neck; Future; Expected date: 2025    Telangiectasias  -     CAM; Future; Expected date: 2025    Arthralgia, unspecified joint  -     CAM; Future; Expected date: 2025    Seborrheic dermatitis    Other orders  -     triamcinolone acetonide 0.025% (KENALOG) 0.025 % cream; Apply topically 2 (two) times daily. Behind left ear for up to 14 days  Dispense: 15 g; Refill: 0        Neck mass: US neck for further eval    Will treat for seborrheic dermatitis behind left ear with triamcinolone cream    CAM to evaluate telangiectasias.            [1]   Social History  Socioeconomic History    Marital status:     Number of children: 3   Occupational History    Occupation: retired     Employer: [employer]   Tobacco Use    Smoking status: Former     Current packs/day: 0.00     Average packs/day: 0.5 packs/day for 48.7 years (24.3 ttl pk-yrs)     Types: Cigarettes     Start date: 1964     Quit date: 3/28/2013     Years since quittin.0    Smokeless tobacco: Never   Substance and Sexual Activity    Alcohol use: Yes     Alcohol/week: 10.0 standard drinks of alcohol     Types: 5 Glasses of wine, 5 Shots of liquor per week     Comment: 0-2 ETOHic drinks per day; wine and gin    Drug use: No    Sexual activity: Yes     Partners: Male   [2]   Current Outpatient Medications on File Prior to Visit   Medication Sig Dispense Refill    ALPRAZolam (XANAX) 0.25 MG tablet Take 1 tablet (0.25 mg total) by mouth daily as needed for Anxiety. 30 tablet 1    aspirin 81 MG Chew       atorvastatin (LIPITOR)  40 MG tablet Take 1 tablet (40 mg total) by mouth every evening. 90 tablet 4    buPROPion (WELLBUTRIN) 75 MG tablet Take 1 tablet (75 mg total) by mouth 2 (two) times daily. 180 tablet 1    carvediloL (COREG) 6.25 MG tablet Take 1 tablet (6.25 mg total) by mouth 2 (two) times daily. 180 tablet 4    cefdinir (OMNICEF) 300 MG capsule Take 1 capsule (300 mg total) by mouth 2 (two) times daily. for 10 days 20 capsule 0    diltiaZEM (CARDIZEM CD) 180 MG 24 hr capsule Take 1 capsule (180 mg total) by mouth every evening. (Patient taking differently: Take 180 mg by mouth 2 (two) times a day.) 90 capsule 4    DULoxetine (CYMBALTA) 60 MG capsule TAKE 1 CAPSULE EVERY DAY 90 capsule 0    ezetimibe (ZETIA) 10 mg tablet Take 1 tablet (10 mg total) by mouth every evening. 90 tablet 4    hydrocortisone (ANUSOL-HC) 2.5 % rectal cream Place rectally 2 (two) times daily as needed for Hemorrhoids. (Patient not taking: Reported on 4/17/2025) 28 g 1    oxybutynin (DITROPAN-XL) 5 MG TR24 TAKE 1 TABLET EVERY DAY 90 tablet 3    pantoprazole (PROTONIX) 40 MG tablet TAKE 1 TABLET BEFORE BREAKFAST 90 tablet 3    traZODone (DESYREL) 50 MG tablet Take 2 tablets (100 mg total) by mouth every evening. 180 tablet 2     No current facility-administered medications on file prior to visit.

## 2025-04-25 LAB — ANA (OHS): NORMAL

## 2025-04-28 ENCOUNTER — RESULTS FOLLOW-UP (OUTPATIENT)
Dept: FAMILY MEDICINE | Facility: CLINIC | Age: 80
End: 2025-04-28

## 2025-04-28 ENCOUNTER — HOSPITAL ENCOUNTER (OUTPATIENT)
Dept: RADIOLOGY | Facility: HOSPITAL | Age: 80
Discharge: HOME | End: 2025-04-28
Attending: NURSE PRACTITIONER
Payer: MEDICARE

## 2025-04-28 DIAGNOSIS — R22.1 NECK MASS: ICD-10-CM

## 2025-04-28 PROCEDURE — 76536 US EXAM OF HEAD AND NECK: CPT | Mod: TC,PO

## 2025-04-28 PROCEDURE — 76536 US EXAM OF HEAD AND NECK: CPT | Mod: 26,,, | Performed by: RADIOLOGY

## 2025-04-29 ENCOUNTER — TELEPHONE (OUTPATIENT)
Dept: FAMILY MEDICINE | Facility: CLINIC | Age: 80
End: 2025-04-29
Payer: MEDICARE

## 2025-04-29 DIAGNOSIS — R93.89 ABNORMAL ULTRASOUND OF CAROTID ARTERY: ICD-10-CM

## 2025-04-29 DIAGNOSIS — R22.1 NECK MASS: Primary | ICD-10-CM

## 2025-04-29 NOTE — TELEPHONE ENCOUNTER
Spoke with patient, she stated she had US done on 04/28/2025, stated they asked her to come back in but does not know why. Stated she's on pins and needles because she wants someone to go over results.

## 2025-04-29 NOTE — TELEPHONE ENCOUNTER
Discussed US neck results with pt. Pt states she is still having some tenderness to right neck. Will order CTA neck to evaluate possible carotid aneurysm. Pt verbalized understandng.

## 2025-04-29 NOTE — TELEPHONE ENCOUNTER
----- Message from Tammy sent at 4/29/2025 10:41 AM CDT -----  Contact: Self  Type:  Test ResultsWho Called:  PatientName of Test (Lab/Mammo/Etc):  USDate of Test:  04/28Ordering Provider:  Rupal Chacon NPWhere the test was performed:  NSBest Call Back Number:  [phone] Information:  Thank You

## 2025-05-13 ENCOUNTER — RESULTS FOLLOW-UP (OUTPATIENT)
Dept: FAMILY MEDICINE | Facility: CLINIC | Age: 80
End: 2025-05-13

## 2025-05-13 ENCOUNTER — HOSPITAL ENCOUNTER (OUTPATIENT)
Dept: RADIOLOGY | Facility: HOSPITAL | Age: 80
Discharge: HOME | End: 2025-05-13
Attending: NURSE PRACTITIONER
Payer: MEDICARE

## 2025-05-13 ENCOUNTER — TELEPHONE (OUTPATIENT)
Dept: FAMILY MEDICINE | Facility: CLINIC | Age: 80
End: 2025-05-13
Payer: MEDICARE

## 2025-05-13 DIAGNOSIS — R93.89 ABNORMAL ULTRASOUND OF CAROTID ARTERY: ICD-10-CM

## 2025-05-13 DIAGNOSIS — R22.1 NECK MASS: ICD-10-CM

## 2025-05-13 PROCEDURE — 70498 CT ANGIOGRAPHY NECK: CPT | Mod: TC,PO

## 2025-05-13 PROCEDURE — 25500020 PHARM REV CODE 255: Mod: PO | Performed by: NURSE PRACTITIONER

## 2025-05-13 PROCEDURE — 70498 CT ANGIOGRAPHY NECK: CPT | Mod: 26,,, | Performed by: RADIOLOGY

## 2025-05-13 RX ADMIN — IOHEXOL 100 ML: 350 INJECTION, SOLUTION INTRAVENOUS at 08:05

## 2025-05-13 NOTE — TELEPHONE ENCOUNTER
Discussed CTA neck with pt and informed her of 60% stenosis of left carotid artery and mild to moderate narrowing of left vertebral artery. Continue atorvastatin daily. Pt is a former smoker and BP controlled. Repeat US carotid in 1 year. Will have lipids drawn before next visit with PCP in 7/2025. Pt verbalized understanding.

## 2025-06-06 DIAGNOSIS — F32.A DEPRESSION, UNSPECIFIED DEPRESSION TYPE: ICD-10-CM

## 2025-06-06 RX ORDER — BUPROPION HYDROCHLORIDE 75 MG/1
75 TABLET ORAL 2 TIMES DAILY
Qty: 180 TABLET | Refills: 1 | Status: SHIPPED | OUTPATIENT
Start: 2025-06-06 | End: 2026-06-06

## 2025-06-06 RX ORDER — DULOXETIN HYDROCHLORIDE 60 MG/1
60 CAPSULE, DELAYED RELEASE ORAL DAILY
Qty: 90 CAPSULE | Refills: 0 | Status: SHIPPED | OUTPATIENT
Start: 2025-06-06

## 2025-06-23 NOTE — TELEPHONE ENCOUNTER
----- Message from Priscilla Shepard sent at 8/31/2018  3:17 PM CDT -----  Contact: self   Patient want to speak with a nurse regarding blood pressure being 165/86 please call back at [phone] (home)      impaired balance/narrow base of support/decreased strength

## 2025-07-24 ENCOUNTER — OFFICE VISIT (OUTPATIENT)
Dept: FAMILY MEDICINE | Facility: CLINIC | Age: 80
End: 2025-07-24
Payer: MEDICARE

## 2025-07-24 VITALS
WEIGHT: 116.38 LBS | HEART RATE: 60 BPM | DIASTOLIC BLOOD PRESSURE: 86 MMHG | SYSTOLIC BLOOD PRESSURE: 120 MMHG | BODY MASS INDEX: 20.62 KG/M2 | HEIGHT: 63 IN | OXYGEN SATURATION: 96 %

## 2025-07-24 DIAGNOSIS — I65.23 BILATERAL CAROTID ARTERY STENOSIS: ICD-10-CM

## 2025-07-24 DIAGNOSIS — E78.2 MIXED HYPERLIPIDEMIA: ICD-10-CM

## 2025-07-24 DIAGNOSIS — I10 ESSENTIAL HYPERTENSION: ICD-10-CM

## 2025-07-24 DIAGNOSIS — N32.81 OAB (OVERACTIVE BLADDER): ICD-10-CM

## 2025-07-24 DIAGNOSIS — Z00.00 WELLNESS EXAMINATION: Primary | ICD-10-CM

## 2025-07-24 PROBLEM — Z72.0 TOBACCO USE: Status: RESOLVED | Noted: 2022-02-28 | Resolved: 2025-07-24

## 2025-07-24 PROCEDURE — 3074F SYST BP LT 130 MM HG: CPT | Mod: CPTII,HCNC,S$GLB, | Performed by: FAMILY MEDICINE

## 2025-07-24 PROCEDURE — 99397 PER PM REEVAL EST PAT 65+ YR: CPT | Mod: HCNC,S$GLB,, | Performed by: FAMILY MEDICINE

## 2025-07-24 PROCEDURE — 1101F PT FALLS ASSESS-DOCD LE1/YR: CPT | Mod: CPTII,HCNC,S$GLB, | Performed by: FAMILY MEDICINE

## 2025-07-24 PROCEDURE — 1159F MED LIST DOCD IN RCRD: CPT | Mod: CPTII,HCNC,S$GLB, | Performed by: FAMILY MEDICINE

## 2025-07-24 PROCEDURE — 3288F FALL RISK ASSESSMENT DOCD: CPT | Mod: CPTII,HCNC,S$GLB, | Performed by: FAMILY MEDICINE

## 2025-07-24 PROCEDURE — 99999 PR PBB SHADOW E&M-EST. PATIENT-LVL III: CPT | Mod: PBBFAC,HCNC,, | Performed by: FAMILY MEDICINE

## 2025-07-24 PROCEDURE — 3079F DIAST BP 80-89 MM HG: CPT | Mod: CPTII,HCNC,S$GLB, | Performed by: FAMILY MEDICINE

## 2025-07-24 NOTE — PROGRESS NOTES
Subjective:       Patient ID: Haley Castro is a 80 y.o. female.    Chief Complaint: Follow-up    Here for wellness and follow up multiple chronic medical issues. Doing well overall and in normal state of health.      Follow-up  Pertinent negatives include no chest pain, chills, coughing or fever.     Review of Systems   Constitutional:  Negative for chills and fever.   Respiratory:  Negative for cough, chest tightness and shortness of breath.    Cardiovascular:  Negative for chest pain, palpitations and leg swelling.   Endocrine: Negative for cold intolerance and heat intolerance.   Psychiatric/Behavioral:  Negative for decreased concentration. The patient is not nervous/anxious.        Objective:      Physical Exam  Vitals and nursing note reviewed.   Constitutional:       Appearance: She is well-developed.   HENT:      Head: Normocephalic and atraumatic.   Cardiovascular:      Rate and Rhythm: Normal rate and regular rhythm.      Heart sounds: Normal heart sounds.   Pulmonary:      Effort: Pulmonary effort is normal.      Breath sounds: Normal breath sounds.   Psychiatric:         Mood and Affect: Mood normal.         Behavior: Behavior normal.         Assessment:       1. Wellness examination    2. Mixed hyperlipidemia    3. Essential hypertension    4. Bilateral carotid artery stenosis    5. OAB (overactive bladder)        Plan:       Wellness examination    Mixed hyperlipidemia  -     TSH; Future; Expected date: 07/24/2025  -     Lipid Panel; Future; Expected date: 07/24/2025  -     Comprehensive Metabolic Panel; Future; Expected date: 07/24/2025  -     CBC Without Differential; Future; Expected date: 07/24/2025    Essential hypertension  -     TSH; Future; Expected date: 07/24/2025  -     Lipid Panel; Future; Expected date: 07/24/2025  -     Comprehensive Metabolic Panel; Future; Expected date: 07/24/2025  -     CBC Without Differential; Future; Expected date: 07/24/2025    Bilateral carotid artery  stenosis  -     US Carotid Bilateral; Future; Expected date: 07/24/2025    OAB (overactive bladder)  -     Ambulatory referral/consult to Urology; Future; Expected date: 07/31/2025      Htn stable   Lipid stable but due for labs    Follow up in about 6 months (around 1/24/2026), or if symptoms worsen or fail to improve.

## 2025-07-29 PROBLEM — M75.21 TENDINITIS OF LONG HEAD OF BICEPS BRACHII OF RIGHT SHOULDER: Status: ACTIVE | Noted: 2025-07-29

## 2025-07-29 PROBLEM — M19.011 ARTHRITIS OF RIGHT SHOULDER: Status: ACTIVE | Noted: 2025-07-29

## 2025-08-18 DIAGNOSIS — F32.A DEPRESSION, UNSPECIFIED DEPRESSION TYPE: ICD-10-CM

## 2025-08-18 RX ORDER — DULOXETIN HYDROCHLORIDE 60 MG/1
60 CAPSULE, DELAYED RELEASE ORAL
Qty: 90 CAPSULE | Refills: 2 | Status: SHIPPED | OUTPATIENT
Start: 2025-08-18

## (undated) DEVICE — TRAY NERVE BLOCK

## (undated) DEVICE — PAD ELECTROSURGICAL PAT PLATE

## (undated) DEVICE — SYR GLASS 5CC LUER LOK

## (undated) DEVICE — APPLICATOR CHLORAPREP CLR 10.5

## (undated) DEVICE — SEE MEDLINE ITEM 152622

## (undated) DEVICE — CANNULA CVD 100MM X 20G

## (undated) DEVICE — MARKER SKIN STND TIP BLUE BARR

## (undated) DEVICE — NDL SPINAL SPINOCAN 22GX3.5

## (undated) DEVICE — NDL TUOHY EPIDURAL 20G X 3.5

## (undated) DEVICE — NDL 18GA X1 1/2 REG BEVEL

## (undated) DEVICE — GLOVE SURGICAL LATEX SZ 7